# Patient Record
Sex: MALE | Race: WHITE | NOT HISPANIC OR LATINO | Employment: OTHER | URBAN - METROPOLITAN AREA
[De-identification: names, ages, dates, MRNs, and addresses within clinical notes are randomized per-mention and may not be internally consistent; named-entity substitution may affect disease eponyms.]

---

## 2017-02-13 ENCOUNTER — GENERIC CONVERSION - ENCOUNTER (OUTPATIENT)
Dept: OTHER | Facility: OTHER | Age: 62
End: 2017-02-13

## 2017-04-25 ENCOUNTER — TRANSCRIBE ORDERS (OUTPATIENT)
Dept: ADMINISTRATIVE | Facility: HOSPITAL | Age: 62
End: 2017-04-25

## 2017-04-25 ENCOUNTER — APPOINTMENT (OUTPATIENT)
Dept: LAB | Facility: MEDICAL CENTER | Age: 62
End: 2017-04-25
Payer: MEDICARE

## 2017-04-25 DIAGNOSIS — I10 ESSENTIAL (PRIMARY) HYPERTENSION: ICD-10-CM

## 2017-04-25 LAB
ALBUMIN SERPL BCP-MCNC: 3.6 G/DL (ref 3.5–5)
ALP SERPL-CCNC: 95 U/L (ref 46–116)
ALT SERPL W P-5'-P-CCNC: 21 U/L (ref 12–78)
ANION GAP SERPL CALCULATED.3IONS-SCNC: 6 MMOL/L (ref 4–13)
AST SERPL W P-5'-P-CCNC: 14 U/L (ref 5–45)
BASOPHILS # BLD AUTO: 0.04 THOUSANDS/ΜL (ref 0–0.1)
BASOPHILS NFR BLD AUTO: 1 % (ref 0–1)
BILIRUB SERPL-MCNC: 0.69 MG/DL (ref 0.2–1)
BILIRUB UR QL STRIP: NEGATIVE
BUN SERPL-MCNC: 14 MG/DL (ref 5–25)
CALCIUM SERPL-MCNC: 8.8 MG/DL (ref 8.3–10.1)
CHLORIDE SERPL-SCNC: 106 MMOL/L (ref 100–108)
CHOLEST SERPL-MCNC: 136 MG/DL (ref 50–200)
CLARITY UR: NORMAL
CO2 SERPL-SCNC: 29 MMOL/L (ref 21–32)
COLOR UR: YELLOW
CREAT SERPL-MCNC: 0.74 MG/DL (ref 0.6–1.3)
EOSINOPHIL # BLD AUTO: 0.15 THOUSAND/ΜL (ref 0–0.61)
EOSINOPHIL NFR BLD AUTO: 3 % (ref 0–6)
ERYTHROCYTE [DISTWIDTH] IN BLOOD BY AUTOMATED COUNT: 13.4 % (ref 11.6–15.1)
GFR SERPL CREATININE-BSD FRML MDRD: >60 ML/MIN/1.73SQ M
GLUCOSE P FAST SERPL-MCNC: 105 MG/DL (ref 65–99)
GLUCOSE UR STRIP-MCNC: NEGATIVE MG/DL
HCT VFR BLD AUTO: 46.8 % (ref 36.5–49.3)
HDLC SERPL-MCNC: 33 MG/DL (ref 40–60)
HGB BLD-MCNC: 15.6 G/DL (ref 12–17)
HGB UR QL STRIP.AUTO: NEGATIVE
KETONES UR STRIP-MCNC: NEGATIVE MG/DL
LDLC SERPL CALC-MCNC: 89 MG/DL (ref 0–100)
LEUKOCYTE ESTERASE UR QL STRIP: NEGATIVE
LYMPHOCYTES # BLD AUTO: 1.15 THOUSANDS/ΜL (ref 0.6–4.47)
LYMPHOCYTES NFR BLD AUTO: 22 % (ref 14–44)
MCH RBC QN AUTO: 32.6 PG (ref 26.8–34.3)
MCHC RBC AUTO-ENTMCNC: 33.3 G/DL (ref 31.4–37.4)
MCV RBC AUTO: 98 FL (ref 82–98)
MONOCYTES # BLD AUTO: 0.43 THOUSAND/ΜL (ref 0.17–1.22)
MONOCYTES NFR BLD AUTO: 8 % (ref 4–12)
NEUTROPHILS # BLD AUTO: 3.35 THOUSANDS/ΜL (ref 1.85–7.62)
NEUTS SEG NFR BLD AUTO: 66 % (ref 43–75)
NITRITE UR QL STRIP: NEGATIVE
NRBC BLD AUTO-RTO: 0 /100 WBCS
PH UR STRIP.AUTO: 7 [PH] (ref 4.5–8)
PLATELET # BLD AUTO: 220 THOUSANDS/UL (ref 149–390)
PMV BLD AUTO: 10 FL (ref 8.9–12.7)
POTASSIUM SERPL-SCNC: 4 MMOL/L (ref 3.5–5.3)
PROT SERPL-MCNC: 7 G/DL (ref 6.4–8.2)
PROT UR STRIP-MCNC: NEGATIVE MG/DL
RBC # BLD AUTO: 4.78 MILLION/UL (ref 3.88–5.62)
SODIUM SERPL-SCNC: 141 MMOL/L (ref 136–145)
SP GR UR STRIP.AUTO: 1 (ref 1–1.03)
TRIGL SERPL-MCNC: 68 MG/DL
TSH SERPL DL<=0.05 MIU/L-ACNC: 1.05 UIU/ML (ref 0.36–3.74)
UROBILINOGEN UR QL STRIP.AUTO: 0.2 E.U./DL
WBC # BLD AUTO: 5.14 THOUSAND/UL (ref 4.31–10.16)

## 2017-04-25 PROCEDURE — 85025 COMPLETE CBC W/AUTO DIFF WBC: CPT

## 2017-04-25 PROCEDURE — 36415 COLL VENOUS BLD VENIPUNCTURE: CPT

## 2017-04-25 PROCEDURE — 80061 LIPID PANEL: CPT

## 2017-04-25 PROCEDURE — 80053 COMPREHEN METABOLIC PANEL: CPT

## 2017-04-25 PROCEDURE — 84443 ASSAY THYROID STIM HORMONE: CPT

## 2017-04-25 PROCEDURE — 81003 URINALYSIS AUTO W/O SCOPE: CPT

## 2017-05-02 ENCOUNTER — ALLSCRIPTS OFFICE VISIT (OUTPATIENT)
Dept: OTHER | Facility: OTHER | Age: 62
End: 2017-05-02

## 2017-06-07 ENCOUNTER — ALLSCRIPTS OFFICE VISIT (OUTPATIENT)
Dept: OTHER | Facility: OTHER | Age: 62
End: 2017-06-07

## 2017-06-21 ENCOUNTER — TRANSCRIBE ORDERS (OUTPATIENT)
Dept: ADMINISTRATIVE | Facility: HOSPITAL | Age: 62
End: 2017-06-21

## 2017-06-21 ENCOUNTER — ALLSCRIPTS OFFICE VISIT (OUTPATIENT)
Dept: OTHER | Facility: OTHER | Age: 62
End: 2017-06-21

## 2017-06-21 DIAGNOSIS — M51.36 OTHER INTERVERTEBRAL DISC DEGENERATION, LUMBAR REGION: ICD-10-CM

## 2017-06-21 DIAGNOSIS — M51.26 OTHER INTERVERTEBRAL DISC DISPLACEMENT, LUMBAR REGION: ICD-10-CM

## 2017-06-21 DIAGNOSIS — M54.16 RADICULOPATHY OF LUMBAR REGION: ICD-10-CM

## 2017-06-21 DIAGNOSIS — M54.50 LOW BACK PAIN: ICD-10-CM

## 2017-06-21 DIAGNOSIS — M48.061 SPINAL STENOSIS OF LUMBAR REGION: ICD-10-CM

## 2017-06-21 DIAGNOSIS — M47.816 SPONDYLOSIS OF LUMBAR REGION WITHOUT MYELOPATHY OR RADICULOPATHY: ICD-10-CM

## 2017-06-21 DIAGNOSIS — M99.83 OTHER BIOMECHANICAL LESIONS OF LUMBAR REGION: ICD-10-CM

## 2017-06-21 DIAGNOSIS — M54.16 LUMBAR RADICULOPATHY: Primary | ICD-10-CM

## 2017-06-21 DIAGNOSIS — M51.26 DISPLACEMENT OF LUMBAR INTERVERTEBRAL DISC WITHOUT MYELOPATHY: ICD-10-CM

## 2017-06-29 ENCOUNTER — HOSPITAL ENCOUNTER (OUTPATIENT)
Dept: RADIOLOGY | Facility: HOSPITAL | Age: 62
Discharge: HOME | End: 2017-06-29
Payer: MEDICARE

## 2017-06-29 ENCOUNTER — HOSPITAL ENCOUNTER (OUTPATIENT)
Dept: MRI IMAGING | Facility: HOSPITAL | Age: 62
Discharge: HOME | End: 2017-06-29
Payer: MEDICARE

## 2017-06-29 DIAGNOSIS — M54.16 RADICULOPATHY OF LUMBAR REGION: ICD-10-CM

## 2017-06-29 DIAGNOSIS — M47.816 SPONDYLOSIS OF LUMBAR REGION WITHOUT MYELOPATHY OR RADICULOPATHY: ICD-10-CM

## 2017-06-29 DIAGNOSIS — M51.36 OTHER INTERVERTEBRAL DISC DEGENERATION, LUMBAR REGION: ICD-10-CM

## 2017-06-29 DIAGNOSIS — M99.83 OTHER BIOMECHANICAL LESIONS OF LUMBAR REGION: ICD-10-CM

## 2017-06-29 DIAGNOSIS — M51.26 OTHER INTERVERTEBRAL DISC DISPLACEMENT, LUMBAR REGION: ICD-10-CM

## 2017-06-29 DIAGNOSIS — M54.50 LOW BACK PAIN: ICD-10-CM

## 2017-06-29 DIAGNOSIS — M48.061 SPINAL STENOSIS OF LUMBAR REGION: ICD-10-CM

## 2017-06-29 PROCEDURE — 72148 MRI LUMBAR SPINE W/O DYE: CPT

## 2017-06-29 PROCEDURE — 72114 X-RAY EXAM L-S SPINE BENDING: CPT

## 2017-07-10 ENCOUNTER — ALLSCRIPTS OFFICE VISIT (OUTPATIENT)
Dept: OTHER | Facility: OTHER | Age: 62
End: 2017-07-10

## 2017-07-12 ENCOUNTER — APPOINTMENT (OUTPATIENT)
Dept: PHYSICAL THERAPY | Facility: MEDICAL CENTER | Age: 62
End: 2017-07-12
Payer: MEDICARE

## 2017-07-12 ENCOUNTER — GENERIC CONVERSION - ENCOUNTER (OUTPATIENT)
Dept: OTHER | Facility: OTHER | Age: 62
End: 2017-07-12

## 2017-07-12 PROCEDURE — 97162 PT EVAL MOD COMPLEX 30 MIN: CPT

## 2017-07-12 PROCEDURE — G8994 SUB PT/OT GOAL STATUS: HCPCS

## 2017-07-12 PROCEDURE — G8993 SUB PT/OT CURRENT STATUS: HCPCS

## 2017-07-17 ENCOUNTER — APPOINTMENT (OUTPATIENT)
Dept: PHYSICAL THERAPY | Facility: MEDICAL CENTER | Age: 62
End: 2017-07-17
Payer: MEDICARE

## 2017-07-17 PROCEDURE — 97110 THERAPEUTIC EXERCISES: CPT

## 2017-07-17 PROCEDURE — 97012 MECHANICAL TRACTION THERAPY: CPT

## 2017-07-19 ENCOUNTER — APPOINTMENT (OUTPATIENT)
Dept: PHYSICAL THERAPY | Facility: MEDICAL CENTER | Age: 62
End: 2017-07-19
Payer: MEDICARE

## 2017-07-19 PROCEDURE — 97012 MECHANICAL TRACTION THERAPY: CPT

## 2017-07-19 PROCEDURE — 97140 MANUAL THERAPY 1/> REGIONS: CPT

## 2017-07-19 PROCEDURE — 97110 THERAPEUTIC EXERCISES: CPT

## 2017-07-24 ENCOUNTER — APPOINTMENT (OUTPATIENT)
Dept: PHYSICAL THERAPY | Facility: MEDICAL CENTER | Age: 62
End: 2017-07-24
Payer: MEDICARE

## 2017-07-24 PROCEDURE — 97012 MECHANICAL TRACTION THERAPY: CPT

## 2017-07-24 PROCEDURE — 97110 THERAPEUTIC EXERCISES: CPT

## 2017-07-26 ENCOUNTER — APPOINTMENT (OUTPATIENT)
Dept: PHYSICAL THERAPY | Facility: MEDICAL CENTER | Age: 62
End: 2017-07-26
Payer: MEDICARE

## 2017-07-26 PROCEDURE — 97110 THERAPEUTIC EXERCISES: CPT

## 2017-07-26 PROCEDURE — 97012 MECHANICAL TRACTION THERAPY: CPT

## 2017-07-31 ENCOUNTER — APPOINTMENT (OUTPATIENT)
Dept: PHYSICAL THERAPY | Facility: MEDICAL CENTER | Age: 62
End: 2017-07-31
Payer: MEDICARE

## 2017-07-31 PROCEDURE — 97012 MECHANICAL TRACTION THERAPY: CPT

## 2017-07-31 PROCEDURE — 97110 THERAPEUTIC EXERCISES: CPT

## 2017-08-02 ENCOUNTER — APPOINTMENT (OUTPATIENT)
Dept: PHYSICAL THERAPY | Facility: MEDICAL CENTER | Age: 62
End: 2017-08-02
Payer: MEDICARE

## 2017-08-07 ENCOUNTER — APPOINTMENT (OUTPATIENT)
Dept: PHYSICAL THERAPY | Facility: MEDICAL CENTER | Age: 62
End: 2017-08-07
Payer: MEDICARE

## 2017-08-07 PROCEDURE — 97012 MECHANICAL TRACTION THERAPY: CPT

## 2017-08-07 PROCEDURE — 97110 THERAPEUTIC EXERCISES: CPT

## 2017-08-14 ENCOUNTER — APPOINTMENT (OUTPATIENT)
Dept: PHYSICAL THERAPY | Facility: MEDICAL CENTER | Age: 62
End: 2017-08-14
Payer: MEDICARE

## 2017-08-14 PROCEDURE — 97110 THERAPEUTIC EXERCISES: CPT

## 2017-08-14 PROCEDURE — 97012 MECHANICAL TRACTION THERAPY: CPT

## 2017-08-21 ENCOUNTER — APPOINTMENT (OUTPATIENT)
Dept: PHYSICAL THERAPY | Facility: MEDICAL CENTER | Age: 62
End: 2017-08-21
Payer: MEDICARE

## 2017-08-21 PROCEDURE — 97012 MECHANICAL TRACTION THERAPY: CPT

## 2017-08-21 PROCEDURE — 97110 THERAPEUTIC EXERCISES: CPT

## 2017-08-28 ENCOUNTER — APPOINTMENT (OUTPATIENT)
Dept: PHYSICAL THERAPY | Facility: MEDICAL CENTER | Age: 62
End: 2017-08-28
Payer: MEDICARE

## 2017-08-28 PROCEDURE — 97012 MECHANICAL TRACTION THERAPY: CPT

## 2017-08-28 PROCEDURE — 97110 THERAPEUTIC EXERCISES: CPT

## 2017-08-29 ENCOUNTER — ALLSCRIPTS OFFICE VISIT (OUTPATIENT)
Dept: OTHER | Facility: OTHER | Age: 62
End: 2017-08-29

## 2017-09-11 ENCOUNTER — ALLSCRIPTS OFFICE VISIT (OUTPATIENT)
Dept: RADIOLOGY | Facility: MEDICAL CENTER | Age: 62
End: 2017-09-11
Payer: MEDICARE

## 2017-09-20 ENCOUNTER — GENERIC CONVERSION - ENCOUNTER (OUTPATIENT)
Dept: OTHER | Facility: OTHER | Age: 62
End: 2017-09-20

## 2017-10-04 ENCOUNTER — ALLSCRIPTS OFFICE VISIT (OUTPATIENT)
Dept: OTHER | Facility: OTHER | Age: 62
End: 2017-10-04

## 2017-10-09 ENCOUNTER — GENERIC CONVERSION - ENCOUNTER (OUTPATIENT)
Dept: OTHER | Facility: OTHER | Age: 62
End: 2017-10-09

## 2017-10-16 ENCOUNTER — ALLSCRIPTS OFFICE VISIT (OUTPATIENT)
Dept: RADIOLOGY | Facility: MEDICAL CENTER | Age: 62
End: 2017-10-16
Payer: MEDICARE

## 2017-10-24 NOTE — PROGRESS NOTES
Assessment  1  Lumbar foraminal stenosis (724 02) (M99 83)   2  Left lumbar radiculopathy (724 4) (M54 16)    Plan  Left lumbar radiculopathy    · Gabapentin 300 MG Oral Capsule; 1 TAB QHS X 3D, THEN 1 TAB BID X 3D, THEN  1 TAB TID   Rx By: Aidee Andino; Dispense: 30 Days ; #:180 Capsule; Refill: 1;For: Left lumbar radiculopathy; ANDRE = N; [facility]   · Nerve Block Transforaminal Epidural Steroid Injection Lumbar; Status:Active; Requested  for:64Bit0609;    Perform:[facility]; Order Comments:(L) L3 & (L) L4 TFESI; JOK:[id number];YUZLFVM; For:Left lumbar radiculopathy; Ordered By:Brandan Leija;   · Follow-up Visit in 4 Weeks Evaluation and Treatment  Follow-up  Status: Hold For -  Scheduling  Requested for: 55SFB9251   Ordered; For: Left lumbar radiculopathy; Ordered By: Aidee Andino Performed:  Due: 55VCP2682    Discussion/Summary    Plan:initiate gabapentin 300 mg daily at bedtime and titrate up to 3 times a day dosing  schedule the patient for left L3 and left L4 transforaminal epidural steroid injection regarding the patient's left L3 and left L4 dermatome pattern of pain  This may need to be repeated  patient has participated in multiple weeks of physical therapy with only mild or no resolution of symptoms  risks and benefits including bleeding, infection, tissue reaction, allergic reaction were discussed  Verbal and written consent were obtained  follow-up in 4-6 weeks to evaluate his response and consider further options at that time  Chief Complaint  1  Back Pain  back and left leg pain      History of Present Illness  Mr Farnaz Barboza returns in follow-up at the request of Dr Richard Wills for consideration of transforaminal epidural steroid injections for his back and radiating left leg pain  patient is interested in pursuing this and had some relief with previous injections  He states his pain is worsening and rates this as a 7/10   He describes constant pain worse in the morning and evening described as shooting and sharp pain  He states this is localized to the back with radiation down the lateral aspect of his left leg towards the knee in an L3 and L4 distribution  have personally reviewed and/or updated the patient's past medical history, past surgical history, family history, social history, current medications, allergies, and vital signs today  Russell Ashford presents with complaints of gradual onset of constant episodes of moderate left lower back pain, described as sharp, radiating to the left buttock and left thigh  On a scale of 1 to 10, the patient rates the pain as 7  Symptoms are worsening  Review of Systems    Constitutional: no fever,-- no recent weight gain-- and-- no recent weight loss  Eyes: no double vision-- and-- no blurry vision  Cardiovascular: no chest pain,-- no palpitations-- and-- no lower extremity edema  Respiratory: no complaints of shortness of breath-- and-- no wheezing  Musculoskeletal: difficulty walking, but-- no muscle weakness,-- no joint stiffness,-- no joint swelling,-- no limb swelling,-- no pain in extremity-- and-- no decreased range of motion  Neurological: no dizziness,-- no difficulty swallowing,-- no memory loss,-- no loss of consciousness-- and-- no seizures  Gastrointestinal: no nausea,-- no vomiting,-- no constipation-- and-- no diarrhea  Genitourinary: no difficulty initiating urine stream,-- no genital pain-- and-- no frequent urination  Integumentary: no complaints of skin rash  Psychiatric: no depression  Endocrine: no excessive thirst,-- no adrenal disease,-- no hypothyroidism-- and-- no hyperthyroidism  Hematologic/Lymphatic: no tendency for easy bruising-- and-- no tendency for easy bleeding  Active Problems  1  Back pain (724 5) (M54 9)   2  Essential hypertension (401 9) (I10)   3  Gastroesophageal reflux disease (530 81) (K21 9)   4  Hyperglycemia (790 29) (R73 9)   5   Hyperlipidemia (272 4) (E78 5)   6  Initial Medicare annual wellness visit (V70 0) (Z00 00)   7  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)   8  Left lumbar radiculopathy (724 4) (M54 16)   9  Lumbago (724 2) (M54 5)   10  Lumbar foraminal stenosis (724 02) (M99 83)   11  Lumbar spinal stenosis (724 02) (M48 06)   12  Lumbar spondylosis (721 3) (M47 816)   13  Other intervertebral disc degeneration, lumbar region (722 52) (M51 36)   14  Polyarticular arthritis (716 50) (M13 0)   15  Protrusion of lumbar intervertebral disc (722 10) (M51 26)   16  Psoriasis (696 1) (L40 9)   17  Rupture of proximal biceps tendon, left, initial encounter (840 8) (C45 069B)    Past Medical History  1  History of A Fall (E888 9)   2  History of Abnormal EKG (794 31) (R94 31)   3  History of Arthritis of right knee (716 96) (M17 11)   4  History of Colon cancer screening (V76 51) (Z12 11)   5  History of Currently Wearing Eyeglasses   6  History of Dizziness and giddiness (780 4) (R42)   7  History of Dry Skin (782 9)   8  History of Gait disturbance (781 2) (R26 9)   9  History of Hiccups (786 8) (R06 6)   10  History of low back pain (V13 59) (Z87 39)   11  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)   12  History of Need for pneumococcal vaccination (V03 82) (Z23)   13  History of Need for revaccination (V05 9) (Z23)   14  History of Need for shingles vaccine (V04 89) (Z23)   15  History of Preoperative cardiovascular examination (V72 81) (Z01 810)   16  History of Status post total right knee replacement (V43 65) (Z96 651)   17  History of Tremor (781 0) (R25 1)    Surgical History  1  History of Complete Colonoscopy   2  History of Excision Of Baker's Cyst   3  History of Knee Surgery   4  History of Laminectomy Cervical   5  History of Tonsillectomy With Adenoidectomy   6  History of Total Knee Arthroplasty    Family History  Father    1   Family history of Seasonal allergies  Family History 2  Family history of Lung Cancer (V16 1)    Social History   · Denied: History of Alcohol use   · Denied: History of Drug use   · Former smoker (V15 82) (Z87 891)   · Home DME Brace   · Home DME Wheelchair   · Less than high school   · Three children   · Uses Safety Equipment - Seatbelts   ·  (V61 07) (Z63 4)    Current Meds   1  Nabumetone 750 MG Oral Tablet; TAKE 1 TABLET TWICE DAILY; Therapy: 89AFC3410 to (JTPCMQMD:[id number])  Requested for: 64EQL3660; Last   Rx:07Jun2017 Ordered   2  Omeprazole 40 MG Oral Capsule Delayed Release; TAKE 1 CAPSULE Every morning; Therapy: 59IRB2923 to (Evaluate:20Oct2017); Last Rx:25Oct2016 Ordered   3  Simvastatin 20 MG Oral Tablet; TAKE 1 TABLET DAILY; Therapy: 80LRY6558 to (Evaluate:27Apr2018); Last JK:03ULN4506 Ordered    Allergies  1  No Known Drug Allergies    Vitals  Vital Signs    Recorded: 70Uko3182 01:47PM   Heart Rate 56   Respiration 14   Systolic 969   Diastolic 54   Height 5 ft 9 in   Weight 204 lb    BMI Calculated 30 13   BSA Calculated 2 08   Pain Scale 7     Physical Exam    Constitutional   General appearance: Well developed, well nourished, alert, in no distress, non-toxic and no overt pain behavior  Eyes   Sclera: anicteric   HEENT   Hearing grossly intact  Pulmonary   Respiratory effort: Even and unlabored  Psychiatric   Mood and affect: Mood and affect appropriate  Neurologic   Cranial nerves: Cranial nerves II-XII grossly intact  Musculoskeletal   Gait and station: Abnormal  -- antalgic on left  Results/Data  Results Free Text Form [facility]:   Results     Radiology:  LUMBAR SPINE    INDICATION: Low back pain radiating to LEFT leg, chronic  COMPARISON: None    VIEWS: AP, bilateral oblique, coned down and lateral projections in neutral, flexion and extension    IMAGES: 7    FINDINGS:    Mild S-shaped scoliotic curvature noted, stable compared to prior      There is no subluxation and alignment is stable in flexion, extension, and neutral positioning  There is no radiographic evidence of acute fracture or destructive osseous lesion  Diffuse multilevel degenerative disc disease throughout the lumbar spine with disc space narrowing, endplate sclerosis and osteophyte formation  This is seen to the greatest degree at L1-2 and L4-5  Vacuum disc vomiting on also noted at L1-2  Hypertrophic facet joint arthropathy through the mid and lower lumbar spine  Visualized soft tissues appear unremarkable  IMPRESSION:    Diffuse multilevel degenerative disc disease as above  No evidence for subluxation or significant alteration in alignment on flexion and extension views  Workstation performed: [id number]    Signed by:      MRI:  MRI LUMBAR SPINE WITHOUT CONTRAST    INDICATION: 75-year-old male, chronic low back pain many years  COMPARISON: 3/26/2014 outside MRI    TECHNIQUE: Sagittal T1, sagittal T2, sagittal inversion recovery, axial T1 and axial T2, coronal T2     IMAGE QUALITY: Diagnostic    FINDINGS:    ALIGNMENT:  No compression fracture  Persistent grade 1 degenerative retrolisthesis L1-2 and L2-3  Persistent mild scoliosis  MARROW SIGNAL: Multilevel degenerative marrow  No significant marrow pathology  DISTAL CORD AND CONUS: Normal size and signal within the distal cord and conus  The conus ends at the T12-L1 level  PARASPINAL SOFT TISSUES: Paraspinal soft tissues are unremarkable  SACRUM: Normal signal within the sacrum  No evidence of insufficiency or stress fracture      LOWER THORACIC DISC SPACES:  Mild degenerative disc and facet disease T12-L1    LUMBAR DISC SPACES:    L1-L2: Progressive severe degenerative disc disease, moderate degenerative facet hypertrophy, persistent grade 1 retrolisthesis, persistent moderate bilateral foraminal stenosis    L2-L3: Progressive moderate to severe degenerative disc disease, moderate degenerative facet hypertrophy, persistent grade 1 retrolisthesis, moderate bilateral foraminal stenosis  Residual bulging annulus  New small right foraminal disc protrusion  with possible right L2 nerve root encroachment  L3-L4: Moderate degenerative disc and facet disease, small left foraminal disc protrusion, moderate to severe left foraminal stenosis, moderate right foraminal stenosis, mild interval progression, possible bilateral L3 nerve root encroachment  Zulma Sinks L4-L5: Progressive moderate to severe degenerative disc and facet disease, progressive moderate to severe bilateral foraminal stenosis, possible bilateral L4 nerve root encroachment  L5-S1: Progressive moderate degenerative disc and facet disease, persistent moderate to severe bilateral foraminal stenosis, possible bilateral L5 nerve root encroachment      IMPRESSION:  Progressive multilevel degenerative spondylosis  Mild scoliosis    Persistent grade 1 retrolisthesis, moderate bilateral foraminal stenosis L1-2, stable    Persistent grade 1 retrolisthesis, moderate bilateral foraminal stenosis, new small right foraminal disc protrusion L2-3    Persistent small left foraminal disc protrusion, moderate to severe left foraminal stenosis, moderate right foraminal stenosis L3-4    Progressive moderate to severe bilateral foraminal stenosis L4-5    Persistent moderate to severe bilateral foraminal stenosis L5-S1  Future Appointments    Date/Time Provider Specialty Site   11/07/2017 10:00 AM Maria De Jesus Costello DO Family Medicine [id number]   10/09/2017 10:00 AM EFRAIN Nelson   [place] [address] [facility]     Signatures   Electronically signed by : Kelly Driscoll DO; Aug 29 2017  2:09PM EST                       (Author)

## 2017-10-25 ENCOUNTER — GENERIC CONVERSION - ENCOUNTER (OUTPATIENT)
Dept: OTHER | Facility: OTHER | Age: 62
End: 2017-10-25

## 2017-10-27 NOTE — PROGRESS NOTES
Assessment  1  Chronic left-sided low back pain (724 2,338 29) (M54 5,G89 29)   2  Left lumbar radiculopathy (724 4) (M54 16)   3  Lumbar foraminal stenosis (724 02) (M99 83)   4  Lumbar spondylosis (721 3) (M47 816)   5  Other intervertebral disc degeneration, lumbar region (722 52) (M51 36)   6  Pain syndrome, chronic (338 4) (G89 4)    Plan  Back pain    · Procedure Flowsheet; Status:Complete;   Done: 32NXZ4444 10:19AM   Performed: In Office; BRF:[id number]; Ordered; For:Back pain; Ordered By:Gordo Prado;  Left lumbar radiculopathy    · Gabapentin 400 MG Oral Capsule; TAKE 1 CAPSULE 3 TIMES DAILY   Rx By: Gill Todd; Dispense: 30 Days ; #:90 Capsule; Refill: 1;For: Left lumbar radiculopathy; ANDRE = N; Verified Transmission to [place] [facility]; Last Updated By: System, [employer]; 10/4/2017 10:31:41 AM  Left lumbar radiculopathy, Lumbar spinal stenosis, Protrusion of lumbar intervertebral  disc    · Nerve Block Transforaminal Epidural Steroid Injection Lumbar; Status:Active; Requested  UMN:[id number];    Perform:[facility]; Order Comments:Left L3 & L4 TFESI with Dr Jonh Cruz; [id number];TGRXIZH; For:Left lumbar radiculopathy, Lumbar spinal stenosis, Protrusion of lumbar intervertebral disc; Ordered By:Gordo Prado;  Pain syndrome, chronic    · Follow-up visit in 6 weeks Evaluation and Treatment  Follow-up  Status: Hold For -  Scheduling  Requested for: 93IWK8924   Ordered; For: Pain syndrome, chronic; Ordered By: Gill Todd Performed:  Due: 47BAQ9553  PMH: History of low back pain    · Nabumetone 750 MG Oral Tablet   Rx By: Suzzette Castleman; Dispense: 90 Days ; #:180 Tablet; Refill: 3;For: PMH: History of low back pain; ANDRE = N; Print Rx; Last Updated By: Tenisha Ordoñez; 10/4/2017 10:16:00 AM    Discussion/Summary    While the patient was in the office today, I did have a thorough conversation with the patient regarding his medication regimen and treatment plan   I explained to the patient at this point since I still feel there is a significant inflammatory component and he did note moderate and stable relief from the previous injection, that it would be beneficial to proceed with the repeat left L3 and L4 transforaminal epidural steroid injection with Dr Sydney Saldaña  The patient was agreeable and verbalized an understanding  Complete risks and benefits including bleeding, infection, tissue reaction, nerve injury and allergic reaction were discussed  The approach was demonstrated using models and literature was provided  Verbal and written consent was obtained  regards to the gabapentin, I explained to the patient that I feel that there is definitely significant neuropathic component and since he is on a subtherapeutic dose, with improvement, without side effects, that it would be in his best interest to increase the gabapentin to 1200 mg a day for the next 6 weeks and see how he does  I advised the patient that if they experience any side effects or issues with the changes in their medication regiment, they should give our office a call to discuss  I also advised the patient not to drive or operate machinery until they see how the changes in the medication regimen affects them  The patient was agreeable and verbalized an understanding  patient is schedule a follow-up office visit in 6 weeks and at that point time we will Re group with regards to his medication regimen and treatment plan  The patient was agreeable and verbalized an understanding  The patient has the current Goals: To proceed with a repeat epidural steroid injection and increase his gabapentin and hopefully note even more significant and stable relief, without side effects  The patent has the current Barriers: Chronic pain syndrome  Patient is able to Self-Care  The treatment plan was reviewed with the patient/guardian   The patient/guardian understands and agrees with the treatment plan   The patient was counseled regarding instructions for management,-- prognosis,-- patient and family education,-- impressions,-- risks and benefits of treatment options-- and-- importance of compliance with treatment  total time of encounter was 25 minutes  Chief Complaint  1  Pain  Left sided low back and leg pain, improved/stable  History of Present Illness  The patient presents today for a follow up office visit  The patient is currently being treated for his left-sided low back and left lower extremity radicular symptoms, which the patient reports has improved since his last office visit  The patient is status post a left L3 and L4 transforaminal epidural steroid injection on September 11, 2017 with Dr Javier Lindquist and reports that he is experiencing 50-60% relief of his pain symptoms as a result of the injection(s)  The patient reports that there is definitely improvement between the injection and the gabapentin, however, he still has significant pain if he tries to do any kind of significant increased or prolonged activity  He states that as long as he is inactive or resting his pain is minimal and very tolerable  The patient reports that at this point since it does not appear that there are any significant surgical options, he feels that he may be needs to just tried a deal with the pain at his current level and presents today to discuss his medication and treatment plan options  Fidelina Alicea presents with complaints of intermittent episodes of moderate r>l, right buttock and right hip pain, radiating to the right buttock  On a scale of 1 to 10, the patient rates the pain as 4  Symptoms are improving  Review of Systems    Constitutional: no fever,-- no recent weight gain-- and-- no recent weight loss  Eyes: no double vision-- and-- no blurry vision  Cardiovascular: no chest pain,-- no palpitations-- and-- no lower extremity edema  Respiratory: no complaints of shortness of breath-- and-- no wheezing  Musculoskeletal: difficulty walking, but-- no muscle weakness,-- no joint stiffness,-- no joint swelling,-- no limb swelling,-- no pain in extremity-- and-- no decreased range of motion  Neurological: no dizziness,-- no difficulty swallowing,-- no memory loss,-- no loss of consciousness-- and-- no seizures  Gastrointestinal: no nausea,-- no vomiting,-- no constipation-- and-- no diarrhea  Genitourinary: no difficulty initiating urine stream,-- no genital pain-- and-- no frequent urination  Integumentary: no complaints of skin rash  Psychiatric: no depression  Endocrine: no excessive thirst,-- no adrenal disease,-- no hypothyroidism-- and-- no hyperthyroidism  Hematologic/Lymphatic: no tendency for easy bruising-- and-- no tendency for easy bleeding  Active Problems  1  Back pain (724 5) (M54 9)   2  Chronic left-sided low back pain (724 2,338 29) (M54 5,G89 29)   3  Essential hypertension (401 9) (I10)   4  Gastroesophageal reflux disease (530 81) (K21 9)   5  Hyperglycemia (790 29) (R73 9)   6  Hyperlipidemia (272 4) (E78 5)   7  Initial Medicare annual wellness visit (V70 0) (Z00 00)   8  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)   9  Left lumbar radiculopathy (724 4) (M54 16)   10  Lumbar foraminal stenosis (724 02) (M99 83)   11  Lumbar spinal stenosis (724 02) (M48 061)   12  Lumbar spondylosis (721 3) (M47 816)   13  Other intervertebral disc degeneration, lumbar region (722 52) (M51 36)   14  Polyarticular arthritis (716 50) (M13 0)   15  Protrusion of lumbar intervertebral disc (722 10) (M51 26)   16  Psoriasis (696 1) (L40 9)   17  Rupture of proximal biceps tendon, left, initial encounter (840 8) (C03 359H)    Past Medical History  1  History of A Fall (E888 9)   2  History of Abnormal EKG (794 31) (R94 31)   3  History of Arthritis of right knee (716 96) (M17 11)   4  History of Colon cancer screening (V76 51) (Z12 11)   5   History of Currently Wearing Eyeglasses   6  History of Dizziness and giddiness (780 4) (R42)   7  History of Dry Skin (782 9)   8  History of Gait disturbance (781 2) (R26 9)   9  History of Hiccups (786 8) (R06 6)   10  History of low back pain (V13 59) (Z87 39)   11  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)   12  History of Need for pneumococcal vaccination (V03 82) (Z23)   13  History of Need for revaccination (V05 9) (Z23)   14  History of Need for shingles vaccine (V04 89) (Z23)   15  History of Preoperative cardiovascular examination (V72 81) (Z01 810)   16  History of Status post total right knee replacement (V43 65) (Z96 651)   17  History of Tremor (781 0) (R25 1)    The active problems and past medical history were reviewed and updated today  Surgical History  1  History of Complete Colonoscopy   2  History of Excision Of Baker's Cyst   3  History of Knee Surgery   4  History of Laminectomy Cervical   5  History of Tonsillectomy With Adenoidectomy   6  History of Total Knee Arthroplasty    The surgical history was reviewed and updated today  Family History  Father    1  Family history of Seasonal allergies  Family History    2  Family history of Lung Cancer (V16 1)    The family history was reviewed and updated today  Social History   · Denied: History of Alcohol use   · Denied: History of Drug use   · Former smoker (V15 82) (Z87 891)   · Home DME Brace   · Home DME Wheelchair   · Less than high school   · Three children   · Uses Safety Equipment - Seatbelts   ·  (V61 07) (Z63 4)  The social history was reviewed and updated today  The social history was reviewed and is unchanged  Current Meds   1  Gabapentin 300 MG Oral Capsule; 1 TAB QHS X 3D, THEN 1 TAB BID X 3D, THEN 1 TAB   TID; Therapy: 43Pfz9286 to [phone]  Requested for: 29Oow3851; Last   Rx:46Yuo7486; Status: [facility] [facility] - Awaiting Verification Ordered   2   Nabumetone 750 MG Oral Tablet; TAKE 1 TABLET TWICE DAILY; Therapy: 33OJO1095 to (ZQJEGOZM:14ZVH4959)  Requested for: 47YZW6380; Last   Rx:07Jun2017 Ordered   3  Omeprazole 40 MG Oral Capsule Delayed Release; TAKE 1 CAPSULE Every morning; Therapy: 18IRM8321 to (Evaluate:20Oct2017); Last Rx:25Oct2016 Ordered   4  Simvastatin 20 MG Oral Tablet; TAKE 1 TABLET DAILY; Therapy: 96JMT1952 to (Evaluate:27Apr2018); Last FE:30WDY0078 Ordered    The medication list was reviewed and updated today  Allergies  1  No Known Drug Allergies    Vitals  Vital Signs    Recorded: 04WQL8631 10:15AM   Temperature 98 2 F   Heart Rate 60   Systolic 523   Diastolic 60   Height 5 ft 9 in   Weight 207 lb    BMI Calculated 30 57   BSA Calculated 2 09   Pain Scale 4     Physical Exam    Constitutional   General appearance: Well developed, well nourished, alert, in no distress, non-toxic and no overt pain behavior  Eyes   Sclera: anicteric   HEENT   Hearing grossly intact  Pulmonary   Respiratory effort: Even and unlabored  Cardiovascular   Examination of extremities: No edema or pitting edema present  Abdomen   Abdomen: Soft, non-tender, non-distended  Skin   Skin and subcutaneous tissue: Normal without rashes or lesions, well hydrated  Psychiatric   Mood and affect: Mood and affect appropriate  Neurologic Motor Tone:    Cranial nerves: Cranial nerves II-XII grossly intact  -- Slightly antalgic, but steady gait without the use of any assistive devices     Musculoskeletal       Results/Data  Procedure Flowsheet 61YRU6330 10:19AM Kelin Mendoza     Test Name Result Flag Reference   Oswestry Score 24         Future Appointments    Date/Time Provider Specialty Site   11/07/2017 10:00 AM Tomasa Baptiste DO Family Medicine [facility] PRACTICE   10/23/2017 03:15 PM Rashmi Springer DO Pain Management [facility] OUTPATIENT   12/06/2017 10:45 AM MEY Coronado Pain Management [facility] SPINE   10/09/2017 10:00 AM Jac Ferris [facility] [place]   Electronically signed by : MEY Deras; Oct  4 2017 12:23PM EST                       (Author)    Electronically signed by : Daniel Sharma DO; Oct  4 2017  4:40PM EST                       (Author)

## 2017-10-31 ENCOUNTER — GENERIC CONVERSION - ENCOUNTER (OUTPATIENT)
Dept: OTHER | Facility: OTHER | Age: 62
End: 2017-10-31

## 2017-11-02 DIAGNOSIS — I10 ESSENTIAL (PRIMARY) HYPERTENSION: ICD-10-CM

## 2017-11-02 DIAGNOSIS — E78.5 HYPERLIPIDEMIA: ICD-10-CM

## 2017-11-02 DIAGNOSIS — R73.9 HYPERGLYCEMIA: ICD-10-CM

## 2017-11-05 ENCOUNTER — APPOINTMENT (OUTPATIENT)
Dept: LAB | Facility: MEDICAL CENTER | Age: 62
End: 2017-11-05
Payer: MEDICARE

## 2017-11-05 ENCOUNTER — TRANSCRIBE ORDERS (OUTPATIENT)
Dept: ADMINISTRATIVE | Facility: HOSPITAL | Age: 62
End: 2017-11-05

## 2017-11-05 ENCOUNTER — GENERIC CONVERSION - ENCOUNTER (OUTPATIENT)
Dept: OTHER | Facility: OTHER | Age: 62
End: 2017-11-05

## 2017-11-05 DIAGNOSIS — I10 ESSENTIAL (PRIMARY) HYPERTENSION: ICD-10-CM

## 2017-11-05 DIAGNOSIS — E78.5 HYPERLIPIDEMIA: ICD-10-CM

## 2017-11-05 DIAGNOSIS — R73.9 HYPERGLYCEMIA: ICD-10-CM

## 2017-11-05 LAB
ALBUMIN SERPL BCP-MCNC: 3.6 G/DL (ref 3.5–5)
ALP SERPL-CCNC: 103 U/L (ref 46–116)
ALT SERPL W P-5'-P-CCNC: 19 U/L (ref 12–78)
ANION GAP SERPL CALCULATED.3IONS-SCNC: 4 MMOL/L (ref 4–13)
AST SERPL W P-5'-P-CCNC: 11 U/L (ref 5–45)
BASOPHILS # BLD AUTO: 0.05 THOUSANDS/ΜL (ref 0–0.1)
BASOPHILS NFR BLD AUTO: 1 % (ref 0–1)
BILIRUB SERPL-MCNC: 0.41 MG/DL (ref 0.2–1)
BILIRUB UR QL STRIP: NEGATIVE
BUN SERPL-MCNC: 21 MG/DL (ref 5–25)
CALCIUM SERPL-MCNC: 9 MG/DL (ref 8.3–10.1)
CHLORIDE SERPL-SCNC: 105 MMOL/L (ref 100–108)
CHOLEST SERPL-MCNC: 135 MG/DL (ref 50–200)
CLARITY UR: CLEAR
CO2 SERPL-SCNC: 29 MMOL/L (ref 21–32)
COLOR UR: YELLOW
CREAT SERPL-MCNC: 0.81 MG/DL (ref 0.6–1.3)
EOSINOPHIL # BLD AUTO: 0.09 THOUSAND/ΜL (ref 0–0.61)
EOSINOPHIL NFR BLD AUTO: 2 % (ref 0–6)
ERYTHROCYTE [DISTWIDTH] IN BLOOD BY AUTOMATED COUNT: 13.1 % (ref 11.6–15.1)
EST. AVERAGE GLUCOSE BLD GHB EST-MCNC: 114 MG/DL
GFR SERPL CREATININE-BSD FRML MDRD: 94 ML/MIN/1.73SQ M
GLUCOSE P FAST SERPL-MCNC: 122 MG/DL (ref 65–99)
GLUCOSE UR STRIP-MCNC: NEGATIVE MG/DL
HBA1C MFR BLD: 5.6 % (ref 4.2–6.3)
HCT VFR BLD AUTO: 48.8 % (ref 36.5–49.3)
HDLC SERPL-MCNC: 33 MG/DL (ref 40–60)
HGB BLD-MCNC: 16.8 G/DL (ref 12–17)
HGB UR QL STRIP.AUTO: NEGATIVE
KETONES UR STRIP-MCNC: NEGATIVE MG/DL
LDLC SERPL CALC-MCNC: 88 MG/DL (ref 0–100)
LEUKOCYTE ESTERASE UR QL STRIP: NEGATIVE
LYMPHOCYTES # BLD AUTO: 1.23 THOUSANDS/ΜL (ref 0.6–4.47)
LYMPHOCYTES NFR BLD AUTO: 23 % (ref 14–44)
MCH RBC QN AUTO: 33.7 PG (ref 26.8–34.3)
MCHC RBC AUTO-ENTMCNC: 34.4 G/DL (ref 31.4–37.4)
MCV RBC AUTO: 98 FL (ref 82–98)
MONOCYTES # BLD AUTO: 0.41 THOUSAND/ΜL (ref 0.17–1.22)
MONOCYTES NFR BLD AUTO: 8 % (ref 4–12)
NEUTROPHILS # BLD AUTO: 3.52 THOUSANDS/ΜL (ref 1.85–7.62)
NEUTS SEG NFR BLD AUTO: 66 % (ref 43–75)
NITRITE UR QL STRIP: NEGATIVE
NRBC BLD AUTO-RTO: 0 /100 WBCS
PH UR STRIP.AUTO: 6.5 [PH] (ref 4.5–8)
PLATELET # BLD AUTO: 204 THOUSANDS/UL (ref 149–390)
PMV BLD AUTO: 9.1 FL (ref 8.9–12.7)
POTASSIUM SERPL-SCNC: 4.2 MMOL/L (ref 3.5–5.3)
PROT SERPL-MCNC: 7 G/DL (ref 6.4–8.2)
PROT UR STRIP-MCNC: NEGATIVE MG/DL
RBC # BLD AUTO: 4.99 MILLION/UL (ref 3.88–5.62)
SODIUM SERPL-SCNC: 138 MMOL/L (ref 136–145)
SP GR UR STRIP.AUTO: 1.01 (ref 1–1.03)
TRIGL SERPL-MCNC: 72 MG/DL
TSH SERPL DL<=0.05 MIU/L-ACNC: 1.22 UIU/ML (ref 0.36–3.74)
UROBILINOGEN UR QL STRIP.AUTO: 0.2 E.U./DL
WBC # BLD AUTO: 5.31 THOUSAND/UL (ref 4.31–10.16)

## 2017-11-05 PROCEDURE — 83036 HEMOGLOBIN GLYCOSYLATED A1C: CPT

## 2017-11-05 PROCEDURE — 81003 URINALYSIS AUTO W/O SCOPE: CPT

## 2017-11-05 PROCEDURE — 85025 COMPLETE CBC W/AUTO DIFF WBC: CPT

## 2017-11-05 PROCEDURE — 80061 LIPID PANEL: CPT

## 2017-11-05 PROCEDURE — 84443 ASSAY THYROID STIM HORMONE: CPT

## 2017-11-05 PROCEDURE — 36415 COLL VENOUS BLD VENIPUNCTURE: CPT

## 2017-11-05 PROCEDURE — 80053 COMPREHEN METABOLIC PANEL: CPT

## 2017-11-07 ENCOUNTER — ALLSCRIPTS OFFICE VISIT (OUTPATIENT)
Dept: OTHER | Facility: OTHER | Age: 62
End: 2017-11-07

## 2017-11-08 NOTE — PROGRESS NOTES
Assessment  1  Essential hypertension (401 9) (I10)   2  Hyperlipidemia (272 4) (E78 5)   3  Hyperglycemia (790 29) (R73 9)   4  Gastroesophageal reflux disease (530 81) (K21 9)   5  Lumbar spinal stenosis (724 02) (M48 061)   6  Lumbar foraminal stenosis (724 02) (M99 83)   7  Need for influenza vaccination (V04 81) (Z23)   8  Chronic left-sided low back pain (724 2,338 29) (M54 5,G89 29)    Plan  Hyperglycemia    · (1) HEMOGLOBIN A1C; Status:Active; Requested for:07May2018;    · (1) URINALYSIS (will reflex a microscopy if leukocytes, occult blood, protein or nitrites are  not within normal limits); Status:Active; Requested for:07May2018;   Hyperlipidemia    · (1) CBC/PLT/DIFF; Status:Active; Requested for:07May2018;    · (1) COMPREHENSIVE METABOLIC PANEL; Status:Active; Requested for:07May2018;    · (1) LIPID PANEL, FASTING; Status:Active; Requested for:07May2018;    · (1) TSH; Status:Active; Requested for:07May2018;   Need for influenza vaccination    · Fluzone Quadrivalent 0 5 ML Intramuscular Suspension Prefilled Syringe;  INJECT 0 5  ML Intramuscular; To Be Done: 75TUV4321    Continue current medications  Follow up with Pain Management and Neurosurgery as scheduled  Call the office if there is any problems  Repeat lab work in 6 months  Chief Complaint  follow up lipid,reflux  no refills needed  review blood work,      History of Present Illness  Patient is a 77-year-old male presenting to the office for follow-up on his blood pressure, cholesterol, sugar, and general health  He continues to see pain management and Neurosurgery for his chronic back pain  He had a recent injection, and feels he is doing well  He states that he is happy with his current level of pain control  His reflux is well controlled with diet  He only takes his acid medication once a month as needed  Review of Systems    Constitutional: No fever or chills, feels well, no tiredness, no recent weight gain or weight loss     Eyes: No complaints of eye pain, no red eyes, no discharge from eyes, no itchy eyes  ENT: no complaints of earache, no hearing loss, no nosebleeds, no nasal discharge, no sore throat, no hoarseness  Cardiovascular: No complaints of slow heart rate, no fast heart rate, no chest pain, no palpitations, no leg claudication, no lower extremity  Respiratory: No complaints of shortness of breath, no wheezing, no cough, no SOB on exertion, no orthopnea or PND  Gastrointestinal: No complaints of abdominal pain, no constipation, no nausea or vomiting, no diarrhea or bloody stools  Genitourinary: No complaints of dysuria, no incontinence, no hesitancy, no nocturia, no genital lesion, no testicular pain  Musculoskeletal: as noted in HPI  Integumentary: No complaints of skin rash or skin lesions, no itching, no skin wound, no dry skin  Neurological: No compliants of headache, no confusion, no convulsions, no numbness or tingling, no dizziness or fainting, no limb weakness, no difficulty walking  Psychiatric: Is not suicidal, no sleep disturbances, no anxiety or depression, no change in personality, no emotional problems  Endocrine: No complaints of proptosis, no hot flashes, no muscle weakness, no erectile dysfunction, no deepening of the voice, no feelings of weakness  Hematologic/Lymphatic: No complaints of swollen glands, no swollen glands in the neck, does not bleed easily, no easy bruising  Active Problems  1  Back pain (724 5) (M54 9)   2  Chronic left-sided low back pain (724 2,338 29) (M54 5,G89 29)   3  Essential hypertension (401 9) (I10)   4  Gastroesophageal reflux disease (530 81) (K21 9)   5  Hyperglycemia (790 29) (R73 9)   6  Hyperlipidemia (272 4) (E78 5)   7  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)   8  Left lumbar radiculopathy (724 4) (M54 16)   9  Lumbar foraminal stenosis (724 02) (M99 83)   10  Lumbar spinal stenosis (724 02) (M48 061)   11   Lumbar spondylosis (721 3) (M47 816)   12  Other intervertebral disc degeneration, lumbar region (722 52) (M51 36)   13  Pain syndrome, chronic (338 4) (G89 4)   14  Polyarticular arthritis (716 50) (M13 0)   15  Protrusion of lumbar intervertebral disc (722 10) (M51 26)   16  Psoriasis (696 1) (L40 9)   17  Rupture of proximal biceps tendon, left, initial encounter (840 8) (X08 170Z)    Past Medical History  1  History of A Fall (E888 9)   2  History of Abnormal EKG (794 31) (R94 31)   3  History of Arthritis of right knee (716 96) (M17 11)   4  History of Colon cancer screening (V76 51) (Z12 11)   5  History of Currently Wearing Eyeglasses   6  History of Dizziness and giddiness (780 4) (R42)   7  History of Dry Skin (782 9)   8  History of Gait disturbance (781 2) (R26 9)   9  History of Hiccups (786 8) (R06 6)   10  History of low back pain (V13 59) (Z87 39)   11  History of Initial Medicare annual wellness visit (V70 0) (Z00 00)   12  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)   13  History of Need for pneumococcal vaccination (V03 82) (Z23)   14  History of Need for revaccination (V05 9) (Z23)   15  History of Need for shingles vaccine (V04 89) (Z23)   16  History of Preoperative cardiovascular examination (V72 81) (Z01 810)   17  History of Status post total right knee replacement (V43 65) (Z96 651)   18  History of Tremor (781 0) (R25 1)    The active problems and past medical history were reviewed and updated today  Surgical History  1  History of Complete Colonoscopy   2  History of Excision Of Baker's Cyst   3  History of Knee Surgery   4  History of Laminectomy Cervical   5  History of Tonsillectomy With Adenoidectomy   6  History of Total Knee Arthroplasty    Family History  Father    1  Family history of Seasonal allergies  Family History    2   Family history of Lung Cancer (V16 1)    Social History   · Denied: History of Alcohol use   · Denied: History of Drug use   · Former smoker (V15 82) (D56 238)   · Home DME Brace   · Home DME Wheelchair   · Less than high school   · Three children   · Uses Safety Equipment - Seatbelts   ·  (V61 07) (Z63 4)  The social history was reviewed and updated today  The social history was reviewed and is unchanged  Current Meds   1  Gabapentin 400 MG Oral Capsule; TAKE 1 CAPSULE 3 TIMES DAILY; Therapy: 28Ckl1633 to (Evaluate:43Ujz0587)  Requested for: 78POV2912; Last   Rx:04Oct2017 Ordered   2  Omeprazole 40 MG Oral Capsule Delayed Release; TAKE 1 CAPSULE Every morning; Therapy: 15SWD1994 to (Evaluate:41Cjo4519); Last Rx:82Pdb7559 Ordered   3  Simvastatin 20 MG Oral Tablet; TAKE 1 TABLET DAILY; Therapy: 73NQQ2571 to (Evaluate:79Sxv6625); Last UN:99BTG3077 Ordered    The medication list was reviewed and updated today  Allergies  1  No Known Drug Allergies    Vitals  Vital Signs    Recorded: 53SEY4283 54:28AI   Systolic 485 mm Hg   Diastolic 72 mm Hg   Height 5 ft 9 in   Weight 205 lb    BMI Calculated 30 27 kg/m2   BSA Calculated 2 09 m2     Physical Exam    Constitutional   General appearance: No acute distress, well appearing and well nourished  Eyes   Conjunctiva and lids: No swelling, erythema, or discharge  Pupils and irises: Equal, round and reactive to light  Ears, Nose, Mouth, and Throat   External inspection of ears and nose: Normal     Otoscopic examination: Tympanic membrance translucent with normal light reflex  Canals patent without erythema  Nasal mucosa, septum, and turbinates: Normal without edema or erythema  Oropharynx: Normal with no erythema, edema, exudate or lesions  Pulmonary   Respiratory effort: No increased work of breathing or signs of respiratory distress  Auscultation of lungs: Clear to auscultation, equal breath sounds bilaterally, no wheezes, no rales, no rhonci  Cardiovascular   Palpation of heart: Normal PMI, no thrills      Auscultation of heart: Normal rate and rhythm, normal S1 and S2, without murmurs  Examination of extremities for edema and/or varicosities: Normal     Carotid pulses: Normal     Abdomen   Abdomen: Non-tender, no masses  Liver and spleen: No hepatomegaly or splenomegaly  Lymphatic   Palpation of lymph nodes in neck: No lymphadenopathy  Musculoskeletal   Gait and station: Normal     Digits and nails: Normal without clubbing or cyanosis  -- Mild tenderness the lumbar spine left greater than right  Neurologic   Cranial nerves: Cranial nerves 2-12 intact  Reflexes: 2+ and symmetric  Sensation: No sensory loss  Psychiatric   Orientation to person, place and time: Normal     Mood and affect: Normal          Results/Data  (1) COMPREHENSIVE METABOLIC PANEL 39QKX0911 24:17[facility] Order Number: [id number]     Test Name Result Flag Reference   SODIUM 138 mmol/L  136-145   POTASSIUM 4 2 mmol/L  3 5-5 3   CHLORIDE 105 mmol/L  100-108   CARBON DIOXIDE 29 mmol/L  21-32   ANION GAP (CALC) 4 mmol/L  4-13   BLOOD UREA NITROGEN 21 mg/dL  5-25   CREATININE 0 81 mg/dL  0 60-1 30   Standardized to IDMS reference method   CALCIUM 9 0 mg/dL  8 3-10 1   BILI, TOTAL 0 41 mg/dL  0 20-1 00   ALK PHOSPHATAS 103 U/L     ALT (SGPT) 19 U/L  12-78   Specimen collection should occur prior to Sulfasalazine and/or Sulfapyridine administration due to the potential for falsely depressed results  AST(SGOT) 11 U/L  5-45   Specimen collection should occur prior to Sulfasalazine administration due to the potential for falsely depressed results  ALBUMIN 3 6 g/dL  3 5-5 0   TOTAL PROTEIN 7 0 g/dL  6 4-8 2   eGFR 94 ml/min/1 73sq m     National Kidney Disease Education Program recommendations are as follows:  GFR calculation is accurate only with a steady state creatinine  Chronic Kidney disease less than 60 ml/min/1 73 sq  meters  Kidney failure less than 15 ml/min/1 73 sq  meters     GLUCOSE FASTING 122 mg/dL H 65-99   Specimen collection should occur prior to Sulfasalazine administration due to the potential for falsely depressed results  Specimen collection should occur prior to Sulfapyridine administration due to the potential for falsely elevated results  (1) URINALYSIS (will reflex a microscopy if leukocytes, occult blood, protein or nitrites are not within normal limits) 54VHU1977 08:19AM [facility] Order Number: [id number]     Test Name Result Flag Reference   COLOR Yellow     CLARITY Clear     SPECIFIC GRAVITY UA 1 007  1 003-1 030   PH UA 6 5  4 5-8 0   LEUKOCYTE ESTERASE UA Negative  Negative   NITRITE UA Negative  Negative   PROTEIN UA Negative mg/dl  Negative   GLUCOSE UA Negative mg/dl  Negative   KETONES UA Negative mg/dl  Negative   UROBILINOGEN UA 0 2 E U /dl  0 2, 1 0 E U /dl   BILIRUBIN UA Negative  Negative   BLOOD UA Negative  Negative     (1) CBC/PLT/DIFF 29FOW6682 08:19AM [facility]    Order Number: [id number]     Test Name Result Flag Reference   WBC COUNT 5 31 Thousand/uL  4 31-10 16   RBC COUNT 4 99 Million/uL  3 88-5 62   HEMOGLOBIN 16 8 g/dL  12 0-17 0   HEMATOCRIT 48 8 %  36 5-49 3   MCV 98 fL  82-98   MCH 33 7 pg  26 8-34 3   MCHC 34 4 g/dL  31 4-37 4   RDW 13 1 %  11 6-15 1   MPV 9 1 fL  8 9-12 7   PLATELET COUNT 686 Thousands/uL  149-390   nRBC AUTOMATED 0 /100 WBCs     NEUTROPHILS RELATIVE PERCENT 66 %  43-75   LYMPHOCYTES RELATIVE PERCENT 23 %  14-44   MONOCYTES RELATIVE PERCENT 8 %  4-12   EOSINOPHILS RELATIVE PERCENT 2 %  0-6   BASOPHILS RELATIVE PERCENT 1 %  0-1   NEUTROPHILS ABSOLUTE COUNT 3 52 Thousands/? ??L  1 85-7 62   LYMPHOCYTES ABSOLUTE COUNT 1 23 Thousands/? ??L  0 60-4 47   MONOCYTES ABSOLUTE COUNT 0 41 Thousand/? ??L  0 17-1 22   EOSINOPHILS ABSOLUTE COUNT 0 09 Thousand/? ??L  0 00-0 61   BASOPHILS ABSOLUTE COUNT 0 05 Thousands/? ??L  0 00-0 10     (1) TSH [id number] 08:19AM [facility] Order Number: [id number]     Test Name Result Flag Reference   TSH 1 220 uIU/mL  0 358-3 740   Patients undergoing fluorescein dye angiography may retain small amounts of fluorescein in the body for 48-72 hours post procedure  Samples containing fluorescein can produce falsely depressed TSH values  If the patient had this procedure,a specimen should be resubmitted post fluorescein clearance  (1) LIPID PANEL, FASTING [id number] 08:19AM [facility] Order Number: [id number]     Test Name Result Flag Reference   CHOLESTEROL 135 mg/dL     HDL,DIRECT 33 mg/dL L 40-60   Specimen collection should occur prior to Metamizole administration due to the potential for falsley depressed results  LDL CHOLESTEROL CALCULATED 88 mg/dL  0-100   Triglyceride:        Normal <150 mg/dl   Borderline High 150-199 mg/dl   High 200-499 mg/dl   Very High >499 mg/dl      Cholesterol:       Desirable <200 mg/dl    Borderline High 200-239 mg/dl    High >239 mg/dl      HDL Cholesterol:       High>59 mg/dL    Low <41 mg/dL      This screening LDL is a calculated result  It does not have the accuracy of the Direct Measured LDL in the monitoring of patients with hyperlipidemia and/or statin therapy  Direct Measure LDL (GSI113) must be ordered separately in these patients  TRIGLYCERIDES 72 mg/dL  <=150   Specimen collection should occur prior to N-Acetylcysteine or Metamizole administration due to the potential for falsely depressed results  (1) HEMOGLOBIN A1C [id number] 08:19AM [facility] Order Number: [id number]     Test Name Result Flag Reference   HEMOGLOBIN A1C 5 6 %  4 2-6 3   EST  AVG   GLUCOSE 114 mg/dl       Future Appointments    Date/Time Provider Specialty Site   11/27/2017 10:30 AM [facility]     Signatures   Electronically signed by : Margo Cobos DO; Nov 7 2017 10:12AM EST                       (Author)

## 2017-11-27 ENCOUNTER — ALLSCRIPTS OFFICE VISIT (OUTPATIENT)
Dept: OTHER | Facility: OTHER | Age: 62
End: 2017-11-27

## 2017-11-28 ENCOUNTER — GENERIC CONVERSION - ENCOUNTER (OUTPATIENT)
Dept: OTHER | Facility: OTHER | Age: 62
End: 2017-11-28

## 2017-11-28 NOTE — PROGRESS NOTES
Assessment    1  Lumbar foraminal stenosis (724 02) (M99 83)   2  Left lumbar radiculopathy (724 4) (M54 16)    Plan  Chronic left-sided low back pain, Left lumbar radiculopathy    · Follow-up visit in 6 months Evaluation and Treatment  Follow-up with AO for med refills Status: Hold For - Scheduling  Requested for: 99JSQ5509   Ordered; For: Chronic left-sided low back pain, Left lumbar radiculopathy; Ordered By: Ginny Kerr Performed:  Due: 35HZV4361  Left lumbar radiculopathy    · Gabapentin 400 MG Oral Capsule; TAKE 1 CAPSULE 3 TIMES DAILY   Rx By: Ginny Kerr; Dispense: 30 Days ; #:90 Capsule; Refill: 5;Left lumbar radiculopathy; ANDRE = N; Verified Transmission to [facility]27; Last Updated By: System, SureScripts; 11/27/2017 10:36:37 AM    Discussion/Summary    The patient presents today for a follow-up office visit  The patient is currently being treated for his lumbar foraminal stenosis, and left lumbar radiculopathy  The patient is status post a left L3 and L4 transforaminal epidural steroid injection on October 23, 2017 with Dr Mindy Soria, and reports that he is experiencing complete relief of his pain symptoms as a result of the injection  Patient continues to take gabapentin 400 mg 3 times a day with no side effects or issues  discussed with the patient that since there has been significant improvement in the pain symptoms that we will hold off on any repeat injections at this point in time  However, I reviewed with the patient that if his symptoms should return, worsen, and/or experience new pain symptoms, he should call our office to schedule an office visit to discuss repeating the injection  can continue with gabapentin, I did refill this for him today  PDMP was reviewed today and was appropriateschedule 6 month follow-up with the patient for medication refills, at that time we can discuss if another injection is necessary  Patient is able to Self-Care     The treatment plan was reviewed with the patient/guardian  The patient/guardian understands and agrees with the treatment plan      Chief Complaint    1  Back Pain  Left low back and leg pain; improved      History of Present Illness  The patient presents today for a follow-up office visit  The patient is currently being treated for his lumbar foraminal stenosis, and left lumbar radiculopathy  The patient is status post a left L3 and L4 transforaminal epidural steroid injection on October 23, 2017 with Dr Kiley Jackson, and reports that he is experiencing complete relief of his pain symptoms as a result of the injection  Patient continues to take gabapentin 400 mg 3 times a day with no side effects or issues  the patient rates his pain 0/10, he does get occasional pain in the left low back   have personally reviewed and/or updated the patient's past medical history, past surgical history, family history, social history, current medications, allergies, and vital signs today  Marla Barbour presents with complaints of occasional episodes of left lower back pain  The patient reports no pain  Symptoms are improving  Review of Systems   Constitutional: no fever,-- no recent weight gain-- and-- no recent weight loss  Eyes: no double vision-- and-- no blurry vision  Cardiovascular: no chest pain,-- no palpitations-- and-- no lower extremity edema  Respiratory: no complaints of shortness of breath-- and-- no wheezing  Musculoskeletal: no difficulty walking,-- no muscle weakness,-- no joint stiffness,-- no joint swelling,-- no limb swelling,-- no pain in extremity-- and-- no decreased range of motion  Neurological: no dizziness,-- no difficulty swallowing,-- no memory loss,-- no loss of consciousness-- and-- no seizures  Gastrointestinal: no nausea,-- no vomiting,-- no constipation-- and-- no diarrhea  Genitourinary: no difficulty initiating urine stream,-- no genital pain-- and-- no frequent urination    Integumentary: no complaints of skin rash  Psychiatric: no depression  Endocrine: no excessive thirst,-- no adrenal disease,-- no hypothyroidism-- and-- no hyperthyroidism  Hematologic/Lymphatic: no tendency for easy bruising-- and-- no tendency for easy bleeding  Active Problems  1  Back pain (724 5) (M54 9)   2  Chronic left-sided low back pain (724 2,338 29) (M54 5,G89 29)   3  Essential hypertension (401 9) (I10)   4  Gastroesophageal reflux disease (530 81) (K21 9)   5  Hyperglycemia (790 29) (R73 9)   6  Hyperlipidemia (272 4) (E78 5)   7  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)   8  Left lumbar radiculopathy (724 4) (M54 16)   9  Lumbar foraminal stenosis (724 02) (M99 83)   10  Lumbar spinal stenosis (724 02) (M48 061)   11  Lumbar spondylosis (721 3) (M47 816)   12  Need for influenza vaccination (V04 81) (Z23)   13  Other intervertebral disc degeneration, lumbar region (722 52) (M51 36)   14  Pain syndrome, chronic (338 4) (G89 4)   15  Polyarticular arthritis (716 50) (M13 0)   16  Protrusion of lumbar intervertebral disc (722 10) (M51 26)   17  Psoriasis (696 1) (L40 9)   18  Rupture of proximal biceps tendon, left, initial encounter (840 8) (F28 364U)    Past Medical History  1  History of A Fall (E888 9)   2  History of Abnormal EKG (794 31) (R94 31)   3  History of Arthritis of right knee (716 96) (M17 11)   4  History of Colon cancer screening (V76 51) (Z12 11)   5  History of Currently Wearing Eyeglasses   6  History of Dizziness and giddiness (780 4) (R42)   7  History of Dry Skin (782 9)   8  History of Gait disturbance (781 2) (R26 9)   9  History of Hiccups (786 8) (R06 6)   10  History of low back pain (V13 59) (Z87 39)   11  History of Initial Medicare annual wellness visit (V70 0) (Z00 00)   12  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)   13  History of Need for pneumococcal vaccination (V03 82) (Z23)   14   History of Need for revaccination (V05 9) (Z23)   15  History of Need for shingles vaccine (V04 89) (Z23)   16  History of Preoperative cardiovascular examination (V72 81) (Z01 810)   17  History of Status post total right knee replacement (V43 65) (Z96 651)   18  History of Tremor (781 0) (R25 1)    Surgical History  1  History of Complete Colonoscopy   2  History of Excision Of Baker's Cyst   3  History of Knee Surgery   4  History of Laminectomy Cervical   5  History of Tonsillectomy With Adenoidectomy   6  History of Total Knee Arthroplasty    Family History  Father    1  Family history of Seasonal allergies  Family History    2  Family history of Lung Cancer (V16 1)    Social History     · Denied: History of Alcohol use   · Denied: History of Drug use   · Former smoker (V15 82) (Z87 891)   · Home DME Brace   · Home DME Wheelchair   · Less than high school   · Three children   · Uses Safety Equipment - Seatbelts   ·  (V61 07) (Z63 4)    Current Meds   1  Gabapentin 400 MG Oral Capsule; TAKE 1 CAPSULE 3 TIMES DAILY; Therapy: 53Acv9590 to (Evaluate:22Bah5352)  Requested for: 59UZQ2898; Last Rx:34Tfx5797 Ordered   2  Omeprazole 40 MG Oral Capsule Delayed Release; TAKE 1 CAPSULE Every morning; Therapy: 68XZG5610 to (Evaluate:93Bpt0858); Last Rx:96Aza7742 Ordered   3  Simvastatin 20 MG Oral Tablet; TAKE 1 TABLET DAILY; Therapy: 59SWC3601 to (Evaluate:62Zry3862); Last FE:36MIB3418 Ordered    Allergies  1  No Known Drug Allergies    Vitals  Vital Signs    Recorded: 05KRJ3634 10:22AM   Temperature 37 4 F   Systolic 146   Diastolic 60   Weight 109 lb    BMI Calculated 30 27   BSA Calculated 2 09   Pain Scale 0       Physical Exam   Constitutional  General appearance: Well developed, well nourished, alert, in no distress, non-toxic and no overt pain behavior  Eyes  Sclera: anicteric  HEENT  Hearing grossly intact  Pulmonary  Respiratory effort: Even and unlabored  Psychiatric  Mood and affect: Mood and affect appropriate     Neurologic  Cranial nerves: Cranial nerves II-XII grossly intact  Musculoskeletal  Gait and station: Normal    Lumbar/Sacral Spine examination demonstrates Lumbosacral Spine:  Tenderness: None  Flexion was restricted-- and-- was painless  Extension was restricted-- and-- was painless  Left lateral flexion was restricted-- and-- was painless  Right lateral flexion was restricted-- and-- was painless  Rotation to the left was restricted-- and-- was painless  Rotation to the right was restricted-- and-- was painless  Foot and ankle strength was normal bilaterally  Knee strength was normal bilaterally  Hip strength was normal bilaterally  Special Tests: negative Slump test on right-- and-- negative Slump test on left  Results/Data  Procedure Flowsheet 28MLS6614 10:25AM      Test Name Result Flag Reference   Oswestry Score 12       * MRI LUMBAR SPINE WO CONTRAST 85KCJ5376 06:52AM Hanna Aid Order Number: [id number]  Performing Comments: june 29 2017 7:15am [facility] Patient Instructions: To schedule this appointment, please contact Central Scheduling at [phone]  Test Name Result Flag Reference   MRI LUMBAR SPINE WO CONTRAST (Report)       MRI LUMBAR SPINE WITHOUT CONTRAST   INDICATION: 26-year-old male, chronic low back pain many years  COMPARISON: 3/26/2014 outside MRI   TECHNIQUE: Sagittal T1, sagittal T2, sagittal inversion recovery, axial T1 and axial T2, coronal T2     IMAGE QUALITY: Diagnostic   FINDINGS:   ALIGNMENT:   No compression fracture  Persistent grade 1 degenerative retrolisthesis L1-2 and L2-3  Persistent mild scoliosis  MARROW SIGNAL:  Multilevel degenerative marrow  No significant marrow pathology  DISTAL CORD AND CONUS: Normal size and signal within the distal cord and conus  The conus ends at the T12-L1 level  PARASPINAL SOFT TISSUES: Paraspinal soft tissues are unremarkable  SACRUM: Normal signal within the sacrum   No evidence of insufficiency or stress fracture  LOWER THORACIC DISC SPACES:   Mild degenerative disc and facet disease T12-L1   LUMBAR DISC SPACES:      L1-L2: Progressive severe degenerative disc disease, moderate degenerative facet hypertrophy, persistent grade 1 retrolisthesis, persistent moderate bilateral foraminal stenosis   L2-L3: Progressive moderate to severe degenerative disc disease, moderate degenerative facet hypertrophy, persistent grade 1 retrolisthesis, moderate bilateral foraminal stenosis  Residual bulging annulus  New small right foraminal disc protrusion   with possible right L2 nerve root encroachment  L3-L4: Moderate degenerative disc and facet disease, small left foraminal disc protrusion, moderate to severe left foraminal stenosis, moderate right foraminal stenosis, mild interval progression, possible bilateral L3 nerve root encroachment  Tongan Reichmann L4-L5: Progressive moderate to severe degenerative disc and facet disease, progressive moderate to severe bilateral foraminal stenosis, possible bilateral L4 nerve root encroachment  L5-S1: Progressive moderate degenerative disc and facet disease, persistent moderate to severe bilateral foraminal stenosis, possible bilateral L5 nerve root encroachment    IMPRESSION:  Progressive multilevel degenerative spondylosis   Mild scoliosis   Persistent grade 1 retrolisthesis, moderate bilateral foraminal stenosis L1-2, stable   Persistent grade 1 retrolisthesis, moderate bilateral foraminal stenosis, new small right foraminal disc protrusion L2-3   Persistent small left foraminal disc protrusion, moderate to severe left foraminal stenosis, moderate right foraminal stenosis L3-4   Progressive moderate to severe bilateral foraminal stenosis L4-5   Persistent moderate to severe bilateral foraminal stenosis L5-S1    ##sigslh##sigslh    Workstation performed: [id number]   Signed by:  Reed Conklin MD  6/29/17     Future Appointments    Date/Time Provider Specialty Site   05/08/2018 10:00 AM Dusty Duverney, DO Family Medicine [address]       Signatures   Electronically signed by : Chris Hoang; Nov 27 2017 10:39AM EST                       (Author)    Electronically signed by : Blima Meckel, DO; Nov 27 2017  4:11PM EST

## 2018-01-10 NOTE — RESULT NOTES
Message   Recorded as Task   Date: 07/20/2016 03:50 PM, Created By: Meryle Das   Task Name: Financial Authorization   Assigned To: Meryle Das   Regarding Patient: Laure Squires, Status: Active   Comment:    Nishant Goyal - 20 Jul 2016 3:50 PM     TASK CREATED  PT'S CHART COMPLETED FOR PROC ON 07/25/2016; PAT; H/P AND NO AUTH REQ'D AND SCANNED          Signatures   Electronically signed by : Kiya Lin, ; Jul 20 2016  3:50PM EST                       (Author)

## 2018-01-11 NOTE — RESULT NOTES
Message   Recorded as Task   Date: 10/24/2017 08:43 AM, Created By: Wild Gordillo   Task Name: Follow Up   Assigned To: [id number] [address] end procedure,Team   Regarding Patient: Almita Leon, Status: Active   CommentClenton Crekeiths - 24 Oct 2017 8:43 AM     TASK CREATED  Pt  is S/P LT L3 &L4 TFESI on 10/16 by Dr Javier Lindquist  F/U is on 12/06 w/DG  Wild Gordillo - 24 Oct 2017 9:33 AM     TASK EDITED  1st attempt to call, no answer  LMOM to CB with % of relief  Audelia Gastelum - 24 Oct 2017 1:13 PM     TASK EDITED  pt returning call and says he is doing good  80- 90 relief   cb# [phone]   Wild Gordillo - 24 Oct 2017 3:44 PM     TASK EDITED   Moo Garcia - 24 Oct 2017 3:48 PM     TASK REPLIED TO: Previously Assigned To Moo Garcia                      aware        Signatures   Electronically signed by : Olga Connelly, ; Oct 25 2017  8:35AM EST                       (Author)

## 2018-01-11 NOTE — RESULT NOTES
Message   Recorded as Task   Date: 07/01/2016 08:59 AM, Created By: Kathy Veronica   Task Name: Follow Up   Assigned To: [facility] surgery sched,Team   Regarding Patient: Pool Johnson, Status: In Progress   Comment:    Kathy Veronica - 01 Jul 2016 8:59 AM     TASK CREATED  please contact the patient and let him know that we did receive records from his rheumatologist   However, we did not receive a report from his MRI  Please ask him to obtain the MRI report and a disc with images so that we may review this  Tari Cortes - 01 Jul 2016 9:12 AM     TASK REPLIED TO: Previously Assigned To [id number] [place] [address] clinical,Team  Attempted to reach pt  LMOM for pt  to c/b  Tari Cortes - 01 Jul 2016 9:12 AM     TASK IN PROGRESS   Loretta Marie - 01 Jul 2016 10:07 AM     TASK REPLIED TO: Previously Assigned To [facility] surgery sched,Team  Patient called  --message relayed to patient that Dr Marleny Genao is requesting his MRI & disc to review  Patient stated he will ride up to the facility today and           Signatures   Electronically signed by : Ugo Cortes, ; Sep 14 2016  3:38PM EST                       (Author)

## 2018-01-12 VITALS
SYSTOLIC BLOOD PRESSURE: 157 MMHG | BODY MASS INDEX: 30.57 KG/M2 | TEMPERATURE: 98 F | DIASTOLIC BLOOD PRESSURE: 86 MMHG | HEART RATE: 68 BPM | WEIGHT: 206.38 LBS | HEIGHT: 69 IN | RESPIRATION RATE: 16 BRPM

## 2018-01-12 VITALS — SYSTOLIC BLOOD PRESSURE: 138 MMHG | TEMPERATURE: 98.5 F | WEIGHT: 205 LBS | DIASTOLIC BLOOD PRESSURE: 60 MMHG

## 2018-01-12 NOTE — RESULT NOTES
Message   Recorded as Task   Date: 07/27/2016 11:35 AM, Created By: Brittnee Eduardo   Task Name: Follow Up   Assigned To: [facility] surgery sched,Team   Regarding Patient: Juliet Franks, Status: Active   Comment:    Myanader Briscoenurys - 27 Jul 2016 11:35 AM     TASK CREATED  please schedule MBB#2   Latoya Palacios - 27 Jul 2016 12:04 PM     TASK REASSIGNED: Previously Assigned To [address] [place] [address] clinical,Team   Nishant Goyal - 27 Jul 2016 4:23 PM     TASK EDITED  Called pt sched MBB#2 on 08 22 2016 @ 9:45am for 10:00am; verbal inst and aware with meds and  needed and understood          Signatures   Electronically signed by : Sebastián Edwards, ; Jul 27 2016  4:24PM EST                       (Author)

## 2018-01-12 NOTE — RESULT NOTES
Message   Recorded as Task   Date: 09/20/2016 12:47 PM, Created By: Donell Baptiste   Task Name: Follow Up   Assigned To: [id number] [address] clinical,Team   Regarding Patient: Den Del Cid, Status: In Progress   Comment:    Pretty Schaefer - 20 Sep 2016 12:47 PM     TASK CREATED  S/P LEFT L3-4,L4-5,L5-S1 RFA 9-19-16    F/U ov 10-27-16    Spoke with pt who states he is well  Denies any pain  No s/s of infection/fever  Confirmed f/u appt with DR Rafy Markham with pt and advised to cb with any questions     Brandan Leija - 20 Sep 2016 12:49 PM     TASK REPLIED TO: Previously Assigned To [address] [address] clinical,Team    aware agree thanks   Tari Cortes - 20 Sep 2016 12:56 PM     TASK IN PROGRESS        Signatures   Electronically signed by : Robert Arredondo RN; Sep 20 2016 12:57PM EST                       (Author)

## 2018-01-12 NOTE — RESULT NOTES
Discussion/Summary   Lab work is oka, but he is way overdue for checkup  Please schedule  Verified Results  (1) COMPREHENSIVE METABOLIC PANEL [id number] 79:69FM [facility]ie [facility] Order Number: [id number]     Test Name Result Flag Reference   SODIUM 138 mmol/L  136-145   POTASSIUM 4 2 mmol/L  3 5-5 3   CHLORIDE 105 mmol/L  100-108   CARBON DIOXIDE 29 mmol/L  21-32   ANION GAP (CALC) 4 mmol/L  4-13   BLOOD UREA NITROGEN 21 mg/dL  5-25   CREATININE 0 81 mg/dL  0 60-1 30   Standardized to IDMS reference method   CALCIUM 9 0 mg/dL  8 3-10 1   BILI, TOTAL 0 41 mg/dL  0 20-1 00   ALK PHOSPHATAS 103 U/L     ALT (SGPT) 19 U/L  12-78   Specimen collection should occur prior to Sulfasalazine and/or Sulfapyridine administration due to the potential for falsely depressed results  AST(SGOT) 11 U/L  5-45   Specimen collection should occur prior to Sulfasalazine administration due to the potential for falsely depressed results  ALBUMIN 3 6 g/dL  3 5-5 0   TOTAL PROTEIN 7 0 g/dL  6 4-8 2   eGFR 94 ml/min/1 73sq m     National Kidney Disease Education Program recommendations are as follows:  GFR calculation is accurate only with a steady state creatinine  Chronic Kidney disease less than 60 ml/min/1 73 sq  meters  Kidney failure less than 15 ml/min/1 73 sq  meters  GLUCOSE FASTING 122 mg/dL H 65-99   Specimen collection should occur prior to Sulfasalazine administration due to the potential for falsely depressed results  Specimen collection should occur prior to Sulfapyridine administration due to the potential for falsely elevated results       (1) URINALYSIS (will reflex a microscopy if leukocytes, occult blood, protein or nitrites are not within normal limits) [id number] 08:19AM [employer] [facility] Order Number: [id number]     Test Name Result Flag Reference   COLOR Yellow     CLARITY Clear     SPECIFIC GRAVITY UA 1 007  1 003-1 030   PH UA 6 5  4 5-8 0   LEUKOCYTE ESTERASE UA Negative Negative   NITRITE UA Negative  Negative   PROTEIN UA Negative mg/dl  Negative   GLUCOSE UA Negative mg/dl  Negative   KETONES UA Negative mg/dl  Negative   UROBILINOGEN UA 0 2 E U /dl  0 2, 1 0 E U /dl   BILIRUBIN UA Negative  Negative   BLOOD UA Negative  Negative     (1) CBC/PLT/DIFF [id number] 08:19AM [facility] Order Number: [id number]     Test Name Result Flag Reference   WBC COUNT 5 31 Thousand/uL  4 31-10 16   RBC COUNT 4 99 Million/uL  3 88-5 62   HEMOGLOBIN 16 8 g/dL  12 0-17 0   HEMATOCRIT 48 8 %  36 5-49 3   MCV 98 fL  82-98   MCH 33 7 pg  26 8-34 3   MCHC 34 4 g/dL  31 4-37 4   RDW 13 1 %  11 6-15 1   MPV 9 1 fL  8 9-12 7   PLATELET COUNT 212 Thousands/uL  149-390   nRBC AUTOMATED 0 /100 WBCs     NEUTROPHILS RELATIVE PERCENT 66 %  43-75   LYMPHOCYTES RELATIVE PERCENT 23 %  14-44   MONOCYTES RELATIVE PERCENT 8 %  4-12   EOSINOPHILS RELATIVE PERCENT 2 %  0-6   BASOPHILS RELATIVE PERCENT 1 %  0-1   NEUTROPHILS ABSOLUTE COUNT 3 52 Thousands/? ??L  1 85-7 62   LYMPHOCYTES ABSOLUTE COUNT 1 23 Thousands/? ??L  0 60-4 47   MONOCYTES ABSOLUTE COUNT 0 41 Thousand/? ??L  0 17-1 22   EOSINOPHILS ABSOLUTE COUNT 0 09 Thousand/? ??L  0 00-0 61   BASOPHILS ABSOLUTE COUNT 0 05 Thousands/? ??L  0 00-0 10     (1) TSH [id number] 08:19AM [facility] Order Number: [id number]     Test Name Result Flag Reference   TSH 1 220 uIU/mL  0 358-3 740   Patients undergoing fluorescein dye angiography may retain small amounts of fluorescein in the body for 48-72 hours post procedure  Samples containing fluorescein can produce falsely depressed TSH values  If the patient had this procedure,a specimen should be resubmitted post fluorescein clearance       (1) LIPID PANEL, FASTING [id number] 08:19AM Eli Schaffer Order Number: [id number]     Test Name Result Flag Reference   CHOLESTEROL 135 mg/dL     HDL,DIRECT 33 mg/dL L 40-60   Specimen collection should occur prior to Metamizole administration due to the potential for falsley depressed results  LDL CHOLESTEROL CALCULATED 88 mg/dL  0-100   Triglyceride:        Normal <150 mg/dl   Borderline High 150-199 mg/dl   High 200-499 mg/dl   Very High >499 mg/dl      Cholesterol:       Desirable <200 mg/dl    Borderline High 200-239 mg/dl    High >239 mg/dl      HDL Cholesterol:       High>59 mg/dL    Low <41 mg/dL      This screening LDL is a calculated result  It does not have the accuracy of the Direct Measured LDL in the monitoring of patients with hyperlipidemia and/or statin therapy  Direct Measure LDL (OBS543) must be ordered separately in these patients  TRIGLYCERIDES 72 mg/dL  <=150   Specimen collection should occur prior to N-Acetylcysteine or Metamizole administration due to the potential for falsely depressed results  (1) HEMOGLOBIN A1C [id number] 08:19AM [facility] [place] Order Number: [id number]     Test Name Result Flag Reference   HEMOGLOBIN A1C 5 6 %  4 2-6 3   EST  AVG   GLUCOSE 114 mg/dl

## 2018-01-13 VITALS
HEIGHT: 69 IN | WEIGHT: 208.5 LBS | SYSTOLIC BLOOD PRESSURE: 120 MMHG | BODY MASS INDEX: 30.88 KG/M2 | DIASTOLIC BLOOD PRESSURE: 72 MMHG

## 2018-01-13 VITALS
DIASTOLIC BLOOD PRESSURE: 60 MMHG | BODY MASS INDEX: 30.66 KG/M2 | WEIGHT: 207 LBS | SYSTOLIC BLOOD PRESSURE: 120 MMHG | HEART RATE: 60 BPM | HEIGHT: 69 IN | TEMPERATURE: 98.2 F

## 2018-01-13 VITALS
DIASTOLIC BLOOD PRESSURE: 72 MMHG | WEIGHT: 205 LBS | BODY MASS INDEX: 30.36 KG/M2 | SYSTOLIC BLOOD PRESSURE: 118 MMHG | HEIGHT: 69 IN

## 2018-01-13 NOTE — PROGRESS NOTES
Assessment    1  Acute sinusitis (461 9) (J01 90)   2  Hyperlipidemia (272 4) (E78 5)    Plan  Acute sinusitis    · Cefuroxime Axetil 500 MG Oral Tablet; Take 1 tablet twice daily   · PredniSONE 10 MG Oral Tablet; 5 for 1 day, 4 for 1 day, 3 for 1 day, 2 for  1 day , 1 for 1 day   · Promethazine-DM 6 25-15 MG/5ML Oral Syrup; TAKE 5 - 10 ML BY MOUTH EVERY  6 HOURS AS NEEDED  Hyperlipidemia    · Crestor 10 MG Oral Tablet   · Simvastatin 20 MG Oral Tablet; TAKE 1 TABLET DAILY     Call the office if there is no improvement  Keep scheduled appointment in April, blood work prior to that  Switch Crestor to simvastatin  Chief Complaint    1  Cold Symptoms  COUGH,CONGESTION,SORE THROAT X 3 WKS   HAS TRIED MUCINEX      History of Present Illness  HPI: Patient is a 47-year-old male complaining of coughing, congestion, not feeling well for 3 weeks  He also recently received notification from his insurance company that it would cost him over $400 for his 3 month supply of his current cholesterol medication  They gave him some alternatives and he would like to switch  Review of Systems    Constitutional: no fever or chills, feels well, no tiredness, no recent weight loss or weight gain  ENT: as noted in HPI  Cardiovascular: no complaints of slow or fast heart rate, no chest pain, no palpitations, no leg claudication or lower extremity edema  Respiratory: as noted in HPI  Gastrointestinal: no complaints of abdominal pain, no constipation, no nausea or vomiting, no diarrhea or bloody stools  Genitourinary: no complaints of dysuria or incontinence, no hesitancy, no nocturia, no genital lesion, no inadequacy of penile erection  Musculoskeletal: no complaints of arthralgia, no myalgia, no joint swelling or stiffness, no limb pain or swelling  Integumentary: no complaints of skin rash or lesion, no itching or dry skin, no skin wounds     Neurological: no complaints of headache, no confusion, no numbness or tingling, no dizziness or fainting  Active Problems    1  Essential hypertension (401 9) (I10)   2  Gastroesophageal reflux disease (530 81) (K21 9)   3  Hyperlipidemia (272 4) (E78 5)   4  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)   5  Lumbar pain (724 2) (M54 5)    Past Medical History    1  History of A Fall (E888 9)   2  History of Abnormal EKG (794 31) (R94 31)   3  History of Arthritis of right knee (716 96) (M19 90)   4  History of Colon cancer screening (V76 51) (Z12 11)   5  History of Currently Wearing Eyeglasses   6  History of Dizziness and giddiness (780 4) (R42)   7  History of Dry Skin (782 9)   8  History of Gait disturbance (781 2) (R26 9)   9  History of Hiccups (786 8) (R06 6)   10  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)   11  History of Need for pneumococcal vaccination (V03 82) (Z23)   12  History of Need for shingles vaccine (V04 89) (Z23)   13  History of Preoperative cardiovascular examination (V72 81) (Z01 810)   14  History of Status post total right knee replacement (V43 65) (Z96 651)   15  History of Tremor (781 0) (R25 1)    Family History    1  Family history of Lung Cancer (V16 1)    Social History    · Denied: History of Alcohol use   · Denied: History of Drug use   · Former smoker (V15 82) (Z87 891)   · Home DME Brace   · NECK BRACE   · Home DME Wheelchair   · MANUAL WHEELCHAIR USED TO AMBULATE   · Uses Safety Equipment - Seatbelts  The social history was reviewed and updated today  The social history was reviewed and is unchanged  Surgical History    1  History of Complete Colonoscopy   2  History of Excision Of Baker's Cyst   3  History of Knee Surgery   4  History of Laminectomy Cervical   5  History of Tonsillectomy With Adenoidectomy   6  History of Total Knee Arthroplasty    Current Meds   1  Advil 200 MG Oral Tablet Recorded   2   Baclofen 10 MG Oral Tablet; TAKE 1 TABLET 3 times daily PRN back pain;   Therapy: 70NZA4476 to (Evaluate:62Vel6055) Recorded   3  Crestor 10 MG Oral Tablet; Take 1 tablet daily; Therapy: 19YTW6150 to (Last Rx:22Nov2015)  Requested for: 033 [id number] 47 39 Ordered   4  Nabumetone 500 MG Oral Tablet; Take 1 tablet twice daily; Therapy: 19MKE9287 to (Evaluate:84Vju8230)  Requested for: 72BNF5737; Last   Rx:27Kaq2837 Ordered   5  Omeprazole 40 MG Oral Capsule Delayed Release; TAKE 1 CAPSULE Every morning; Therapy: 64SXU6084 to (Evaluate:28Jan2017); Last Rx:46Qav7121 Ordered    The medication list was reviewed and updated today  Allergies    1  No Known Drug Allergies    Vitals   Recorded: 90GLZ6689 10:12AM   Temperature 89 6 F   Systolic 996   Diastolic 80   Weight 311 lb    BMI Calculated 30 42   BSA Calculated 2 09     Physical Exam    Constitutional   General appearance: Abnormal   acutely ill  Eyes   Conjunctiva and lids: No swelling, erythema, or discharge  Pupils and irises: Equal, round and reactive to light  Ears, Nose, Mouth, and Throat   External inspection of ears and nose: Normal     Otoscopic examination: Tympanic membrance translucent with normal light reflex  Canals patent without erythema  Nasal mucosa, septum, and turbinates: Abnormal   There was a mucoid discharge and a purulent discharge from both nares  The bilateral nasal mucosa was crusted and edematous  Oropharynx: Normal with no erythema, edema, exudate or lesions  Pulmonary   Respiratory effort: No increased work of breathing or signs of respiratory distress  Auscultation of lungs: Clear to auscultation, equal breath sounds bilaterally, no wheezes, no rales, no rhonci  Cardiovascular   Palpation of heart: Normal PMI, no thrills  Auscultation of heart: Normal rate and rhythm, normal S1 and S2, without murmurs  Examination of extremities for edema and/or varicosities: Normal     Carotid pulses: Normal     Abdomen   Abdomen: Non-tender, no masses      Liver and spleen: No hepatomegaly or splenomegaly  Lymphatic   Palpation of lymph nodes in neck: No lymphadenopathy  Musculoskeletal   Gait and station: Normal     Digits and nails: Normal without clubbing or cyanosis  Inspection/palpation of joints, bones, and muscles: Normal     Skin   Skin and subcutaneous tissue: Normal without rashes or lesions  Neurologic   Cranial nerves: Cranial nerves 2-12 intact  Reflexes: 2+ and symmetric  Sensation: No sensory loss      Psychiatric   Orientation to person, place and time: Normal     Mood and affect: Normal          Future Appointments    Date/Time Provider Specialty Site   04/26/2016 10:00 AM Collette Antonio, DO Family Medicine [facility]     Signatures   Electronically signed by : Ayde Levi DO; Feb 11 2016 10:27AM EST                       (Author)

## 2018-01-13 NOTE — CONSULTS
Assessment   1  Other intervertebral disc degeneration, lumbar region (722 52) (M51 36)  2  Lumbar spinal stenosis (724 02) (M48 06)  3  Lumbar foraminal stenosis (724 02) (M99 83)  4  Protrusion of lumbar intervertebral disc (722 10) (M51 26)  5  Lumbago (724 2) (M54 5)  6  Left lumbar radiculopathy (724 4) (M54 16)  7  Lumbar spondylosis (721 3) (M47 816)    Plan   Left lumbar radiculopathy, Lumbago, Lumbar foraminal stenosis, Lumbar spinal  stenosis, Lumbar spondylosis, Other intervertebral disc degeneration, lumbar region,  Protrusion of lumbar intervertebral disc    · XR SPINE LUMBAR COMPLETE W BENDING MINIMUM 6 VIEWS; Status:Active; Requested AMW:[id number];   Perform:[facility]; Order Comments:Upright AP, Lateral, oblique, and lateral   flexion / extension views; Due:2018; Ordered; For:Left lumbar radiculopathy,   Lumbago, Lumbar foraminal stenosis, Lumbar spinal stenosis, Lumbar spondylosis,   Other intervertebral disc degeneration, lumbar region, Protrusion of lumbar intervertebral   disc; Ordered By:Ely Aragon;   · Follow Up After Tests Complete Evaluation and Treatment  Follow-up  sovl Dr Logan Villareal  after lumbar MRI and lumbar xray  Status: Hold For - Scheduling  Requested for:  2017  Ordered; For: Left lumbar radiculopathy, Lumbago, Lumbar foraminal stenosis, Lumbar   spinal stenosis, Lumbar spondylosis, Other intervertebral disc degeneration, lumbar   region, Protrusion of lumbar intervertebral disc;  Ordered By: Andrei Alford  Performed:   Due: 68WGG8344    * MRI LUMBAR SPINE WO CONTRAST; Status:Need Information - Financial Authorization; Requested SGB:[id number];   Perform:[facility]; Order Comments:2017 7:15am st raphael jerry; [id number]; Last Updated By:Nanette Valles; 2017 1:23:03 PM;Ordered;     For:Left lumbar radiculopathy, Lumbago, Lumbar foraminal stenosis, Lumbar spinal stenosis, Lumbar spondylosis, Other intervertebral disc degeneration, lumbar region, Protrusion of lumbar intervertebral disc; Ordered By:Jennifer Aragon;      Discussion/Summary    This is a 57-year-old male with low back pain and left leg pain suspicious for neurogenic claudication  An old lumbar spine MRI from 2014 demonstrated lumbar disc degeneration, protrusion of lumbar disc, lumbar spinal stenosis and foraminal stenosis  To evaluate his symptoms further updated lumbar spine imaging imaging is advised  He is to have a lumbar spine MRI without contrast to evaluate for progressive degeneration , disc herniation, stenosis, etc  Advise to have lumbar flexion extension x-rays to evaluate dynamic stability  He is to follow up with Dr Larry Nino after completion of requested imaging studies  Patient advised to contact our office or present to emergency room if he experiences new or progressive back or leg pain, sensory or motor changes, bladder or bowel dysfunction, or other neurological change  Patient expressed understanding and agreement  The patient has the current Goals: Undergo updated lumbar spine imaging  Improve low back pain and left leg pain  The patent has the current Barriers: Chronic low back pain and left leg pain  Patient is able to Self-Care  Chief Complaint  Evaluation for low back and leg pain      History of Present Illness  This s is a 57-year-old male known to Dr Larry Nino  He presents for evaluation of low back pain and leg pain  Patient denies having any recent lumbar spine imaging  Patient is status post a posterior cervical decompression fusion in May 2013 by Dr Larry Nino for cervical cord compression and traumatic central cord syndrome in setting of cervical spinal stenosis  Patient reports he has done well in regards to his cervical spine surgery  He does report baseline wide gait since his neck injury in May 2013  Patient reports history of low back pain for approximately 15 years and left leg pain for approximately 5 years  He attributed his pain to his previous occupations as  although denies any specific inciting event  Patient reports left-sided low back pain near the left buttock which is constant and described as sharp in character  He rates this pain as a 5/10 pain scale  His pain is worse with activity such as doing the lawn (push mower) and cleaning  He reports he will take breaks with such activities  It is also aggravated with lumbar flexion to the left  His back pain improves with lying down or using ice or heat  He reports intermittent left leg pain  As noted above the left leg pain has been for approximately 5 years  However he reports worsening in the leg pain over the last 9 months  The pain extends from his left hip down the lateral left thigh stopping below the knee of the proximal lateral left calf typically  Occasionally the pain extends down the left leg further to his ankle area with bad weather  Sitting he rates his leg pain as 0/10 pain scale  Earlier today walking he rated his leg pain as 5/10 pain scale  His pain is aggravated with the more activity that he performs and the more he walks  He reports it takes a while but his leg pain eventually resolves with sitting  He reports intermittent numbness down the left thigh anteriorly and medial/laterally with walking  This also eventually resolves with sitting  He denies weakness of his lower extremities  He denies pain or numbness of his right lower extremity  Patient denies any recent physical therapy in the last 6 months  He does report he completed PT in 2015 following his right knee replacement  He has tried Advil in the past without relief  He reports taking Naumetone twice a day which initially helped his pain but reports dose eventually had to be increased  He has seen Dr John Bejarano of pain management  He underwent left L3-L4, L4-L5, and L5-S1 medial branch blocks in July and August 2016   He underwent an left RFA at L3-L4, L4-L5, and L5-S1 in September 2016  Patient reports these procedures provided some relief of his back and leg pain for about one month post procedure  He denies bladder or bowel dysfunction  An old L-spine MRI from 3/26/2014 demonstrated diffuse degenerative disc disease, disc bulging, and spinal stenosis and foraminal stenosis at multiple levels  Review of Systems    Constitutional: no fever and no chills  Eyes: no eyesight problems  ENT: no hearing loss  Cardiovascular: no chest pain and no palpitations  Respiratory: no shortness of breath, no cough and no wheezing  Gastrointestinal: no nausea  Genitourinary: no incontinence  Musculoskeletal: limb pain (left LE pain into the lateral calf) and joint stiffness (lower back stiffness), but no joint swelling, no myalgias and no limb swelling    The patient presents with complaints of gradual onset of mild lower back arthralgias  Neurological: numbness (bilateral hand numbness) and difficulty walking, but no headache, no tingling, no confusion, no dizziness, no limb weakness, no convulsions and no fainting  Psychiatric: no anxiety and no depression  Endocrine: no muscle weakness  Hematologic/Lymphatic: no tendency for easy bleeding and no tendency for easy bruising  ROS reviewed  Active Problems   1  Back pain (724 5) (M54 9)  2  Essential hypertension (401 9) (I10)  3  Gastroesophageal reflux disease (530 81) (K21 9)  4  Hyperglycemia (790 29) (R73 9)  5  Hyperlipidemia (272 4) (E78 5)  6  Initial Medicare annual wellness visit (V70 0) (Z00 00)  7  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)  8  Lumbar foraminal stenosis (724 02) (M99 83)  9  Lumbar spinal stenosis (724 02) (M48 06)  10  Lumbar spondylosis (721 3) (M47 816)  11  Other intervertebral disc degeneration, lumbar region (722 52) (M51 36)  12  Polyarticular arthritis (716 50) (M13 0)  13  Psoriasis (696 1) (L40 9)  14   Rupture of proximal biceps tendon, left, initial encounter (840 8) (R67 166U)    Past Medical History   1  History of A Fall (E888 9)  2  History of Abnormal EKG (794 31) (R94 31)  3  History of Arthritis of right knee (716 96) (M17 11)  4  History of Colon cancer screening (V76 51) (Z12 11)  5  History of Currently Wearing Eyeglasses  6  History of Dizziness and giddiness (780 4) (R42)  7  History of Dry Skin (782 9)  8  History of Gait disturbance (781 2) (R26 9)  9  History of Hiccups (786 8) (R06 6)  10  History of low back pain (V13 59) (Z87 39)  11  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)  12  History of Need for pneumococcal vaccination (V03 82) (Z23)  13  History of Need for revaccination (V05 9) (Z23)  14  History of Need for shingles vaccine (V04 89) (Z23)  15  History of Preoperative cardiovascular examination (V72 81) (Z01 810)  16  History of Status post total right knee replacement (V43 65) (Z96 651)  17  History of Tremor (781 0) (R25 1)    The active problems and past medical history were reviewed and updated today  Surgical History   1  History of Complete Colonoscopy  2  History of Excision Of Baker's Cyst  3  History of Knee Surgery  4  History of Laminectomy Cervical  5  History of Tonsillectomy With Adenoidectomy  6  History of Total Knee Arthroplasty    The surgical history was reviewed and updated today  Family History  Father   1  Family history of Seasonal allergies  Family History   2  Family history of Lung Cancer (V16 1)    The family history was reviewed and updated today  Social History    · Denied: History of Alcohol use   · Denied: History of Drug use   · Former smoker (V15 82) (Z87 891)   · Home DME Brace   · Home DME Wheelchair   · Less than high school   · Three children   · Uses Safety Equipment - Seatbelts   ·  (V61 07) (Z63 4)  The social history was reviewed and updated today  Current Meds  1   Nabumetone 750 MG Oral Tablet; TAKE 1 TABLET TWICE DAILY; Therapy: 51NXL3421 to (FFHYNOLW:99DNO1734)  Requested for: 89HDI5674; Last   Rx:07Jun2017 Ordered  2  Omeprazole 40 MG Oral Capsule Delayed Release; TAKE 1 CAPSULE Every morning; Therapy: 49IWZ8428 to (Evaluate:20Oct2017); Last Rx:25Oct2016 Ordered  3  Simvastatin 20 MG Oral Tablet; TAKE 1 TABLET DAILY; Therapy: 27NOJ5350 to (Evaluate:27Apr2018); Last GC:69UOZ2480 Ordered    The medication list was reviewed and updated today  Allergies   1  No Known Drug Allergies    Vitals  Vital Signs    Recorded: 21Jun2017 09:59AM   Temperature 98 F   Heart Rate 68   Respiration 16   Systolic 826   Diastolic 86   Height 5 ft 9 in   Weight 206 lb 6 oz   BMI Calculated 30 48   BSA Calculated 2 09   Pain Scale 5     Physical Exam   (SLR negative bilaterally  KIA left "pulling" of low back on left  KIA right negative)   Musculo: Spine   Spine:    Lumbar/Sacral Spine examination demonstrates no visible abnormailities and normal lordosis Lumbosacral Spine: Tenderness: level diffuse left paraspinal, but not the lumbar spine and not the right paraspinal  Flexion was not restricted  Extension was restricted and was painful  Skin Mature scar posterior cervical region and anterior right knee  Motor System - Upper Extremities: Normal to inspection and palpation  Strength: Deltoids 5/5 bilaterally  Biceps 5/5 bilaterally  Triceps 5/5 bilaterally  Extensor carpi radials is 5/5 bilaterally  Extensor digitorum 5/5 bilaterally  Intrinsic 5/5 bilaterally   5/5 bilaterally  Motor System - Lower Extremities:   Muscle strength: Abnormal   (Hip flexion/abduction/adduction 5/5 bilaterally  Knee flexion/extension 5/5 bilaterally  Ankle Dorsiflexion / Plantarflexion 5/5 bilaterally   Great toe DF left 4/5 and right 5/5)  Reflexes:   Reflexes: Abnormal   (Brisk biceps / triceps / brachioradialis / patellar/ and achilles reflexes bilaterally) Deep tendon reflexes: 2 beats ankle clonus on the right and 2 beats ankle clonus on the left  Superficial/Primitive Reflexes: Babinski reflex absent on the right and Babinski reflex absent on the left  Sensory: Sensation grossly intact to light touch  Sensation grossly intact to light touch  (Pinprick intact of upper extremities, torso, and lower extremities  )  Sensory exam: intact vibration sensation and normal proprioception  Gait and Station:   Gait and station: Abnormal   (Wide based (chronic since neck injury years ago per patient)  Independent)  Results/Data    Results Review   An old L-spine MRI from 3/26/2014 demonstrated diffuse degenerative disc disease, disc bulging, and spinal stenosis and foraminal stenosis at multiple levels  Future Appointments    Date/Time Provider Specialty Site   11/07/2017 10:00 AM Benton Frazier DO Family Medicine [facility]   07/10/2017 10:15 AM EFRAIN Haskins   [address] [place] [address]     Signatures   Electronically signed by : Jorge Grissom, [facility]; Jun 22 2017  6:13AM EST                       (Author)    Electronically signed by : EFRAIN Pedraza ; Jun 22 2017  7:48AM EST                       (Author)    Electronically signed by : EFRAIN Pedraza ; Jun 22 2017  7:48AM EST                       (Author)

## 2018-01-13 NOTE — MISCELLANEOUS
Message   Recorded as Task   Date: 11/28/2017 01:54 PM, Created By: Travon Bella   Task Name: Miscellaneous   Assigned To: [id number] clinical,Team   Regarding Patient: Kel Walker, Status: Active   CommentRadford Noam - 28 Nov 2017 1:54 PM     TASK CREATED  Patient left a message with the answering service that he saw Jacinta White in the office on Monday & his prescription was not yet called into [facility]  Please call the patient @ [phone]  Thank you  Taina Hwang - 28 Nov 2017 2:14 PM     TASK EDITED  S/w Hubert Gerber from [address] who ststed that they did receive the electronic e script that was sent by [facility] on 11/27 but for some reason it was "on hold "  Per Hubert Gerber he will fill this now  RN s/w pt and relayed same message, pt aware that he may  later today  Pt appreciative of help  Active Problems    1  Back pain (724 5) (M54 9)   2  Chronic left-sided low back pain (724 2,338 29) (M54 5,G89 29)   3  Essential hypertension (401 9) (I10)   4  Gastroesophageal reflux disease (530 81) (K21 9)   5  Hyperglycemia (790 29) (R73 9)   6  Hyperlipidemia (272 4) (E78 5)   7  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)   8  Left lumbar radiculopathy (724 4) (M54 16)   9  Lumbar foraminal stenosis (724 02) (M99 83)   10  Lumbar spinal stenosis (724 02) (M48 061)   11  Lumbar spondylosis (721 3) (M47 816)   12  Need for influenza vaccination (V04 81) (Z23)   13  Other intervertebral disc degeneration, lumbar region (722 52) (M51 36)   14  Pain syndrome, chronic (338 4) (G89 4)   15  Polyarticular arthritis (716 50) (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 0)   16  Protrusion of lumbar intervertebral disc (722 10) (M51 26)   17  Psoriasis (696 1) (L40 9)   18  Rupture of proximal biceps tendon, left, initial encounter (840 8) (F35 732Q)    Current Meds   1  Gabapentin 400 MG Oral Capsule; TAKE 1 CAPSULE 3 TIMES DAILY;    Therapy: 22Bae7494 to (Zeeshan Blancas)  Requested for: 20RQF5136; Last Rx:55Nhg7647 Ordered   2  Omeprazole 40 MG Oral Capsule Delayed Release; TAKE 1 CAPSULE Every morning; Therapy: 47APL3193 to (Evaluate:20Oct2017); Last Rx:25Oct2016 Ordered   3  Simvastatin 20 MG Oral Tablet; TAKE 1 TABLET DAILY; Therapy: 84HNR8268 to (Evaluate:27Apr2018); Last OQ:39WFR2005 Ordered    Allergies    1   No Known Drug Allergies    Signatures   Electronically signed by : Obdulio Dozier RN; Nov 28 2017  2:14PM EST                       (Author)

## 2018-01-14 VITALS
DIASTOLIC BLOOD PRESSURE: 54 MMHG | HEART RATE: 56 BPM | RESPIRATION RATE: 14 BRPM | SYSTOLIC BLOOD PRESSURE: 124 MMHG | BODY MASS INDEX: 30.21 KG/M2 | WEIGHT: 204 LBS | HEIGHT: 69 IN

## 2018-01-14 VITALS
BODY MASS INDEX: 31.25 KG/M2 | DIASTOLIC BLOOD PRESSURE: 74 MMHG | TEMPERATURE: 98.1 F | SYSTOLIC BLOOD PRESSURE: 142 MMHG | HEIGHT: 69 IN | WEIGHT: 211 LBS

## 2018-01-15 NOTE — PROGRESS NOTES
Assessment    1  Lumbar foraminal stenosis (724 02) (M99 83)   2  Lumbar spinal stenosis (724 02) (M48 06)    Plan     · *1 - [facility] Co-Management  please assess for lumbar ROM, core  strengthening, hip girdle/LE strengthening  Status: Active  Requested for: 56RMA0556   Ordered; For: Lumbar spinal stenosis; Ordered By: Narda Martinez Performed:  Due: 38MQV5256  Care Summary provided  : Yes    Follow-up visit in 3 months Evaluation and Treatment  Follow-up  Status: Hold For - Scheduling  Requested for: 36FWJ3482  Ordered; For: Lumbar spinal stenosis; Ordered By: Narda Martinez  Performed:   Due: 17SVK2473  1 - Cony Vaughan DO, Sarah Whatley (Anesthesia) Co-Management  please assess for left L3/4 and L4/5 transforaminal block  Status: Active  Requested for: 91PVV0411  Ordered; For: Lumbar spinal stenosis; Ordered By: Narda Martinez  Performed:   Due: 37IBJ2434     Discussion/Summary    Mr  Gino Ferrara has marked lumbar disc degeneration with stenosis and radiculopathy  today we reviewed the conservative medical options including PT, BLADE and medicaiton  I have provided him with a script for PT, a referral to Dr Cony Vaughan, and will see him in fu in 3 months  The patient has the current Goals: Improve function  The patent has the current Barriers: None  Patient is able to Self-Care  Self Referrals: No      Chief Complaint    1  Back Pain  Previous PCDF for cord compression  Chronic LBP with left leg radiation worse with activity  No recent PT or BLADE  denies weakness or numbness  History of Present Illness    Saima Metcalf presents with complaints of gradual onset of constant episodes of moderate left lower back pain, radiating to the left buttock and left thigh  On a scale of 1 to 10, the patient rates the pain as 6  Episodes started about 15 years ago  Symptoms are unchanged     Associated symptoms include stiffness, but no spasm, no fever, no night sweats, no abdominal pain, no general malaise, no weight loss, no arm numbness, no arm weakness, no leg weakness, no urinary incontinence, no fecal incontinence, no urinary retention and no rash localized to the area of pain    leg numbness (intermittent left thigh numbness)  Review of Systems    Constitutional: no fever and no chills  Eyes: no eyesight problems  ENT: no hearing loss  Cardiovascular: no chest pain and no palpitations  Respiratory: no shortness of breath, no cough and no wheezing  Gastrointestinal: no nausea  Genitourinary: no incontinence  Musculoskeletal: arthralgias, limb pain, myalgias and joint stiffness, but no joint swelling and no limb swelling  Active Problems    1  Back pain (724 5) (M54 9)   2  Essential hypertension (401 9) (I10)   3  Gastroesophageal reflux disease (530 81) (K21 9)   4  Hyperglycemia (790 29) (R73 9)   5  Hyperlipidemia (272 4) (E78 5)   6  Initial Medicare annual wellness visit (V70 0) (Z00 00)   7  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)   8  Left lumbar radiculopathy (724 4) (M54 16)   9  Lumbago (724 2) (M54 5)   10  Lumbar foraminal stenosis (724 02) (M99 83)   11  Lumbar spinal stenosis (724 02) (M48 06)   12  Lumbar spondylosis (721 3) (M47 816)   13  Other intervertebral disc degeneration, lumbar region (722 52) (M51 36)   14  Polyarticular arthritis (716 50) (M13 0)   15  Protrusion of lumbar intervertebral disc (722 10) (M51 26)   16  Psoriasis (696 1) (L40 9)   17  Rupture of proximal biceps tendon, left, initial encounter (840 8) (D04 829U)    Past Medical History    1  History of A Fall (E888 9)   2  History of Abnormal EKG (794 31) (R94 31)   3  History of Arthritis of right knee (716 96) (M17 11)   4  History of Colon cancer screening (V76 51) (Z12 11)   5  History of Currently Wearing Eyeglasses   6  History of Dizziness and giddiness (780 4) (R42)   7  History of Dry Skin (782 9)   8  History of Gait disturbance (781 2) (R26 9)   9   History of Hiccups (786 8) (R06 6)   10  History of low back pain (V13 59) (Z87 39)   11  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)   12  History of Need for pneumococcal vaccination (V03 82) (Z23)   13  History of Need for revaccination (V05 9) (Z23)   14  History of Need for shingles vaccine (V04 89) (Z23)   15  History of Preoperative cardiovascular examination (V72 81) (Z01 810)   16  History of Status post total right knee replacement (V43 65) (Z96 651)   17  History of Tremor (781 0) (R25 1)    Surgical History    1  History of Complete Colonoscopy   2  History of Excision Of Baker's Cyst   3  History of Knee Surgery   4  History of Laminectomy Cervical   5  History of Tonsillectomy With Adenoidectomy   6  History of Total Knee Arthroplasty    Family History  Father    1  Family history of Seasonal allergies  Family History    2  Family history of Lung Cancer (V16 1)    Social History    · Denied: History of Alcohol use   · Denied: History of Drug use   · Former smoker (V15 82) (Z87 891)   · Home DME Brace   · Home DME Wheelchair   · Less than high school   · Three children   · Uses Safety Equipment - Seatbelts   ·  (V61 07) (Z63 4)    Current Meds   1  Nabumetone 750 MG Oral Tablet; TAKE 1 TABLET TWICE DAILY; Therapy: 80CAZ4594 to (TDJUATNB:81MKJ7512)  Requested for: 90FTN3430; Last   Rx:07Jun2017 Ordered   2  Omeprazole 40 MG Oral Capsule Delayed Release; TAKE 1 CAPSULE Every morning; Therapy: 18HZH2138 to (Evaluate:20Oct2017); Last Rx:25Oct2016 Ordered   3  Simvastatin 20 MG Oral Tablet; TAKE 1 TABLET DAILY; Therapy: 11QQH2855 to (Evaluate:92Lkk7448); Last ZC:85JCL4107 Ordered    Allergies    1   No Known Drug Allergies    Vitals  Vital Signs    Recorded: 70AEP0963 10:38AM   Temperature 98 1 F   Heart Rate 57   Respiration 16   Systolic 304   Diastolic 65   Height 5 ft 9 in   Weight 206 lb    BMI Calculated 30 42   BSA Calculated 2 09   Pain Scale 6     Physical Exam   (negative SLR, full strength bilateral LE, intact sensation and DTR, severe paravertebral spasm, antalgic gait)   Constitutional Patient appears healthy and well developed  No signs of acute distress present  Eyes Vision is grossly normal  Discs flat with sharp margins  Respiratory Auscultation of lungs: Clear to auscultation bilaterally  Cardiovascular Auscultation of heart: Rate is regular  Rhythm is regular  No heart murmur appreciated  Carotid pulses: 2+ and equal bilaterally, without bruits  Peripheral vascular exam: Pedal Pulses: 2+ and equal bilaterally  Radial pulses: 2+ and equal bilaterally  Extremities: No edema of the lower limbs bilaterally  Abdomen The abdomen is flat  No abdominal scars  Abdomen is soft, nontender, and nondistended  Anus, perineum, and rectum: Exam deferred  Neurologic - Mental Status: Alert and Oriented x3  Mood and affect: Affect is normal   Memory is grossly intact  Attention is WNL  Speech is articulated and fluent  Knowledge and vocabulary consistent with education  Cranial Nerve Exam:  2nd cranial nerve: Visual field was full to confrontation  3rd, 4th, and 6th cranial nerves: Normal with no deficit  5th cranial nerve: Sensation was intact in all three divisions to light touch and temperature  Motor function was intact  7th cranial nerve: Face symmetrical at grimace and at rest  8th cranial nerve: Grossly intact to finger rub bilaterally  9th and 10th cranial nerves: Uvula is midline  11th cranial nerve: Shoulder shrug equal bilaterally  12th cranial nerve: Tongue mideline, no atrophy present  Motor System General Motor Strength: No pronator drift and no parietal drift  Motor System - Upper Extremities: Normal to inspection and palpation  Strength: Deltoids 5/5 bilaterally  Biceps 5/5 bilaterally  Triceps 5/5 bilaterally  Extensor carpi radials is 5/5 bilaterally  Extensor digitorum 5/5 bilaterally  Intrinsic 5/5 bilaterally   5/5 bilaterally    Muscle tone: Normal bilaterally  Muscle Bulk: Normal bilaterally  Motor System - Lower Extremities: Normal to inspection and palpation  Strength: Iliopsoas 5/5 bilaterally  Quadriceps 5/5 bilaterally  Hamstrings 5/5 bilaterally  Gastrocnemius 5/5 bilaterally  Muscle tone: Normal bilaterally  Reflexes: Biceps reflexes are 2+ bilaterally  Triceps reflexes are 2+ bilaterally  Achilles reflexes are 2+ bilaterally  Babinski's reflex is 2+ down going bilaterally  Ankle clonus is absent bilaterally  Coordination: Finger to nose was normal    Sensory: Sensation grossly intact to light touch  Sensation grossly intact to light touch  Gait and Station: [place] with a normal gait  Results/Data  Diagnostic Studies Reviewed Neuro[facility]:   MRI Review severe lumbar degeneration with bone-on-bone changes, lateral recess and foraminal stenosis worse at left L3/4 > L4/5  Future Appointments    Date/Time Provider Specialty Site   11/07/2017 10:00 AM Mya Bernabe DO Family Medicine [facility]   08/29/2017 01:30 PM Annie Adamson DO Pain Management [facility]   10/09/2017 10:00 AM EFRAIN Caro   Neurosurgery [facility]     Signatures   Electronically signed by : Dory Leventhal, M D ; Jul 10 2017  1:47PM EST                       (Author)

## 2018-01-16 NOTE — RESULT NOTES
Message   Recorded as Task   Date: 08/23/2016 01:04 PM, Created By: Alesia June   Task Name: Follow Up   Assigned To: [facility] surgery sched,Team   Regarding Patient: Ruddy Pabon, Status: Active   Comment:    Alesia June - 23 Aug 2016 1:04 PM     TASK CREATED  please schedule RFA and follow up with me 6 weeks later   Tari Cortes - 23 Aug 2016 2:58 PM     TASK REASSIGNED: Previously Assigned To [address] [place] [address]   Electronically signed by : Alonso Franco, ; Aug 23 2016  3:10PM EST                       (Author)

## 2018-01-16 NOTE — RESULT NOTES
Message   Recorded as Task   Date: 08/23/2016 03:09 PM, Created By: Yamil Mcgraw   Task Name: Document Appointment   Assigned To: Yamil Mcgraw   Regarding Patient: Ruddy Pabon, Status: Active   Comment:    Yamil Mcgraw - 23 Aug 2016 3:09 PM     TASK CREATED  Called pt sched RFA on 09 19 2016  @9:45am for 10:00am; verbal inst given and aware of meds and  needed  Pt understood   and has my direct line for contact          Signatures   Electronically signed by : Alonso Franco, ; Aug 23 2016  3:10PM EST                       (Author)

## 2018-01-22 VITALS
TEMPERATURE: 98.1 F | DIASTOLIC BLOOD PRESSURE: 65 MMHG | RESPIRATION RATE: 16 BRPM | HEIGHT: 69 IN | BODY MASS INDEX: 30.51 KG/M2 | HEART RATE: 57 BPM | SYSTOLIC BLOOD PRESSURE: 133 MMHG | WEIGHT: 206 LBS

## 2018-01-22 VITALS
HEIGHT: 69 IN | RESPIRATION RATE: 16 BRPM | HEART RATE: 58 BPM | DIASTOLIC BLOOD PRESSURE: 59 MMHG | TEMPERATURE: 98 F | SYSTOLIC BLOOD PRESSURE: 110 MMHG | WEIGHT: 207 LBS | BODY MASS INDEX: 30.66 KG/M2

## 2018-02-05 DIAGNOSIS — E78.2 MIXED HYPERLIPIDEMIA: Primary | ICD-10-CM

## 2018-02-05 PROBLEM — G89.29 CHRONIC LEFT-SIDED LOW BACK PAIN: Status: ACTIVE | Noted: 2017-06-21

## 2018-02-05 PROBLEM — G89.4 PAIN SYNDROME, CHRONIC: Status: ACTIVE | Noted: 2017-10-04

## 2018-02-05 PROBLEM — M51.26 PROTRUSION OF LUMBAR INTERVERTEBRAL DISC: Status: ACTIVE | Noted: 2017-06-21

## 2018-02-05 PROBLEM — R73.9 HYPERGLYCEMIA: Status: ACTIVE | Noted: 2017-05-02

## 2018-02-05 PROBLEM — M54.50 CHRONIC LEFT-SIDED LOW BACK PAIN: Status: ACTIVE | Noted: 2017-06-21

## 2018-02-05 RX ORDER — SIMVASTATIN 20 MG
TABLET ORAL
Qty: 90 TABLET | Refills: 3 | Status: SHIPPED | OUTPATIENT
Start: 2018-02-05 | End: 2018-11-05 | Stop reason: SDUPTHER

## 2018-03-07 NOTE — PROGRESS NOTES
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)    Immunizations  Influenza --- Lynsey Carmona: [id number]-0202Ycmxbad Sharma: 18-Oct-2014; Yolanda Ghotra: 22-Oct-2015; Series4:  25-Oct-2016   PCV --- Series1: 25-Oct-2016   PPSV --- Lynsey Carmona: 12-Aug-2015   Zoster --- Series1: 12-Aug-2015     Current Meds   1  Advil 200 MG Oral Tablet   2  Nabumetone 500 MG Oral Tablet; Take 1 tablet twice daily   3  Omeprazole 40 MG Oral Capsule Delayed Release; TAKE 1 CAPSULE Every morning   4  Simvastatin 20 MG Oral Tablet; TAKE 1 TABLET DAILY    Allergies    1  No Known Drug Allergies    Plan    1   Tdap (Adacel)    Future Appointments    Date/Time Provider Specialty Site   11/07/2017 10:00 AM Jonathan Morrow DO Family Medicine [facility]     Signatures   Electronically signed by : Destinee Khan DO; May  2 2017 12:13PM EST                       (Author)

## 2018-03-07 NOTE — PROCEDURES
Procedure    Indication: Leg pain  Preoperative diagnosis: Lumbar radiculitis  Postoperative diagnosis: Lumbar radiculitis    Procedure: Fluoroscopically-guided left L3 and left L4 transforaminal epidural steroid injection under fluoroscopy      After discussing the risks, benefits, and alternatives to the procedure, the patient expressed understanding and wished to proceed  The patient was brought to the fluoroscopy suite and placed in the prone position  A procedural pause was conducted to verify: correct patient identity, procedure to be performed and as applicable, correct side and site, correct patient position, and availability of implants, special equipment and special requirements  After identifying the left L3 pedicle fluoroscopically with an oblique view, the skin was sterilely prepped and draped in the usual fashion using Chloraprep skin prep  The skin and subcutaneous tissues were anesthetized with 0 5% lidocaine  A 3 5 in 22 gauge spinal needle was then advanced under fluoroscopic guidance to the neural foramen  Appropriate foraminal depth was determined with a lateral fluoroscopic view, and AP visualization confirmed needle positioning at approximately the 6 o'clock position relative to the pedicle  After negative aspiration, Omnipaque 300 contrast was injected using live fluoroscopy confirming appropriate transforaminal spread without evidence of intravascular or intrathecal uptake  Next, a 1 5 ml solution consisting of 7 5 mg of dexamethasone in sterile saline was injected slowly and incrementally into the epidural space  Following the injection the needle was withdrawn slightly and flushed with lidocaine as it was fully extracted  The procedure was then repeated in the exact same way at the left L4 pedicle with a 3 5 inch 22 gauge spinal needle  The patient tolerated the procedure well and there were no apparent complications  The patient did not develop any new neurologic deficits   After appropriate observation, the patient was dismissed from the clinic in good condition under their own power                   Signatures   Electronically signed by : Lillian Jasmine DO; Oct 16 2017 10:30AM EST                       (Author)

## 2018-04-23 ENCOUNTER — OFFICE VISIT (OUTPATIENT)
Dept: PAIN MEDICINE | Facility: MEDICAL CENTER | Age: 63
End: 2018-04-23
Payer: MEDICARE

## 2018-04-23 VITALS — WEIGHT: 214 LBS | BODY MASS INDEX: 31.6 KG/M2 | DIASTOLIC BLOOD PRESSURE: 60 MMHG | SYSTOLIC BLOOD PRESSURE: 130 MMHG

## 2018-04-23 DIAGNOSIS — M54.16 LUMBAR RADICULOPATHY: Primary | ICD-10-CM

## 2018-04-23 DIAGNOSIS — M48.061 LUMBAR FORAMINAL STENOSIS: ICD-10-CM

## 2018-04-23 PROCEDURE — 99213 OFFICE O/P EST LOW 20 MIN: CPT | Performed by: NURSE PRACTITIONER

## 2018-04-23 RX ORDER — GABAPENTIN 400 MG/1
1 CAPSULE ORAL 3 TIMES DAILY
COMMUNITY
Start: 2017-08-29 | End: 2018-04-23 | Stop reason: SDUPTHER

## 2018-04-23 RX ORDER — SIMVASTATIN 20 MG
1 TABLET ORAL DAILY
COMMUNITY
Start: 2016-02-11 | End: 2018-11-05 | Stop reason: SDUPTHER

## 2018-04-23 RX ORDER — OMEPRAZOLE 40 MG/1
1 CAPSULE, DELAYED RELEASE ORAL
COMMUNITY
Start: 2015-05-12 | End: 2019-12-11 | Stop reason: ALTCHOICE

## 2018-04-23 RX ORDER — GABAPENTIN 400 MG/1
400 CAPSULE ORAL 3 TIMES DAILY
Qty: 90 CAPSULE | Refills: 2 | Status: SHIPPED | OUTPATIENT
Start: 2018-04-23 | End: 2018-07-16 | Stop reason: SDUPTHER

## 2018-04-23 NOTE — PROGRESS NOTES
Pt is c/o lower left sided back pain  Assessment:  1  Lumbar radiculopathy - Left    2  Lumbar foraminal stenosis        Plan: At this time, the patient can continue to take gabapentin as prescribed this was refilled for him today  We will see the patient back in 3 months for medication refills unless he needs to be seen sooner  [facility] Prescription Drug Monitoring Program report was reviewed and was appropriate       History of Present Illness: The patient is a 70 y o  male who presents for a follow up office visit in regards to Back Pain  The patients current symptoms include left low back pain rated 0/10, he does get occasional pain especially when he is doing yd work that is most bothersome in the morning  Current pain medications includes:  3 times daily Gabapentin 400 mg  The patient reports that this regimen is providing pain relief  The patient is reporting no side effects from this pain medication regimen  I have personally reviewed and/or updated the patient's past medical history, past surgical history, family history, social history, current medications, allergies, and vital signs today  Review of Systems  Review of Systems   Respiratory: Negative for shortness of breath  Cardiovascular: Negative for chest pain  Gastrointestinal: Negative for constipation, diarrhea, nausea and vomiting  Musculoskeletal: Negative for arthralgias, gait problem, joint swelling and myalgias  Difficulty walking   Skin: Negative for rash  Neurological: Negative for dizziness, seizures and weakness  All other systems reviewed and are negative          Past Medical History:   Diagnosis Date    Abnormal EKG     last assessed: 04/22/2015    Arthritis of right knee     last assessed: 04/22/2015    Gait disturbance     last assessed: 04/09/2014    Injury of C5-C7 spinal cord ([facility] [address] )     last assessed: 10/20/2014; with central cord syndrome     Status post total knee replacement, right     last assessed: 05/12/2015    Tremor     last assessed: 04/09/2014    Wears glasses        Past Surgical History:   Procedure Laterality Date    CERVICAL LAMINECTOMY      COLONOSCOPY  07/2015    multiple polyps, repeat in 3 years    KNEE SURGERY Right 2002    20 years ago-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    POPLITEAL SYNOVIAL CYST EXCISION      last assessed: 04/22/2015    TONSILLECTOMY AND ADENOIDECTOMY      last assessed: 04/22/2015    TOTAL KNEE ARTHROPLASTY      last assessed: 05/12/2015       Family History   Problem Relation Age of Onset    Allergies Father      seasonal    Lung cancer Family        Social History     Occupational History    Not on file  Social History Main Topics    Smoking status: Former Smoker    Smokeless tobacco: Not on file    Alcohol use No    Drug use: No    Sexual activity: Not on file         Current Outpatient Prescriptions:     gabapentin (NEURONTIN) 400 mg capsule, Take 1 capsule (400 mg total) by mouth 3 (three) times a day, Disp: 90 capsule, Rfl: 2    omeprazole (PriLOSEC) 40 MG capsule, Take 1 capsule by mouth, Disp: , Rfl:     simvastatin (ZOCOR) 20 mg tablet, TAKE 1 TABLET EVERY DAY, Disp: 90 tablet, Rfl: 3    simvastatin (ZOCOR) 20 mg tablet, Take 1 tablet by mouth daily, Disp: , Rfl:     No Known Allergies    Physical Exam:    /60   Wt 97 1 kg (214 lb)   BMI 31 60 kg/m²     Constitutional:normal, well developed, well nourished, alert, in no distress and non-toxic and no overt pain behavior    Eyes:anicteric  HEENT:grossly intact  Neck:supple, symmetric, trachea midline and no masses   Pulmonary:even and unlabored  Cardiovascular:No edema or pitting edema present  Skin:Normal without rashes or lesions and well hydrated  Psychiatric:Mood and affect appropriate  Neurologic:Cranial Nerves II-XII grossly intact  Musculoskeletal:normal   Lumbar Spine Exam    Appearance:  Normal lordosis  Palpation/Tenderness:  left lumbar paraspinal tenderness  Range of Motion:  Full range of motion with no pain or limitations in flexion, extension, lateral flexion and rotation  Motor Strength:  Left hip flexion:  5/5  Left hip extension:  5/5  Right hip flexion:  5/5  Right hip extension:  5/5  Left knee flexion:  5/5  Left knee extension:  5/5  Right knee flexion:  5/5  Right knee extension:  5/5  Left foot dorsiflexion:  5/5  Left foot plantar flexion:  5/5  Right foot dorsiflexion:  5/5  Right foot plantar flexion:  5/5        Imaging  No orders to display   MRI lumbar spine wo contrast   Status: Final result   PACS Images     Show images for MRI lumbar spine wo contrast   Order Report      Order Details   Study Result     MRI LUMBAR SPINE WITHOUT CONTRAST     INDICATION:  78-year-old male, chronic low back pain many years  COMPARISON:  3/26/2014 outside MRI     TECHNIQUE:  Sagittal T1, sagittal T2, sagittal inversion recovery, axial T1 and axial T2, coronal T2        IMAGE QUALITY:  Diagnostic     FINDINGS:     ALIGNMENT:     No compression fracture  Persistent grade 1 degenerative retrolisthesis L1-2 and L2-3  Persistent mild scoliosis      MARROW SIGNAL:   Multilevel degenerative marrow  No significant marrow pathology      DISTAL CORD AND CONUS:  Normal size and signal within the distal cord and conus  The conus ends at the T12-L1 level      PARASPINAL SOFT TISSUES:  Paraspinal soft tissues are unremarkable      SACRUM:  Normal signal within the sacrum   No evidence of insufficiency or stress fracture      LOWER THORACIC DISC SPACES:    Mild degenerative disc and facet disease T12-L1     LUMBAR DISC SPACES:          L1-L2:  Progressive severe degenerative disc disease, moderate degenerative facet hypertrophy, persistent grade 1 retrolisthesis, persistent moderate bilateral foraminal stenosis     L2-L3:  Progressive moderate to severe degenerative disc disease, moderate degenerative facet hypertrophy, persistent grade 1 retrolisthesis, moderate bilateral foraminal stenosis  Residual bulging annulus  New small right foraminal disc protrusion   with possible right L2 nerve root encroachment      L3-L4:  Moderate degenerative disc and facet disease, small left foraminal disc protrusion, moderate to severe left foraminal stenosis, moderate right foraminal stenosis, mild interval progression, possible bilateral L3 nerve root encroachment        L4-L5:  Progressive moderate to severe degenerative disc and facet disease, progressive moderate to severe bilateral foraminal stenosis, possible bilateral L4 nerve root encroachment      L5-S1:  Progressive moderate degenerative disc and facet disease, persistent moderate to severe bilateral foraminal stenosis, possible bilateral L5 nerve root encroachment     IMPRESSION:  Progressive multilevel degenerative spondylosis  Mild scoliosis     Persistent grade 1 retrolisthesis, moderate bilateral foraminal stenosis L1-2, stable     Persistent grade 1 retrolisthesis, moderate bilateral foraminal stenosis, new small right foraminal disc protrusion L2-3     Persistent small left foraminal disc protrusion, moderate to severe left foraminal stenosis, moderate right foraminal stenosis L3-4     Progressive moderate to severe bilateral foraminal stenosis L4-5     Persistent moderate to severe bilateral foraminal stenosis L5-S1        ##sigslh##sigslh               No orders of the defined types were placed in this encounter

## 2018-05-07 ENCOUNTER — APPOINTMENT (OUTPATIENT)
Dept: LAB | Facility: MEDICAL CENTER | Age: 63
End: 2018-05-07
Payer: MEDICARE

## 2018-05-07 DIAGNOSIS — R73.9 HYPERGLYCEMIA: ICD-10-CM

## 2018-05-07 DIAGNOSIS — E78.5 HYPERLIPIDEMIA: ICD-10-CM

## 2018-05-07 LAB
ALBUMIN SERPL BCP-MCNC: 3.8 G/DL (ref 3.5–5)
ALP SERPL-CCNC: 77 U/L (ref 46–116)
ALT SERPL W P-5'-P-CCNC: 23 U/L (ref 12–78)
ANION GAP SERPL CALCULATED.3IONS-SCNC: 6 MMOL/L (ref 4–13)
AST SERPL W P-5'-P-CCNC: 18 U/L (ref 5–45)
BASOPHILS # BLD AUTO: 0.05 THOUSANDS/ΜL (ref 0–0.1)
BASOPHILS NFR BLD AUTO: 1 % (ref 0–1)
BILIRUB SERPL-MCNC: 0.44 MG/DL (ref 0.2–1)
BILIRUB UR QL STRIP: NEGATIVE
BUN SERPL-MCNC: 17 MG/DL (ref 5–25)
CALCIUM SERPL-MCNC: 8.8 MG/DL (ref 8.3–10.1)
CHLORIDE SERPL-SCNC: 109 MMOL/L (ref 100–108)
CHOLEST SERPL-MCNC: 118 MG/DL (ref 50–200)
CLARITY UR: CLEAR
CO2 SERPL-SCNC: 26 MMOL/L (ref 21–32)
COLOR UR: YELLOW
CREAT SERPL-MCNC: 0.71 MG/DL (ref 0.6–1.3)
EOSINOPHIL # BLD AUTO: 0.11 THOUSAND/ΜL (ref 0–0.61)
EOSINOPHIL NFR BLD AUTO: 2 % (ref 0–6)
ERYTHROCYTE [DISTWIDTH] IN BLOOD BY AUTOMATED COUNT: 13.3 % (ref 11.6–15.1)
EST. AVERAGE GLUCOSE BLD GHB EST-MCNC: 114 MG/DL
GFR SERPL CREATININE-BSD FRML MDRD: 98 ML/MIN/1.73SQ M
GLUCOSE P FAST SERPL-MCNC: 99 MG/DL (ref 65–99)
GLUCOSE UR STRIP-MCNC: NEGATIVE MG/DL
HBA1C MFR BLD: 5.6 % (ref 4.2–6.3)
HCT VFR BLD AUTO: 47.5 % (ref 36.5–49.3)
HDLC SERPL-MCNC: 37 MG/DL (ref 40–60)
HGB BLD-MCNC: 15.6 G/DL (ref 12–17)
HGB UR QL STRIP.AUTO: NEGATIVE
KETONES UR STRIP-MCNC: NEGATIVE MG/DL
LDLC SERPL CALC-MCNC: 71 MG/DL (ref 0–100)
LEUKOCYTE ESTERASE UR QL STRIP: NEGATIVE
LYMPHOCYTES # BLD AUTO: 1.42 THOUSANDS/ΜL (ref 0.6–4.47)
LYMPHOCYTES NFR BLD AUTO: 30 % (ref 14–44)
MCH RBC QN AUTO: 32.9 PG (ref 26.8–34.3)
MCHC RBC AUTO-ENTMCNC: 32.8 G/DL (ref 31.4–37.4)
MCV RBC AUTO: 100 FL (ref 82–98)
MONOCYTES # BLD AUTO: 0.37 THOUSAND/ΜL (ref 0.17–1.22)
MONOCYTES NFR BLD AUTO: 8 % (ref 4–12)
NEUTROPHILS # BLD AUTO: 2.81 THOUSANDS/ΜL (ref 1.85–7.62)
NEUTS SEG NFR BLD AUTO: 59 % (ref 43–75)
NITRITE UR QL STRIP: NEGATIVE
NONHDLC SERPL-MCNC: 81 MG/DL
NRBC BLD AUTO-RTO: 0 /100 WBCS
PH UR STRIP.AUTO: 6.5 [PH] (ref 4.5–8)
PLATELET # BLD AUTO: 208 THOUSANDS/UL (ref 149–390)
PMV BLD AUTO: 9.5 FL (ref 8.9–12.7)
POTASSIUM SERPL-SCNC: 4.4 MMOL/L (ref 3.5–5.3)
PROT SERPL-MCNC: 6.8 G/DL (ref 6.4–8.2)
PROT UR STRIP-MCNC: NEGATIVE MG/DL
RBC # BLD AUTO: 4.74 MILLION/UL (ref 3.88–5.62)
SODIUM SERPL-SCNC: 141 MMOL/L (ref 136–145)
SP GR UR STRIP.AUTO: 1.01 (ref 1–1.03)
TRIGL SERPL-MCNC: 49 MG/DL
TSH SERPL DL<=0.05 MIU/L-ACNC: 0.97 UIU/ML (ref 0.36–3.74)
UROBILINOGEN UR QL STRIP.AUTO: 0.2 E.U./DL
WBC # BLD AUTO: 4.77 THOUSAND/UL (ref 4.31–10.16)

## 2018-05-07 PROCEDURE — 85025 COMPLETE CBC W/AUTO DIFF WBC: CPT

## 2018-05-07 PROCEDURE — 80061 LIPID PANEL: CPT

## 2018-05-07 PROCEDURE — 80053 COMPREHEN METABOLIC PANEL: CPT

## 2018-05-07 PROCEDURE — 81003 URINALYSIS AUTO W/O SCOPE: CPT

## 2018-05-07 PROCEDURE — 36415 COLL VENOUS BLD VENIPUNCTURE: CPT

## 2018-05-07 PROCEDURE — 83036 HEMOGLOBIN GLYCOSYLATED A1C: CPT

## 2018-05-07 PROCEDURE — 84443 ASSAY THYROID STIM HORMONE: CPT

## 2018-05-08 ENCOUNTER — OFFICE VISIT (OUTPATIENT)
Dept: FAMILY MEDICINE CLINIC | Facility: CLINIC | Age: 63
End: 2018-05-08
Payer: MEDICARE

## 2018-05-08 VITALS
SYSTOLIC BLOOD PRESSURE: 128 MMHG | DIASTOLIC BLOOD PRESSURE: 70 MMHG | BODY MASS INDEX: 30.96 KG/M2 | HEIGHT: 69 IN | WEIGHT: 209 LBS

## 2018-05-08 DIAGNOSIS — R73.9 HYPERGLYCEMIA: ICD-10-CM

## 2018-05-08 DIAGNOSIS — Z00.00 MEDICARE ANNUAL WELLNESS VISIT, SUBSEQUENT: ICD-10-CM

## 2018-05-08 DIAGNOSIS — M17.12 PRIMARY OSTEOARTHRITIS OF LEFT KNEE: ICD-10-CM

## 2018-05-08 DIAGNOSIS — I10 ESSENTIAL HYPERTENSION: Primary | ICD-10-CM

## 2018-05-08 DIAGNOSIS — M54.16 LUMBAR RADICULOPATHY: ICD-10-CM

## 2018-05-08 DIAGNOSIS — M13.0 POLYARTICULAR ARTHRITIS: ICD-10-CM

## 2018-05-08 DIAGNOSIS — E78.2 MIXED HYPERLIPIDEMIA: ICD-10-CM

## 2018-05-08 DIAGNOSIS — K21.9 GASTROESOPHAGEAL REFLUX DISEASE WITHOUT ESOPHAGITIS: ICD-10-CM

## 2018-05-08 DIAGNOSIS — Z12.5 SCREENING FOR PROSTATE CANCER: ICD-10-CM

## 2018-05-08 DIAGNOSIS — L40.9 PSORIASIS: ICD-10-CM

## 2018-05-08 PROBLEM — M17.9 OSTEOARTHRITIS OF KNEE: Status: ACTIVE | Noted: 2018-05-08

## 2018-05-08 PROBLEM — M17.10 OSTEOARTHRITIS OF KNEE: Status: ACTIVE | Noted: 2018-05-08

## 2018-05-08 PROCEDURE — 99214 OFFICE O/P EST MOD 30 MIN: CPT | Performed by: FAMILY MEDICINE

## 2018-05-08 PROCEDURE — G0439 PPPS, SUBSEQ VISIT: HCPCS | Performed by: FAMILY MEDICINE

## 2018-05-08 RX ORDER — SIMVASTATIN 20 MG
TABLET ORAL
COMMUNITY
End: 2018-05-08 | Stop reason: SDUPTHER

## 2018-05-08 RX ORDER — PREDNISONE 10 MG/1
TABLET ORAL
Qty: 30 TABLET | Refills: 0 | Status: SHIPPED | OUTPATIENT
Start: 2018-05-08 | End: 2018-11-08

## 2018-05-08 RX ORDER — CALCIPOTRIENE 50 UG/G
OINTMENT TOPICAL
COMMUNITY
End: 2019-11-07

## 2018-05-08 RX ORDER — CALCITRIOL 3 UG/G
OINTMENT TOPICAL
COMMUNITY
End: 2019-11-07

## 2018-05-08 RX ORDER — TRIAMCINOLONE ACETONIDE 1 MG/G
CREAM TOPICAL
COMMUNITY
End: 2019-11-07

## 2018-05-08 RX ORDER — TACROLIMUS 1 MG/G
OINTMENT TOPICAL
COMMUNITY
End: 2019-11-07

## 2018-05-08 NOTE — PATIENT INSTRUCTIONS
Obesity   AMBULATORY CARE:   Obesity  is when your body mass index (BMI) is greater than 30  Your healthcare provider will use your height and weight to measure your BMI  The risks of obesity include  many health problems, such as injuries or physical disability  You may need tests to check for the following:  · Diabetes     · High blood pressure or high cholesterol     · Heart disease     · Gallbladder or liver disease     · Cancer of the colon, breast, prostate, liver, or kidney     · Sleep apnea     · Arthritis or gout  Seek care immediately if:   · You have a severe headache, confusion, or difficulty speaking  · You have weakness on one side of your body  · You have chest pain, sweating, or shortness of breath  Contact your healthcare provider if:   · You have symptoms of gallbladder or liver disease, such as pain in your upper abdomen  · You have knee or hip pain and discomfort while walking  · You have symptoms of diabetes, such as intense hunger and thirst, and frequent urination  · You have symptoms of sleep apnea, such as snoring or daytime sleepiness  · You have questions or concerns about your condition or care  Treatment for obesity  focuses on helping you lose weight to improve your health  Even a small decrease in BMI can reduce the risk for many health problems  Your healthcare provider will help you set a weight-loss goal   · Lifestyle changes  are the first step in treating obesity  These include making healthy food choices and getting regular physical activity  Your healthcare provider may suggest a weight-loss program that involves coaching, education, and therapy  · Medicine  may help you lose weight when it is used with a healthy diet and physical activity  · Surgery  can help you lose weight if you are very obese and have other health problems  There are several types of weight-loss surgery  Ask your healthcare provider for more information    Be successful losing weight:   · Set small, realistic goals  An example of a small goal is to walk for 20 minutes 5 days a week  Anther goal is to lose 5% of your body weight  · Tell friends, family members, and coworkers about your goals  and ask for their support  Ask a friend to lose weight with you, or join a weight-loss support group  · Identify foods or triggers that may cause you to overeat , and find ways to avoid them  Remove tempting high-calorie foods from your home and workplace  Place a bowl of fresh fruit on your kitchen counter  If stress causes you to eat, then find other ways to cope with stress  · Keep a diary to track what you eat and drink  Also write down how many minutes of physical activity you do each day  Weigh yourself once a week and record it in your diary  Eating changes: You will need to eat 500 to 1,000 fewer calories each day than you currently eat to lose 1 to 2 pounds a week  The following changes will help you cut calories:  · Eat smaller portions  Use small plates, no larger than 9 inches in diameter  Fill your plate half full of fruits and vegetables  Measure your food using measuring cups until you know what a serving size looks like  · Eat 3 meals and 1 or 2 snacks each day  Plan your meals in advance  [employer] and eat at home most of the time  Eat slowly  · Eat fruits and vegetables at every meal   They are low in calories and high in fiber, which makes you feel full  Do not add butter, margarine, or cream sauce to vegetables  Use herbs to season steamed vegetables  · Eat less fat and fewer fried foods  Eat more baked or grilled chicken and fish  These protein sources are lower in calories and fat than red meat  Limit fast food  Dress your salads with olive oil and vinegar instead of bottled dressing  · Limit the amount of sugar you eat  Do not drink sugary beverages  Limit alcohol  Activity changes:  Physical activity is good for your body in many ways   It helps you burn calories and build strong muscles  It decreases stress and depression, and improves your mood  It can also help you sleep better  Talk to your healthcare provider before you begin an exercise program   · Exercise for at least 30 minutes 5 days a week  Start slowly  Set aside time each day for physical activity that you enjoy and that is convenient for you  It is best to do both weight training and an activity that increases your heart rate, such as walking, bicycling, or swimming  · Find ways to be more active  Do yard work and housecleaning  Walk up the stairs instead of using elevators  Spend your leisure time going to events that require walking, such as outdoor festivals or fairs  This extra physical activity can help you lose weight and keep it off  Follow up with your healthcare provider as directed: You may need to meet with a dietitian  Write down your questions so you remember to ask them during your visits  © 2017 [address] [employer] Information is for End User's use only and may not be sold, redistributed or otherwise used for commercial purposes  All illustrations and images included in CareNotes® are the copyrighted property of [employer]  or Kg Bernstein  The above information is an  only  It is not intended as medical advice for individual conditions or treatments  Talk to your doctor, nurse or pharmacist before following any medical regimen to see if it is safe and effective for you  Urinary Incontinence   WHAT YOU NEED TO KNOW:   What is urinary incontinence? Urinary incontinence (UI) is when you lose control of your bladder  What causes UI? UI occurs because your bladder cannot store or empty urine properly  The following are the most common types of UI:  · Stress incontinence  is when you leak urine due to increased bladder pressure  This may happen when you cough, sneeze, or exercise       · Urge incontinence  is when you feel the need to urinate right away and leak urine accidentally  · Mixed incontinence  is when you have both stress and urge UI  What are the signs and symptoms of UI?   · You feel like your bladder does not empty completely when you urinate  · You urinate often and need to urinate immediately  · You leak urine when you sleep, or you wake up with the urge to urinate  · You leak urine when you cough, sneeze, exercise, or laugh  How is UI diagnosed? Your healthcare provider will ask how often you leak urine and whether you have stress or urge symptoms  Tell him which medicines you take, how often you urinate, and how much liquid you drink each day  You may need any of the following tests:  · Urine tests  may show infection or kidney function  · A pelvic exam  may be done to check for blockages  A pelvic exam will also show if your bladder, uterus, or other organs have moved out of place  · An x-ray, ultrasound, or CT  may show problems with parts of your urinary system  You may be given contrast liquid to help your organs show up better in the pictures  Tell the healthcare provider if you have ever had an allergic reaction to contrast liquid  Do not enter the MRI room with anything metal  Metal can cause serious injury  Tell the healthcare provider if you have any metal in or on your body  · A bladder scan  will show how much urine is left in your bladder after you urinate  You will be asked to urinate and then healthcare providers will use a small ultrasound machine to check the urine left in your bladder  · Cystometry  is used to check the function of your urinary system  Your healthcare provider checks the pressure in your bladder while filling it with fluid  Your bladder pressure may also be tested when your bladder is full and while you urinate  How is UI treated? · Medicines  can help strengthen your bladder control      · Electrical stimulation  is used to send a small amount of electrical energy to your pelvic floor muscles  This helps control your bladder function  Electrodes may be placed outside your body or in your rectum  For women, the electrodes may be placed in the vagina  · A bulking agent  may be injected into the wall of your urethra to make it thicker  This helps keep your urethra closed and decreases urine leakage  · Devices  such as a clamp, pessary, or tampon may help stop urine leaks  Ask your healthcare provider for more information about these and other devices  · Surgery  may be needed if other treatments do not work  Several types of surgery can help improve your bladder control  Ask your healthcare provider for more information about the surgery you may need  How can I manage my symptoms? · Do pelvic muscle exercises often  Your pelvic muscles help you stop urinating  Squeeze these muscles tight for 5 seconds, then relax for 5 seconds  Gradually work up to squeezing for 10 seconds  Do 3 sets of 15 repetitions a day, or as directed  This will help strengthen your pelvic muscles and improve bladder control  · A catheter  may be used to help empty your bladder  A catheter is a tiny, plastic tube that is put into your bladder to drain your urine  Your healthcare provider may tell you to use a catheter to prevent your bladder from getting too full and leaking urine  · Keep a UI record  Write down how often you leak urine and how much you leak  Make a note of what you were doing when you leaked urine  · Train your bladder  Go to the bathroom at set times, such as every 2 hours, even if you do not feel the urge to go  You can also try to hold your urine when you feel the urge to go  For example, hold your urine for 5 minutes when you feel the urge to go  As that becomes easier, hold your urine for 10 minutes  · Drink liquids as directed  Ask your healthcare provider how much liquid to drink each day and which liquids are best for you   You may need to limit the amount of liquid you drink to help control your urine leakage  Limit or do not have drinks that contain caffeine or alcohol  Do not drink any liquid right before you go to bed  · Prevent constipation  Eat a variety of high-fiber foods  Good examples are high-fiber cereals, beans, vegetables, and whole-grain breads  Prune juice may help make your bowel movement softer  Walking is the best way to trigger your intestines to have a bowel movement  · Exercise regularly and maintain a healthy weight  Ask your healthcare provider how much you should weigh and about the best exercise plan for you  Weight loss and exercise will decrease pressure on your bladder and help you control your leakage  Ask him to help you create a weight loss plan if you are overweight  When should I seek immediate care? · You have severe pain  · You are confused or cannot think clearly  When should I contact my healthcare provider? · You have a fever  · You see blood in your urine  · You have pain when you urinate  · You have new or worse pain, even after treatment  · Your mouth feels dry or you have vision changes  · Your urine is cloudy or smells bad  · You have questions or concerns about your condition or care  CARE AGREEMENT:   You have the right to help plan your care  Learn about your health condition and how it may be treated  Discuss treatment options with your caregivers to decide what care you want to receive  You always have the right to refuse treatment  The above information is an  only  It is not intended as medical advice for individual conditions or treatments  Talk to your doctor, nurse or pharmacist before following any medical regimen to see if it is safe and effective for you  © 2017 [address] [employer] Information is for End User's use only and may not be sold, redistributed or otherwise used for commercial purposes   All illustrations and images included in CareNotes® are the copyrighted property of [employer] M , Inc  or Kg Bernstein  Cigarette Smoking and Your Health   AMBULATORY CARE:   Risks to your health if you smoke:  Nicotine and other chemicals found in tobacco damage every cell in your body  Even if you are a light smoker, you have an increased risk for cancer, heart disease, and lung disease  If you are pregnant or have diabetes, smoking increases your risk for complications  Benefits to your health if you stop smoking:   · You decrease respiratory symptoms such as coughing, wheezing, and shortness of breath  · You reduce your risk for cancers of the lung, mouth, throat, kidney, bladder, pancreas, stomach, and cervix  If you already have cancer, you increase the benefits of chemotherapy  You also reduce your risk for cancer returning or a second cancer from developing  · You reduce your risk for heart disease, blood clots, heart attack, and stroke  · You reduce your risk for lung infections, and diseases such as pneumonia, asthma, chronic bronchitis, and emphysema  · Your circulation improves  More oxygen can be delivered to your body  If you have diabetes, you lower your risk for complications, such as kidney, artery, and eye diseases  You also lower your risk for nerve damage  Nerve damage can lead to amputations, poor vision, and blindness  · You improve your body's ability to heal and to fight infections  Benefits to the health of others if you stop smoking:  Tobacco is harmful to nonsmokers who breathe in your secondhand smoke  The following are ways the health of others around you may improve when you stop smoking:  · You lower the risks for lung cancer and heart disease in nonsmoking adults  · If you are pregnant, you lower the risk for miscarriage, early delivery, low birth weight, and stillbirth  You also lower your baby's risk for SIDS, obesity, developmental delay, and neurobehavioral problems, such as ADHD  · If you have children, you lower their risk for ear infections, colds, pneumonia, bronchitis, and asthma  For more information and support to stop smoking:   · Smokefree  gov  Phone: 7- 949 - 437-9734  Web Address: www smokefrMed[employer]  Follow up with your healthcare provider as directed:  Write down your questions so you remember to ask them during your visits  © 2017 [address] [employer] Information is for End User's use only and may not be sold, redistributed or otherwise used for commercial purposes  All illustrations and images included in CareNotes® are the copyrighted property of A D A M , Inc  or [address]  The above information is an  only  It is not intended as medical advice for individual conditions or treatments  Talk to your doctor, nurse or pharmacist before following any medical regimen to see if it is safe and effective for you  Fall Prevention   WHAT YOU NEED TO KNOW:   What is fall prevention? Fall prevention includes ways to make your home and other areas safer  It also includes ways you can move more carefully to prevent a fall  What increases my risk for falls? · Lack of vitamin D    · Not getting enough sleep each night    · Trouble walking or keeping your balance, or foot problems    · Health conditions that cause changes in your blood pressure, vision, or muscle strength and coordination    · Medicines that make you dizzy, weak, or sleepy    · Problems seeing clearly    · Shoes that have high heels or are not supportive    · Tripping hazards, such as items left on the floor, no handrails on the stairs, or broken steps  How can I help protect myself from falls? · Stand or sit up slowly  This may help you keep your balance and prevent falls  If you need to get up during the night, sit up first  Be sure you are fully awake before you stand  Turn on the light before you start walking  Go slowly in case you are still sleepy   Make sure you will not trip over any pets sleeping in the bedroom  · Use assistive devices as directed  Your healthcare provider may suggest that you use a cane or walker to help you keep your balance  You may need to have grab bars put in your bathroom near the toilet or in the shower  · Wear shoes that fit well and have soles that   Wear shoes both inside and outside  Use slippers with good   Do not wear shoes with high heels  · Wear a personal alarm  This is a device that allows you to call 911 if you fall and need help  Ask your healthcare provider for more information  · Stay active  Exercise can help strengthen your muscles and improve your balance  Your healthcare provider may recommend water aerobics or walking  He or she may also recommend physical therapy to improve your coordination  Never start an exercise program without talking to your healthcare provider first      · Manage medical conditions  Keep all appointments with your healthcare providers  Visit your eye doctor as directed  How can I make my home safer? · Add items to prevent falls in the bathroom  Put nonslip strips on your bath or shower floor to prevent you from slipping  Use a bath mat if you do not have carpet in the bathroom  This will prevent you from falling when you step out of the bath or shower  Use a shower seat so you do not need to stand while you shower  Sit on the toilet or a chair in your bathroom to dry yourself and put on clothing  This will prevent you from losing your balance from drying or dressing yourself while you are standing  · Keep paths clear  Remove books, shoes, and other objects from walkways and stairs  Place cords for telephones and lamps out of the way so that you do not need to walk over them  Tape them down if you cannot move them  Remove small rugs  If you cannot remove a rug, secure it with double-sided tape  This will prevent you from tripping  · Install bright lights in your home  Use night lights to help light paths to the bathroom or kitchen  Always turn on the light before you start walking  · Keep items you use often on shelves within reach  Do not use a step stool to help you reach an item  · Paint or place reflective tape on the edges of your stairs  This will help you see the stairs better  Call 911 or have someone else call if:   · You have fallen and are unconscious  · You have fallen and cannot move part of your body  Contact your healthcare provider if:   · You have fallen and have pain or a headache  · You have questions or concerns about your condition or care  CARE AGREEMENT:   You have the right to help plan your care  Learn about your health condition and how it may be treated  Discuss treatment options with your caregivers to decide what care you want to receive  You always have the right to refuse treatment  The above information is an  only  It is not intended as medical advice for individual conditions or treatments  Talk to your doctor, nurse or pharmacist before following any medical regimen to see if it is safe and effective for you  © 2017 [address] [facility] Information is for End User's use only and may not be sold, redistributed or otherwise used for commercial purposes  All illustrations and images included in CareNotes® are the copyrighted property of [employer]  or Kg Bernstein  Advance Directives   WHAT YOU NEED TO KNOW:   What are advance directives? Advance directives are legal documents that state your wishes and plans for medical care  These plans are made ahead of time in case you lose your ability to make decisions for yourself  Advance directives can apply to any medical decision, such as the treatments you want, and if you want to donate organs  What are the types of advance directives? There are many types of advance directives, and each state has rules about how to use them   You may choose a combination of any of the following:  · Living will: This is a written record of the treatment you want  You can also choose which treatments you do not want, which to limit, and which to stop at a certain time  This includes surgery, medicine, IV fluid, and tube feedings  · Durable power of  for healthcare [facility]): This is a written record that states who you want to make healthcare choices for you when you are unable to make them for yourself  This person, called a proxy, is usually a family member or a friend  You may choose more than 1 proxy  · Do not resuscitate (DNR) order:  A DNR order is used in case your heart stops beating or you stop breathing  It is a request not to have certain forms of treatment, such as CPR  A DNR order may be included in other types of advance directives  · Medical directive: This covers the care that you want if you are in a coma, near death, or unable to make decisions for yourself  You can list the treatments you want for each condition  Treatment may include pain medicine, surgery, blood transfusions, dialysis, IV or tube feedings, and a ventilator (breathing machine)  · Values history: This document has questions about your views, beliefs, and how you feel and think about life  This information can help others choose the care that you would choose  Why are advance directives important? An advance directive helps you control your care  Although spoken wishes may be used, it is better to have your wishes written down  Spoken wishes can be misunderstood, or not followed  Treatments may be given even if you do not want them  An advance directive may make it easier for your family to make difficult choices about your care  How do I decide what to put in my advance directives? · Make informed decisions:  Make sure you fully understand treatments or care you may receive   Think about the benefits and problems your decisions could cause for you or your family  Talk to healthcare providers if you have concerns or questions before you write down your wishes  You may also want to talk with your Roman Catholic or , or a   Check your state laws to make sure that what you put in your advance directive is legal      · Sign all forms:  Sign and date your advance directive when you have finished  You may also need 2 witnesses to sign the forms  Witnesses cannot be your doctor or his staff, your spouse, heirs or beneficiaries, people you owe money to, or your chosen proxy  Talk to your family, proxy, and healthcare providers about your advance directive  Give each person a copy, and keep one for yourself in a place you can get to easily  Do not keep it hidden or locked away  · Review and revise your plans: You can revise your advance directive at any time, as long as you are able to make decisions  Review your plan every year, and when there are changes in your life, or your health  When you make changes, let your family, proxy, and healthcare providers know  Give each a new copy  Where can I find more information? · [employer]  [address] , [address]  Phone: 1- [phone] - [phone]  Phone: 8- 406 - [phone]  Web Address: http://www  aafp org  · [address] [facility])  [address] [facility] [place] [address]  Phone: [phone]  Phone: [phone]  Web Address: Sergey sandoval  [facility] AGREEMENT:   You have the right to help plan your care  To help with this plan, you must learn about your health condition and treatment options  You must also learn about advance directives and how they are used  Work with your healthcare providers to decide what care will be used to treat you  You always have the right to refuse treatment  The above information is an  only   It is not intended as medical advice for individual conditions or treatments  Talk to your doctor, nurse or pharmacist before following any medical regimen to see if it is safe and effective for you  © 2017 [address] [employer] Pitts Information is for End User's use only and may not be sold, redistributed or otherwise used for commercial purposes  All illustrations and images included in CareNotes® are the copyrighted property of [employer]  or Kg Bernstein  Obesity   AMBULATORY CARE:   Obesity  is when your body mass index (BMI) is greater than 30  Your healthcare provider will use your height and weight to measure your BMI  The risks of obesity include  many health problems, such as injuries or physical disability  You may need tests to check for the following:  · Diabetes     · High blood pressure or high cholesterol     · Heart disease     · Gallbladder or liver disease     · Cancer of the colon, breast, prostate, liver, or kidney     · Sleep apnea     · Arthritis or gout  Seek care immediately if:   · You have a severe headache, confusion, or difficulty speaking  · You have weakness on one side of your body  · You have chest pain, sweating, or shortness of breath  Contact your healthcare provider if:   · You have symptoms of gallbladder or liver disease, such as pain in your upper abdomen  · You have knee or hip pain and discomfort while walking  · You have symptoms of diabetes, such as intense hunger and thirst, and frequent urination  · You have symptoms of sleep apnea, such as snoring or daytime sleepiness  · You have questions or concerns about your condition or care  Treatment for obesity  focuses on helping you lose weight to improve your health  Even a small decrease in BMI can reduce the risk for many health problems  Your healthcare provider will help you set a weight-loss goal   · Lifestyle changes  are the first step in treating obesity  These include making healthy food choices and getting regular physical activity   Your healthcare provider may suggest a weight-loss program that involves coaching, education, and therapy  · Medicine  may help you lose weight when it is used with a healthy diet and physical activity  · Surgery  can help you lose weight if you are very obese and have other health problems  There are several types of weight-loss surgery  Ask your healthcare provider for more information  Be successful losing weight:   · Set small, realistic goals  An example of a small goal is to walk for 20 minutes 5 days a week  Anther goal is to lose 5% of your body weight  · Tell friends, family members, and coworkers about your goals  and ask for their support  Ask a friend to lose weight with you, or join a weight-loss support group  · Identify foods or triggers that may cause you to overeat , and find ways to avoid them  Remove tempting high-calorie foods from your home and workplace  Place a bowl of fresh fruit on your kitchen counter  If stress causes you to eat, then find other ways to cope with stress  · Keep a diary to track what you eat and drink  Also write down how many minutes of physical activity you do each day  Weigh yourself once a week and record it in your diary  Eating changes: You will need to eat 500 to 1,000 fewer calories each day than you currently eat to lose 1 to 2 pounds a week  The following changes will help you cut calories:  · Eat smaller portions  Use small plates, no larger than 9 inches in diameter  Fill your plate half full of fruits and vegetables  Measure your food using measuring cups until you know what a serving size looks like  · Eat 3 meals and 1 or 2 snacks each day  Plan your meals in advance  [employer] and eat at home most of the time  Eat slowly  · Eat fruits and vegetables at every meal   They are low in calories and high in fiber, which makes you feel full  Do not add butter, margarine, or cream sauce to vegetables  Use herbs to season steamed vegetables  · Eat less fat and fewer fried foods  Eat more baked or grilled chicken and fish  These protein sources are lower in calories and fat than red meat  Limit fast food  Dress your salads with olive oil and vinegar instead of bottled dressing  · Limit the amount of sugar you eat  Do not drink sugary beverages  Limit alcohol  Activity changes:  Physical activity is good for your body in many ways  It helps you burn calories and build strong muscles  It decreases stress and depression, and improves your mood  It can also help you sleep better  Talk to your healthcare provider before you begin an exercise program   · Exercise for at least 30 minutes 5 days a week  Start slowly  Set aside time each day for physical activity that you enjoy and that is convenient for you  It is best to do both weight training and an activity that increases your heart rate, such as walking, bicycling, or swimming  · Find ways to be more active  Do yard work and housecleaning  Walk up the stairs instead of using elevators  Spend your leisure time going to events that require walking, such as outdoor festivals or fairs  This extra physical activity can help you lose weight and keep it off  Follow up with your healthcare provider as directed: You may need to meet with a dietitian  Write down your questions so you remember to ask them during your visits  © 2017 [address] Information is for End User's use only and may not be sold, redistributed or otherwise used for commercial purposes  All illustrations and images included in CareNotes® are the copyrighted property of [employer]  or Kg [employer]  The above information is an  only  It is not intended as medical advice for individual conditions or treatments  Talk to your doctor, nurse or pharmacist before following any medical regimen to see if it is safe and effective for you    Urinary Incontinence   WHAT YOU NEED TO KNOW:   What is urinary incontinence? Urinary incontinence (UI) is when you lose control of your bladder  What causes UI? UI occurs because your bladder cannot store or empty urine properly  The following are the most common types of UI:  · Stress incontinence  is when you leak urine due to increased bladder pressure  This may happen when you cough, sneeze, or exercise  · Urge incontinence  is when you feel the need to urinate right away and leak urine accidentally  · Mixed incontinence  is when you have both stress and urge UI  What are the signs and symptoms of UI?   · You feel like your bladder does not empty completely when you urinate  · You urinate often and need to urinate immediately  · You leak urine when you sleep, or you wake up with the urge to urinate  · You leak urine when you cough, sneeze, exercise, or laugh  How is UI diagnosed? Your healthcare provider will ask how often you leak urine and whether you have stress or urge symptoms  Tell him which medicines you take, how often you urinate, and how much liquid you drink each day  You may need any of the following tests:  · Urine tests  may show infection or kidney function  · A pelvic exam  may be done to check for blockages  A pelvic exam will also show if your bladder, uterus, or other organs have moved out of place  · An x-ray, ultrasound, or CT  may show problems with parts of your urinary system  You may be given contrast liquid to help your organs show up better in the pictures  Tell the healthcare provider if you have ever had an allergic reaction to contrast liquid  Do not enter the MRI room with anything metal  Metal can cause serious injury  Tell the healthcare provider if you have any metal in or on your body  · A bladder scan  will show how much urine is left in your bladder after you urinate   You will be asked to urinate and then healthcare providers will use a small ultrasound machine to check the urine left in your bladder  · Cystometry  is used to check the function of your urinary system  Your healthcare provider checks the pressure in your bladder while filling it with fluid  Your bladder pressure may also be tested when your bladder is full and while you urinate  How is UI treated? · Medicines  can help strengthen your bladder control  · Electrical stimulation  is used to send a small amount of electrical energy to your pelvic floor muscles  This helps control your bladder function  Electrodes may be placed outside your body or in your rectum  For women, the electrodes may be placed in the vagina  · A bulking agent  may be injected into the wall of your urethra to make it thicker  This helps keep your urethra closed and decreases urine leakage  · Devices  such as a clamp, pessary, or tampon may help stop urine leaks  Ask your healthcare provider for more information about these and other devices  · Surgery  may be needed if other treatments do not work  Several types of surgery can help improve your bladder control  Ask your healthcare provider for more information about the surgery you may need  How can I manage my symptoms? · Do pelvic muscle exercises often  Your pelvic muscles help you stop urinating  Squeeze these muscles tight for 5 seconds, then relax for 5 seconds  Gradually work up to squeezing for 10 seconds  Do 3 sets of 15 repetitions a day, or as directed  This will help strengthen your pelvic muscles and improve bladder control  · A catheter  may be used to help empty your bladder  A catheter is a tiny, plastic tube that is put into your bladder to drain your urine  Your healthcare provider may tell you to use a catheter to prevent your bladder from getting too full and leaking urine  · Keep a UI record  Write down how often you leak urine and how much you leak  Make a note of what you were doing when you leaked urine  · Train your bladder    Go to the bathroom at set times, such as every 2 hours, even if you do not feel the urge to go  You can also try to hold your urine when you feel the urge to go  For example, hold your urine for 5 minutes when you feel the urge to go  As that becomes easier, hold your urine for 10 minutes  · Drink liquids as directed  Ask your healthcare provider how much liquid to drink each day and which liquids are best for you  You may need to limit the amount of liquid you drink to help control your urine leakage  Limit or do not have drinks that contain caffeine or alcohol  Do not drink any liquid right before you go to bed  · Prevent constipation  Eat a variety of high-fiber foods  Good examples are high-fiber cereals, beans, vegetables, and whole-grain breads  Prune juice may help make your bowel movement softer  Walking is the best way to trigger your intestines to have a bowel movement  · Exercise regularly and maintain a healthy weight  Ask your healthcare provider how much you should weigh and about the best exercise plan for you  Weight loss and exercise will decrease pressure on your bladder and help you control your leakage  Ask him to help you create a weight loss plan if you are overweight  When should I seek immediate care? · You have severe pain  · You are confused or cannot think clearly  When should I contact my healthcare provider? · You have a fever  · You see blood in your urine  · You have pain when you urinate  · You have new or worse pain, even after treatment  · Your mouth feels dry or you have vision changes  · Your urine is cloudy or smells bad  · You have questions or concerns about your condition or care  CARE AGREEMENT:   You have the right to help plan your care  Learn about your health condition and how it may be treated  Discuss treatment options with your caregivers to decide what care you want to receive  You always have the right to refuse treatment   The above information is an  only  It is not intended as medical advice for individual conditions or treatments  Talk to your doctor, nurse or pharmacist before following any medical regimen to see if it is safe and effective for you  © 2017 [address] Information is for End User's use only and may not be sold, redistributed or otherwise used for commercial purposes  All illustrations and images included in CareNotes® are the copyrighted property of [employer]  or Kg [employer]  Cigarette Smoking and Your Health   AMBULATORY CARE:   Risks to your health if you smoke:  Nicotine and other chemicals found in tobacco damage every cell in your body  Even if you are a light smoker, you have an increased risk for cancer, heart disease, and lung disease  If you are pregnant or have diabetes, smoking increases your risk for complications  Benefits to your health if you stop smoking:   · You decrease respiratory symptoms such as coughing, wheezing, and shortness of breath  · You reduce your risk for cancers of the lung, mouth, throat, kidney, bladder, pancreas, stomach, and cervix  If you already have cancer, you increase the benefits of chemotherapy  You also reduce your risk for cancer returning or a second cancer from developing  · You reduce your risk for heart disease, blood clots, heart attack, and stroke  · You reduce your risk for lung infections, and diseases such as pneumonia, asthma, chronic bronchitis, and emphysema  · Your circulation improves  More oxygen can be delivered to your body  If you have diabetes, you lower your risk for complications, such as kidney, artery, and eye diseases  You also lower your risk for nerve damage  Nerve damage can lead to amputations, poor vision, and blindness  · You improve your body's ability to heal and to fight infections    Benefits to the health of others if you stop smoking:  Tobacco is harmful to nonsmokers who breathe in your secondhand smoke  The following are ways the health of others around you may improve when you stop smoking:  · You lower the risks for lung cancer and heart disease in nonsmoking adults  · If you are pregnant, you lower the risk for miscarriage, early delivery, low birth weight, and stillbirth  You also lower your baby's risk for SIDS, obesity, developmental delay, and neurobehavioral problems, such as ADHD  · If you have children, you lower their risk for ear infections, colds, pneumonia, bronchitis, and asthma  For more information and support to stop smoking:   · [employer]  Phone: 2- 012 - 923-7640  Web Address: www [employer]  Follow up with your healthcare provider as directed:  Write down your questions so you remember to ask them during your visits  © 2017 [address] Information is for End User's use only and may not be sold, redistributed or otherwise used for commercial purposes  All illustrations and images included in CareNotes® are the copyrighted property of [employer]  or Kg Bernstein  The above information is an  only  It is not intended as medical advice for individual conditions or treatments  Talk to your doctor, nurse or pharmacist before following any medical regimen to see if it is safe and effective for you  Fall Prevention   WHAT YOU NEED TO KNOW:   What is fall prevention? Fall prevention includes ways to make your home and other areas safer  It also includes ways you can move more carefully to prevent a fall  What increases my risk for falls?    · Lack of vitamin D    · Not getting enough sleep each night    · Trouble walking or keeping your balance, or foot problems    · Health conditions that cause changes in your blood pressure, vision, or muscle strength and coordination    · Medicines that make you dizzy, weak, or sleepy    · Problems seeing clearly    · Shoes that have high heels or are not supportive    · Tripping hazards, such as items left on the floor, no handrails on the stairs, or broken steps  How can I help protect myself from falls? · Stand or sit up slowly  This may help you keep your balance and prevent falls  If you need to get up during the night, sit up first  Be sure you are fully awake before you stand  Turn on the light before you start walking  Go slowly in case you are still sleepy  Make sure you will not trip over any pets sleeping in the bedroom  · Use assistive devices as directed  Your healthcare provider may suggest that you use a cane or walker to help you keep your balance  You may need to have grab bars put in your bathroom near the toilet or in the shower  · Wear shoes that fit well and have soles that   Wear shoes both inside and outside  Use slippers with good   Do not wear shoes with high heels  · Wear a personal alarm  This is a device that allows you to call 911 if you fall and need help  Ask your healthcare provider for more information  · Stay active  Exercise can help strengthen your muscles and improve your balance  Your healthcare provider may recommend water aerobics or walking  He or she may also recommend physical therapy to improve your coordination  Never start an exercise program without talking to your healthcare provider first      · Manage medical conditions  Keep all appointments with your healthcare providers  Visit your eye doctor as directed  How can I make my home safer? · Add items to prevent falls in the bathroom  Put nonslip strips on your bath or shower floor to prevent you from slipping  Use a bath mat if you do not have carpet in the bathroom  This will prevent you from falling when you step out of the bath or shower  Use a shower seat so you do not need to stand while you shower  Sit on the toilet or a chair in your bathroom to dry yourself and put on clothing   This will prevent you from losing your balance from drying or dressing yourself while you are standing  · Keep paths clear  Remove books, shoes, and other objects from walkways and stairs  Place cords for telephones and lamps out of the way so that you do not need to walk over them  Tape them down if you cannot move them  Remove small rugs  If you cannot remove a rug, secure it with double-sided tape  This will prevent you from tripping  · Install bright lights in your home  Use night lights to help light paths to the bathroom or kitchen  Always turn on the light before you start walking  · Keep items you use often on shelves within reach  Do not use a step stool to help you reach an item  · Paint or place reflective tape on the edges of your stairs  This will help you see the stairs better  Call 911 or have someone else call if:   · You have fallen and are unconscious  · You have fallen and cannot move part of your body  Contact your healthcare provider if:   · You have fallen and have pain or a headache  · You have questions or concerns about your condition or care  CARE AGREEMENT:   You have the right to help plan your care  Learn about your health condition and how it may be treated  Discuss treatment options with your caregivers to decide what care you want to receive  You always have the right to refuse treatment  The above information is an  only  It is not intended as medical advice for individual conditions or treatments  Talk to your doctor, nurse or pharmacist before following any medical regimen to see if it is safe and effective for you  © 2017 [address] Information is for End User's use only and may not be sold, redistributed or otherwise used for commercial purposes  All illustrations and images included in CareNotes® are the copyrighted property of [employer]  or Kg Bernstein  Advance Directives   WHAT YOU NEED TO KNOW:   What are advance directives?   Advance directives are legal documents that state your wishes and plans for medical care  These plans are made ahead of time in case you lose your ability to make decisions for yourself  Advance directives can apply to any medical decision, such as the treatments you want, and if you want to donate organs  What are the types of advance directives? There are many types of advance directives, and each state has rules about how to use them  You may choose a combination of any of the following:  · Living will: This is a written record of the treatment you want  You can also choose which treatments you do not want, which to limit, and which to stop at a certain time  This includes surgery, medicine, IV fluid, and tube feedings  · Durable power of  for healthcare [facility]): This is a written record that states who you want to make healthcare choices for you when you are unable to make them for yourself  This person, called a proxy, is usually a family member or a friend  You may choose more than 1 proxy  · Do not resuscitate (DNR) order:  A DNR order is used in case your heart stops beating or you stop breathing  It is a request not to have certain forms of treatment, such as CPR  A DNR order may be included in other types of advance directives  · Medical directive: This covers the care that you want if you are in a coma, near death, or unable to make decisions for yourself  You can list the treatments you want for each condition  Treatment may include pain medicine, surgery, blood transfusions, dialysis, IV or tube feedings, and a ventilator (breathing machine)  · Values history: This document has questions about your views, beliefs, and how you feel and think about life  This information can help others choose the care that you would choose  Why are advance directives important? An advance directive helps you control your care  Although spoken wishes may be used, it is better to have your wishes written down   Spoken wishes can be misunderstood, or not followed  Treatments may be given even if you do not want them  An advance directive may make it easier for your family to make difficult choices about your care  How do I decide what to put in my advance directives? · Make informed decisions:  Make sure you fully understand treatments or care you may receive  Think about the benefits and problems your decisions could cause for you or your family  Talk to healthcare providers if you have concerns or questions before you write down your wishes  You may also want to talk with your [employer] or , or a   Check your state laws to make sure that what you put in your advance directive is legal      · Sign all forms:  Sign and date your advance directive when you have finished  You may also need 2 witnesses to sign the forms  Witnesses cannot be your doctor or his staff, your spouse, heirs or beneficiaries, people you owe money to, or your chosen proxy  Talk to your family, proxy, and healthcare providers about your advance directive  Give each person a copy, and keep one for yourself in a place you can get to easily  Do not keep it hidden or locked away  · Review and revise your plans: You can revise your advance directive at any time, as long as you are able to make decisions  Review your plan every year, and when there are changes in your life, or your health  When you make changes, let your family, proxy, and healthcare providers know  Give each a new copy  Where can I find more information? · [employer]  [address] , [address]  Phone: 5- 670 - [phone]  Phone: 1- 910 - [phone]  Web Address: http://www  aafp org  · [address] [facility])  [address] [facility] [address], [address]  Phone: [phone] [phone] - [phone]  Phone: [phone]  Web Address: Sergey SOUZA AGREEMENT:   You have the right to help plan your care  To help with this plan, you must learn about your health condition and treatment options  You must also learn about advance directives and how they are used  Work with your healthcare providers to decide what care will be used to treat you  You always have the right to refuse treatment  The above information is an  only  It is not intended as medical advice for individual conditions or treatments  Talk to your doctor, nurse or pharmacist before following any medical regimen to see if it is safe and effective for you  © 2017 [address] Information is for End User's use only and may not be sold, redistributed or otherwise used for commercial purposes  All illustrations and images included in CareNotes® are the copyrighted property of [employer]  or Kg Bernstein

## 2018-05-08 NOTE — PROGRESS NOTES
HPI:  Deysi Duncan is a 70 y o  male here for his Subsequent Wellness Visit      Patient Active Problem List   Diagnosis    Chronic left-sided low back pain    Essential hypertension    Gastroesophageal reflux disease    Hyperglycemia    Mixed hyperlipidemia    Polyarticular arthritis    Pain syndrome, chronic    Psoriasis    Protrusion of lumbar intervertebral disc    Lumbar radiculopathy - Left    Lumbar foraminal stenosis    Osteoarthritis of knee     Past Medical History:   Diagnosis Date    Abnormal EKG     last assessed: 04/22/2015    Arthritis of right knee     last assessed: 04/22/2015    Gait disturbance     last assessed: 04/09/2014    Injury of C5-C7 spinal cord ([address] )     last assessed: 10/20/2014; with central cord syndrome     Status post total knee replacement, right     last assessed: 05/12/2015    Tremor     last assessed: 04/09/2014    Wears glasses      Past Surgical History:   Procedure Laterality Date    CERVICAL LAMINECTOMY      COLONOSCOPY  07/2015    multiple polyps, repeat in 3 years    KNEE SURGERY Right 2002    20 years ago-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    POPLITEAL SYNOVIAL CYST EXCISION      last assessed: 04/22/2015    TONSILLECTOMY AND ADENOIDECTOMY      last assessed: 04/22/2015    TOTAL KNEE ARTHROPLASTY      last assessed: 05/12/2015     Family History   Problem Relation Age of Onset    Allergies Father      seasonal    Lung cancer Family      History   Smoking Status    Former Smoker   Smokeless Tobacco    Never Used     History   Alcohol Use No      History   Drug Use No     /70   Ht 5' 9" (1 753 m)   Wt 94 8 kg (209 lb)   BMI 30 86 kg/m²       Current Outpatient Prescriptions   Medication Sig Dispense Refill    calcipotriene (DOVONOX) 0 005 % ointment calcipotriene 0 005 % topical ointment      Calcitriol 3 MCG/GM cream calcitriol 3 mcg/gram topical ointment      gabapentin (NEURONTIN) 400 mg capsule Take 1 capsule (400 mg total) by mouth 3 (three) times a day 90 capsule 2    simvastatin (ZOCOR) 20 mg tablet TAKE 1 TABLET EVERY DAY 90 tablet 3    tacrolimus (PROTOPIC) 0 1 % ointment Protopic 0 1 % topical ointment      triamcinolone (KENALOG) 0 1 % cream triamcinolone acetonide 0 1 % topical cream      omeprazole (PriLOSEC) 40 MG capsule Take 1 capsule by mouth      simvastatin (ZOCOR) 20 mg tablet Take 1 tablet by mouth daily       No current facility-administered medications for this visit        No Known Allergies  Immunization History   Administered Date(s) Administered    Influenza 10/07/2013    Influenza Quadrivalent Preservative Free 3 years and older IM 10/22/2015, 11/07/2017    Influenza Quadrivalent, 6-35 Months IM 10/25/2016    Influenza TIV (IM) 10/18/2014    Pneumococcal Conjugate 13-Valent 10/25/2016    Pneumococcal Polysaccharide PPV23 08/12/2015    Tdap 10/22/2015, 05/02/2017    Zoster 08/12/2015       Patient Care Team:  Katerin Rasmussen DO as PCP - General  Cena Berkeley, DO Johnney Graces, PA-C Malissa Som, CRNP Daryl Severiano Guys, MD Eliot Bigness, DO      Medicare Screening Tests and Risk Assessments:  AWV Clinical     ISAR:   Previous hospitalizations?:  Yes       Once in a Lifetime Medicare Screening:   EKG performed?:  Yes    AAA screening performed? (if performed, please add date to Health Maintenance):  No       Medicare Screening Tests and Risk Assessment:   AAA Risk Assessment    None Indicated:  Yes    Osteoporosis Risk Assessment    None indicated:  Yes    HIV Risk Assessment    None indicated:  Yes        Drug and Alcohol Use:   Tobacco use    Cigarettes:  former smoker    Tobacco use duration    Tobacco Cessation Readiness    Alcohol use    Alcohol use:  never    Alcohol Treatment Readiness   Illicit Drug Use    Drug use:  never        Diet & Exercise:   Diet   How many servings a day of the following:   Fruits and Vegetables:  3-4 Meat:  1-2   Whole Grains:  1 Simple Carbs:  1 Dairy:  1 Soda:  1   Coffee:  3 Tea:  0   Exercise    Do you currently exercise?:  yes    Frequency:  daily    Minutes per day:  15    Type of exercise:  walking       Cognitive Impairment Screening:   Anxiety screenings preformed: Yes Anxiety screen score:  0   Depression screening preformed:  Yes     PHQ-9 Depression scale score:  0   Cognitive Impairment Screening    Do you have difficulty learning or retaining new information?:  No Do you have difficulty handling new tasks?:  No   Do you have difficulty with reasoning?:  No Do you have difficulty with spatial ability and orientation?:  No   Do you have difficulty with language?:  No Do you have difficulty with behavior?:  No       Functional Ability/Level of Safety:   Hearing    Hearing difficulties:  No Bilateral:  normal   Left:  normal Right:  normal   Hearing Impairment Assessment    Current Activities    Status:  unlimited ADL's, unlimited driving, unlimited social activities   Help needed with the folllowing:    Using the phone:  No Transportation:  No   Shopping:  No Preparing Meals:  No   Doing Housework:  No Doing Laundry:  No   Managing Medications:  No Managing Money:  No   ADL    Fall Risk   Have you fallen in the last 12 months?:  No    Injury History       Home Safety:   Home Safety Risk Factors   Unfamilar with surroundings:  No Uneven floors:  No   Stairs or handrail saftey risk:  Yes Loose rugs:  No   Household clutter:  No Poor household lighting:  No   No grab bars in bathroom:  Yes        Advanced Directives:   Advanced Directives    Living Will:  No Durable POA for healthcare:  No   Advanced directive:  No    Patient's End of Life Decisions    Reviewed with patient:  Yes Provider agrees with end of life decisions:  Yes   End of life decisions comments:  I discussed with the patient his current health  At this point, he would like heroic measures to resuscitate himself if he were to be found without a pulse    However, if he is found to be terminal or non recoverable he would like to be made comfortable and have life support withdrawn  Urinary Incontinence:   Do you have urinary incontinence?:  No        Glaucoma:            Provider Screening     Preventative Screening/Counseling:   Cardiovascular Screening/Counseling:   (Labs Q5 years, EKG optional one-time)   General:  Screening Current           Diabetes Screening/Counseling:   (2 tests/year if Pre-Diabetes or 1 test/year if no Diabetes)   General:  Screening Current           Colorectal Cancer Screening/Counseling:   (FOBT Q1 yr; Flex Sig Q4 yrs or Q10 yrs after Screening Colonoscopy; Screening Colonoscpy Q2 yrs High Risk or Q10 yrs Low Risk; Barium Enema Q2 yrs High Risk or Q4 yrs Low Risk)   General:  Screening Current           Prostate Cancer Screening/Counseling:   (Annual)    General:  Risks and Benefits Discussed Due for: Labs:  PSA         Breast Cancer Screening/Counseling:   (Baseline Age 28 - 43; Annual Age 36+)         Cervical Cancer Screening/Counseling:   (Annual for High Risk or Childbearing Age with Abnormal Pap in Last 3 yrs; Every 2 all others)         Osteoporosis Screening/Counseling:   (Every 2 Yrs if at risk or more if medically necessary)   General:  Screening Not Indicated           AAA Screening/Counseling:   (Once per Lifetime with risk factors)    General:  Screening Not Indicated           Glaucoma Screening/Counseling:   (Annual)   General:  Screening Current          HIV Screening/Counseling:   (Voluntary; Once annually for high risk OR 3 times for Pregnancy at diagnosis of IUP; 3rd trimester; and at Labor   General:  Screening Not Indicated           Hepatitis C Screening:             Immunizations:        Other Preventative Couseling (Non-Medicare Wellness Visit Required):       Referrals (Non-Medicare Wellness Visit Required):       Medical Equipment/Suppliers:           No exam data present  Reviewed Updated [facility] Prior Wellness Visits:   Last Medicare wellness visit information was reviewed, patient interviewed , no change since last AWVyes  Last Medicare wellness visit information was reviewed, patient interviewed and updates made to the record today yes    Assessment and Plan:  1  Medicare annual wellness visit, subsequent     2   Screening for prostate cancer  PSA, Total Screen       Health Maintenance Due   Topic Date Due    HIV SCREENING  1955    Hepatitis C Screening  1955    Depression Screening PHQ-9  02/01/1965    Fall Risk  02/01/2018    ABDOMINAL AORTIC ANEURYSM (AAA) SCREEN  02/01/2018

## 2018-05-08 NOTE — PROGRESS NOTES
Assessment/Plan:         Diagnoses and all orders for this visit:    Essential hypertension  -     Comprehensive metabolic panel; Future  -     CBC and differential; Future  -     Urinalysis with reflex to microscopic; Future  -     PSA, Total Screen; Future    Medicare annual wellness visit, subsequent    Screening for prostate cancer  -     Cancel: PSA, Total Screen  -     PSA, Total Screen; Future    Gastroesophageal reflux disease without esophagitis    Polyarticular arthritis    Lumbar radiculopathy - Left    Psoriasis    Primary osteoarthritis of left knee  -     predniSONE 10 mg tablet; 5 x 2 days, 4 x 2 days, 3 x 2 days,2 x 2 days, 1 x 2 days  Hyperglycemia  -     HEMOGLOBIN A1C W/ EAG ESTIMATION; Future  -     Urinalysis with reflex to microscopic; Future    Mixed hyperlipidemia  -     Lipid panel; Future  -     TSH, 3rd generation; Future    Other orders  -     calcipotriene (DOVONOX) 0 005 % ointment; calcipotriene 0 005 % topical ointment  -     Calcitriol 3 MCG/GM cream; calcitriol 3 mcg/gram topical ointment  -     tacrolimus (PROTOPIC) 0 1 % ointment; Protopic 0 1 % topical ointment  -     triamcinolone (KENALOG) 0 1 % cream; triamcinolone acetonide 0 1 % topical cream  -     Discontinue: simvastatin (ZOCOR) 20 mg tablet; simvastatin 20 mg tablet          Subjective:   Chief Complaint   Patient presents with    Medicare Wellness Visit          Patient ID: Adela Giraldo is a 70 y o  male  Patient is a 55-year-old male presenting to the office for follow-up on his hypertension, hyperlipidemia, reflux, arthritis, and general health  Last colonoscopy was in 2015, he will be due in 2020  He sees the eye doctor on a regular basis, 1 year ago was his last visit  He just had his teeth completely renovated, he now has complete upper and lower dentures          The following portions of the patient's history were reviewed and updated as appropriate: allergies, current medications, past family history, past medical history, past social history, past surgical history and problem list     Review of Systems   Constitutional: Negative for appetite change, chills, diaphoresis, fatigue, fever and unexpected weight change  HENT: Negative for congestion, ear pain, hearing loss, nosebleeds, postnasal drip, rhinorrhea, sinus pressure, sore throat, tinnitus and trouble swallowing  Eyes: Negative for photophobia, pain, discharge, redness, itching and visual disturbance  Respiratory: Negative for cough, chest tightness, shortness of breath and wheezing  Cardiovascular: Negative for chest pain, palpitations and leg swelling  Denies orthopnea , dyspnea on exertion   Gastrointestinal: Negative for abdominal distention, abdominal pain, blood in stool, constipation, diarrhea, nausea and vomiting  Endocrine: Negative  Genitourinary: Negative for difficulty urinating, dysuria, flank pain, frequency, hematuria and urgency  Denies nocturia , erectile dysfunction   Musculoskeletal: Negative for arthralgias, back pain, gait problem, joint swelling and myalgias  Continues to see pain management for his back  Recently did a lot of [employer] work in his lower back is bothering a little bit more, also his left knee has swollen and he has to having difficulty bending it  Skin: Negative for pallor, rash and wound  He sees Dermatology for his plaque psoriasis and is on numerous creams  He is looking forward to getting outside this spring and summer and getting some sunlight on the plaques  Usually gets much better during the summer months  Allergic/Immunologic: Negative for environmental allergies, food allergies and immunocompromised state  Neurological: Negative for dizziness, tremors, seizures, syncope, speech difficulty, weakness, numbness and headaches  Hematological: Negative for adenopathy  Does not bruise/bleed easily     Psychiatric/Behavioral: Negative for behavioral problems, confusion, sleep disturbance and suicidal ideas  The patient is not nervous/anxious  Objective:      /70   Ht 5' 9" (1 753 m)   Wt 94 8 kg (209 lb)   BMI 30 86 kg/m²          Physical Exam   Constitutional: He is oriented to person, place, and time  He appears well-developed and well-nourished  HENT:   Head: Normocephalic and atraumatic  Nose: Nose normal    Mouth/Throat: Oropharynx is clear and moist    Total upper and lower dentures  Eyes: Conjunctivae and EOM are normal  Pupils are equal, round, and reactive to light  Neck: Normal range of motion  Neck supple  No JVD present  No tracheal deviation present  No thyromegaly present  Cardiovascular: Normal rate, regular rhythm and intact distal pulses  No murmur heard  Pulmonary/Chest: Effort normal and breath sounds normal  He has no wheezes  He has no rales  Abdominal: Soft  Bowel sounds are normal  He exhibits no mass  There is no tenderness  There is no rebound and no guarding  Musculoskeletal: He exhibits no edema or deformity  Left knee: He exhibits decreased range of motion, swelling and effusion  He exhibits no ecchymosis, no deformity, no erythema, no LCL laxity, no bony tenderness and no MCL laxity  Tenderness found  Lateral joint line tenderness noted  No medial joint line, no MCL, no LCL and no patellar tendon tenderness noted  Lumbar back: He exhibits decreased range of motion and tenderness  Lymphadenopathy:     He has no cervical adenopathy  Neurological: He is alert and oriented to person, place, and time  He has normal reflexes  No cranial nerve deficit  He exhibits normal muscle tone  Coordination normal    Skin: Skin is warm and dry  No lesion and no rash noted  Nails show no clubbing  Psychiatric: He has a normal mood and affect   Judgment normal        Appointment on 05/07/2018   Component Date Value Ref Range Status    Cholesterol 05/07/2018 118  50 - 200 mg/dL Final      Cholesterol:       Desirable         <200 mg/dl       Borderline         200-239 mg/dl       High              >239           Triglycerides 05/07/2018 49  <=150 mg/dL Final    Specimen collection should occur prior to N-Acetylcysteine or Metamizole administration due to the potential for falsely depressed results   HDL, Direct 05/07/2018 37* 40 - 60 mg/dL Final      HDL Cholesterol:       High    >59 mg/dL       Low     <41 mg/dL    LDL Calculated 05/07/2018 71  0 - 100 mg/dL Final      This screening LDL is a calculated result  It does not have the accuracy of the Direct Measured LDL in the monitoring of patients with hyperlipidemia and/or statin therapy  Direct Measure LDL (FZD995) must be ordered separately in these patients   Non-HDL-Chol (CHOL-HDL) 05/07/2018 81  mg/dl Final    Sodium 05/07/2018 141  136 - 145 mmol/L Final    Potassium 05/07/2018 4 4  3 5 - 5 3 mmol/L Final    Chloride 05/07/2018 109* 100 - 108 mmol/L Final    CO2 05/07/2018 26  21 - 32 mmol/L Final    Anion Gap 05/07/2018 6  4 - 13 mmol/L Final    BUN 05/07/2018 17  5 - 25 mg/dL Final    Creatinine 05/07/2018 0 71  0 60 - 1 30 mg/dL Final    Standardized to IDMS reference method    Glucose, Fasting 05/07/2018 99  65 - 99 mg/dL Final      Specimen collection should occur prior to Sulfasalazine administration due to the potential for falsely depressed results  Specimen collection should occur prior to Sulfapyridine administration due to the potential for falsely elevated results   Calcium 05/07/2018 8 8  8 3 - 10 1 mg/dL Final    AST 05/07/2018 18  5 - 45 U/L Final      Specimen collection should occur prior to Sulfasalazine administration due to the potential for falsely depressed results   ALT 05/07/2018 23  12 - 78 U/L Final      Specimen collection should occur prior to Sulfasalazine and/or Sulfapyridine administration due to the potential for falsely depressed results       Alkaline Phosphatase 05/07/2018 77  46 - 116 U/L Final    Total Protein 05/07/2018 6 8  6 4 - 8 2 g/dL Final    Albumin 05/07/2018 3 8  3 5 - 5 0 g/dL Final    Total Bilirubin 05/07/2018 0 44  0 20 - 1 00 mg/dL Final    eGFR 05/07/2018 98  ml/min/1 73sq m Final    WBC 05/07/2018 4 77  4 31 - 10 16 Thousand/uL Final    RBC 05/07/2018 4 74  3 88 - 5 62 Million/uL Final    Hemoglobin 05/07/2018 15 6  12 0 - 17 0 g/dL Final    Hematocrit 05/07/2018 47 5  36 5 - 49 3 % Final    MCV 05/07/2018 100* 82 - 98 fL Final    MCH 05/07/2018 32 9  26 8 - 34 3 pg Final    MCHC 05/07/2018 32 8  31 4 - 37 4 g/dL Final    RDW 05/07/2018 13 3  11 6 - 15 1 % Final    MPV 05/07/2018 9 5  8 9 - 12 7 fL Final    Platelets 12/40/3691 208  149 - 390 Thousands/uL Final    nRBC 05/07/2018 0  /100 WBCs Final    Neutrophils Relative 05/07/2018 59  43 - 75 % Final    Lymphocytes Relative 05/07/2018 30  14 - 44 % Final    Monocytes Relative 05/07/2018 8  4 - 12 % Final    Eosinophils Relative 05/07/2018 2  0 - 6 % Final    Basophils Relative 05/07/2018 1  0 - 1 % Final    Neutrophils Absolute 05/07/2018 2 81  1 85 - 7 62 Thousands/µL Final    Lymphocytes Absolute 05/07/2018 1 42  0 60 - 4 47 Thousands/µL Final    Monocytes Absolute 05/07/2018 0 37  0 17 - 1 22 Thousand/µL Final    Eosinophils Absolute 05/07/2018 0 11  0 00 - 0 61 Thousand/µL Final    Basophils Absolute 05/07/2018 0 05  0 00 - 0 10 Thousands/µL Final    TSH 3RD GENERATON 05/07/2018 0 969  0 358 - 3 740 uIU/mL Final    Color, UA 05/07/2018 Yellow   Final    Clarity, UA 05/07/2018 Clear   Final    Specific Gravity, UA 05/07/2018 1 006  1 003 - 1 030 Final    pH, UA 05/07/2018 6 5  4 5 - 8 0 Final    Leukocytes, UA 05/07/2018 Negative  Negative Final    Nitrite, UA 05/07/2018 Negative  Negative Final    Protein, UA 05/07/2018 Negative  Negative mg/dl Final    Glucose, UA 05/07/2018 Negative  Negative mg/dl Final    Ketones, UA 05/07/2018 Negative  Negative mg/dl Final    Urobilinogen, UA 05/07/2018 0 2  0 2, 1 0 E U /dl E U /dl Final    Bilirubin, UA 05/07/2018 Negative  Negative Final    Blood, UA 05/07/2018 Negative  Negative Final    Hemoglobin A1C 05/07/2018 5 6  4 2 - 6 3 % Final    EAG 05/07/2018 114  mg/dl Final   ]

## 2018-07-16 ENCOUNTER — OFFICE VISIT (OUTPATIENT)
Dept: PAIN MEDICINE | Facility: MEDICAL CENTER | Age: 63
End: 2018-07-16
Payer: MEDICARE

## 2018-07-16 VITALS
HEIGHT: 69 IN | HEART RATE: 70 BPM | WEIGHT: 204.6 LBS | BODY MASS INDEX: 30.3 KG/M2 | SYSTOLIC BLOOD PRESSURE: 124 MMHG | DIASTOLIC BLOOD PRESSURE: 60 MMHG

## 2018-07-16 DIAGNOSIS — M79.18 MYOFASCIAL PAIN SYNDROME: ICD-10-CM

## 2018-07-16 DIAGNOSIS — M54.16 LUMBAR RADICULOPATHY: Primary | ICD-10-CM

## 2018-07-16 DIAGNOSIS — M48.061 LUMBAR FORAMINAL STENOSIS: ICD-10-CM

## 2018-07-16 PROCEDURE — 99213 OFFICE O/P EST LOW 20 MIN: CPT | Performed by: NURSE PRACTITIONER

## 2018-07-16 RX ORDER — METHOCARBAMOL 500 MG/1
500 TABLET, FILM COATED ORAL
Qty: 30 TABLET | Refills: 2 | Status: SHIPPED | OUTPATIENT
Start: 2018-07-16 | End: 2018-10-08 | Stop reason: ALTCHOICE

## 2018-07-16 RX ORDER — GABAPENTIN 400 MG/1
400 CAPSULE ORAL 3 TIMES DAILY
Qty: 90 CAPSULE | Refills: 2 | Status: SHIPPED | OUTPATIENT
Start: 2018-07-16 | End: 2018-10-08 | Stop reason: SDUPTHER

## 2018-07-16 NOTE — PROGRESS NOTES
Assessment:  1  Lumbar radiculopathy - Left    2  Lumbar foraminal stenosis    3  Myofascial pain syndrome        Plan: At this time we will continue the gabapentin as prescribed this was refilled today  I will initiate methocarbamol 500 mg 1 tablet at bedtime since he states that he is waking up pretty stiff and that is when his pain is the most bothersome  I would also like him to try taking 2 extra Strength Tylenol in the morning when  He wakes up  Follow-up visit in our office in 12 weeks  History of Present Illness: The patient is a 70 y o  male who presents for a follow-up visit related to his left low back pain  Today he rates his pain 3/10, this is intermittent in nature most bothersome in the morning  He describes his pain as pressure-like  Patient continues to take gabapentin 400 mg 3 times a day with moderate relief and no side effects or issues  I have personally reviewed and/or updated the patient's past medical history, past surgical history, family history, social history, current medications, allergies, and vital signs today  Review of Systems:    Review of Systems   Respiratory: Negative for shortness of breath  Cardiovascular: Negative for chest pain  Gastrointestinal: Negative for constipation, diarrhea, nausea and vomiting  Musculoskeletal: Negative for arthralgias, gait problem, joint swelling and myalgias  Skin: Negative for rash  Neurological: Negative for dizziness, seizures and weakness  All other systems reviewed and are negative          Past Medical History:   Diagnosis Date    Abnormal EKG     last assessed: 04/22/2015    Arthritis of right knee     last assessed: 04/22/2015    Gait disturbance     last assessed: 04/09/2014    Injury of C5-C7 spinal cord ([facility] [address] )     last assessed: 10/20/2014; with central cord syndrome     Status post total knee replacement, right     last assessed: 05/12/2015    Tremor     last assessed: 04/09/2014    Wears glasses        Past Surgical History:   Procedure Laterality Date    CERVICAL LAMINECTOMY      COLONOSCOPY  07/2015    multiple polyps, repeat in 3 years    KNEE SURGERY Right 2002    20 years ago-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    POPLITEAL SYNOVIAL CYST EXCISION      last assessed: 04/22/2015    TONSILLECTOMY AND ADENOIDECTOMY      last assessed: 04/22/2015    TOTAL KNEE ARTHROPLASTY      last assessed: 05/12/2015       Family History   Problem Relation Age of Onset    Allergies Father         seasonal    Lung cancer Family        Social History     Occupational History    Not on file  Social History Main Topics    Smoking status: Former Smoker    Smokeless tobacco: Never Used    Alcohol use No    Drug use: No    Sexual activity: Not on file         Current Outpatient Prescriptions:     calcipotriene (DOVONOX) 0 005 % ointment, calcipotriene 0 005 % topical ointment, Disp: , Rfl:     Calcitriol 3 MCG/GM cream, calcitriol 3 mcg/gram topical ointment, Disp: , Rfl:     gabapentin (NEURONTIN) 400 mg capsule, Take 1 capsule (400 mg total) by mouth 3 (three) times a day, Disp: 90 capsule, Rfl: 2    methocarbamol (ROBAXIN) 500 mg tablet, Take 1 tablet (500 mg total) by mouth daily at bedtime as needed for muscle spasms, Disp: 30 tablet, Rfl: 2    omeprazole (PriLOSEC) 40 MG capsule, Take 1 capsule by mouth, Disp: , Rfl:     predniSONE 10 mg tablet, 5 x 2 days, 4 x 2 days, 3 x 2 days,2 x 2 days, 1 x 2 days  , Disp: 30 tablet, Rfl: 0    simvastatin (ZOCOR) 20 mg tablet, TAKE 1 TABLET EVERY DAY, Disp: 90 tablet, Rfl: 3    simvastatin (ZOCOR) 20 mg tablet, Take 1 tablet by mouth daily, Disp: , Rfl:     tacrolimus (PROTOPIC) 0 1 % ointment, Protopic 0 1 % topical ointment, Disp: , Rfl:     triamcinolone (KENALOG) 0 1 % cream, triamcinolone acetonide 0 1 % topical cream, Disp: , Rfl:     No Known Allergies    Physical Exam:    /60   Pulse 70   Ht 5' 9" (1 753 m)   Wt 92 8 kg (204 lb 9 6 oz) BMI 30 21 kg/m²     Constitutional: normal, well developed, well nourished, alert, in no distress and non-toxic and no overt pain behavior  Eyes: anicteric  HEENT: grossly intact  Neck: supple, symmetric, trachea midline and no masses   Pulmonary:even and unlabored  Cardiovascular:No edema or pitting edema present  Skin:Normal without rashes or lesions and well hydrated  Psychiatric:Mood and affect appropriate  Neurologic:Cranial Nerves II-XII grossly intact  Musculoskeletal:antalgic    Imaging  No orders to display       No orders of the defined types were placed in this encounter

## 2018-10-08 ENCOUNTER — OFFICE VISIT (OUTPATIENT)
Dept: PAIN MEDICINE | Facility: MEDICAL CENTER | Age: 63
End: 2018-10-08
Payer: MEDICARE

## 2018-10-08 VITALS
BODY MASS INDEX: 30.36 KG/M2 | SYSTOLIC BLOOD PRESSURE: 102 MMHG | DIASTOLIC BLOOD PRESSURE: 60 MMHG | HEIGHT: 69 IN | HEART RATE: 64 BPM | WEIGHT: 205 LBS

## 2018-10-08 DIAGNOSIS — M48.061 LUMBAR FORAMINAL STENOSIS: ICD-10-CM

## 2018-10-08 DIAGNOSIS — M54.16 LUMBAR RADICULOPATHY: Primary | ICD-10-CM

## 2018-10-08 DIAGNOSIS — G89.4 PAIN SYNDROME, CHRONIC: ICD-10-CM

## 2018-10-08 PROCEDURE — 99213 OFFICE O/P EST LOW 20 MIN: CPT | Performed by: NURSE PRACTITIONER

## 2018-10-08 RX ORDER — GABAPENTIN 400 MG/1
400 CAPSULE ORAL 3 TIMES DAILY
Qty: 90 CAPSULE | Refills: 2 | Status: SHIPPED | OUTPATIENT
Start: 2018-10-08 | End: 2018-12-31 | Stop reason: SDUPTHER

## 2018-10-08 NOTE — PROGRESS NOTES
Assessment:  1  Lumbar radiculopathy - Left    2  Lumbar foraminal stenosis    3  Pain syndrome, chronic        Plan:  Continue with gabapentin as prescribed this was refilled today  Patient does not feel that the methocarbamol was helping much though it is okay for him to discontinue the medication  Continue with extra strength Tylenol morning  Patient did have pain in his left leg the last 2 weeks however he states that yesterday and today that has resolved  If his pain symptoms should return I would like him to call the office to schedule repeat left L3 and L4 transforaminal epidural steroid injection as the last 1 did provide him complete relief, and his pain symptoms are the same  His last injection was October 23, 2017  Follow-up in 12 weeks for medication refills        [place] Prescription Drug Monitoring Program report was reviewed and was appropriate         My impressions and treatment recommendations were discussed in detail with the patient who verbalized understanding and had no further questions  Discharge instructions were provided  I personally saw and examined the patient and I agree with the above discussed plan of care  No orders of the defined types were placed in this encounter  New Medications Ordered This Visit   Medications    gabapentin (NEURONTIN) 400 mg capsule     Sig: Take 1 capsule (400 mg total) by mouth 3 (three) times a day     Dispense:  90 capsule     Refill:  2       History of Present Illness:    Izzy Gomes is a 70 y o  male who presents for follow-up related to his left leg pain this is rated 5/10 today his pain is intermittent in nature most bothersome in the morning in the evening  He describes his pain as sharp, and shooting  The patient tells me that for the past 2 weeks he did have a lot of pain and swelling in his left leg however yesterday and today his pain symptoms have resolved      Patient continues to take gabapentin 400 mg 3 times a day   He was using methocarbamol 1 tablet at bedtime however he states it was not helping  He does use extra-strength Tylenol in the morning which is helpful  He denies any side effects or issues  I have personally reviewed and/or updated the patient's past medical history, past surgical history, family history, social history, current medications, allergies, and vital signs today  Review of Systems:    Review of Systems   Constitutional: Negative for fever and unexpected weight change  HENT: Negative for trouble swallowing  Eyes: Negative for visual disturbance  Respiratory: Negative for shortness of breath and wheezing  Cardiovascular: Negative for chest pain and palpitations  Gastrointestinal: Negative for constipation, diarrhea, nausea and vomiting  Endocrine: Negative for cold intolerance, heat intolerance and polydipsia  Genitourinary: Negative for difficulty urinating and frequency  Musculoskeletal: Negative for arthralgias, gait problem, joint swelling and myalgias  Skin: Negative for rash  Neurological: Negative for dizziness, seizures, syncope, weakness and headaches  Hematological: Does not bruise/bleed easily  Psychiatric/Behavioral: Negative for dysphoric mood  All other systems reviewed and are negative        Patient Active Problem List   Diagnosis    Chronic left-sided low back pain    Essential hypertension    Gastroesophageal reflux disease    Hyperglycemia    Mixed hyperlipidemia    Polyarticular arthritis    Pain syndrome, chronic    Psoriasis    Protrusion of lumbar intervertebral disc    Lumbar radiculopathy - Left    Lumbar foraminal stenosis    Osteoarthritis of knee       Past Medical History:   Diagnosis Date    Abnormal EKG     last assessed: 04/22/2015    Arthritis of right knee     last assessed: 04/22/2015    Gait disturbance     last assessed: 04/09/2014    Injury of C5-C7 spinal cord ([facility] [address] )     last assessed: 10/20/2014; with central cord syndrome     Status post total knee replacement, right     last assessed: 05/12/2015    Tremor     last assessed: 04/09/2014    Wears glasses        Past Surgical History:   Procedure Laterality Date    CERVICAL LAMINECTOMY      COLONOSCOPY  07/2015    multiple polyps, repeat in 3 years    KNEE SURGERY Right 2002    20 years ago-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    POPLITEAL SYNOVIAL CYST EXCISION      last assessed: 04/22/2015    TONSILLECTOMY AND ADENOIDECTOMY      last assessed: 04/22/2015    TOTAL KNEE ARTHROPLASTY      last assessed: 05/12/2015       Family History   Problem Relation Age of Onset    Allergies Father         seasonal    Lung cancer Family        Social History     Occupational History    Not on file  Social History Main Topics    Smoking status: Former Smoker    Smokeless tobacco: Never Used    Alcohol use No    Drug use: No    Sexual activity: Not on file       Current Outpatient Prescriptions on File Prior to Visit   Medication Sig    calcipotriene (DOVONOX) 0 005 % ointment calcipotriene 0 005 % topical ointment    Calcitriol 3 MCG/GM cream calcitriol 3 mcg/gram topical ointment    omeprazole (PriLOSEC) 40 MG capsule Take 1 capsule by mouth    predniSONE 10 mg tablet 5 x 2 days, 4 x 2 days, 3 x 2 days,2 x 2 days, 1 x 2 days   simvastatin (ZOCOR) 20 mg tablet TAKE 1 TABLET EVERY DAY    simvastatin (ZOCOR) 20 mg tablet Take 1 tablet by mouth daily    tacrolimus (PROTOPIC) 0 1 % ointment Protopic 0 1 % topical ointment    triamcinolone (KENALOG) 0 1 % cream triamcinolone acetonide 0 1 % topical cream    [DISCONTINUED] gabapentin (NEURONTIN) 400 mg capsule Take 1 capsule (400 mg total) by mouth 3 (three) times a day    [DISCONTINUED] methocarbamol (ROBAXIN) 500 mg tablet Take 1 tablet (500 mg total) by mouth daily at bedtime as needed for muscle spasms     No current facility-administered medications on file prior to visit          No Known Allergies    Physical Exam:    /60   Pulse 64   Ht 5' 9" (1 753 m)   Wt 93 kg (205 lb)   BMI 30 27 kg/m²     Constitutional: normal, well developed, well nourished, alert, in no distress and non-toxic and no overt pain behavior    Eyes: anicteric  HEENT: grossly intact  Neck: supple, symmetric, trachea midline and no masses   Pulmonary:even and unlabored  Cardiovascular:No edema or pitting edema present  Skin:Normal without rashes or lesions and well hydrated  Psychiatric:Mood and affect appropriate  Neurologic:Cranial Nerves II-XII grossly intact  Musculoskeletal:normal    Imaging

## 2018-11-05 DIAGNOSIS — E78.2 MIXED HYPERLIPIDEMIA: Primary | ICD-10-CM

## 2018-11-05 RX ORDER — SIMVASTATIN 20 MG
TABLET ORAL
Qty: 90 TABLET | Refills: 3 | Status: SHIPPED | OUTPATIENT
Start: 2018-11-05 | End: 2019-05-09 | Stop reason: SDUPTHER

## 2018-11-07 ENCOUNTER — APPOINTMENT (OUTPATIENT)
Dept: LAB | Facility: MEDICAL CENTER | Age: 63
End: 2018-11-07
Payer: MEDICARE

## 2018-11-07 DIAGNOSIS — Z12.5 SCREENING FOR PROSTATE CANCER: ICD-10-CM

## 2018-11-07 DIAGNOSIS — R73.9 HYPERGLYCEMIA: ICD-10-CM

## 2018-11-07 DIAGNOSIS — I10 ESSENTIAL HYPERTENSION: ICD-10-CM

## 2018-11-07 DIAGNOSIS — E78.2 MIXED HYPERLIPIDEMIA: ICD-10-CM

## 2018-11-07 LAB
ALBUMIN SERPL BCP-MCNC: 4.1 G/DL (ref 3.5–5)
ALP SERPL-CCNC: 98 U/L (ref 46–116)
ALT SERPL W P-5'-P-CCNC: 18 U/L (ref 12–78)
ANION GAP SERPL CALCULATED.3IONS-SCNC: 3 MMOL/L (ref 4–13)
AST SERPL W P-5'-P-CCNC: 9 U/L (ref 5–45)
BASOPHILS # BLD AUTO: 0.06 THOUSANDS/ΜL (ref 0–0.1)
BASOPHILS NFR BLD AUTO: 1 % (ref 0–1)
BILIRUB SERPL-MCNC: 0.48 MG/DL (ref 0.2–1)
BILIRUB UR QL STRIP: NEGATIVE
BUN SERPL-MCNC: 15 MG/DL (ref 5–25)
CALCIUM SERPL-MCNC: 8.8 MG/DL (ref 8.3–10.1)
CHLORIDE SERPL-SCNC: 105 MMOL/L (ref 100–108)
CHOLEST SERPL-MCNC: 132 MG/DL (ref 50–200)
CLARITY UR: CLEAR
CO2 SERPL-SCNC: 29 MMOL/L (ref 21–32)
COLOR UR: YELLOW
CREAT SERPL-MCNC: 0.8 MG/DL (ref 0.6–1.3)
EOSINOPHIL # BLD AUTO: 0.13 THOUSAND/ΜL (ref 0–0.61)
EOSINOPHIL NFR BLD AUTO: 2 % (ref 0–6)
ERYTHROCYTE [DISTWIDTH] IN BLOOD BY AUTOMATED COUNT: 13.2 % (ref 11.6–15.1)
EST. AVERAGE GLUCOSE BLD GHB EST-MCNC: 117 MG/DL
GFR SERPL CREATININE-BSD FRML MDRD: 94 ML/MIN/1.73SQ M
GLUCOSE P FAST SERPL-MCNC: 88 MG/DL (ref 65–99)
GLUCOSE UR STRIP-MCNC: NEGATIVE MG/DL
HBA1C MFR BLD: 5.7 % (ref 4.2–6.3)
HCT VFR BLD AUTO: 51 % (ref 36.5–49.3)
HDLC SERPL-MCNC: 35 MG/DL (ref 40–60)
HGB BLD-MCNC: 16.8 G/DL (ref 12–17)
HGB UR QL STRIP.AUTO: NEGATIVE
IMM GRANULOCYTES # BLD AUTO: 0.01 THOUSAND/UL (ref 0–0.2)
IMM GRANULOCYTES NFR BLD AUTO: 0 % (ref 0–2)
KETONES UR STRIP-MCNC: NEGATIVE MG/DL
LDLC SERPL CALC-MCNC: 83 MG/DL (ref 0–100)
LEUKOCYTE ESTERASE UR QL STRIP: NEGATIVE
LYMPHOCYTES # BLD AUTO: 1.37 THOUSANDS/ΜL (ref 0.6–4.47)
LYMPHOCYTES NFR BLD AUTO: 25 % (ref 14–44)
MCH RBC QN AUTO: 33.2 PG (ref 26.8–34.3)
MCHC RBC AUTO-ENTMCNC: 32.9 G/DL (ref 31.4–37.4)
MCV RBC AUTO: 101 FL (ref 82–98)
MONOCYTES # BLD AUTO: 0.36 THOUSAND/ΜL (ref 0.17–1.22)
MONOCYTES NFR BLD AUTO: 7 % (ref 4–12)
NEUTROPHILS # BLD AUTO: 3.58 THOUSANDS/ΜL (ref 1.85–7.62)
NEUTS SEG NFR BLD AUTO: 65 % (ref 43–75)
NITRITE UR QL STRIP: NEGATIVE
NONHDLC SERPL-MCNC: 97 MG/DL
NRBC BLD AUTO-RTO: 0 /100 WBCS
PH UR STRIP.AUTO: 6.5 [PH] (ref 4.5–8)
PLATELET # BLD AUTO: 202 THOUSANDS/UL (ref 149–390)
PMV BLD AUTO: 9.4 FL (ref 8.9–12.7)
POTASSIUM SERPL-SCNC: 4.6 MMOL/L (ref 3.5–5.3)
PROT SERPL-MCNC: 6.7 G/DL (ref 6.4–8.2)
PROT UR STRIP-MCNC: NEGATIVE MG/DL
PSA SERPL-MCNC: 0.7 NG/ML (ref 0–4)
RBC # BLD AUTO: 5.06 MILLION/UL (ref 3.88–5.62)
SODIUM SERPL-SCNC: 137 MMOL/L (ref 136–145)
SP GR UR STRIP.AUTO: 1 (ref 1–1.03)
TRIGL SERPL-MCNC: 72 MG/DL
TSH SERPL DL<=0.05 MIU/L-ACNC: 1.53 UIU/ML (ref 0.36–3.74)
UROBILINOGEN UR QL STRIP.AUTO: 0.2 E.U./DL
WBC # BLD AUTO: 5.51 THOUSAND/UL (ref 4.31–10.16)

## 2018-11-07 PROCEDURE — 80061 LIPID PANEL: CPT

## 2018-11-07 PROCEDURE — 85025 COMPLETE CBC W/AUTO DIFF WBC: CPT

## 2018-11-07 PROCEDURE — G0103 PSA SCREENING: HCPCS

## 2018-11-07 PROCEDURE — 36415 COLL VENOUS BLD VENIPUNCTURE: CPT

## 2018-11-07 PROCEDURE — 81003 URINALYSIS AUTO W/O SCOPE: CPT

## 2018-11-07 PROCEDURE — 80053 COMPREHEN METABOLIC PANEL: CPT

## 2018-11-07 PROCEDURE — 83036 HEMOGLOBIN GLYCOSYLATED A1C: CPT

## 2018-11-07 PROCEDURE — 84443 ASSAY THYROID STIM HORMONE: CPT

## 2018-11-08 ENCOUNTER — OFFICE VISIT (OUTPATIENT)
Dept: FAMILY MEDICINE CLINIC | Facility: CLINIC | Age: 63
End: 2018-11-08
Payer: MEDICARE

## 2018-11-08 VITALS
WEIGHT: 208 LBS | DIASTOLIC BLOOD PRESSURE: 70 MMHG | HEIGHT: 69 IN | SYSTOLIC BLOOD PRESSURE: 110 MMHG | BODY MASS INDEX: 30.81 KG/M2

## 2018-11-08 DIAGNOSIS — I10 ESSENTIAL HYPERTENSION: Primary | ICD-10-CM

## 2018-11-08 DIAGNOSIS — Z23 NEED FOR VACCINATION: ICD-10-CM

## 2018-11-08 DIAGNOSIS — M13.0 POLYARTICULAR ARTHRITIS: ICD-10-CM

## 2018-11-08 DIAGNOSIS — J30.1 SEASONAL ALLERGIC RHINITIS DUE TO POLLEN: ICD-10-CM

## 2018-11-08 DIAGNOSIS — K21.9 GASTROESOPHAGEAL REFLUX DISEASE WITHOUT ESOPHAGITIS: ICD-10-CM

## 2018-11-08 DIAGNOSIS — E78.2 MIXED HYPERLIPIDEMIA: ICD-10-CM

## 2018-11-08 DIAGNOSIS — R73.9 HYPERGLYCEMIA: ICD-10-CM

## 2018-11-08 PROCEDURE — 90662 IIV NO PRSV INCREASED AG IM: CPT | Performed by: FAMILY MEDICINE

## 2018-11-08 PROCEDURE — 99214 OFFICE O/P EST MOD 30 MIN: CPT | Performed by: FAMILY MEDICINE

## 2018-11-08 PROCEDURE — G0008 ADMIN INFLUENZA VIRUS VAC: HCPCS | Performed by: FAMILY MEDICINE

## 2018-11-08 RX ORDER — LORATADINE 10 MG/1
10 TABLET ORAL DAILY
Qty: 90 TABLET | Refills: 1 | Status: SHIPPED | OUTPATIENT
Start: 2018-11-08 | End: 2019-03-25 | Stop reason: ALTCHOICE

## 2018-11-08 NOTE — PROGRESS NOTES
Assessment/Plan:    Essential hypertension  Stable on current medications  Continue same  Recheck in the office in 6 months    Mixed hyperlipidemia  Favorable lipid profile  Continue simvastatin  Recheck lab in 6 months    Hyperglycemia  HB A1c is 5 7  Recheck in 1 year    Gastroesophageal reflux disease  Currently taking omeprazole as needed  Only once or twice a month  Diagnoses and all orders for this visit:    Essential hypertension  -     CBC and differential; Future  -     Comprehensive metabolic panel; Future  -     Lipid panel; Future  -     Urinalysis with reflex to microscopic; Future    Mixed hyperlipidemia  -     CBC and differential; Future  -     Comprehensive metabolic panel; Future  -     Lipid panel; Future  -     TSH, 3rd generation; Future    Hyperglycemia  -     Comprehensive metabolic panel; Future  -     Urinalysis with reflex to microscopic; Future    Gastroesophageal reflux disease without esophagitis    Polyarticular arthritis    Seasonal allergic rhinitis due to pollen  -     loratadine (CLARITIN) 10 mg tablet; Take 1 tablet (10 mg total) by mouth daily    Need for vaccination  -     influenza vaccine, [id number], high-dose, PF 0 5 mL, for patients 65 yr+ (FLUZONE HIGH-DOSE)          Subjective:   Chief Complaint   Patient presents with    Hypertension    Hyperlipidemia     no refills needed  Patient ID: Steven Austin is a 70 y o  male  Patient is a 13-year-old male presenting to the office for follow-up on his hypertension, hyperlipidemia, reflux, arthritis, and to get a flu shot  He had recent lab work which he would like to review  Overall he his only complaint is some mild difficulty with allergies this time a year          The following portions of the patient's history were reviewed and updated as appropriate: allergies, current medications, past family history, past medical history, past social history, past surgical history and problem list     Review of Systems   Constitutional: Negative for appetite change, chills, diaphoresis, fatigue, fever and unexpected weight change  HENT: Positive for postnasal drip and rhinorrhea  Negative for congestion, ear pain, hearing loss, nosebleeds, sinus pressure, sore throat, tinnitus and trouble swallowing  Eyes: Negative for photophobia, pain, discharge, redness, itching and visual disturbance  Respiratory: Negative for cough, chest tightness, shortness of breath and wheezing  Cardiovascular: Negative for chest pain, palpitations and leg swelling  Denies orthopnea , dyspnea on exertion   Gastrointestinal: Negative for abdominal distention, abdominal pain, blood in stool, constipation, diarrhea, nausea and vomiting  Endocrine: Negative  Genitourinary: Negative for difficulty urinating, dysuria, flank pain, frequency, hematuria and urgency  Denies nocturia , erectile dysfunction   Musculoskeletal: Negative for arthralgias, back pain, gait problem, joint swelling and myalgias  Skin: Negative for pallor, rash and wound  Denies skin lesions   Allergic/Immunologic: Negative for environmental allergies, food allergies and immunocompromised state  Neurological: Negative for dizziness, tremors, seizures, syncope, speech difficulty, weakness, numbness and headaches  Hematological: Negative for adenopathy  Does not bruise/bleed easily  Psychiatric/Behavioral: Negative for behavioral problems, confusion, sleep disturbance and suicidal ideas  The patient is not nervous/anxious  Objective:      /70   Ht 5' 9" (1 753 m)   Wt 94 3 kg (208 lb)   BMI 30 72 kg/m²          Physical Exam   Constitutional: He is oriented to person, place, and time  He appears well-developed and well-nourished  HENT:   Head: Normocephalic and atraumatic  Nose: Nose normal    Mouth/Throat: Oropharynx is clear and moist    Eyes: Pupils are equal, round, and reactive to light   Conjunctivae and EOM are normal  Right eye exhibits discharge  Neck: Normal range of motion  Neck supple  No JVD present  No tracheal deviation present  No thyromegaly present  Cardiovascular: Normal rate, regular rhythm and intact distal pulses  No murmur heard  Pulmonary/Chest: Effort normal and breath sounds normal  He has no wheezes  He has no rales  Abdominal: Soft  Bowel sounds are normal  He exhibits no mass  There is no tenderness  There is no rebound and no guarding  Musculoskeletal: He exhibits no edema, tenderness or deformity  Lymphadenopathy:     He has no cervical adenopathy  Neurological: He is alert and oriented to person, place, and time  He has normal reflexes  No cranial nerve deficit  He exhibits normal muscle tone  Coordination normal    Skin: Skin is warm and dry  No lesion and no rash noted  Nails show no clubbing  Psychiatric: He has a normal mood and affect  Judgment normal    Nursing note and vitals reviewed

## 2018-11-19 ENCOUNTER — TELEPHONE (OUTPATIENT)
Dept: PAIN MEDICINE | Facility: MEDICAL CENTER | Age: 63
End: 2018-11-19

## 2018-11-19 NOTE — TELEPHONE ENCOUNTER
Per AO's 10/8/2018 ov note:      If his pain symptoms should return I would like him to call the office to schedule repeat left L3 and L4 transforaminal epidural steroid injection as the last 1 did provide him complete relief, and his pain symptoms are the same  His last injection was October 23, 2017  Forwarding to [address] surg coordinator - to be scheduled

## 2018-11-19 NOTE — TELEPHONE ENCOUNTER
Dr Tanya Juarez    S/w patient and he is asking to be scheduled for an injection  I spoke with syeda who stated he needed an appt to get that set up  Per patient he was in on 10/8/18 and was told to just call to get this sched if he cannot stand the pain  Patient would like a call to get this scheduled

## 2018-12-06 NOTE — TELEPHONE ENCOUNTER
Called patient and apologized for being told that he needed an OV first   Scheduled:     LT L3 & L4 TFESI ON 12/12  Has scheduled f/u with Mel Early on 12/31    Reviewed instructions: , NPO 1 hour prior, loose-fitting/comfortable pants, if ill/fever/infx/abx to call and reschedule  Patient stated verbal understanding

## 2018-12-12 ENCOUNTER — HOSPITAL ENCOUNTER (OUTPATIENT)
Dept: RADIOLOGY | Facility: MEDICAL CENTER | Age: 63
Discharge: HOME | End: 2018-12-12
Payer: MEDICARE

## 2018-12-12 VITALS
DIASTOLIC BLOOD PRESSURE: 86 MMHG | OXYGEN SATURATION: 98 % | SYSTOLIC BLOOD PRESSURE: 167 MMHG | TEMPERATURE: 98 F | RESPIRATION RATE: 20 BRPM | HEART RATE: 68 BPM

## 2018-12-12 DIAGNOSIS — M54.16 LUMBAR RADICULOPATHY: ICD-10-CM

## 2018-12-12 PROCEDURE — 64483 NJX AA&/STRD TFRM EPI L/S 1: CPT | Performed by: PHYSICAL MEDICINE & REHABILITATION

## 2018-12-12 PROCEDURE — 64484 NJX AA&/STRD TFRM EPI L/S EA: CPT | Performed by: PHYSICAL MEDICINE & REHABILITATION

## 2018-12-12 RX ORDER — CLOBETASOL PROPIONATE 0.5 MG/G
0.05 CREAM TOPICAL
COMMUNITY
End: 2019-11-07

## 2018-12-12 RX ORDER — PAPAVERINE HCL 150 MG
20 CAPSULE, EXTENDED RELEASE ORAL ONCE
Status: COMPLETED | OUTPATIENT
Start: 2018-12-12 | End: 2018-12-12

## 2018-12-12 RX ORDER — LIDOCAINE HYDROCHLORIDE 10 MG/ML
5 INJECTION, SOLUTION EPIDURAL; INFILTRATION; INTRACAUDAL; PERINEURAL ONCE
Status: COMPLETED | OUTPATIENT
Start: 2018-12-12 | End: 2018-12-12

## 2018-12-12 RX ADMIN — DEXAMETHASONE SODIUM PHOSPHATE 15 MG: 10 INJECTION, SOLUTION INTRAMUSCULAR; INTRAVENOUS at 14:18

## 2018-12-12 RX ADMIN — LIDOCAINE HYDROCHLORIDE 2.5 ML: 10 INJECTION, SOLUTION EPIDURAL; INFILTRATION; INTRACAUDAL; PERINEURAL at 14:14

## 2018-12-12 RX ADMIN — IOHEXOL 2 ML: 300 INJECTION, SOLUTION INTRAVENOUS at 14:18

## 2018-12-12 NOTE — H&P
History of Present Illness:  The patient is a 70 y o  male who presents with complaints of   Back and left leg pain    Patient Active Problem List   Diagnosis    Chronic left-sided low back pain    Essential hypertension    Gastroesophageal reflux disease    Hyperglycemia    Mixed hyperlipidemia    Polyarticular arthritis    Pain syndrome, chronic    Psoriasis    Protrusion of lumbar intervertebral disc    Lumbar radiculopathy - Left    Lumbar foraminal stenosis    Osteoarthritis of knee       Past Medical History:   Diagnosis Date    Abnormal EKG     last assessed: 04/22/2015    Arthritis of right knee     last assessed: 04/22/2015    Gait disturbance     last assessed: 04/09/2014    Injury of C5-C7 spinal cord ([address] )     last assessed: 10/20/2014; with central cord syndrome     Status post total knee replacement, right     last assessed: 05/12/2015    Tremor     last assessed: 04/09/2014    Wears glasses        Past Surgical History:   Procedure Laterality Date    CERVICAL LAMINECTOMY      COLONOSCOPY  07/2015    multiple polyps, repeat in 3 years    KNEE SURGERY Right 2002    20 years ago-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    POPLITEAL SYNOVIAL CYST EXCISION      last assessed: 04/22/2015    TONSILLECTOMY AND ADENOIDECTOMY      last assessed: 04/22/2015    TOTAL KNEE ARTHROPLASTY      last assessed: 05/12/2015         Current Outpatient Prescriptions:     calcipotriene (DOVONOX) 0 005 % ointment, calcipotriene 0 005 % topical ointment, Disp: , Rfl:     Calcitriol 3 MCG/GM cream, calcitriol 3 mcg/gram topical ointment, Disp: , Rfl:     clobetasol (TEMOVATE) 0 05 % cream, 4 61 application, Disp: , Rfl:     gabapentin (NEURONTIN) 400 mg capsule, Take 1 capsule (400 mg total) by mouth 3 (three) times a day, Disp: 90 capsule, Rfl: 2    loratadine (CLARITIN) 10 mg tablet, Take 1 tablet (10 mg total) by mouth daily, Disp: 90 tablet, Rfl: 1    omeprazole (PriLOSEC) 40 MG capsule, Take 1 capsule by mouth, Disp: , Rfl:     simvastatin (ZOCOR) 20 mg tablet, TAKE 1 TABLET EVERY DAY, Disp: 90 tablet, Rfl: 3    tacrolimus (PROTOPIC) 0 1 % ointment, Protopic 0 1 % topical ointment, Disp: , Rfl:     triamcinolone (KENALOG) 0 1 % cream, triamcinolone acetonide 0 1 % topical cream, Disp: , Rfl:     Current Facility-Administered Medications:     dexamethasone (PF) (DECADRON) injection 20 mg, 20 mg, Epidural, Once, [place], DO    iohexol (OMNIPAQUE) 300 mg/mL injection 50 mL, 50 mL, Epidural, Once, [place], DO    lidocaine (PF) (XYLOCAINE-MPF) 1 % injection 5 mL, 5 mL, Infiltration, Once, [place], DO    No Known Allergies    Physical Exam:   Vitals:    12/12/18 1401   BP: 156/82   Pulse: 77   Resp: 20   Temp: 98 °F (36 7 °C)   SpO2: 98%     General: Awake, Alert, Oriented x 3, Mood and affect appropriate  Respiratory: Respirations even and unlabored  Cardiovascular: Peripheral pulses intact; no edema  Musculoskeletal Exam:  Back and left leg pain    ASA Score:  2  Patient/Chart Verification  Patient ID Verified: Verbal  ID Band Applied: No  Consents Confirmed: Procedural, To be obtained in the Pre-Procedure area  H&P( within 30 days) Verified: To be obtained in the Pre-Procedure area  Interval H&P(within 24 hr) Complete (required for Outpatients and Surgery Admit only): To be obtained in the Pre-Procedure area  Allergies Reviewed: Yes  Anticoag/NSAID held?: NA  Currently on antibiotics?: No    Assessment:   1   Lumbar radiculopathy        Plan:  repeat left L3-4 and L4-5 transforaminal epidural steroid injection

## 2018-12-12 NOTE — DISCHARGE INSTRUCTIONS
Epidural Steroid Injection   WHAT YOU NEED TO KNOW:   An epidural steroid injection (BLADE) is a procedure to inject steroid medicine into the epidural space  The epidural space is between your spinal cord and vertebrae  Steroids reduce inflammation and fluid buildup in your spine that may be causing pain  You may be given pain medicine along with the steroids  ACTIVITY  · Do not drive or operate machinery today  · No strenuous activity today - bending, lifting, etc   · You may resume normal activites starting tomorrow - start slowly and as tolerated  · You may shower today, but no tub baths or hot tubs  · You may have numbness for several hours from the local anesthetic  Please use caution and common sense, especially with weight-bearing activities  CARE OF THE INJECTION SITE  · If you have soreness or pain, apply ice to the area today (20 minutes on/20 minutes off)  · Starting tomorrow, you may use warm, moist heat or ice if needed  · You may have an increase or change in your discomfort for 36-48 hours after your treatment  · Apply ice and continue with any pain medication you have been prescribed  · Notify [facility] if you have any of the following: redness, drainage, swelling, headache, stiff neck or fever above 100°F     SPECIAL INSTRUCTIONS  · Our office will contact you in approximately 7 days for a progress report  MEDICATIONS  · Continue to take all routine medications  · Our office may have instructed you to hold some medications  If you have a problem specifically related to your procedure, please call our office at [phone]  Problems not related to your procedure should be directed to your primary care physician

## 2018-12-19 ENCOUNTER — TELEPHONE (OUTPATIENT)
Dept: PAIN MEDICINE | Facility: CLINIC | Age: 63
End: 2018-12-19

## 2018-12-19 ENCOUNTER — TELEPHONE (OUTPATIENT)
Dept: OBGYN CLINIC | Facility: HOSPITAL | Age: 63
End: 2018-12-19

## 2018-12-19 NOTE — TELEPHONE ENCOUNTER
Patient called back in and said his level of pain is at 2% and his pain is 75% improved        Callback#[phone]

## 2018-12-31 ENCOUNTER — OFFICE VISIT (OUTPATIENT)
Dept: PAIN MEDICINE | Facility: MEDICAL CENTER | Age: 63
End: 2018-12-31
Payer: MEDICARE

## 2018-12-31 VITALS
BODY MASS INDEX: 31.43 KG/M2 | RESPIRATION RATE: 14 BRPM | HEART RATE: 60 BPM | HEIGHT: 69 IN | WEIGHT: 212.2 LBS | SYSTOLIC BLOOD PRESSURE: 114 MMHG | DIASTOLIC BLOOD PRESSURE: 66 MMHG

## 2018-12-31 DIAGNOSIS — G89.29 CHRONIC LEFT-SIDED LOW BACK PAIN WITH LEFT-SIDED SCIATICA: ICD-10-CM

## 2018-12-31 DIAGNOSIS — M54.42 CHRONIC LEFT-SIDED LOW BACK PAIN WITH LEFT-SIDED SCIATICA: ICD-10-CM

## 2018-12-31 DIAGNOSIS — M54.16 LUMBAR RADICULOPATHY: Primary | ICD-10-CM

## 2018-12-31 DIAGNOSIS — G89.4 PAIN SYNDROME, CHRONIC: ICD-10-CM

## 2018-12-31 PROCEDURE — 99213 OFFICE O/P EST LOW 20 MIN: CPT | Performed by: NURSE PRACTITIONER

## 2018-12-31 RX ORDER — GABAPENTIN 400 MG/1
400 CAPSULE ORAL 3 TIMES DAILY
Qty: 90 CAPSULE | Refills: 2 | Status: SHIPPED | OUTPATIENT
Start: 2018-12-31 | End: 2019-03-25 | Stop reason: SDUPTHER

## 2018-12-31 NOTE — PROGRESS NOTES
Assessment:  1  Lumbar radiculopathy - Left    2  Pain syndrome, chronic    3  Chronic left-sided low back pain with left-sided sciatica        Plan:  I discussed with the patient that since there has been  significant improvement in the pain symptoms that we will hold off on any repeat injections at this point in time  However, I reviewed with the patient that if his symptoms should return, worsen, and/or experience new pain symptoms, he should call our office to schedule an office visit to discuss repeating the injection  Continue with gabapentin the patient was given a refill while he was in the office today  Follow-up in 12 weeks to refill gabapentin  [place] Prescription Drug Monitoring Program report was reviewed and was appropriate         My impressions and treatment recommendations were discussed in detail with the patient who verbalized understanding and had no further questions  Discharge instructions were provided  I personally saw and examined the patient and I agree with the above discussed plan of care  No orders of the defined types were placed in this encounter  New Medications Ordered This Visit   Medications    gabapentin (NEURONTIN) 400 mg capsule     Sig: Take 1 capsule (400 mg total) by mouth 3 (three) times a day     Dispense:  90 capsule     Refill:  2       History of Present Illness:    Risa Ortiz is a 70 y o  male who presents for follow-up related to his left low back and leg pain  The patient is status post a left L3-4 and L4-5 transforaminal epidural steroid injection on December 12, 2018 with Dr John Bejarano  The patient reports significant relief as results of this procedure  Today the patient rates his pain 2/10 he does feel like he is doing better since having his injection  His pain is intermittent in nature most bothersome in the morning      Patient continues to take gabapentin 400 mg 3 times a day this does help his pain symptoms without any side effects or issues  I have personally reviewed and/or updated the patient's past medical history, past surgical history, family history, social history, current medications, allergies, and vital signs today  Review of Systems:    Review of Systems   Respiratory: Negative for shortness of breath  Cardiovascular: Negative for chest pain  Gastrointestinal: Negative for constipation, diarrhea, nausea and vomiting  Musculoskeletal: Negative for arthralgias, gait problem, joint swelling and myalgias  Skin: Negative for rash  Neurological: Negative for dizziness, seizures and weakness  All other systems reviewed and are negative        Patient Active Problem List   Diagnosis    Chronic left-sided low back pain    Essential hypertension    Gastroesophageal reflux disease    Hyperglycemia    Mixed hyperlipidemia    Polyarticular arthritis    Pain syndrome, chronic    Psoriasis    Protrusion of lumbar intervertebral disc    Lumbar radiculopathy - Left    Lumbar foraminal stenosis    Osteoarthritis of knee       Past Medical History:   Diagnosis Date    Abnormal EKG     last assessed: 04/22/2015    Arthritis of right knee     last assessed: 04/22/2015    Gait disturbance     last assessed: 04/09/2014    Injury of C5-C7 spinal cord ([facility] [place] [address] )     last assessed: 10/20/2014; with central cord syndrome     Status post total knee replacement, right     last assessed: 05/12/2015    Tremor     last assessed: 04/09/2014    Wears glasses        Past Surgical History:   Procedure Laterality Date    CERVICAL LAMINECTOMY      COLONOSCOPY  07/2015    multiple polyps, repeat in 3 years    KNEE SURGERY Right 2002    20 years ago-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    POPLITEAL SYNOVIAL CYST EXCISION      last assessed: 04/22/2015    TONSILLECTOMY AND ADENOIDECTOMY      last assessed: 04/22/2015    TOTAL KNEE ARTHROPLASTY      last assessed: 05/12/2015       Family History   Problem Relation Age of Onset    Allergies Father         seasonal    Lung cancer Family        Social History     Occupational History    Not on file  Social History Main Topics    Smoking status: Former Smoker    Smokeless tobacco: Never Used    Alcohol use No    Drug use: No    Sexual activity: Not on file       Current Outpatient Prescriptions on File Prior to Visit   Medication Sig    calcipotriene (DOVONOX) 0 005 % ointment calcipotriene 0 005 % topical ointment    Calcitriol 3 MCG/GM cream calcitriol 3 mcg/gram topical ointment    clobetasol (TEMOVATE) 0 05 % cream 8 81 application    loratadine (CLARITIN) 10 mg tablet Take 1 tablet (10 mg total) by mouth daily    omeprazole (PriLOSEC) 40 MG capsule Take 1 capsule by mouth    simvastatin (ZOCOR) 20 mg tablet TAKE 1 TABLET EVERY DAY    tacrolimus (PROTOPIC) 0 1 % ointment Protopic 0 1 % topical ointment    triamcinolone (KENALOG) 0 1 % cream triamcinolone acetonide 0 1 % topical cream    [DISCONTINUED] gabapentin (NEURONTIN) 400 mg capsule Take 1 capsule (400 mg total) by mouth 3 (three) times a day     No current facility-administered medications on file prior to visit  No Known Allergies    Physical Exam:    /66   Pulse 60   Resp 14   Ht 5' 9" (1 753 m)   Wt 96 3 kg (212 lb 3 2 oz)   BMI 31 34 kg/m²     Constitutional: normal, well developed, well nourished, alert, in no distress and non-toxic and no overt pain behavior    Eyes: anicteric  HEENT: grossly intact  Neck: supple, symmetric, trachea midline and no masses   Pulmonary:even and unlabored  Cardiovascular:No edema or pitting edema present  Skin:Normal without rashes or lesions and well hydrated  Psychiatric:Mood and affect appropriate  Neurologic:Cranial Nerves II-XII grossly intact  Musculoskeletal:normal    Imaging

## 2019-01-25 ENCOUNTER — TRANSCRIBE ORDERS (OUTPATIENT)
Dept: ADMINISTRATIVE | Facility: HOSPITAL | Age: 64
End: 2019-01-25

## 2019-01-25 ENCOUNTER — APPOINTMENT (OUTPATIENT)
Dept: LAB | Facility: MEDICAL CENTER | Age: 64
End: 2019-01-25
Payer: MEDICARE

## 2019-01-25 DIAGNOSIS — Z01.89 DIAGNOSTIC SKIN AND SENSITIZATION TESTS: ICD-10-CM

## 2019-01-25 DIAGNOSIS — Z79.899 NEED FOR PROPHYLACTIC CHEMOTHERAPY: ICD-10-CM

## 2019-01-25 DIAGNOSIS — Z79.899 NEED FOR PROPHYLACTIC CHEMOTHERAPY: Primary | ICD-10-CM

## 2019-01-25 LAB
ALBUMIN SERPL BCP-MCNC: 3.8 G/DL (ref 3.5–5)
ALP SERPL-CCNC: 97 U/L (ref 46–116)
ALT SERPL W P-5'-P-CCNC: 17 U/L (ref 12–78)
ANION GAP SERPL CALCULATED.3IONS-SCNC: 6 MMOL/L (ref 4–13)
AST SERPL W P-5'-P-CCNC: 10 U/L (ref 5–45)
BASOPHILS # BLD AUTO: 0.05 THOUSANDS/ΜL (ref 0–0.1)
BASOPHILS NFR BLD AUTO: 1 % (ref 0–1)
BILIRUB SERPL-MCNC: 0.61 MG/DL (ref 0.2–1)
BUN SERPL-MCNC: 13 MG/DL (ref 5–25)
CALCIUM SERPL-MCNC: 9.1 MG/DL (ref 8.3–10.1)
CHLORIDE SERPL-SCNC: 105 MMOL/L (ref 100–108)
CO2 SERPL-SCNC: 28 MMOL/L (ref 21–32)
CREAT SERPL-MCNC: 0.82 MG/DL (ref 0.6–1.3)
EOSINOPHIL # BLD AUTO: 0.05 THOUSAND/ΜL (ref 0–0.61)
EOSINOPHIL NFR BLD AUTO: 1 % (ref 0–6)
ERYTHROCYTE [DISTWIDTH] IN BLOOD BY AUTOMATED COUNT: 12.8 % (ref 11.6–15.1)
GFR SERPL CREATININE-BSD FRML MDRD: 93 ML/MIN/1.73SQ M
GLUCOSE SERPL-MCNC: 90 MG/DL (ref 65–140)
HBV CORE AB SER QL: NORMAL
HBV SURFACE AB SER-ACNC: <3.1 MIU/ML
HBV SURFACE AG SER QL: NORMAL
HCT VFR BLD AUTO: 48.4 % (ref 36.5–49.3)
HCV AB SER QL: NORMAL
HGB BLD-MCNC: 15.7 G/DL (ref 12–17)
IMM GRANULOCYTES # BLD AUTO: 0.01 THOUSAND/UL (ref 0–0.2)
IMM GRANULOCYTES NFR BLD AUTO: 0 % (ref 0–2)
LYMPHOCYTES # BLD AUTO: 1.26 THOUSANDS/ΜL (ref 0.6–4.47)
LYMPHOCYTES NFR BLD AUTO: 27 % (ref 14–44)
MCH RBC QN AUTO: 32.5 PG (ref 26.8–34.3)
MCHC RBC AUTO-ENTMCNC: 32.4 G/DL (ref 31.4–37.4)
MCV RBC AUTO: 100 FL (ref 82–98)
MONOCYTES # BLD AUTO: 0.34 THOUSAND/ΜL (ref 0.17–1.22)
MONOCYTES NFR BLD AUTO: 7 % (ref 4–12)
NEUTROPHILS # BLD AUTO: 2.91 THOUSANDS/ΜL (ref 1.85–7.62)
NEUTS SEG NFR BLD AUTO: 64 % (ref 43–75)
NRBC BLD AUTO-RTO: 0 /100 WBCS
PLATELET # BLD AUTO: 185 THOUSANDS/UL (ref 149–390)
PMV BLD AUTO: 9.4 FL (ref 8.9–12.7)
POTASSIUM SERPL-SCNC: 4 MMOL/L (ref 3.5–5.3)
PROT SERPL-MCNC: 6.8 G/DL (ref 6.4–8.2)
RBC # BLD AUTO: 4.83 MILLION/UL (ref 3.88–5.62)
SODIUM SERPL-SCNC: 139 MMOL/L (ref 136–145)
WBC # BLD AUTO: 4.62 THOUSAND/UL (ref 4.31–10.16)

## 2019-01-25 PROCEDURE — 87389 HIV-1 AG W/HIV-1&-2 AB AG IA: CPT

## 2019-01-25 PROCEDURE — 86706 HEP B SURFACE ANTIBODY: CPT

## 2019-01-25 PROCEDURE — 80053 COMPREHEN METABOLIC PANEL: CPT

## 2019-01-25 PROCEDURE — 36415 COLL VENOUS BLD VENIPUNCTURE: CPT

## 2019-01-25 PROCEDURE — 86803 HEPATITIS C AB TEST: CPT

## 2019-01-25 PROCEDURE — 85025 COMPLETE CBC W/AUTO DIFF WBC: CPT

## 2019-01-25 PROCEDURE — 87340 HEPATITIS B SURFACE AG IA: CPT

## 2019-01-25 PROCEDURE — 86480 TB TEST CELL IMMUN MEASURE: CPT

## 2019-01-25 PROCEDURE — 86704 HEP B CORE ANTIBODY TOTAL: CPT

## 2019-01-27 LAB — HIV 1+2 AB+HIV1 P24 AG SERPL QL IA: NORMAL

## 2019-01-30 LAB
GAMMA INTERFERON BACKGROUND BLD IA-ACNC: 0.03 IU/ML
M TB IFN-G BLD-IMP: NEGATIVE
M TB IFN-G CD4+ BCKGRND COR BLD-ACNC: -0.01 IU/ML
M TB IFN-G CD4+ BCKGRND COR BLD-ACNC: 0 IU/ML
MITOGEN IGNF BCKGRD COR BLD-ACNC: >10 IU/ML

## 2019-03-25 ENCOUNTER — OFFICE VISIT (OUTPATIENT)
Dept: PAIN MEDICINE | Facility: MEDICAL CENTER | Age: 64
End: 2019-03-25
Payer: MEDICARE

## 2019-03-25 VITALS
DIASTOLIC BLOOD PRESSURE: 62 MMHG | BODY MASS INDEX: 31.9 KG/M2 | HEIGHT: 69 IN | WEIGHT: 215.4 LBS | SYSTOLIC BLOOD PRESSURE: 106 MMHG | HEART RATE: 80 BPM | RESPIRATION RATE: 14 BRPM

## 2019-03-25 DIAGNOSIS — G89.4 PAIN SYNDROME, CHRONIC: ICD-10-CM

## 2019-03-25 DIAGNOSIS — M54.42 CHRONIC LEFT-SIDED LOW BACK PAIN WITH LEFT-SIDED SCIATICA: ICD-10-CM

## 2019-03-25 DIAGNOSIS — M54.16 LUMBAR RADICULOPATHY: Primary | ICD-10-CM

## 2019-03-25 DIAGNOSIS — G89.29 CHRONIC LEFT-SIDED LOW BACK PAIN WITH LEFT-SIDED SCIATICA: ICD-10-CM

## 2019-03-25 PROCEDURE — 99213 OFFICE O/P EST LOW 20 MIN: CPT | Performed by: NURSE PRACTITIONER

## 2019-03-25 RX ORDER — GABAPENTIN 400 MG/1
400 CAPSULE ORAL 3 TIMES DAILY
Qty: 90 CAPSULE | Refills: 2 | Status: SHIPPED | OUTPATIENT
Start: 2019-03-25 | End: 2019-06-19 | Stop reason: SDUPTHER

## 2019-03-25 RX ORDER — GABAPENTIN 400 MG/1
400 CAPSULE ORAL 3 TIMES DAILY
Qty: 90 CAPSULE | Refills: 2 | Status: SHIPPED | OUTPATIENT
Start: 2019-03-25 | End: 2019-03-25 | Stop reason: SDUPTHER

## 2019-03-25 NOTE — PROGRESS NOTES
Assessment  1  Lumbar radiculopathy - Left    2  Chronic left-sided low back pain with left-sided sciatica    3  Pain syndrome, chronic        Plan  Continue with gabapentin this was refilled today  At this time the patient is doing well we will hold off on repeating his left L3-4 and L4-5 transforaminal epidural steroid injection  The patient tells me that when the weather gets warmer he does a lot of yard work and that usually bothers his back  If his back pain worsens when he increases activity level he will call the office to schedule his repeat injection if he needs to prior to his next visit  Otherwise, follow-up in 3 months for medication refills      [place] Prescription Drug Monitoring Program report was reviewed and was appropriate         My impressions and treatment recommendations were discussed in detail with the patient who verbalized understanding and had no further questions  Discharge instructions were provided  I personally saw and examined the patient and I agree with the above discussed plan of care  No orders of the defined types were placed in this encounter  New Medications Ordered This Visit   Medications    gabapentin (NEURONTIN) 400 mg capsule     Sig: Take 1 capsule (400 mg total) by mouth 3 (three) times a day     Dispense:  90 capsule     Refill:  2       History of Present Illness    Aki Duque is a 75 y o  male who presents for follow-up related to his chronic left low back and leg pain  Today he rates his pain 1/10 he reports that he is doing better since his last office visit  The patient reports that he is not experiencing any radiating leg pain at this time  His pain is intermittent in nature most bothersome in the morning  The patient does anticipate that his pain symptoms will worsen in the next couple months as he does a lot of outdoor yd work when the weather gets warm    He reports that these activities normally do increase his back pain symptoms  Patient continues to take gabapentin 400 mg 3 times a day with moderate relief of his pain symptoms and no side effects or issues  I have personally reviewed and/or updated the patient's past medical history, past surgical history, family history, social history, current medications, allergies, and vital signs today  Review of Systems   Respiratory: Negative for shortness of breath  Cardiovascular: Negative for chest pain  Gastrointestinal: Negative for constipation, diarrhea, nausea and vomiting  Musculoskeletal: Negative for arthralgias, gait problem, joint swelling and myalgias  Skin: Negative for rash  Neurological: Negative for dizziness, seizures and weakness  All other systems reviewed and are negative        Patient Active Problem List   Diagnosis    Chronic left-sided low back pain    Essential hypertension    Gastroesophageal reflux disease    Hyperglycemia    Mixed hyperlipidemia    Polyarticular arthritis    Pain syndrome, chronic    Psoriasis    Protrusion of lumbar intervertebral disc    Lumbar radiculopathy - Left    Lumbar foraminal stenosis    Osteoarthritis of knee       Past Medical History:   Diagnosis Date    Abnormal EKG     last assessed: 04/22/2015    Arthritis of right knee     last assessed: 04/22/2015    Gait disturbance     last assessed: 04/09/2014    Injury of C5-C7 spinal cord ([facility] [address] )     last assessed: 10/20/2014; with central cord syndrome     Status post total knee replacement, right     last assessed: 05/12/2015    Tremor     last assessed: 04/09/2014    Wears glasses        Past Surgical History:   Procedure Laterality Date    CERVICAL LAMINECTOMY      COLONOSCOPY  07/2015    multiple polyps, repeat in 3 years    KNEE SURGERY Right 2002    20 years ago-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    POPLITEAL SYNOVIAL CYST EXCISION      last assessed: 04/22/2015    TONSILLECTOMY AND ADENOIDECTOMY      last assessed: 04/22/2015    TOTAL KNEE ARTHROPLASTY      last assessed: 05/12/2015       Family History   Problem Relation Age of Onset    Allergies Father         seasonal    Lung cancer Family        Social History     Occupational History    Not on file   Tobacco Use    Smoking status: Former Smoker    Smokeless tobacco: Never Used   Substance and Sexual Activity    Alcohol use: No    Drug use: No    Sexual activity: Not on file       Current Outpatient Medications on File Prior to Visit   Medication Sig    calcipotriene (DOVONOX) 0 005 % ointment calcipotriene 0 005 % topical ointment    Calcitriol 3 MCG/GM cream calcitriol 3 mcg/gram topical ointment    clobetasol (TEMOVATE) 0 05 % cream 2 99 application    omeprazole (PriLOSEC) 40 MG capsule Take 1 capsule by mouth    simvastatin (ZOCOR) 20 mg tablet TAKE 1 TABLET EVERY DAY    tacrolimus (PROTOPIC) 0 1 % ointment Protopic 0 1 % topical ointment    triamcinolone (KENALOG) 0 1 % cream triamcinolone acetonide 0 1 % topical cream    [DISCONTINUED] gabapentin (NEURONTIN) 400 mg capsule Take 1 capsule (400 mg total) by mouth 3 (three) times a day    [DISCONTINUED] loratadine (CLARITIN) 10 mg tablet Take 1 tablet (10 mg total) by mouth daily     No current facility-administered medications on file prior to visit  No Known Allergies    Physical Exam    /62   Pulse 80   Resp 14   Ht 5' 9" (1 753 m)   Wt 97 7 kg (215 lb 6 4 oz)   BMI 31 81 kg/m²     Constitutional: normal, well developed, well nourished, alert, in no distress and non-toxic and no overt pain behavior    Eyes: anicteric  HEENT: grossly intact  Neck: supple, symmetric, trachea midline and no masses   Pulmonary:even and unlabored  Cardiovascular:No edema or pitting edema present  Skin:Normal without rashes or lesions and well hydrated  Psychiatric:Mood and affect appropriate  Neurologic:Cranial Nerves II-XII grossly intact  Musculoskeletal:normal   Lumbar Spine Exam    Appearance:  Normal lordosis  Palpation/Tenderness:  left lumbar paraspinal tenderness      Range of Motion:  Flexion:  No limitation  with pain  Extension:  No limitation  with pain  Lateral Flexion - Left:  No limitation  without pain  Lateral Flexion - Right:  No limitation  without pain  Rotation - Left:  No limitation  without pain  Rotation - Right:  No limitation  without pain  Motor Strength:  Left hip flexion:  5/5  Left hip extension:  5/5  Right hip flexion:  5/5  Right hip extension:  5/5  Left knee flexion:  5/5  Left knee extension:  5/5  Right knee flexion:  5/5  Right knee extension:  5/5  Left foot dorsiflexion:  5/5  Left foot plantar flexion:  5/5  Right foot dorsiflexion:  5/5  Right foot plantar flexion:  5/5    Special Tests:  Negative slump test bilaterally      Imaging

## 2019-04-05 ENCOUNTER — APPOINTMENT (OUTPATIENT)
Dept: LAB | Facility: MEDICAL CENTER | Age: 64
End: 2019-04-05
Payer: MEDICARE

## 2019-04-05 DIAGNOSIS — R73.9 HYPERGLYCEMIA: ICD-10-CM

## 2019-04-05 DIAGNOSIS — E78.2 MIXED HYPERLIPIDEMIA: ICD-10-CM

## 2019-04-05 DIAGNOSIS — I10 ESSENTIAL HYPERTENSION: ICD-10-CM

## 2019-04-05 LAB
ALBUMIN SERPL BCP-MCNC: 3.9 G/DL (ref 3.5–5)
ALP SERPL-CCNC: 98 U/L (ref 46–116)
ALT SERPL W P-5'-P-CCNC: 18 U/L (ref 12–78)
ANION GAP SERPL CALCULATED.3IONS-SCNC: 5 MMOL/L (ref 4–13)
AST SERPL W P-5'-P-CCNC: 12 U/L (ref 5–45)
BASOPHILS # BLD AUTO: 0.06 THOUSANDS/ΜL (ref 0–0.1)
BASOPHILS NFR BLD AUTO: 1 % (ref 0–1)
BILIRUB SERPL-MCNC: 0.43 MG/DL (ref 0.2–1)
BILIRUB UR QL STRIP: NEGATIVE
BUN SERPL-MCNC: 16 MG/DL (ref 5–25)
CALCIUM SERPL-MCNC: 8.9 MG/DL (ref 8.3–10.1)
CHLORIDE SERPL-SCNC: 106 MMOL/L (ref 100–108)
CHOLEST SERPL-MCNC: 139 MG/DL (ref 50–200)
CLARITY UR: CLEAR
CO2 SERPL-SCNC: 27 MMOL/L (ref 21–32)
COLOR UR: YELLOW
CREAT SERPL-MCNC: 0.84 MG/DL (ref 0.6–1.3)
EOSINOPHIL # BLD AUTO: 0.1 THOUSAND/ΜL (ref 0–0.61)
EOSINOPHIL NFR BLD AUTO: 2 % (ref 0–6)
ERYTHROCYTE [DISTWIDTH] IN BLOOD BY AUTOMATED COUNT: 12.7 % (ref 11.6–15.1)
GFR SERPL CREATININE-BSD FRML MDRD: 91 ML/MIN/1.73SQ M
GLUCOSE P FAST SERPL-MCNC: 91 MG/DL (ref 65–99)
GLUCOSE UR STRIP-MCNC: NEGATIVE MG/DL
HCT VFR BLD AUTO: 49.6 % (ref 36.5–49.3)
HDLC SERPL-MCNC: 33 MG/DL (ref 40–60)
HGB BLD-MCNC: 16 G/DL (ref 12–17)
HGB UR QL STRIP.AUTO: NEGATIVE
IMM GRANULOCYTES # BLD AUTO: 0.01 THOUSAND/UL (ref 0–0.2)
IMM GRANULOCYTES NFR BLD AUTO: 0 % (ref 0–2)
KETONES UR STRIP-MCNC: NEGATIVE MG/DL
LDLC SERPL CALC-MCNC: 89 MG/DL (ref 0–100)
LEUKOCYTE ESTERASE UR QL STRIP: NEGATIVE
LYMPHOCYTES # BLD AUTO: 1.36 THOUSANDS/ΜL (ref 0.6–4.47)
LYMPHOCYTES NFR BLD AUTO: 26 % (ref 14–44)
MCH RBC QN AUTO: 32.7 PG (ref 26.8–34.3)
MCHC RBC AUTO-ENTMCNC: 32.3 G/DL (ref 31.4–37.4)
MCV RBC AUTO: 101 FL (ref 82–98)
MONOCYTES # BLD AUTO: 0.47 THOUSAND/ΜL (ref 0.17–1.22)
MONOCYTES NFR BLD AUTO: 9 % (ref 4–12)
NEUTROPHILS # BLD AUTO: 3.17 THOUSANDS/ΜL (ref 1.85–7.62)
NEUTS SEG NFR BLD AUTO: 62 % (ref 43–75)
NITRITE UR QL STRIP: NEGATIVE
NONHDLC SERPL-MCNC: 106 MG/DL
NRBC BLD AUTO-RTO: 0 /100 WBCS
PH UR STRIP.AUTO: 7 [PH]
PLATELET # BLD AUTO: 208 THOUSANDS/UL (ref 149–390)
PMV BLD AUTO: 9.3 FL (ref 8.9–12.7)
POTASSIUM SERPL-SCNC: 4.1 MMOL/L (ref 3.5–5.3)
PROT SERPL-MCNC: 7.1 G/DL (ref 6.4–8.2)
PROT UR STRIP-MCNC: NEGATIVE MG/DL
RBC # BLD AUTO: 4.9 MILLION/UL (ref 3.88–5.62)
SODIUM SERPL-SCNC: 138 MMOL/L (ref 136–145)
SP GR UR STRIP.AUTO: 1.01 (ref 1–1.03)
TRIGL SERPL-MCNC: 85 MG/DL
TSH SERPL DL<=0.05 MIU/L-ACNC: 1.3 UIU/ML (ref 0.36–3.74)
UROBILINOGEN UR QL STRIP.AUTO: 0.2 E.U./DL
WBC # BLD AUTO: 5.17 THOUSAND/UL (ref 4.31–10.16)

## 2019-04-05 PROCEDURE — 85025 COMPLETE CBC W/AUTO DIFF WBC: CPT

## 2019-04-05 PROCEDURE — 84443 ASSAY THYROID STIM HORMONE: CPT

## 2019-04-05 PROCEDURE — 81003 URINALYSIS AUTO W/O SCOPE: CPT

## 2019-04-05 PROCEDURE — 80061 LIPID PANEL: CPT

## 2019-04-05 PROCEDURE — 80053 COMPREHEN METABOLIC PANEL: CPT

## 2019-04-05 PROCEDURE — 36415 COLL VENOUS BLD VENIPUNCTURE: CPT

## 2019-04-19 ENCOUNTER — TRANSCRIBE ORDERS (OUTPATIENT)
Dept: ADMINISTRATIVE | Facility: HOSPITAL | Age: 64
End: 2019-04-19

## 2019-04-19 ENCOUNTER — APPOINTMENT (OUTPATIENT)
Dept: LAB | Facility: MEDICAL CENTER | Age: 64
End: 2019-04-19
Payer: MEDICARE

## 2019-04-19 DIAGNOSIS — L40.0 PSORIASIS VULGARIS: ICD-10-CM

## 2019-04-19 DIAGNOSIS — Z01.89 RADIOLOGICAL EXAMINATION, NOT ELSEWHERE CLASSIFIED: ICD-10-CM

## 2019-04-19 DIAGNOSIS — Z79.899 ENCOUNTER FOR LONG-TERM (CURRENT) USE OF OTHER MEDICATIONS: ICD-10-CM

## 2019-04-19 DIAGNOSIS — L40.0 PSORIASIS VULGARIS: Primary | ICD-10-CM

## 2019-04-19 LAB
ALBUMIN SERPL BCP-MCNC: 4 G/DL (ref 3.5–5)
ALP SERPL-CCNC: 110 U/L (ref 46–116)
ALT SERPL W P-5'-P-CCNC: 17 U/L (ref 12–78)
ANION GAP SERPL CALCULATED.3IONS-SCNC: 1 MMOL/L (ref 4–13)
AST SERPL W P-5'-P-CCNC: 13 U/L (ref 5–45)
BASOPHILS # BLD AUTO: 0.07 THOUSANDS/ΜL (ref 0–0.1)
BASOPHILS NFR BLD AUTO: 1 % (ref 0–1)
BILIRUB SERPL-MCNC: 0.79 MG/DL (ref 0.2–1)
BUN SERPL-MCNC: 19 MG/DL (ref 5–25)
CALCIUM SERPL-MCNC: 8.6 MG/DL (ref 8.3–10.1)
CHLORIDE SERPL-SCNC: 108 MMOL/L (ref 100–108)
CO2 SERPL-SCNC: 27 MMOL/L (ref 21–32)
CREAT SERPL-MCNC: 0.85 MG/DL (ref 0.6–1.3)
EOSINOPHIL # BLD AUTO: 0.12 THOUSAND/ΜL (ref 0–0.61)
EOSINOPHIL NFR BLD AUTO: 2 % (ref 0–6)
ERYTHROCYTE [DISTWIDTH] IN BLOOD BY AUTOMATED COUNT: 12.7 % (ref 11.6–15.1)
GFR SERPL CREATININE-BSD FRML MDRD: 91 ML/MIN/1.73SQ M
GLUCOSE P FAST SERPL-MCNC: 90 MG/DL (ref 65–99)
HCT VFR BLD AUTO: 46.9 % (ref 36.5–49.3)
HGB BLD-MCNC: 15.5 G/DL (ref 12–17)
IMM GRANULOCYTES # BLD AUTO: 0.01 THOUSAND/UL (ref 0–0.2)
IMM GRANULOCYTES NFR BLD AUTO: 0 % (ref 0–2)
LYMPHOCYTES # BLD AUTO: 1.66 THOUSANDS/ΜL (ref 0.6–4.47)
LYMPHOCYTES NFR BLD AUTO: 30 % (ref 14–44)
MCH RBC QN AUTO: 32.6 PG (ref 26.8–34.3)
MCHC RBC AUTO-ENTMCNC: 33 G/DL (ref 31.4–37.4)
MCV RBC AUTO: 99 FL (ref 82–98)
MONOCYTES # BLD AUTO: 0.43 THOUSAND/ΜL (ref 0.17–1.22)
MONOCYTES NFR BLD AUTO: 8 % (ref 4–12)
NEUTROPHILS # BLD AUTO: 3.3 THOUSANDS/ΜL (ref 1.85–7.62)
NEUTS SEG NFR BLD AUTO: 59 % (ref 43–75)
NRBC BLD AUTO-RTO: 0 /100 WBCS
PLATELET # BLD AUTO: 202 THOUSANDS/UL (ref 149–390)
PMV BLD AUTO: 9.6 FL (ref 8.9–12.7)
POTASSIUM SERPL-SCNC: 4 MMOL/L (ref 3.5–5.3)
PROT SERPL-MCNC: 6.9 G/DL (ref 6.4–8.2)
RBC # BLD AUTO: 4.76 MILLION/UL (ref 3.88–5.62)
SODIUM SERPL-SCNC: 136 MMOL/L (ref 136–145)
WBC # BLD AUTO: 5.59 THOUSAND/UL (ref 4.31–10.16)

## 2019-04-19 PROCEDURE — 85025 COMPLETE CBC W/AUTO DIFF WBC: CPT

## 2019-04-19 PROCEDURE — 36415 COLL VENOUS BLD VENIPUNCTURE: CPT

## 2019-04-19 PROCEDURE — 80053 COMPREHEN METABOLIC PANEL: CPT

## 2019-05-03 ENCOUNTER — APPOINTMENT (OUTPATIENT)
Dept: LAB | Facility: MEDICAL CENTER | Age: 64
End: 2019-05-03
Payer: MEDICARE

## 2019-05-03 DIAGNOSIS — Z79.899 ENCOUNTER FOR LONG-TERM (CURRENT) USE OF OTHER MEDICATIONS: ICD-10-CM

## 2019-05-03 DIAGNOSIS — L40.0 PSORIASIS VULGARIS: ICD-10-CM

## 2019-05-03 DIAGNOSIS — Z01.89 RADIOLOGICAL EXAMINATION, NOT ELSEWHERE CLASSIFIED: ICD-10-CM

## 2019-05-03 LAB
ALBUMIN SERPL BCP-MCNC: 3.8 G/DL (ref 3.5–5)
ALP SERPL-CCNC: 105 U/L (ref 46–116)
ALT SERPL W P-5'-P-CCNC: 18 U/L (ref 12–78)
ANION GAP SERPL CALCULATED.3IONS-SCNC: 7 MMOL/L (ref 4–13)
AST SERPL W P-5'-P-CCNC: 13 U/L (ref 5–45)
BASOPHILS # BLD AUTO: 0.05 THOUSANDS/ΜL (ref 0–0.1)
BASOPHILS NFR BLD AUTO: 1 % (ref 0–1)
BILIRUB SERPL-MCNC: 0.46 MG/DL (ref 0.2–1)
BUN SERPL-MCNC: 13 MG/DL (ref 5–25)
CALCIUM SERPL-MCNC: 8.4 MG/DL (ref 8.3–10.1)
CHLORIDE SERPL-SCNC: 111 MMOL/L (ref 100–108)
CO2 SERPL-SCNC: 26 MMOL/L (ref 21–32)
CREAT SERPL-MCNC: 0.81 MG/DL (ref 0.6–1.3)
EOSINOPHIL # BLD AUTO: 0.14 THOUSAND/ΜL (ref 0–0.61)
EOSINOPHIL NFR BLD AUTO: 3 % (ref 0–6)
ERYTHROCYTE [DISTWIDTH] IN BLOOD BY AUTOMATED COUNT: 12.9 % (ref 11.6–15.1)
GFR SERPL CREATININE-BSD FRML MDRD: 93 ML/MIN/1.73SQ M
GLUCOSE P FAST SERPL-MCNC: 96 MG/DL (ref 65–99)
HCT VFR BLD AUTO: 47 % (ref 36.5–49.3)
HGB BLD-MCNC: 15.4 G/DL (ref 12–17)
IMM GRANULOCYTES # BLD AUTO: 0.01 THOUSAND/UL (ref 0–0.2)
IMM GRANULOCYTES NFR BLD AUTO: 0 % (ref 0–2)
LYMPHOCYTES # BLD AUTO: 1.53 THOUSANDS/ΜL (ref 0.6–4.47)
LYMPHOCYTES NFR BLD AUTO: 33 % (ref 14–44)
MCH RBC QN AUTO: 32.6 PG (ref 26.8–34.3)
MCHC RBC AUTO-ENTMCNC: 32.8 G/DL (ref 31.4–37.4)
MCV RBC AUTO: 100 FL (ref 82–98)
MONOCYTES # BLD AUTO: 0.33 THOUSAND/ΜL (ref 0.17–1.22)
MONOCYTES NFR BLD AUTO: 7 % (ref 4–12)
NEUTROPHILS # BLD AUTO: 2.53 THOUSANDS/ΜL (ref 1.85–7.62)
NEUTS SEG NFR BLD AUTO: 56 % (ref 43–75)
NRBC BLD AUTO-RTO: 0 /100 WBCS
PLATELET # BLD AUTO: 205 THOUSANDS/UL (ref 149–390)
PMV BLD AUTO: 9.1 FL (ref 8.9–12.7)
POTASSIUM SERPL-SCNC: 4.2 MMOL/L (ref 3.5–5.3)
PROT SERPL-MCNC: 6.7 G/DL (ref 6.4–8.2)
RBC # BLD AUTO: 4.72 MILLION/UL (ref 3.88–5.62)
SODIUM SERPL-SCNC: 144 MMOL/L (ref 136–145)
WBC # BLD AUTO: 4.59 THOUSAND/UL (ref 4.31–10.16)

## 2019-05-03 PROCEDURE — 36415 COLL VENOUS BLD VENIPUNCTURE: CPT

## 2019-05-03 PROCEDURE — 85025 COMPLETE CBC W/AUTO DIFF WBC: CPT

## 2019-05-03 PROCEDURE — 80053 COMPREHEN METABOLIC PANEL: CPT

## 2019-05-09 ENCOUNTER — OFFICE VISIT (OUTPATIENT)
Dept: FAMILY MEDICINE CLINIC | Facility: CLINIC | Age: 64
End: 2019-05-09
Payer: MEDICARE

## 2019-05-09 VITALS
WEIGHT: 212 LBS | SYSTOLIC BLOOD PRESSURE: 110 MMHG | BODY MASS INDEX: 31.4 KG/M2 | HEIGHT: 69 IN | DIASTOLIC BLOOD PRESSURE: 72 MMHG

## 2019-05-09 DIAGNOSIS — Z00.00 MEDICARE ANNUAL WELLNESS VISIT, SUBSEQUENT: ICD-10-CM

## 2019-05-09 DIAGNOSIS — I10 ESSENTIAL HYPERTENSION: Primary | ICD-10-CM

## 2019-05-09 DIAGNOSIS — E78.2 MIXED HYPERLIPIDEMIA: ICD-10-CM

## 2019-05-09 DIAGNOSIS — G89.4 PAIN SYNDROME, CHRONIC: ICD-10-CM

## 2019-05-09 DIAGNOSIS — L40.9 PSORIASIS: ICD-10-CM

## 2019-05-09 DIAGNOSIS — Z12.5 SCREENING FOR PROSTATE CANCER: ICD-10-CM

## 2019-05-09 DIAGNOSIS — M13.0 POLYARTICULAR ARTHRITIS: ICD-10-CM

## 2019-05-09 DIAGNOSIS — R73.9 HYPERGLYCEMIA: ICD-10-CM

## 2019-05-09 PROCEDURE — 99214 OFFICE O/P EST MOD 30 MIN: CPT | Performed by: FAMILY MEDICINE

## 2019-05-09 PROCEDURE — G0439 PPPS, SUBSEQ VISIT: HCPCS | Performed by: FAMILY MEDICINE

## 2019-05-09 RX ORDER — SIMVASTATIN 20 MG
20 TABLET ORAL DAILY
Qty: 90 TABLET | Refills: 3 | Status: SHIPPED | OUTPATIENT
Start: 2019-05-09 | End: 2019-06-29 | Stop reason: SDUPTHER

## 2019-05-09 RX ORDER — METHOTREXATE 2.5 MG/1
TABLET ORAL WEEKLY
COMMUNITY
End: 2019-08-14 | Stop reason: ALTCHOICE

## 2019-05-09 RX ORDER — FOLIC ACID 1 MG/1
TABLET ORAL DAILY
COMMUNITY
End: 2019-08-14 | Stop reason: ALTCHOICE

## 2019-06-19 ENCOUNTER — OFFICE VISIT (OUTPATIENT)
Dept: PAIN MEDICINE | Facility: MEDICAL CENTER | Age: 64
End: 2019-06-19
Payer: MEDICARE

## 2019-06-19 ENCOUNTER — TRANSCRIBE ORDERS (OUTPATIENT)
Dept: ADMINISTRATIVE | Facility: HOSPITAL | Age: 64
End: 2019-06-19

## 2019-06-19 ENCOUNTER — APPOINTMENT (OUTPATIENT)
Dept: LAB | Facility: MEDICAL CENTER | Age: 64
End: 2019-06-19
Payer: MEDICARE

## 2019-06-19 VITALS
RESPIRATION RATE: 16 BRPM | HEART RATE: 60 BPM | BODY MASS INDEX: 31.58 KG/M2 | SYSTOLIC BLOOD PRESSURE: 134 MMHG | HEIGHT: 69 IN | DIASTOLIC BLOOD PRESSURE: 80 MMHG | WEIGHT: 213.2 LBS

## 2019-06-19 DIAGNOSIS — Z79.899 ENCOUNTER FOR LONG-TERM (CURRENT) USE OF OTHER MEDICATIONS: ICD-10-CM

## 2019-06-19 DIAGNOSIS — G89.4 PAIN SYNDROME, CHRONIC: ICD-10-CM

## 2019-06-19 DIAGNOSIS — M54.42 CHRONIC LEFT-SIDED LOW BACK PAIN WITH LEFT-SIDED SCIATICA: ICD-10-CM

## 2019-06-19 DIAGNOSIS — L40.0 PSORIASIS VULGARIS: Primary | ICD-10-CM

## 2019-06-19 DIAGNOSIS — L40.0 PSORIASIS VULGARIS: ICD-10-CM

## 2019-06-19 DIAGNOSIS — G89.29 CHRONIC LEFT-SIDED LOW BACK PAIN WITH LEFT-SIDED SCIATICA: ICD-10-CM

## 2019-06-19 DIAGNOSIS — M54.16 LUMBAR RADICULOPATHY: Primary | ICD-10-CM

## 2019-06-19 DIAGNOSIS — M48.061 LUMBAR FORAMINAL STENOSIS: ICD-10-CM

## 2019-06-19 LAB
ALBUMIN SERPL BCP-MCNC: 4.1 G/DL (ref 3.5–5)
ALP SERPL-CCNC: 121 U/L (ref 46–116)
ALT SERPL W P-5'-P-CCNC: 23 U/L (ref 12–78)
ANION GAP SERPL CALCULATED.3IONS-SCNC: 7 MMOL/L (ref 4–13)
AST SERPL W P-5'-P-CCNC: 11 U/L (ref 5–45)
BASOPHILS # BLD AUTO: 0.08 THOUSANDS/ΜL (ref 0–0.1)
BASOPHILS NFR BLD AUTO: 2 % (ref 0–1)
BILIRUB SERPL-MCNC: 0.65 MG/DL (ref 0.2–1)
BUN SERPL-MCNC: 11 MG/DL (ref 5–25)
CALCIUM SERPL-MCNC: 9.2 MG/DL (ref 8.3–10.1)
CHLORIDE SERPL-SCNC: 108 MMOL/L (ref 100–108)
CO2 SERPL-SCNC: 27 MMOL/L (ref 21–32)
CREAT SERPL-MCNC: 0.85 MG/DL (ref 0.6–1.3)
EOSINOPHIL # BLD AUTO: 0.11 THOUSAND/ΜL (ref 0–0.61)
EOSINOPHIL NFR BLD AUTO: 2 % (ref 0–6)
ERYTHROCYTE [DISTWIDTH] IN BLOOD BY AUTOMATED COUNT: 13.4 % (ref 11.6–15.1)
GFR SERPL CREATININE-BSD FRML MDRD: 91 ML/MIN/1.73SQ M
GLUCOSE SERPL-MCNC: 88 MG/DL (ref 65–140)
HCT VFR BLD AUTO: 49.2 % (ref 36.5–49.3)
HGB BLD-MCNC: 16.2 G/DL (ref 12–17)
IMM GRANULOCYTES # BLD AUTO: 0.01 THOUSAND/UL (ref 0–0.2)
IMM GRANULOCYTES NFR BLD AUTO: 0 % (ref 0–2)
LYMPHOCYTES # BLD AUTO: 1.59 THOUSANDS/ΜL (ref 0.6–4.47)
LYMPHOCYTES NFR BLD AUTO: 30 % (ref 14–44)
MCH RBC QN AUTO: 33.3 PG (ref 26.8–34.3)
MCHC RBC AUTO-ENTMCNC: 32.9 G/DL (ref 31.4–37.4)
MCV RBC AUTO: 101 FL (ref 82–98)
MONOCYTES # BLD AUTO: 0.32 THOUSAND/ΜL (ref 0.17–1.22)
MONOCYTES NFR BLD AUTO: 6 % (ref 4–12)
NEUTROPHILS # BLD AUTO: 3.2 THOUSANDS/ΜL (ref 1.85–7.62)
NEUTS SEG NFR BLD AUTO: 60 % (ref 43–75)
NRBC BLD AUTO-RTO: 0 /100 WBCS
PLATELET # BLD AUTO: 222 THOUSANDS/UL (ref 149–390)
PMV BLD AUTO: 9.5 FL (ref 8.9–12.7)
POTASSIUM SERPL-SCNC: 4.3 MMOL/L (ref 3.5–5.3)
PROT SERPL-MCNC: 6.9 G/DL (ref 6.4–8.2)
RBC # BLD AUTO: 4.87 MILLION/UL (ref 3.88–5.62)
SODIUM SERPL-SCNC: 142 MMOL/L (ref 136–145)
WBC # BLD AUTO: 5.31 THOUSAND/UL (ref 4.31–10.16)

## 2019-06-19 PROCEDURE — 36415 COLL VENOUS BLD VENIPUNCTURE: CPT

## 2019-06-19 PROCEDURE — 99214 OFFICE O/P EST MOD 30 MIN: CPT | Performed by: NURSE PRACTITIONER

## 2019-06-19 PROCEDURE — 80053 COMPREHEN METABOLIC PANEL: CPT

## 2019-06-19 PROCEDURE — 85025 COMPLETE CBC W/AUTO DIFF WBC: CPT

## 2019-06-19 RX ORDER — GABAPENTIN 400 MG/1
400 CAPSULE ORAL 3 TIMES DAILY
Qty: 90 CAPSULE | Refills: 2 | Status: SHIPPED | OUTPATIENT
Start: 2019-06-19 | End: 2019-09-18 | Stop reason: SDUPTHER

## 2019-06-29 DIAGNOSIS — E78.2 MIXED HYPERLIPIDEMIA: ICD-10-CM

## 2019-06-29 RX ORDER — SIMVASTATIN 20 MG
TABLET ORAL
Qty: 90 TABLET | Refills: 3 | Status: SHIPPED | OUTPATIENT
Start: 2019-06-29 | End: 2020-01-06 | Stop reason: SDUPTHER

## 2019-07-17 ENCOUNTER — TELEPHONE (OUTPATIENT)
Dept: PAIN MEDICINE | Facility: MEDICAL CENTER | Age: 64
End: 2019-07-17

## 2019-07-17 NOTE — TELEPHONE ENCOUNTER
Patient called phone room to cancel his procedure, stated he was sick  Phone room did try to contact our  who is out of office, message should be on machine for her  Patient will be cx out of todays appointment and will receive a call back from whoever is covering or when our scheduelr returns

## 2019-08-07 ENCOUNTER — TELEPHONE (OUTPATIENT)
Dept: RADIOLOGY | Facility: MEDICAL CENTER | Age: 64
End: 2019-08-07

## 2019-08-07 NOTE — TELEPHONE ENCOUNTER
RN s/w pt regarding previous  Pt aware and will contact his Humira prescriber and will call us back if it is not ok to move forward with procedure

## 2019-08-07 NOTE — TELEPHONE ENCOUNTER
Patient called stating he has left L3-4 and L4-5 tfesi scheduled on 8/14  Recently patient has started using humira pens and was told he could not have certain procedures with it  Patient wants to verify if he can still have procedure  Please advise  Thank you

## 2019-08-07 NOTE — TELEPHONE ENCOUNTER
Please have patient contact the prescriber of humira and tell them the procedure he has scheduled and see if they are ok with it

## 2019-08-14 ENCOUNTER — HOSPITAL ENCOUNTER (OUTPATIENT)
Dept: RADIOLOGY | Facility: MEDICAL CENTER | Age: 64
Discharge: HOME | End: 2019-08-14
Attending: PHYSICAL MEDICINE & REHABILITATION
Payer: MEDICARE

## 2019-08-14 VITALS
OXYGEN SATURATION: 98 % | RESPIRATION RATE: 20 BRPM | TEMPERATURE: 98.6 F | SYSTOLIC BLOOD PRESSURE: 149 MMHG | DIASTOLIC BLOOD PRESSURE: 79 MMHG | HEART RATE: 55 BPM

## 2019-08-14 DIAGNOSIS — M54.16 LUMBAR RADICULOPATHY: ICD-10-CM

## 2019-08-14 DIAGNOSIS — M48.061 LUMBAR FORAMINAL STENOSIS: ICD-10-CM

## 2019-08-14 PROCEDURE — 64484 NJX AA&/STRD TFRM EPI L/S EA: CPT | Performed by: PHYSICAL MEDICINE & REHABILITATION

## 2019-08-14 PROCEDURE — 64483 NJX AA&/STRD TFRM EPI L/S 1: CPT | Performed by: PHYSICAL MEDICINE & REHABILITATION

## 2019-08-14 RX ORDER — HYDROCORTISONE 25 MG/ML
LOTION TOPICAL
Refills: 3 | COMMUNITY
Start: 2019-07-06 | End: 2019-11-07

## 2019-08-14 RX ORDER — LIDOCAINE HYDROCHLORIDE 10 MG/ML
5 INJECTION, SOLUTION EPIDURAL; INFILTRATION; INTRACAUDAL; PERINEURAL ONCE
Status: COMPLETED | OUTPATIENT
Start: 2019-08-14 | End: 2019-08-14

## 2019-08-14 RX ORDER — PAPAVERINE HCL 150 MG
20 CAPSULE, EXTENDED RELEASE ORAL ONCE
Status: COMPLETED | OUTPATIENT
Start: 2019-08-14 | End: 2019-08-14

## 2019-08-14 RX ADMIN — IOHEXOL 2 ML: 300 INJECTION, SOLUTION INTRAVENOUS at 11:17

## 2019-08-14 RX ADMIN — DEXAMETHASONE SODIUM PHOSPHATE 15 MG: 10 INJECTION, SOLUTION INTRAMUSCULAR; INTRAVENOUS at 11:17

## 2019-08-14 RX ADMIN — LIDOCAINE HYDROCHLORIDE 2.5 ML: 10 INJECTION, SOLUTION EPIDURAL; INFILTRATION; INTRACAUDAL; PERINEURAL at 11:14

## 2019-08-14 NOTE — H&P
History of Present Illness:  The patient is a 75 y o  male who presents with complaints of back and left leg pain    Patient Active Problem List   Diagnosis    Chronic left-sided low back pain    Essential hypertension    Hyperglycemia    Mixed hyperlipidemia    Polyarticular arthritis    Pain syndrome, chronic    Psoriasis    Protrusion of lumbar intervertebral disc    Lumbar radiculopathy - Left    Lumbar foraminal stenosis    Osteoarthritis of knee    Gastroesophageal reflux disease without esophagitis       Past Medical History:   Diagnosis Date    Abnormal EKG     last assessed: 04/22/2015    Arthritis of right knee     last assessed: 04/22/2015    Gait disturbance     last assessed: 04/09/2014    Injury of C5-C7 spinal cord ([facility] [address] )     last assessed: 10/20/2014; with central cord syndrome     Status post total knee replacement, right     last assessed: 05/12/2015    Tremor     last assessed: 04/09/2014    Wears glasses        Past Surgical History:   Procedure Laterality Date    CERVICAL LAMINECTOMY      COLONOSCOPY  07/2015    multiple polyps, repeat in 3 years    KNEE SURGERY Right 2002    20 years ago-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    POPLITEAL SYNOVIAL CYST EXCISION      last assessed: 04/22/2015    TONSILLECTOMY AND ADENOIDECTOMY      last assessed: 04/22/2015    TOTAL KNEE ARTHROPLASTY      last assessed: 05/12/2015         Current Outpatient Medications:     adalimumab (HUMIRA) 40 mg/0 8 mL PSKT, Inject 40 mg under the skin every 14 (fourteen) days, Disp: , Rfl:     calcipotriene (DOVONOX) 0 005 % ointment, calcipotriene 0 005 % topical ointment, Disp: , Rfl:     Calcitriol 3 MCG/GM cream, calcitriol 3 mcg/gram topical ointment, Disp: , Rfl:     clobetasol (TEMOVATE) 0 05 % cream, 1 31 application, Disp: , Rfl:     gabapentin (NEURONTIN) 400 mg capsule, Take 1 capsule (400 mg total) by mouth 3 (three) times a day, Disp: 90 capsule, Rfl: 2    hydrocortisone 2 5 % lotion, APPLY TO AREA OF FACE EARS AND GROIN TWICE DAILY AS NEEDED FOR PSORIASIS USE SPARINGLY, Disp: , Rfl: 3    omeprazole (PriLOSEC) 40 MG capsule, Take 1 capsule by mouth, Disp: , Rfl:     simvastatin (ZOCOR) 20 mg tablet, TAKE 1 TABLET EVERY DAY, Disp: 90 tablet, Rfl: 3    tacrolimus (PROTOPIC) 0 1 % ointment, Protopic 0 1 % topical ointment, Disp: , Rfl:     triamcinolone (KENALOG) 0 1 % cream, triamcinolone acetonide 0 1 % topical cream, Disp: , Rfl:     Current Facility-Administered Medications:     dexamethasone (PF) (DECADRON) injection 20 mg, 20 mg, Epidural, Once, Gwenevere Chet, DO    iohexol (OMNIPAQUE) 300 mg/mL injection 50 mL, 50 mL, Epidural, Once, Gwenevere Chet, DO    lidocaine (PF) (XYLOCAINE-MPF) 1 % injection 5 mL, 5 mL, Infiltration, Once, Gwenevere Chet, DO    No Known Allergies    Physical Exam:   Vitals:    08/14/19 1102   BP: 145/75   Pulse: 60   Resp: 20   Temp: 98 6 °F (37 °C)   SpO2: 97%     General: Awake, Alert, Oriented x 3, Mood and affect appropriate  Respiratory: Respirations even and unlabored  Cardiovascular: Peripheral pulses intact; no edema  Musculoskeletal Exam:  Back and left leg pain    ASA Score:  2  Patient/Chart Verification  Patient ID Verified: Verbal  ID Band Applied: No  Consents Confirmed: Procedural  H&P( within 30 days) Verified: To be obtained in the Pre-Procedure area  Interval H&P(within 24 hr) Complete (required for Outpatients and Surgery Admit only): To be obtained in the Pre-Procedure area  Allergies Reviewed: Yes  Anticoag/NSAID held?: NA  Currently on antibiotics?: No(pt does injections for his psoriasis, humira)    Assessment:   1  Lumbar radiculopathy - Left    2   Lumbar foraminal stenosis        Plan: left L3-4 and L4-5 tfesi

## 2019-08-14 NOTE — DISCHARGE INSTRUCTIONS
Epidural Steroid Injection   WHAT YOU NEED TO KNOW:   An epidural steroid injection (BLADE) is a procedure to inject steroid medicine into the epidural space  The epidural space is between your spinal cord and vertebrae  Steroids reduce inflammation and fluid buildup in your spine that may be causing pain  You may be given pain medicine along with the steroids  ACTIVITY  · Do not drive or operate machinery today  · No strenuous activity today - bending, lifting, etc   · You may resume normal activites starting tomorrow - start slowly and as tolerated  · You may shower today, but no tub baths or hot tubs  · You may have numbness for several hours from the local anesthetic  Please use caution and common sense, especially with weight-bearing activities  CARE OF THE INJECTION SITE  · If you have soreness or pain, apply ice to the area today (20 minutes on/20 minutes off)  · Starting tomorrow, you may use warm, moist heat or ice if needed  · You may have an increase or change in your discomfort for 36-48 hours after your treatment  · Apply ice and continue with any pain medication you have been prescribed  · Notify [facility] if you have any of the following: redness, drainage, swelling, headache, stiff neck or fever above 100°F     SPECIAL INSTRUCTIONS  · Our office will contact you in approximately 7 days for a progress report  MEDICATIONS  · Continue to take all routine medications  · Our office may have instructed you to hold some medications  If you have a problem specifically related to your procedure, please call our office at [phone]  Problems not related to your procedure should be directed to your primary care physician

## 2019-08-21 ENCOUNTER — TELEPHONE (OUTPATIENT)
Dept: PAIN MEDICINE | Facility: CLINIC | Age: 64
End: 2019-08-21

## 2019-09-18 ENCOUNTER — OFFICE VISIT (OUTPATIENT)
Dept: PAIN MEDICINE | Facility: MEDICAL CENTER | Age: 64
End: 2019-09-18
Payer: MEDICARE

## 2019-09-18 VITALS
BODY MASS INDEX: 31.25 KG/M2 | RESPIRATION RATE: 16 BRPM | DIASTOLIC BLOOD PRESSURE: 52 MMHG | WEIGHT: 211 LBS | HEART RATE: 64 BPM | HEIGHT: 69 IN | SYSTOLIC BLOOD PRESSURE: 108 MMHG

## 2019-09-18 DIAGNOSIS — M48.061 LUMBAR FORAMINAL STENOSIS: ICD-10-CM

## 2019-09-18 DIAGNOSIS — M54.42 CHRONIC LEFT-SIDED LOW BACK PAIN WITH LEFT-SIDED SCIATICA: ICD-10-CM

## 2019-09-18 DIAGNOSIS — G89.4 PAIN SYNDROME, CHRONIC: ICD-10-CM

## 2019-09-18 DIAGNOSIS — G89.29 CHRONIC LEFT-SIDED LOW BACK PAIN WITH LEFT-SIDED SCIATICA: ICD-10-CM

## 2019-09-18 DIAGNOSIS — M54.16 LUMBAR RADICULOPATHY: Primary | ICD-10-CM

## 2019-09-18 PROCEDURE — 99213 OFFICE O/P EST LOW 20 MIN: CPT | Performed by: NURSE PRACTITIONER

## 2019-09-18 RX ORDER — GABAPENTIN 400 MG/1
400 CAPSULE ORAL 3 TIMES DAILY
Qty: 90 CAPSULE | Refills: 2 | Status: SHIPPED | OUTPATIENT
Start: 2019-09-18 | End: 2019-12-11 | Stop reason: SDUPTHER

## 2019-09-18 NOTE — PROGRESS NOTES
Assessment  1  Lumbar radiculopathy - Left    2  Lumbar foraminal stenosis    3  Chronic left-sided low back pain with left-sided sciatica    4  Pain syndrome, chronic        Plan  Continue with gabapentin this was refilled today  Patient is still experiencing significant relief from his left L3-4 and L4-5 transforaminal epidural steroid injection that he had in August   We will hold off on any further injections until his pain symptoms return  Patient will follow up in 3 months for medication refills    [place] Prescription Drug Monitoring Program report was reviewed and was appropriate         My impressions and treatment recommendations were discussed in detail with the patient who verbalized understanding and had no further questions  Discharge instructions were provided  I personally saw and examined the patient and I agree with the above discussed plan of care  No orders of the defined types were placed in this encounter  New Medications Ordered This Visit   Medications    gabapentin (NEURONTIN) 400 mg capsule     Sig: Take 1 capsule (400 mg total) by mouth 3 (three) times a day     Dispense:  90 capsule     Refill:  2       History of Present Illness    Aki Palomino is a 75 y o  male presents for follow-up related to his left low back and leg pain  Today the patient reports that he is doing better rates his pain 3/10  His pain is constant most bothersome in the morning  He describes his pain as dull, and aching  He is status post a left L3-4 and L4-5 transforaminal epidural steroid injection on August 14, 2019 with Dr Marleny Genao  He reports that he has gotten significant relief from the injection  Patient is taking gabapentin 400 mg 1 tablet 3 times per day this does adequately relieve some of his pain symptoms without any side effects or issues  Patient tells me that he recently began Humira      I have personally reviewed and/or updated the patient's past medical history, past surgical history, family history, social history, current medications, allergies, and vital signs today  Review of Systems   Respiratory: Negative for shortness of breath  Cardiovascular: Negative for chest pain  Gastrointestinal: Negative for constipation, diarrhea, nausea and vomiting  Musculoskeletal: Negative for arthralgias, gait problem, joint swelling and myalgias  Skin: Negative for rash  Neurological: Negative for dizziness, seizures and weakness  All other systems reviewed and are negative        Patient Active Problem List   Diagnosis    Chronic left-sided low back pain    Essential hypertension    Hyperglycemia    Mixed hyperlipidemia    Polyarticular arthritis    Pain syndrome, chronic    Psoriasis    Protrusion of lumbar intervertebral disc    Lumbar radiculopathy - Left    Lumbar foraminal stenosis    Osteoarthritis of knee    Gastroesophageal reflux disease without esophagitis       Past Medical History:   Diagnosis Date    Abnormal EKG     last assessed: 04/22/2015    Arthritis of right knee     last assessed: 04/22/2015    Gait disturbance     last assessed: 04/09/2014    Injury of C5-C7 spinal cord ([facility] [address] )     last assessed: 10/20/2014; with central cord syndrome     Status post total knee replacement, right     last assessed: 05/12/2015    Tremor     last assessed: 04/09/2014    Wears glasses        Past Surgical History:   Procedure Laterality Date    CERVICAL LAMINECTOMY      COLONOSCOPY  07/2015    multiple polyps, repeat in 3 years    KNEE SURGERY Right 2002    20 years ago-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    POPLITEAL SYNOVIAL CYST EXCISION      last assessed: 04/22/2015    TONSILLECTOMY AND ADENOIDECTOMY      last assessed: 04/22/2015    TOTAL KNEE ARTHROPLASTY      last assessed: 05/12/2015       Family History   Problem Relation Age of Onset    Allergies Father         seasonal    Lung cancer Family        Social History     Occupational History    Not on file   Tobacco Use    Smoking status: Former Smoker    Smokeless tobacco: Never Used   Substance and Sexual Activity    Alcohol use: No    Drug use: No    Sexual activity: Not on file       Current Outpatient Medications on File Prior to Visit   Medication Sig    adalimumab (HUMIRA) 40 mg/0 8 mL PSKT Inject 40 mg under the skin every 14 (fourteen) days    calcipotriene (DOVONOX) 0 005 % ointment calcipotriene 0 005 % topical ointment    Calcitriol 3 MCG/GM cream calcitriol 3 mcg/gram topical ointment    clobetasol (TEMOVATE) 0 05 % cream 7 87 application    hydrocortisone 2 5 % lotion APPLY TO AREA OF FACE EARS AND GROIN TWICE DAILY AS NEEDED FOR PSORIASIS USE SPARINGLY    omeprazole (PriLOSEC) 40 MG capsule Take 1 capsule by mouth    simvastatin (ZOCOR) 20 mg tablet TAKE 1 TABLET EVERY DAY    tacrolimus (PROTOPIC) 0 1 % ointment Protopic 0 1 % topical ointment    triamcinolone (KENALOG) 0 1 % cream triamcinolone acetonide 0 1 % topical cream    [DISCONTINUED] gabapentin (NEURONTIN) 400 mg capsule Take 1 capsule (400 mg total) by mouth 3 (three) times a day     No current facility-administered medications on file prior to visit  No Known Allergies    Physical Exam    /52   Pulse 64   Resp 16   Ht 5' 9" (1 753 m)   Wt 95 7 kg (211 lb)   BMI 31 16 kg/m²     Constitutional: normal, well developed, well nourished, alert, in no distress and non-toxic and no overt pain behavior    Eyes: anicteric  HEENT: grossly intact  Neck: supple, symmetric, trachea midline and no masses   Pulmonary:even and unlabored  Cardiovascular:No edema or pitting edema present  Skin:Normal without rashes or lesions and well hydrated  Psychiatric:Mood and affect appropriate  Neurologic:Cranial Nerves II-XII grossly intact  Musculoskeletal:normal    Imaging

## 2019-09-19 ENCOUNTER — OFFICE VISIT (OUTPATIENT)
Dept: FAMILY MEDICINE CLINIC | Facility: CLINIC | Age: 64
End: 2019-09-19
Payer: MEDICARE

## 2019-09-19 VITALS
HEIGHT: 69 IN | BODY MASS INDEX: 31.13 KG/M2 | DIASTOLIC BLOOD PRESSURE: 70 MMHG | WEIGHT: 210.2 LBS | SYSTOLIC BLOOD PRESSURE: 124 MMHG

## 2019-09-19 DIAGNOSIS — R22.0 RIGHT FACIAL SWELLING: Primary | ICD-10-CM

## 2019-09-19 PROCEDURE — 99213 OFFICE O/P EST LOW 20 MIN: CPT | Performed by: FAMILY MEDICINE

## 2019-09-19 RX ORDER — CEFADROXIL 500 MG/1
500 CAPSULE ORAL EVERY 12 HOURS SCHEDULED
Qty: 14 CAPSULE | Refills: 0 | Status: SHIPPED | OUTPATIENT
Start: 2019-09-19 | End: 2019-09-26

## 2019-09-19 NOTE — PROGRESS NOTES
Assessment/Plan:         Diagnoses and all orders for this visit:    Right facial swelling  Comments: Will cover him for cellulitis with antibiotics  If this happens again after his next injection, with talk to the specialist about possible side effects  Orders:  -     cefadroxil (DURICEF) 500 mg capsule; Take 1 capsule (500 mg total) by mouth every 12 (twelve) hours for 7 days          Subjective:   Chief Complaint   Patient presents with    Facial Swelling     right side x  2 days           Patient ID: Geeta Ley is a 75 y o  male  Patient is a 56-year-old male presenting to the office complaining of right-sided facial swelling for 2 days  Started 3 days ago, was bad for 2 days yesterday had difficulty opening his mouth to chew  It is somewhat improved today  No fever or chills, no obvious redness to the area  He does not have any teeth  Of note, he recently started Humira in August, and gave himself his 2nd injection 3 days ago  The following portions of the patient's history were reviewed and updated as appropriate: allergies, current medications, past family history, past medical history, past social history, past surgical history and problem list     Review of Systems   Constitutional: Negative for activity change, chills, fatigue and fever  HENT: Positive for facial swelling  Negative for congestion, dental problem, ear pain, mouth sores, postnasal drip, rhinorrhea, sinus pressure, sinus pain and sore throat  Eyes: Negative for discharge and redness  Respiratory: Negative  Cardiovascular: Negative  Gastrointestinal: Negative  Endocrine: Negative for cold intolerance and heat intolerance  Genitourinary: Negative  Musculoskeletal: Positive for myalgias  Skin: Positive for color change  Allergic/Immunologic: Positive for immunocompromised state  Neurological: Negative            Objective:      /70   Ht 5' 9" (1 753 m)   Wt 95 3 kg (210 lb 3 2 oz)   BMI 31 04 kg/m²          Physical Exam   Constitutional: He is oriented to person, place, and time  He appears well-developed and well-nourished  HENT:   Head: Normocephalic and atraumatic  Nose: Nose normal    Mouth/Throat: Oropharynx is clear and moist    adentulous   Eyes: Pupils are equal, round, and reactive to light  Conjunctivae and EOM are normal    Neck: Normal range of motion  Neck supple  No JVD present  No tracheal deviation present  No thyromegaly present  Cardiovascular: Normal rate, regular rhythm and intact distal pulses  No murmur heard  Pulmonary/Chest: Effort normal and breath sounds normal  He has no wheezes  He has no rales  Abdominal: Soft  Bowel sounds are normal  He exhibits no mass  There is no tenderness  There is no rebound and no guarding  Musculoskeletal: He exhibits no edema, tenderness or deformity  Lymphadenopathy:     He has no cervical adenopathy  Neurological: He is alert and oriented to person, place, and time  He has normal reflexes  He displays normal reflexes  No cranial nerve deficit  He exhibits normal muscle tone  Coordination normal    Skin: Skin is warm and dry  No lesion and no rash noted  No erythema ( of the neck bilaterally  Does go up the left side to the jaw somewhat  )  Nails show no clubbing  Psychiatric: He has a normal mood and affect  Judgment normal        Brief literature search failed to correlate facial swelling with Humira

## 2019-11-04 ENCOUNTER — APPOINTMENT (OUTPATIENT)
Dept: LAB | Facility: MEDICAL CENTER | Age: 64
End: 2019-11-04
Payer: MEDICARE

## 2019-11-04 DIAGNOSIS — I10 ESSENTIAL HYPERTENSION: ICD-10-CM

## 2019-11-04 DIAGNOSIS — Z12.5 SCREENING FOR PROSTATE CANCER: ICD-10-CM

## 2019-11-04 DIAGNOSIS — E78.2 MIXED HYPERLIPIDEMIA: ICD-10-CM

## 2019-11-04 DIAGNOSIS — M13.0 POLYARTICULAR ARTHRITIS: ICD-10-CM

## 2019-11-04 LAB
ALBUMIN SERPL BCP-MCNC: 3.9 G/DL (ref 3.5–5)
ALP SERPL-CCNC: 93 U/L (ref 46–116)
ALT SERPL W P-5'-P-CCNC: 18 U/L (ref 12–78)
ANION GAP SERPL CALCULATED.3IONS-SCNC: 4 MMOL/L (ref 4–13)
AST SERPL W P-5'-P-CCNC: 10 U/L (ref 5–45)
BASOPHILS # BLD AUTO: 0.05 THOUSANDS/ΜL (ref 0–0.1)
BASOPHILS NFR BLD AUTO: 1 % (ref 0–1)
BILIRUB SERPL-MCNC: 0.54 MG/DL (ref 0.2–1)
BILIRUB UR QL STRIP: NEGATIVE
BUN SERPL-MCNC: 18 MG/DL (ref 5–25)
CALCIUM SERPL-MCNC: 8.7 MG/DL (ref 8.3–10.1)
CHLORIDE SERPL-SCNC: 109 MMOL/L (ref 100–108)
CHOLEST SERPL-MCNC: 150 MG/DL (ref 50–200)
CLARITY UR: CLEAR
CO2 SERPL-SCNC: 25 MMOL/L (ref 21–32)
COLOR UR: YELLOW
CREAT SERPL-MCNC: 0.84 MG/DL (ref 0.6–1.3)
EOSINOPHIL # BLD AUTO: 0.09 THOUSAND/ΜL (ref 0–0.61)
EOSINOPHIL NFR BLD AUTO: 2 % (ref 0–6)
ERYTHROCYTE [DISTWIDTH] IN BLOOD BY AUTOMATED COUNT: 12.4 % (ref 11.6–15.1)
GFR SERPL CREATININE-BSD FRML MDRD: 91 ML/MIN/1.73SQ M
GLUCOSE P FAST SERPL-MCNC: 100 MG/DL (ref 65–99)
GLUCOSE UR STRIP-MCNC: NEGATIVE MG/DL
HCT VFR BLD AUTO: 50.3 % (ref 36.5–49.3)
HDLC SERPL-MCNC: 42 MG/DL
HGB BLD-MCNC: 16.4 G/DL (ref 12–17)
HGB UR QL STRIP.AUTO: NEGATIVE
IMM GRANULOCYTES # BLD AUTO: 0.01 THOUSAND/UL (ref 0–0.2)
IMM GRANULOCYTES NFR BLD AUTO: 0 % (ref 0–2)
KETONES UR STRIP-MCNC: NEGATIVE MG/DL
LDLC SERPL CALC-MCNC: 93 MG/DL (ref 0–100)
LEUKOCYTE ESTERASE UR QL STRIP: NEGATIVE
LYMPHOCYTES # BLD AUTO: 1.66 THOUSANDS/ΜL (ref 0.6–4.47)
LYMPHOCYTES NFR BLD AUTO: 46 % (ref 14–44)
MCH RBC QN AUTO: 32.9 PG (ref 26.8–34.3)
MCHC RBC AUTO-ENTMCNC: 32.6 G/DL (ref 31.4–37.4)
MCV RBC AUTO: 101 FL (ref 82–98)
MONOCYTES # BLD AUTO: 0.39 THOUSAND/ΜL (ref 0.17–1.22)
MONOCYTES NFR BLD AUTO: 10 % (ref 4–12)
NEUTROPHILS # BLD AUTO: 1.54 THOUSANDS/ΜL (ref 1.85–7.62)
NEUTS SEG NFR BLD AUTO: 41 % (ref 43–75)
NITRITE UR QL STRIP: NEGATIVE
NONHDLC SERPL-MCNC: 108 MG/DL
NRBC BLD AUTO-RTO: 0 /100 WBCS
PH UR STRIP.AUTO: 6.5 [PH]
PLATELET # BLD AUTO: 180 THOUSANDS/UL (ref 149–390)
PMV BLD AUTO: 10 FL (ref 8.9–12.7)
POTASSIUM SERPL-SCNC: 4.5 MMOL/L (ref 3.5–5.3)
PROT SERPL-MCNC: 6.7 G/DL (ref 6.4–8.2)
PROT UR STRIP-MCNC: NEGATIVE MG/DL
PSA SERPL-MCNC: 0.6 NG/ML (ref 0–4)
RBC # BLD AUTO: 4.99 MILLION/UL (ref 3.88–5.62)
SODIUM SERPL-SCNC: 138 MMOL/L (ref 136–145)
SP GR UR STRIP.AUTO: 1.01 (ref 1–1.03)
TRIGL SERPL-MCNC: 75 MG/DL
TSH SERPL DL<=0.05 MIU/L-ACNC: 1.14 UIU/ML (ref 0.36–3.74)
UROBILINOGEN UR QL STRIP.AUTO: 0.2 E.U./DL
WBC # BLD AUTO: 3.74 THOUSAND/UL (ref 4.31–10.16)

## 2019-11-04 PROCEDURE — 80061 LIPID PANEL: CPT

## 2019-11-04 PROCEDURE — 80053 COMPREHEN METABOLIC PANEL: CPT

## 2019-11-04 PROCEDURE — G0103 PSA SCREENING: HCPCS

## 2019-11-04 PROCEDURE — 36415 COLL VENOUS BLD VENIPUNCTURE: CPT

## 2019-11-04 PROCEDURE — 85025 COMPLETE CBC W/AUTO DIFF WBC: CPT

## 2019-11-04 PROCEDURE — 84443 ASSAY THYROID STIM HORMONE: CPT

## 2019-11-04 PROCEDURE — 81003 URINALYSIS AUTO W/O SCOPE: CPT

## 2019-11-07 ENCOUNTER — OFFICE VISIT (OUTPATIENT)
Dept: FAMILY MEDICINE CLINIC | Facility: CLINIC | Age: 64
End: 2019-11-07
Payer: MEDICARE

## 2019-11-07 VITALS
DIASTOLIC BLOOD PRESSURE: 78 MMHG | SYSTOLIC BLOOD PRESSURE: 124 MMHG | HEIGHT: 69 IN | WEIGHT: 214 LBS | BODY MASS INDEX: 31.7 KG/M2

## 2019-11-07 DIAGNOSIS — L40.9 PSORIASIS: ICD-10-CM

## 2019-11-07 DIAGNOSIS — M48.061 LUMBAR FORAMINAL STENOSIS: ICD-10-CM

## 2019-11-07 DIAGNOSIS — R73.9 HYPERGLYCEMIA: ICD-10-CM

## 2019-11-07 DIAGNOSIS — I10 ESSENTIAL HYPERTENSION: Primary | ICD-10-CM

## 2019-11-07 DIAGNOSIS — M13.0 POLYARTICULAR ARTHRITIS: ICD-10-CM

## 2019-11-07 DIAGNOSIS — K21.9 GASTROESOPHAGEAL REFLUX DISEASE WITHOUT ESOPHAGITIS: ICD-10-CM

## 2019-11-07 DIAGNOSIS — E78.2 MIXED HYPERLIPIDEMIA: ICD-10-CM

## 2019-11-07 DIAGNOSIS — Z23 NEED FOR VACCINATION: ICD-10-CM

## 2019-11-07 PROBLEM — E66.09 CLASS 1 OBESITY DUE TO EXCESS CALORIES WITHOUT SERIOUS COMORBIDITY IN ADULT: Status: ACTIVE | Noted: 2019-11-07

## 2019-11-07 PROBLEM — E66.811 CLASS 1 OBESITY DUE TO EXCESS CALORIES WITHOUT SERIOUS COMORBIDITY IN ADULT: Status: ACTIVE | Noted: 2019-11-07

## 2019-11-07 PROCEDURE — 99214 OFFICE O/P EST MOD 30 MIN: CPT | Performed by: FAMILY MEDICINE

## 2019-11-07 PROCEDURE — 90662 IIV NO PRSV INCREASED AG IM: CPT | Performed by: FAMILY MEDICINE

## 2019-11-07 PROCEDURE — G0008 ADMIN INFLUENZA VIRUS VAC: HCPCS | Performed by: FAMILY MEDICINE

## 2019-11-07 NOTE — PROGRESS NOTES
Assessment/Plan:    Essential hypertension    Diet-controlled  Monitor weight, increased exercise of possible  Hyperglycemia    Stable, check Hb A1c in 6 months  Mixed hyperlipidemia    HDL is the best it has been in years  Continue physical activity, continue simvastatin 20 mg daily  Pain syndrome, chronic    Follow-up with pain management as scheduled, continue gabapentin 400 mg twice a day  Psoriasis    Continue with Dermatology and Humira  Diagnoses and all orders for this visit:    Essential hypertension  -     Lipid panel; Future    Gastroesophageal reflux disease without esophagitis    Hyperglycemia  -     Hemoglobin A1C; Future  -     Comprehensive metabolic panel; Future    Lumbar foraminal stenosis    Polyarticular arthritis    Psoriasis    Mixed hyperlipidemia  -     Comprehensive metabolic panel; Future  -     Lipid panel; Future  -     TSH, 3rd generation; Future    Need for vaccination  -     influenza vaccine, [id number], high-dose, PF 0 5 mL (FLUZONE HIGH-DOSE)          Subjective:   Chief Complaint   Patient presents with    Hypertension    GERD    Hyperlipidemia     no refills needed  review blood work           Patient ID: Brianne Maldonado is a 75 y o  male  Patient is a 69-year-old male presenting to the office for follow-up on his diet-controlled blood pressure, hyperlipidemia, GERD, and update me on his pain issues, to get a flu shot, and review recent lab work  Overall he feels he has more good days and bad days  Still getting occasional injections and continuing on gabapentin  The following portions of the patient's history were reviewed and updated as appropriate: allergies, current medications, past family history, past medical history, past social history, past surgical history and problem list     Review of Systems   Constitutional: Negative for appetite change, chills, diaphoresis, fatigue, fever and unexpected weight change     HENT: Negative for congestion, ear pain, hearing loss, nosebleeds, postnasal drip, rhinorrhea, sinus pressure, sore throat, tinnitus and trouble swallowing  Eyes: Negative for photophobia, pain, discharge, redness, itching and visual disturbance  Respiratory: Negative for cough, chest tightness, shortness of breath and wheezing  Cardiovascular: Negative for chest pain, palpitations and leg swelling  Denies orthopnea , dyspnea on exertion   Gastrointestinal: Negative for abdominal distention, abdominal pain, blood in stool, constipation, diarrhea, nausea and vomiting  Endocrine: Negative  Genitourinary: Negative for difficulty urinating, dysuria, flank pain, frequency, hematuria and urgency  Denies nocturia , erectile dysfunction   Musculoskeletal: Positive for arthralgias and back pain  Negative for gait problem, joint swelling and myalgias  Skin: Negative for pallor, rash and wound  Denies skin lesions   Allergic/Immunologic: Negative for environmental allergies, food allergies and immunocompromised state  Neurological: Negative for dizziness, tremors, seizures, syncope, speech difficulty, weakness, numbness and headaches  Hematological: Negative for adenopathy  Does not bruise/bleed easily  Psychiatric/Behavioral: Negative for behavioral problems, confusion, sleep disturbance and suicidal ideas  The patient is not nervous/anxious  Objective:      /78   Ht 5' 9" (1 753 m)   Wt 97 1 kg (214 lb)   BMI 31 60 kg/m²          Physical Exam   Constitutional: He is oriented to person, place, and time  He appears well-developed and well-nourished  HENT:   Head: Normocephalic and atraumatic  Nose: Nose normal    Mouth/Throat: Oropharynx is clear and moist    Eyes: Pupils are equal, round, and reactive to light  Conjunctivae and EOM are normal    Neck: Normal range of motion  Neck supple  No JVD present  No tracheal deviation present  No thyromegaly present     Cardiovascular: Normal rate, regular rhythm and intact distal pulses  No murmur heard  Pulmonary/Chest: Effort normal and breath sounds normal  He has no wheezes  He has no rales  Abdominal: Soft  Bowel sounds are normal  He exhibits no mass  There is no tenderness  There is no rebound and no guarding  Musculoskeletal: He exhibits no edema or deformity  Lumbar back: He exhibits decreased range of motion and tenderness  Lymphadenopathy:     He has no cervical adenopathy  Neurological: He is alert and oriented to person, place, and time  He has normal reflexes  He displays normal reflexes  No cranial nerve deficit  He exhibits normal muscle tone  Coordination normal    Skin: Skin is warm and dry  No lesion and no rash noted  Nails show no clubbing  Psychiatric: He has a normal mood and affect  Judgment normal    Nursing note and vitals reviewed  BMI Counseling: Body mass index is 31 6 kg/m²  The BMI is above normal  Nutrition recommendations include decreasing overall calorie intake, moderation in carbohydrate intake, increasing intake of lean protein and reducing intake of saturated fat and trans fat  Exercise recommendations include exercising 3-5 times per week

## 2019-12-11 ENCOUNTER — OFFICE VISIT (OUTPATIENT)
Dept: PAIN MEDICINE | Facility: MEDICAL CENTER | Age: 64
End: 2019-12-11
Payer: MEDICARE

## 2019-12-11 VITALS
WEIGHT: 219 LBS | RESPIRATION RATE: 14 BRPM | BODY MASS INDEX: 32.44 KG/M2 | HEIGHT: 69 IN | HEART RATE: 64 BPM | DIASTOLIC BLOOD PRESSURE: 58 MMHG | SYSTOLIC BLOOD PRESSURE: 120 MMHG

## 2019-12-11 DIAGNOSIS — M54.42 CHRONIC LEFT-SIDED LOW BACK PAIN WITH LEFT-SIDED SCIATICA: Primary | ICD-10-CM

## 2019-12-11 DIAGNOSIS — G89.4 PAIN SYNDROME, CHRONIC: ICD-10-CM

## 2019-12-11 DIAGNOSIS — M48.061 LUMBAR FORAMINAL STENOSIS: ICD-10-CM

## 2019-12-11 DIAGNOSIS — G89.29 CHRONIC LEFT-SIDED LOW BACK PAIN WITH LEFT-SIDED SCIATICA: Primary | ICD-10-CM

## 2019-12-11 DIAGNOSIS — M54.16 LUMBAR RADICULOPATHY: ICD-10-CM

## 2019-12-11 PROCEDURE — 99213 OFFICE O/P EST LOW 20 MIN: CPT | Performed by: NURSE PRACTITIONER

## 2019-12-11 RX ORDER — GABAPENTIN 400 MG/1
400 CAPSULE ORAL 3 TIMES DAILY
Qty: 270 CAPSULE | Refills: 1 | Status: SHIPPED | OUTPATIENT
Start: 2019-12-11 | End: 2020-06-10 | Stop reason: SDUPTHER

## 2019-12-11 RX ORDER — GABAPENTIN 400 MG/1
400 CAPSULE ORAL 3 TIMES DAILY
Qty: 90 CAPSULE | Refills: 1 | Status: SHIPPED | OUTPATIENT
Start: 2019-12-11 | End: 2019-12-11 | Stop reason: SDUPTHER

## 2019-12-11 NOTE — PROGRESS NOTES
Assessment  1  Chronic left-sided low back pain with left-sided sciatica    2  Lumbar radiculopathy - Left    3  Pain syndrome, chronic    4  Lumbar foraminal stenosis        Plan  Continue with gabapentin this was refilled today  Patient is still doing well he does not need to repeat his left L3-4 and L4-5 transforaminal epidural steroid injection at this time  He will call the office if something changes or worsens  Follow-up in 6 months for medication refills        [place] Prescription Drug Monitoring Program report was reviewed and was appropriate         My impressions and treatment recommendations were discussed in detail with the patient who verbalized understanding and had no further questions  Discharge instructions were provided  I personally saw and examined the patient and I agree with the above discussed plan of care  No orders of the defined types were placed in this encounter  New Medications Ordered This Visit   Medications    gabapentin (NEURONTIN) 400 mg capsule     Sig: Take 1 capsule (400 mg total) by mouth 3 (three) times a day     Dispense:  270 capsule     Refill:  1       History of Present Illness    Aki Castellon is a 75 y o  male presents for follow-up related to his chronic left low back pain  Today he reports he is doing better rates his pain 3/10  He does get intermittent pain that is most bothersome in the morning  Patient continues to take gabapentin 400 mg 1 tablet 3 times a day with good relief and no side effects or issues  I have personally reviewed and/or updated the patient's past medical history, past surgical history, family history, social history, current medications, allergies, and vital signs today  Review of Systems   Respiratory: Negative for shortness of breath  Cardiovascular: Negative for chest pain  Gastrointestinal: Negative for constipation, diarrhea, nausea and vomiting     Musculoskeletal: Negative for arthralgias, gait problem, joint swelling and myalgias  Skin: Negative for rash  Neurological: Negative for dizziness, seizures and weakness  All other systems reviewed and are negative        Patient Active Problem List   Diagnosis    Chronic left-sided low back pain    Essential hypertension    Hyperglycemia    Mixed hyperlipidemia    Polyarticular arthritis    Pain syndrome, chronic    Psoriasis    Protrusion of lumbar intervertebral disc    Lumbar radiculopathy - Left    Lumbar foraminal stenosis    Osteoarthritis of knee    Gastroesophageal reflux disease without esophagitis    Class 1 obesity due to excess calories without serious comorbidity in adult       Past Medical History:   Diagnosis Date    Abnormal EKG     last assessed: 04/22/2015    Arthritis of right knee     last assessed: 04/22/2015    Gait disturbance     last assessed: 04/09/2014    Injury of C5-C7 spinal cord ([facility] [address] )     last assessed: 10/20/2014; with central cord syndrome     Status post total knee replacement, right     last assessed: 05/12/2015    Tremor     last assessed: 04/09/2014    Wears glasses        Past Surgical History:   Procedure Laterality Date    CERVICAL LAMINECTOMY      COLONOSCOPY  07/2015    multiple polyps, repeat in 3 years    KNEE SURGERY Right 2002    20 years ago-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    POPLITEAL SYNOVIAL CYST EXCISION      last assessed: 04/22/2015    TONSILLECTOMY AND ADENOIDECTOMY      last assessed: 04/22/2015    TOTAL KNEE ARTHROPLASTY      last assessed: 05/12/2015       Family History   Problem Relation Age of Onset    Allergies Father         seasonal    Lung cancer Family        Social History     Occupational History    Not on file   Tobacco Use    Smoking status: Former Smoker    Smokeless tobacco: Never Used   Substance and Sexual Activity    Alcohol use: No    Drug use: No    Sexual activity: Not on file       Current Outpatient Medications on File Prior to Visit   Medication Sig    adalimumab (HUMIRA) 40 mg/0 8 mL PSKT Inject 40 mg under the skin every 14 (fourteen) days    simvastatin (ZOCOR) 20 mg tablet TAKE 1 TABLET EVERY DAY    [DISCONTINUED] gabapentin (NEURONTIN) 400 mg capsule Take 1 capsule (400 mg total) by mouth 3 (three) times a day    [DISCONTINUED] omeprazole (PriLOSEC) 40 MG capsule Take 1 capsule by mouth     No current facility-administered medications on file prior to visit  No Known Allergies    Physical Exam    /58   Pulse 64   Resp 14   Ht 5' 9" (1 753 m)   Wt 99 3 kg (219 lb)   BMI 32 34 kg/m²     Constitutional: normal, well developed, well nourished, alert, in no distress and non-toxic and no overt pain behavior    Eyes: anicteric  HEENT: grossly intact  Neck: supple, symmetric, trachea midline and no masses   Pulmonary:even and unlabored  Cardiovascular:No edema or pitting edema present  Skin:Normal without rashes or lesions and well hydrated  Psychiatric:Mood and affect appropriate  Neurologic:Cranial Nerves II-XII grossly intact  Musculoskeletal:normal    Imaging

## 2019-12-16 ENCOUNTER — TELEPHONE (OUTPATIENT)
Dept: FAMILY MEDICINE CLINIC | Facility: CLINIC | Age: 64
End: 2019-12-16

## 2020-01-06 DIAGNOSIS — E78.2 MIXED HYPERLIPIDEMIA: ICD-10-CM

## 2020-01-06 RX ORDER — SIMVASTATIN 20 MG
20 TABLET ORAL DAILY
Qty: 90 TABLET | Refills: 3 | Status: SHIPPED | OUTPATIENT
Start: 2020-01-06 | End: 2020-11-06

## 2020-01-16 ENCOUNTER — TRANSCRIBE ORDERS (OUTPATIENT)
Dept: ADMINISTRATIVE | Facility: HOSPITAL | Age: 65
End: 2020-01-16

## 2020-01-16 ENCOUNTER — APPOINTMENT (OUTPATIENT)
Dept: LAB | Facility: MEDICAL CENTER | Age: 65
End: 2020-01-16
Payer: MEDICARE

## 2020-01-16 DIAGNOSIS — Z01.89 LABORATORY PROCEDURE: ICD-10-CM

## 2020-01-16 DIAGNOSIS — Z79.899 ENCOUNTER FOR LONG-TERM (CURRENT) USE OF OTHER MEDICATIONS: ICD-10-CM

## 2020-01-16 DIAGNOSIS — L40.0 PSORIASIS VULGARIS: Primary | ICD-10-CM

## 2020-01-16 DIAGNOSIS — L40.0 PSORIASIS VULGARIS: ICD-10-CM

## 2020-01-16 PROCEDURE — 36415 COLL VENOUS BLD VENIPUNCTURE: CPT

## 2020-01-16 PROCEDURE — 86480 TB TEST CELL IMMUN MEASURE: CPT

## 2020-01-17 LAB
GAMMA INTERFERON BACKGROUND BLD IA-ACNC: 0.02 IU/ML
M TB IFN-G BLD-IMP: NEGATIVE
M TB IFN-G CD4+ BCKGRND COR BLD-ACNC: 0 IU/ML
M TB IFN-G CD4+ BCKGRND COR BLD-ACNC: 0 IU/ML
MITOGEN IGNF BCKGRD COR BLD-ACNC: >10 IU/ML

## 2020-02-25 NOTE — RESULT NOTES
Message   Recorded as Task   Date: 09/18/2017 03:47 PM, Created By: Jordyn Rebolledo   Task Name: Follow Up   Assigned To: [id number] end procedure,Team   Regarding Patient: Martha Guzman, Status: Active   CommentCorey Clint - 18 Sep 2017 3:47 PM     TASK CREATED  Pt  is S/P LT L3 & L4 TFESI on 9/11 by Dr Jonh Cruz  F/U is on 10/17  Albert Bhat - 19 Sep 2017 3:29 PM     TASK EDITED  Pt  reports 10-15% relief post inj  just started feeling relief today, was very sore over weekend  F/U is on 10/17     Girish Terrazas - 37 Sep 2017 4:06 PM     TASK REPLIED TO: Previously Assigned To Girish Terrazas                      agree        Signatures   Electronically signed by : Ruthann Giraldo, ; Sep 20 2017  2:01PM EST                       (Author)
labs, images and plan d/w pt and daughter. all questions answered. pt ready and stable for admission. -Stephen Peters PA-C     If daughter needs to be reached overnight she provided contact info:  [place] [phone].

## 2020-05-13 ENCOUNTER — APPOINTMENT (OUTPATIENT)
Dept: LAB | Facility: MEDICAL CENTER | Age: 65
End: 2020-05-13
Payer: MEDICARE

## 2020-05-13 DIAGNOSIS — R73.9 HYPERGLYCEMIA: ICD-10-CM

## 2020-05-13 DIAGNOSIS — I10 ESSENTIAL HYPERTENSION: ICD-10-CM

## 2020-05-13 DIAGNOSIS — E78.2 MIXED HYPERLIPIDEMIA: ICD-10-CM

## 2020-05-13 LAB
ALBUMIN SERPL BCP-MCNC: 3.8 G/DL (ref 3.5–5)
ALP SERPL-CCNC: 83 U/L (ref 46–116)
ALT SERPL W P-5'-P-CCNC: 21 U/L (ref 12–78)
ANION GAP SERPL CALCULATED.3IONS-SCNC: 4 MMOL/L (ref 4–13)
AST SERPL W P-5'-P-CCNC: 10 U/L (ref 5–45)
BILIRUB SERPL-MCNC: 0.39 MG/DL (ref 0.2–1)
BUN SERPL-MCNC: 13 MG/DL (ref 5–25)
CALCIUM SERPL-MCNC: 8.6 MG/DL (ref 8.3–10.1)
CHLORIDE SERPL-SCNC: 108 MMOL/L (ref 100–108)
CHOLEST SERPL-MCNC: 138 MG/DL (ref 50–200)
CO2 SERPL-SCNC: 27 MMOL/L (ref 21–32)
CREAT SERPL-MCNC: 0.85 MG/DL (ref 0.6–1.3)
EST. AVERAGE GLUCOSE BLD GHB EST-MCNC: 111 MG/DL
GFR SERPL CREATININE-BSD FRML MDRD: 90 ML/MIN/1.73SQ M
GLUCOSE P FAST SERPL-MCNC: 110 MG/DL (ref 65–99)
HBA1C MFR BLD: 5.5 %
HDLC SERPL-MCNC: 33 MG/DL
LDLC SERPL CALC-MCNC: 88 MG/DL (ref 0–100)
NONHDLC SERPL-MCNC: 105 MG/DL
POTASSIUM SERPL-SCNC: 4.7 MMOL/L (ref 3.5–5.3)
PROT SERPL-MCNC: 6.6 G/DL (ref 6.4–8.2)
SODIUM SERPL-SCNC: 139 MMOL/L (ref 136–145)
TRIGL SERPL-MCNC: 87 MG/DL
TSH SERPL DL<=0.05 MIU/L-ACNC: 1.85 UIU/ML (ref 0.36–3.74)

## 2020-05-13 PROCEDURE — 36415 COLL VENOUS BLD VENIPUNCTURE: CPT

## 2020-05-13 PROCEDURE — 83036 HEMOGLOBIN GLYCOSYLATED A1C: CPT

## 2020-05-13 PROCEDURE — 84443 ASSAY THYROID STIM HORMONE: CPT

## 2020-05-13 PROCEDURE — 80053 COMPREHEN METABOLIC PANEL: CPT

## 2020-05-13 PROCEDURE — 80061 LIPID PANEL: CPT

## 2020-05-14 ENCOUNTER — OFFICE VISIT (OUTPATIENT)
Dept: FAMILY MEDICINE CLINIC | Facility: CLINIC | Age: 65
End: 2020-05-14
Payer: MEDICARE

## 2020-05-14 VITALS
SYSTOLIC BLOOD PRESSURE: 128 MMHG | BODY MASS INDEX: 32.44 KG/M2 | WEIGHT: 219 LBS | DIASTOLIC BLOOD PRESSURE: 70 MMHG | HEIGHT: 69 IN | TEMPERATURE: 98.4 F

## 2020-05-14 DIAGNOSIS — G89.29 CHRONIC LEFT-SIDED LOW BACK PAIN WITH LEFT-SIDED SCIATICA: ICD-10-CM

## 2020-05-14 DIAGNOSIS — Z12.5 SCREENING FOR PROSTATE CANCER: ICD-10-CM

## 2020-05-14 DIAGNOSIS — R73.9 HYPERGLYCEMIA: ICD-10-CM

## 2020-05-14 DIAGNOSIS — E78.2 MIXED HYPERLIPIDEMIA: ICD-10-CM

## 2020-05-14 DIAGNOSIS — K21.9 GASTROESOPHAGEAL REFLUX DISEASE WITHOUT ESOPHAGITIS: ICD-10-CM

## 2020-05-14 DIAGNOSIS — I10 ESSENTIAL HYPERTENSION: ICD-10-CM

## 2020-05-14 DIAGNOSIS — L40.9 PSORIASIS: ICD-10-CM

## 2020-05-14 DIAGNOSIS — M54.42 CHRONIC LEFT-SIDED LOW BACK PAIN WITH LEFT-SIDED SCIATICA: ICD-10-CM

## 2020-05-14 DIAGNOSIS — Z00.00 MEDICARE ANNUAL WELLNESS VISIT, SUBSEQUENT: Primary | ICD-10-CM

## 2020-05-14 DIAGNOSIS — E66.09 CLASS 1 OBESITY DUE TO EXCESS CALORIES WITHOUT SERIOUS COMORBIDITY WITH BODY MASS INDEX (BMI) OF 32.0 TO 32.9 IN ADULT: ICD-10-CM

## 2020-05-14 PROCEDURE — 3078F DIAST BP <80 MM HG: CPT | Performed by: FAMILY MEDICINE

## 2020-05-14 PROCEDURE — 1160F RVW MEDS BY RX/DR IN RCRD: CPT | Performed by: FAMILY MEDICINE

## 2020-05-14 PROCEDURE — 4040F PNEUMOC VAC/ADMIN/RCVD: CPT | Performed by: FAMILY MEDICINE

## 2020-05-14 PROCEDURE — G0439 PPPS, SUBSEQ VISIT: HCPCS | Performed by: FAMILY MEDICINE

## 2020-05-14 PROCEDURE — 99214 OFFICE O/P EST MOD 30 MIN: CPT | Performed by: FAMILY MEDICINE

## 2020-05-14 PROCEDURE — 3074F SYST BP LT 130 MM HG: CPT | Performed by: FAMILY MEDICINE

## 2020-05-14 PROCEDURE — 3008F BODY MASS INDEX DOCD: CPT | Performed by: FAMILY MEDICINE

## 2020-05-14 PROCEDURE — 1170F FXNL STATUS ASSESSED: CPT | Performed by: FAMILY MEDICINE

## 2020-05-14 PROCEDURE — 1125F AMNT PAIN NOTED PAIN PRSNT: CPT | Performed by: FAMILY MEDICINE

## 2020-05-14 PROCEDURE — 1123F ACP DISCUSS/DSCN MKR DOCD: CPT | Performed by: FAMILY MEDICINE

## 2020-05-14 PROCEDURE — 1036F TOBACCO NON-USER: CPT | Performed by: FAMILY MEDICINE

## 2020-05-14 RX ORDER — GABAPENTIN 400 MG/1
400 CAPSULE ORAL 3 TIMES DAILY
COMMUNITY
Start: 2020-03-31 | End: 2020-06-10 | Stop reason: SDUPTHER

## 2020-05-14 RX ORDER — OMEPRAZOLE 40 MG/1
40 CAPSULE, DELAYED RELEASE ORAL
Qty: 90 CAPSULE | Refills: 1 | Status: SHIPPED | OUTPATIENT
Start: 2020-05-14 | End: 2021-10-08 | Stop reason: ALTCHOICE

## 2020-06-09 ENCOUNTER — TELEPHONE (OUTPATIENT)
Dept: PAIN MEDICINE | Facility: MEDICAL CENTER | Age: 65
End: 2020-06-09

## 2020-06-10 ENCOUNTER — OFFICE VISIT (OUTPATIENT)
Dept: PAIN MEDICINE | Facility: MEDICAL CENTER | Age: 65
End: 2020-06-10
Payer: MEDICARE

## 2020-06-10 VITALS
TEMPERATURE: 99.1 F | HEART RATE: 61 BPM | BODY MASS INDEX: 32.44 KG/M2 | HEIGHT: 69 IN | WEIGHT: 219 LBS | SYSTOLIC BLOOD PRESSURE: 135 MMHG | DIASTOLIC BLOOD PRESSURE: 77 MMHG | RESPIRATION RATE: 16 BRPM

## 2020-06-10 DIAGNOSIS — M54.16 LUMBAR RADICULOPATHY: Primary | ICD-10-CM

## 2020-06-10 DIAGNOSIS — M48.061 LUMBAR FORAMINAL STENOSIS: ICD-10-CM

## 2020-06-10 DIAGNOSIS — G89.4 PAIN SYNDROME, CHRONIC: ICD-10-CM

## 2020-06-10 DIAGNOSIS — M54.42 CHRONIC LEFT-SIDED LOW BACK PAIN WITH LEFT-SIDED SCIATICA: ICD-10-CM

## 2020-06-10 DIAGNOSIS — M51.26 PROTRUSION OF LUMBAR INTERVERTEBRAL DISC: ICD-10-CM

## 2020-06-10 DIAGNOSIS — G89.29 CHRONIC LEFT-SIDED LOW BACK PAIN WITH LEFT-SIDED SCIATICA: ICD-10-CM

## 2020-06-10 PROCEDURE — 4040F PNEUMOC VAC/ADMIN/RCVD: CPT | Performed by: NURSE PRACTITIONER

## 2020-06-10 PROCEDURE — 99214 OFFICE O/P EST MOD 30 MIN: CPT | Performed by: NURSE PRACTITIONER

## 2020-06-10 PROCEDURE — 1036F TOBACCO NON-USER: CPT | Performed by: NURSE PRACTITIONER

## 2020-06-10 PROCEDURE — 1170F FXNL STATUS ASSESSED: CPT | Performed by: NURSE PRACTITIONER

## 2020-06-10 PROCEDURE — 1160F RVW MEDS BY RX/DR IN RCRD: CPT | Performed by: NURSE PRACTITIONER

## 2020-06-10 PROCEDURE — 3078F DIAST BP <80 MM HG: CPT | Performed by: NURSE PRACTITIONER

## 2020-06-10 PROCEDURE — 3008F BODY MASS INDEX DOCD: CPT | Performed by: NURSE PRACTITIONER

## 2020-06-10 PROCEDURE — 3075F SYST BP GE 130 - 139MM HG: CPT | Performed by: NURSE PRACTITIONER

## 2020-06-10 RX ORDER — OMEPRAZOLE 40 MG/1
CAPSULE, DELAYED RELEASE ORAL
COMMUNITY
Start: 2020-05-14 | End: 2020-06-10 | Stop reason: SDUPTHER

## 2020-06-10 RX ORDER — GABAPENTIN 400 MG/1
400 CAPSULE ORAL 3 TIMES DAILY
Qty: 90 CAPSULE | Refills: 5 | Status: SHIPPED | OUTPATIENT
Start: 2020-06-10 | End: 2021-05-12

## 2020-06-10 RX ORDER — SIMVASTATIN 20 MG
TABLET ORAL
COMMUNITY
Start: 2020-06-02 | End: 2020-06-10 | Stop reason: SDUPTHER

## 2020-06-17 ENCOUNTER — HOSPITAL ENCOUNTER (OUTPATIENT)
Dept: RADIOLOGY | Facility: MEDICAL CENTER | Age: 65
Discharge: HOME | End: 2020-06-17
Attending: PHYSICAL MEDICINE & REHABILITATION
Payer: MEDICARE

## 2020-06-17 VITALS
DIASTOLIC BLOOD PRESSURE: 79 MMHG | OXYGEN SATURATION: 96 % | TEMPERATURE: 99.1 F | HEART RATE: 58 BPM | SYSTOLIC BLOOD PRESSURE: 126 MMHG | RESPIRATION RATE: 18 BRPM

## 2020-06-17 DIAGNOSIS — M54.16 LUMBAR RADICULOPATHY: ICD-10-CM

## 2020-06-17 DIAGNOSIS — G89.29 CHRONIC LEFT-SIDED LOW BACK PAIN WITH LEFT-SIDED SCIATICA: ICD-10-CM

## 2020-06-17 DIAGNOSIS — M48.061 LUMBAR FORAMINAL STENOSIS: ICD-10-CM

## 2020-06-17 DIAGNOSIS — G89.4 PAIN SYNDROME, CHRONIC: ICD-10-CM

## 2020-06-17 DIAGNOSIS — M54.42 CHRONIC LEFT-SIDED LOW BACK PAIN WITH LEFT-SIDED SCIATICA: ICD-10-CM

## 2020-06-17 DIAGNOSIS — M51.26 PROTRUSION OF LUMBAR INTERVERTEBRAL DISC: ICD-10-CM

## 2020-06-17 PROCEDURE — 64483 NJX AA&/STRD TFRM EPI L/S 1: CPT | Performed by: PHYSICAL MEDICINE & REHABILITATION

## 2020-06-17 RX ORDER — PAPAVERINE HCL 150 MG
20 CAPSULE, EXTENDED RELEASE ORAL ONCE
Status: COMPLETED | OUTPATIENT
Start: 2020-06-17 | End: 2020-06-17

## 2020-06-17 RX ORDER — LIDOCAINE HYDROCHLORIDE 10 MG/ML
5 INJECTION, SOLUTION EPIDURAL; INFILTRATION; INTRACAUDAL; PERINEURAL ONCE
Status: COMPLETED | OUTPATIENT
Start: 2020-06-17 | End: 2020-06-17

## 2020-06-17 RX ADMIN — LIDOCAINE HYDROCHLORIDE 1.5 ML: 10 INJECTION, SOLUTION EPIDURAL; INFILTRATION; INTRACAUDAL; PERINEURAL at 10:03

## 2020-06-17 RX ADMIN — IOHEXOL 1 ML: 300 INJECTION, SOLUTION INTRAVENOUS at 10:04

## 2020-06-17 RX ADMIN — DEXAMETHASONE SODIUM PHOSPHATE 10 MG: 10 INJECTION, SOLUTION INTRAMUSCULAR; INTRAVENOUS at 10:05

## 2020-06-24 ENCOUNTER — TELEPHONE (OUTPATIENT)
Dept: PAIN MEDICINE | Facility: CLINIC | Age: 65
End: 2020-06-24

## 2020-07-08 ENCOUNTER — OFFICE VISIT (OUTPATIENT)
Dept: FAMILY MEDICINE CLINIC | Facility: CLINIC | Age: 65
End: 2020-07-08
Payer: MEDICARE

## 2020-07-08 VITALS
DIASTOLIC BLOOD PRESSURE: 82 MMHG | WEIGHT: 217 LBS | TEMPERATURE: 97.4 F | BODY MASS INDEX: 32.14 KG/M2 | SYSTOLIC BLOOD PRESSURE: 120 MMHG | HEIGHT: 69 IN

## 2020-07-08 DIAGNOSIS — G56.02 LEFT CARPAL TUNNEL SYNDROME: Primary | ICD-10-CM

## 2020-07-08 PROCEDURE — 4040F PNEUMOC VAC/ADMIN/RCVD: CPT | Performed by: FAMILY MEDICINE

## 2020-07-08 PROCEDURE — 3074F SYST BP LT 130 MM HG: CPT | Performed by: FAMILY MEDICINE

## 2020-07-08 PROCEDURE — 99213 OFFICE O/P EST LOW 20 MIN: CPT | Performed by: FAMILY MEDICINE

## 2020-07-08 PROCEDURE — 1160F RVW MEDS BY RX/DR IN RCRD: CPT | Performed by: FAMILY MEDICINE

## 2020-07-08 PROCEDURE — 3079F DIAST BP 80-89 MM HG: CPT | Performed by: FAMILY MEDICINE

## 2020-07-08 PROCEDURE — 1036F TOBACCO NON-USER: CPT | Performed by: FAMILY MEDICINE

## 2020-07-08 PROCEDURE — 3008F BODY MASS INDEX DOCD: CPT | Performed by: FAMILY MEDICINE

## 2020-07-08 NOTE — PROGRESS NOTES
Assessment/Plan:    No problem-specific Assessment & Plan notes found for this encounter  Diagnoses and all orders for this visit:    Left carpal tunnel syndrome  -     Ambulatory referral to Hand Surgery; Future          Subjective:   Chief Complaint   Patient presents with    Hand Pain     left x 2 wks  Patient ID: Hermilo Villagomez is a 77 y o  male  Patient is complaining of left hand numbness, and discomfort in the 1st 3 fingers for months  Has gotten much worse over the last 2-2 and half weeks  He is right-hand dominant  The following portions of the patient's history were reviewed and updated as appropriate: allergies, current medications, past family history, past medical history, past social history, past surgical history and problem list     Review of Systems   Constitutional: Positive for activity change  Negative for appetite change and fatigue  HENT: Negative  Eyes: Negative  Respiratory: Negative  Cardiovascular: Negative  Gastrointestinal: Negative  Endocrine: Negative  Genitourinary: Negative  Musculoskeletal: Positive for arthralgias, back pain and myalgias  Skin: Negative for color change, pallor and rash  Allergic/Immunologic: Positive for immunocompromised state  Neurological: Positive for weakness and numbness  Hematological: Negative  Psychiatric/Behavioral: Negative  Objective:      /82   Temp (!) 97 4 °F (36 3 °C)   Ht 5' 9" (1 753 m)   Wt 98 4 kg (217 lb)   BMI 32 05 kg/m²          Physical Exam   Constitutional: He is oriented to person, place, and time  He appears well-developed and well-nourished  HENT:   Head: Normocephalic and atraumatic  Mouth/Throat: Oropharynx is clear and moist    Eyes: Pupils are equal, round, and reactive to light  Conjunctivae and EOM are normal    Neck: Normal range of motion  Neck supple  No JVD present  Cardiovascular: Normal rate, regular rhythm and intact distal pulses     No murmur heard  Pulmonary/Chest: Effort normal and breath sounds normal  He has no wheezes  Abdominal: Soft  Bowel sounds are normal  He exhibits no mass  There is no tenderness  There is no rebound and no guarding  Musculoskeletal: He exhibits no edema, tenderness or deformity  Positive Tinel sign, positive Phalen sign on left  Lymphadenopathy:     He has no cervical adenopathy  Neurological: He is alert and oriented to person, place, and time  He has normal reflexes  He displays normal reflexes  A sensory deficit (  Mild decreased sensation to light touch of the first through 3rd left digits ) is present  No cranial nerve deficit  He exhibits normal muscle tone  Coordination normal    Skin: Skin is warm and dry  No lesion and no rash noted  Nails show no clubbing  Psychiatric: He has a normal mood and affect  Judgment normal    Nursing note and vitals reviewed

## 2020-07-15 ENCOUNTER — OFFICE VISIT (OUTPATIENT)
Dept: PAIN MEDICINE | Facility: MEDICAL CENTER | Age: 65
End: 2020-07-15
Payer: MEDICARE

## 2020-07-15 VITALS
SYSTOLIC BLOOD PRESSURE: 125 MMHG | RESPIRATION RATE: 14 BRPM | BODY MASS INDEX: 31.84 KG/M2 | DIASTOLIC BLOOD PRESSURE: 75 MMHG | WEIGHT: 215 LBS | HEIGHT: 69 IN | TEMPERATURE: 99.4 F | HEART RATE: 62 BPM

## 2020-07-15 DIAGNOSIS — G89.29 CHRONIC LEFT-SIDED LOW BACK PAIN WITH LEFT-SIDED SCIATICA: ICD-10-CM

## 2020-07-15 DIAGNOSIS — M54.42 CHRONIC LEFT-SIDED LOW BACK PAIN WITH LEFT-SIDED SCIATICA: ICD-10-CM

## 2020-07-15 DIAGNOSIS — M51.26 PROTRUSION OF LUMBAR INTERVERTEBRAL DISC: ICD-10-CM

## 2020-07-15 DIAGNOSIS — M48.061 LUMBAR FORAMINAL STENOSIS: ICD-10-CM

## 2020-07-15 DIAGNOSIS — G89.4 PAIN SYNDROME, CHRONIC: ICD-10-CM

## 2020-07-15 DIAGNOSIS — M54.16 LUMBAR RADICULOPATHY: Primary | ICD-10-CM

## 2020-07-15 PROCEDURE — 3078F DIAST BP <80 MM HG: CPT | Performed by: PHYSICAL MEDICINE & REHABILITATION

## 2020-07-15 PROCEDURE — 4040F PNEUMOC VAC/ADMIN/RCVD: CPT | Performed by: PHYSICAL MEDICINE & REHABILITATION

## 2020-07-15 PROCEDURE — 3008F BODY MASS INDEX DOCD: CPT | Performed by: PHYSICAL MEDICINE & REHABILITATION

## 2020-07-15 PROCEDURE — 1036F TOBACCO NON-USER: CPT | Performed by: PHYSICAL MEDICINE & REHABILITATION

## 2020-07-15 PROCEDURE — 1160F RVW MEDS BY RX/DR IN RCRD: CPT | Performed by: PHYSICAL MEDICINE & REHABILITATION

## 2020-07-15 PROCEDURE — 99213 OFFICE O/P EST LOW 20 MIN: CPT | Performed by: PHYSICAL MEDICINE & REHABILITATION

## 2020-07-15 PROCEDURE — 3074F SYST BP LT 130 MM HG: CPT | Performed by: PHYSICAL MEDICINE & REHABILITATION

## 2020-07-15 NOTE — PROGRESS NOTES
Assessment  1  Lumbar radiculopathy - Left    2  Protrusion of lumbar intervertebral disc    3  Lumbar foraminal stenosis    4  Chronic left-sided low back pain with left-sided sciatica    5  Pain syndrome, chronic        Plan  I discussed with the patient that since there has been  significant improvement in the pain symptoms that we will hold off on any repeat injections at this point in time  However, I reviewed with the patient that if his symptoms should return, worsen, and/or experience new pain symptoms, he should call our office to discuss repeating the injection  Continue with gabapentin as prescribed the patient does not require any refills at this time  Follow up as needed      My impressions and treatment recommendations were discussed in detail with the patient who verbalized understanding and had no further questions  Discharge instructions were provided  I personally saw and examined the patient and I agree with the above discussed plan of care  No orders of the defined types were placed in this encounter  No orders of the defined types were placed in this encounter  History of Present Illness    Aki Miranda is a 77 y o  male presents for follow-up related to his chronic left low back and leg pain  Today the patient reports he is doing better rates his pain 2/10  He does get intermittent pain which is bothersome in the morning  He describes this pain as pressure-like  Patient is status post a left L3-4 transforaminal epidural steroid injection with Dr Jayshree Petersen on June 17, 2020  He was also supposed to have the left L4-5 level done as well however he tells me that he elected to not have this procedure done because it was too painful he reports 80% of ongoing relief from his procedure  He tells me that about a week after the injection he did do [employer] work and it took him 4 days to recover due to pain    He tells me that since then this has resolved and he was working in his [employer] yesterday with no problem  Patient uses gabapentin 400 mg 3 times daily with moderate relief and no side effects or issues  I have personally reviewed and/or updated the patient's past medical history, past surgical history, family history, social history, current medications, allergies, and vital signs today  Review of Systems   Respiratory: Negative for shortness of breath  Cardiovascular: Negative for chest pain  Gastrointestinal: Negative for constipation, diarrhea, nausea and vomiting  Musculoskeletal: Negative for arthralgias, gait problem, joint swelling and myalgias  Skin: Negative for rash  Neurological: Negative for dizziness, seizures and weakness  All other systems reviewed and are negative        Patient Active Problem List   Diagnosis    Chronic left-sided low back pain    Essential hypertension    Hyperglycemia    Mixed hyperlipidemia    Polyarticular arthritis    Pain syndrome, chronic    Psoriasis    Protrusion of lumbar intervertebral disc    Lumbar radiculopathy - Left    Lumbar foraminal stenosis    Osteoarthritis of knee    Gastroesophageal reflux disease without esophagitis    Class 1 obesity due to excess calories without serious comorbidity in adult       Past Medical History:   Diagnosis Date    Abnormal EKG     last assessed: 04/22/2015    Arthritis of right knee     last assessed: 04/22/2015    Chronic pain disorder     Gait disturbance     last assessed: 04/09/2014    Injury of C5-C7 spinal cord ([facility] [address] )     last assessed: 10/20/2014; with central cord syndrome     Low back pain     Neck pain     Status post total knee replacement, right     last assessed: 05/12/2015    Tremor     last assessed: 04/09/2014    Wears glasses        Past Surgical History:   Procedure Laterality Date    CERVICAL LAMINECTOMY      COLONOSCOPY  07/2015    multiple polyps, repeat in 3 years    JOINT REPLACEMENT      KNEE SURGERY Right 2002    20 years ago-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    LAMINECTOMY      ORTHOPEDIC SURGERY      POPLITEAL SYNOVIAL CYST EXCISION      last assessed: 04/22/2015    SPINE SURGERY      TONSILLECTOMY AND ADENOIDECTOMY      last assessed: 04/22/2015    TOTAL KNEE ARTHROPLASTY      last assessed: 05/12/2015       Family History   Problem Relation Age of Onset    Allergies Father         seasonal    Lung cancer Family     No Known Problems Mother        Social History     Occupational History    Not on file   Tobacco Use    Smoking status: Former Smoker    Smokeless tobacco: Never Used   Substance and Sexual Activity    Alcohol use: No    Drug use: No    Sexual activity: Yes     Partners: Female       Current Outpatient Medications on File Prior to Visit   Medication Sig    adalimumab (HUMIRA) 40 mg/0 8 mL PSKT Inject 40 mg under the skin every 14 (fourteen) days    gabapentin (NEURONTIN) 400 mg capsule Take 1 capsule (400 mg total) by mouth 3 (three) times a day    omeprazole (PriLOSEC) 40 MG capsule Take 1 capsule (40 mg total) by mouth daily before breakfast    simvastatin (ZOCOR) 20 mg tablet Take 1 tablet (20 mg total) by mouth daily     No current facility-administered medications on file prior to visit  No Known Allergies    Physical Exam    /75   Pulse 62   Temp 99 4 °F (37 4 °C)   Resp 14   Ht 5' 9" (1 753 m)   Wt 97 5 kg (215 lb)   BMI 31 75 kg/m²     Constitutional: normal, well developed, well nourished, alert, in no distress and non-toxic and no overt pain behavior    Eyes: anicteric  HEENT: grossly intact  Neck: supple, symmetric, trachea midline and no masses   Pulmonary:even and unlabored  Cardiovascular:No edema or pitting edema present  Skin:Normal without rashes or lesions and well hydrated  Psychiatric:Mood and affect appropriate  Neurologic:Cranial Nerves II-XII grossly intact  Musculoskeletal:normal    Imaging

## 2020-07-23 ENCOUNTER — OFFICE VISIT (OUTPATIENT)
Dept: OBGYN CLINIC | Facility: MEDICAL CENTER | Age: 65
End: 2020-07-23
Payer: MEDICARE

## 2020-07-23 VITALS
HEIGHT: 69 IN | SYSTOLIC BLOOD PRESSURE: 143 MMHG | WEIGHT: 216 LBS | DIASTOLIC BLOOD PRESSURE: 75 MMHG | HEART RATE: 71 BPM | BODY MASS INDEX: 31.99 KG/M2 | TEMPERATURE: 99.1 F

## 2020-07-23 DIAGNOSIS — G56.02 LEFT CARPAL TUNNEL SYNDROME: Primary | ICD-10-CM

## 2020-07-23 DIAGNOSIS — G56.01 CARPAL TUNNEL SYNDROME ON RIGHT: ICD-10-CM

## 2020-07-23 PROCEDURE — 99204 OFFICE O/P NEW MOD 45 MIN: CPT | Performed by: ORTHOPAEDIC SURGERY

## 2020-07-23 NOTE — LETTER
July 23, 2020     Cathleen Trejo DO  [address]    Patient: Sebastian Srivastava   YOB: 1955   Date of Visit: 7/23/2020       Dear Dr Robert Perdomo: Thank you for referring Augusto Varghese to me for evaluation  Below are my notes for this consultation  If you have questions, please do not hesitate to call me  I look forward to following your patient along with you  Sincerely,        Francisco Javier Sheikh MD        CC: No Recipients  Francisco Javier Sheikh MD  7/23/2020  5:23 PM  Sign at close encounter  Chief Complaint     Bilateral hand numbness     History of Present Illness     Aki Prado is a right-hand-dominant 77 y o  male who presents the office today for evaluation of his bilateral hand numbness  I am seeing him in consultation at the request of Cathleen WEISS  Patient states he has had left hand numbness for approximately 2 months time  He notes no incident of injury  The numbness and tingling affects the radial 3 digits on the left side  He notes the numbness is worse at night and wakes him from sleep  Recently he has been experiencing intermittent numbness tingling to the radial 3 digits of the right side as well  This is more with positions were his wrist is fully flexed  He does have a past medical history of a cervical spine injury in 2013  He notes they had diffuse C5 through C6  After his cervical spine injury he had left-sided weakness and numbness and tingling but states the symptoms he is having is different than what he had experienced in the past   Patient also reports that he has had some left small finger pain to the PIP joint specifically  He notes the past he dislocated this finger believes he may have some underlying arthritis  He is currently on Humira  He has a history of a right knee replacement        Past Medical History:   Diagnosis Date    Abnormal EKG     last assessed: 04/22/2015    Arthritis of right knee     last assessed: 04/22/2015    Chronic pain disorder     Gait disturbance     last assessed: 04/09/2014    Injury of C5-C7 spinal cord ([address] )     last assessed: 10/20/2014; with central cord syndrome     Low back pain     Neck pain     Status post total knee replacement, right     last assessed: 05/12/2015    Tremor     last assessed: 04/09/2014    Wears glasses        Past Surgical History:   Procedure Laterality Date    CERVICAL LAMINECTOMY      COLONOSCOPY  07/2015    multiple polyps, repeat in 3 years   [address] Right 2002    20 years ago-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    LAMINECTOMY      ORTHOPEDIC SURGERY      POPLITEAL SYNOVIAL CYST EXCISION      last assessed: 04/22/2015    SPINE SURGERY      TONSILLECTOMY AND ADENOIDECTOMY      last assessed: 04/22/2015    TOTAL KNEE ARTHROPLASTY      last assessed: 05/12/2015       No Known Allergies    Current Outpatient Medications on File Prior to Visit   Medication Sig Dispense Refill    adalimumab (HUMIRA) 40 mg/0 8 mL PSKT Inject 40 mg under the skin every 14 (fourteen) days      gabapentin (NEURONTIN) 400 mg capsule Take 1 capsule (400 mg total) by mouth 3 (three) times a day 90 capsule 5    omeprazole (PriLOSEC) 40 MG capsule Take 1 capsule (40 mg total) by mouth daily before breakfast 90 capsule 1    simvastatin (ZOCOR) 20 mg tablet Take 1 tablet (20 mg total) by mouth daily 90 tablet 3     No current facility-administered medications on file prior to visit  Social History     Tobacco Use    Smoking status: Former Smoker    Smokeless tobacco: Never Used   Substance Use Topics    Alcohol use: No    Drug use: No       Family History   Problem Relation Age of Onset    Allergies Father         seasonal    Lung cancer Family     No Known Problems Mother        Review of Systems     As stated in the HPI  All other systems were reviewed and are negative        Physical Exam     /75   Pulse 71   Temp 99 1 °F (37 3 °C)   Ht 5' 9" (1 753 m)   Wt 98 kg (216 lb)   BMI 31 90 kg/m²      GENERAL: This is a well-developed, well-nourished, age-appropriate patient in no acute distress  The patient is alert and oriented x3  Pleasant and cooperative  Eyes: Anicteric sclerae  Extraocular movements appear intact  HENT: Nares are patent with no drainage  Lungs: There is equal chest rise on inspection  Breathing is non-labored with no audible wheezing  Cardiovascular: No cyanosis  No upper extremity lymphadema  Skin: Skin is warm to touch  No obvious skin lesions or rashes other than described below  Neurologic: No ataxia  Psychiatric: Mood and affect are appropriate  Left upper extremity   Skin intact  No erythema or ecchymosis noted  No swelling noted  Full range of motion of elbow, wrist, and hand in all planes  Negative Spurling's test  Mild thenar atrophy   Positive Tinel's at carpal tunnel  Positive Durkan's compression test  Negative Tinel's at cubital tunnel  Negative flexion compression test of elbow  Tender to palpate small finger, no active triggering noted  5/5 Motor to the FDI, FDP2, FDP5, EDC  4/5 APB  Sensation intact to light touch in the radial, and ulnar nerve distribution  Decreased to the median distribution in the index and long finger  Brisk capillary refill noted to all digits    Right upper extremity  Skin intact  No erythema or ecchymosis noted  No swelling noted  Negative Spurling's test  Full range of motion of elbow, wrist, hand in all planes  Positive Tinel's at carpal tunnel  Positive Durkan's compression test  Negative Tinel's at cubital tunnel  Negative flexion compression test of elbow  5/5 Motor to the APB, FDI, FDP2, FDP5, EDC  Sensation intact to light touch in the median, radial, and ulnar nerve distribution    Brisk capillary refill to all digits      Data Review     Results Reviewed     None             Imaging:  None today     Assessment and Plan      Diagnoses and all orders for this visit:    Left carpal tunnel syndrome  -     Ambulatory referral to Hand Surgery           57-year-old male with bilateral carpal tunnel syndrome left worse than right and and left small finger pain likely due to underlying arthritis  Patient and I discussed in regards to his small finger oral anti-inflammatories  Patient does not wish to proceed further with treatment for this  In regards to his carpal tunnel syndrome we discussed nonsurgical treatment options of bilateral wrist cock-up braces  Should wear these only at night  If he continues to have referred symptoms refractory to nonsurgical management surgery may be warranted  Follow-up in 6 weeks for re-evaluation    We discussed the etiology and natural course of carpal tunnel syndrome  We discussed that carpal tunnel syndrome is related to increased pressure on the nerve in the carpal canal at the level of the wrist   Increasing pressure can be a result of wrist flexion or extension or changes to the contents of the carpal tunnel  We discussed that progression of this condition from mild to severe can result in numbness and tingling as well as dysfunction of the hand and even atrophy and weakness to the thumb musculature  Treatment options for this condition range from nonoperative to operative and the mainstays are nighttime splinting for nonoperative measures and carpal tunnel release for operative measures  Trial of nonoperative measures for around 6 weeks is typically beneficial prior to any surgical intervention and can help to avoid surgery  Surgical release is performed with a mini open approach and while it can be performed with local only or local and sedation, there are risks to the procedure that include bleeding, infection, damage to surrounding neurovascular structures, weakness, pillar pain, and persistence of symptoms    I also discussed with the patient that if carpal tunnel persists in both wrists that surgical intervention can be performed simultaneously and that recovery is expedited        Follow Up: 6 weeks     To Do Next Visit: re-evaluation     PROCEDURES PERFORMED:  Procedures  No Procedures performed today     Scribe Attestation    I,:   Yoav Pruett MA am acting as a scribe while in the presence of the attending physician :        I,:   Cesia Anaya MD personally performed the services described in this documentation    as scribed in my presence :

## 2020-07-23 NOTE — PROGRESS NOTES
Chief Complaint     Bilateral hand numbness     History of Present Illness     Aki Blanca is a right-hand-dominant 77 y o  male who presents the office today for evaluation of his bilateral hand numbness  I am seeing him in consultation at the request of Cintia WEISS  Patient states he has had left hand numbness for approximately 2 months time  He notes no incident of injury  The numbness and tingling affects the radial 3 digits on the left side  He notes the numbness is worse at night and wakes him from sleep  Recently he has been experiencing intermittent numbness tingling to the radial 3 digits of the right side as well  This is more with positions were his wrist is fully flexed  He does have a past medical history of a cervical spine injury in 2013  He notes they had diffuse C5 through C6  After his cervical spine injury he had left-sided weakness and numbness and tingling but states the symptoms he is having is different than what he had experienced in the past   Patient also reports that he has had some left small finger pain to the PIP joint specifically  He notes the past he dislocated this finger believes he may have some underlying arthritis  He is currently on Humira  He has a history of a right knee replacement        Past Medical History:   Diagnosis Date    Abnormal EKG     last assessed: 04/22/2015    Arthritis of right knee     last assessed: 04/22/2015    Chronic pain disorder     Gait disturbance     last assessed: 04/09/2014    Injury of C5-C7 spinal cord ([facility] [address] )     last assessed: 10/20/2014; with central cord syndrome     Low back pain     Neck pain     Status post total knee replacement, right     last assessed: 05/12/2015    Tremor     last assessed: 04/09/2014    Wears glasses        Past Surgical History:   Procedure Laterality Date    CERVICAL LAMINECTOMY      COLONOSCOPY  07/2015    multiple polyps, repeat in 3 years   [address] [facility] Right 2002    20 years ago-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    LAMINECTOMY      ORTHOPEDIC SURGERY      POPLITEAL SYNOVIAL CYST EXCISION      last assessed: 04/22/2015    SPINE SURGERY      TONSILLECTOMY AND ADENOIDECTOMY      last assessed: 04/22/2015    TOTAL KNEE ARTHROPLASTY      last assessed: 05/12/2015       No Known Allergies    Current Outpatient Medications on File Prior to Visit   Medication Sig Dispense Refill    adalimumab (HUMIRA) 40 mg/0 8 mL PSKT Inject 40 mg under the skin every 14 (fourteen) days      gabapentin (NEURONTIN) 400 mg capsule Take 1 capsule (400 mg total) by mouth 3 (three) times a day 90 capsule 5    omeprazole (PriLOSEC) 40 MG capsule Take 1 capsule (40 mg total) by mouth daily before breakfast 90 capsule 1    simvastatin (ZOCOR) 20 mg tablet Take 1 tablet (20 mg total) by mouth daily 90 tablet 3     No current facility-administered medications on file prior to visit  Social History     Tobacco Use    Smoking status: Former Smoker    Smokeless tobacco: Never Used   Substance Use Topics    Alcohol use: No    Drug use: No       Family History   Problem Relation Age of Onset    Allergies Father         seasonal    Lung cancer Family     No Known Problems Mother        Review of Systems     As stated in the HPI  All other systems were reviewed and are negative  Physical Exam     /75   Pulse 71   Temp 99 1 °F (37 3 °C)   Ht 5' 9" (1 753 m)   Wt 98 kg (216 lb)   BMI 31 90 kg/m²     GENERAL: This is a well-developed, well-nourished, age-appropriate patient in no acute distress  The patient is alert and oriented x3  Pleasant and cooperative  Eyes: Anicteric sclerae  Extraocular movements appear intact  HENT: Nares are patent with no drainage  Lungs: There is equal chest rise on inspection  Breathing is non-labored with no audible wheezing  Cardiovascular: No cyanosis  No upper extremity lymphadema  Skin: Skin is warm to touch   No obvious skin lesions or rashes other than described below  Neurologic: No ataxia  Psychiatric: Mood and affect are appropriate  Left upper extremity   Skin intact  No erythema or ecchymosis noted  No swelling noted  Full range of motion of elbow, wrist, and hand in all planes  Negative Spurling's test  Mild thenar atrophy   Positive Tinel's at carpal tunnel  Positive Durkan's compression test  Negative Tinel's at cubital tunnel  Negative flexion compression test of elbow  Tender to palpate small finger, no active triggering noted  5/5 Motor to the FDI, FDP2, FDP5, EDC  4/5 APB  Sensation intact to light touch in the radial, and ulnar nerve distribution  Decreased to the median distribution in the index and long finger  Brisk capillary refill noted to all digits    Right upper extremity  Skin intact  No erythema or ecchymosis noted  No swelling noted  Negative Spurling's test  Full range of motion of elbow, wrist, hand in all planes  Positive Tinel's at carpal tunnel  Positive Durkan's compression test  Negative Tinel's at cubital tunnel  Negative flexion compression test of elbow  5/5 Motor to the APB, FDI, FDP2, FDP5, EDC  Sensation intact to light touch in the median, radial, and ulnar nerve distribution  Brisk capillary refill to all digits      Data Review     Results Reviewed     None             Imaging:  None today     Assessment and Plan      Diagnoses and all orders for this visit:    Left carpal tunnel syndrome  -     Ambulatory referral to Hand Surgery           69-year-old male with bilateral carpal tunnel syndrome left worse than right and and left small finger pain likely due to underlying arthritis  Patient and I discussed in regards to his small finger oral anti-inflammatories  Patient does not wish to proceed further with treatment for this  In regards to his carpal tunnel syndrome we discussed nonsurgical treatment options of bilateral wrist cock-up braces  Should wear these only at night    If he continues to have referred symptoms refractory to nonsurgical management surgery may be warranted  Follow-up in 6 weeks for re-evaluation    We discussed the etiology and natural course of carpal tunnel syndrome  We discussed that carpal tunnel syndrome is related to increased pressure on the nerve in the carpal canal at the level of the wrist   Increasing pressure can be a result of wrist flexion or extension or changes to the contents of the carpal tunnel  We discussed that progression of this condition from mild to severe can result in numbness and tingling as well as dysfunction of the hand and even atrophy and weakness to the thumb musculature  Treatment options for this condition range from nonoperative to operative and the mainstays are nighttime splinting for nonoperative measures and carpal tunnel release for operative measures  Trial of nonoperative measures for around 6 weeks is typically beneficial prior to any surgical intervention and can help to avoid surgery  Surgical release is performed with a mini open approach and while it can be performed with local only or local and sedation, there are risks to the procedure that include bleeding, infection, damage to surrounding neurovascular structures, weakness, pillar pain, and persistence of symptoms  I also discussed with the patient that if carpal tunnel persists in both wrists that surgical intervention can be performed simultaneously and that recovery is expedited        Follow Up: 6 weeks     To Do Next Visit: re-evaluation     PROCEDURES PERFORMED:  Procedures  No Procedures performed today     Scribe Attestation    I,:   Steven Tate MA am acting as a scribe while in the presence of the attending physician :        I,:   Francisco Javier Sheikh MD personally performed the services described in this documentation    as scribed in my presence :

## 2020-09-03 ENCOUNTER — OFFICE VISIT (OUTPATIENT)
Dept: OBGYN CLINIC | Facility: MEDICAL CENTER | Age: 65
End: 2020-09-03
Payer: MEDICARE

## 2020-09-03 VITALS
DIASTOLIC BLOOD PRESSURE: 74 MMHG | BODY MASS INDEX: 31.84 KG/M2 | SYSTOLIC BLOOD PRESSURE: 149 MMHG | WEIGHT: 215 LBS | HEIGHT: 69 IN | HEART RATE: 74 BPM | TEMPERATURE: 97.6 F

## 2020-09-03 DIAGNOSIS — G56.03 CARPAL TUNNEL SYNDROME, BILATERAL: Primary | ICD-10-CM

## 2020-09-03 PROCEDURE — 99214 OFFICE O/P EST MOD 30 MIN: CPT | Performed by: ORTHOPAEDIC SURGERY

## 2020-09-03 RX ORDER — LIDOCAINE HYDROCHLORIDE AND EPINEPHRINE 10; 10 MG/ML; UG/ML
20 INJECTION, SOLUTION INFILTRATION; PERINEURAL
Status: CANCELLED | OUTPATIENT
Start: 2020-10-20 | End: 2020-10-21

## 2020-09-03 RX ORDER — LIDOCAINE HYDROCHLORIDE AND EPINEPHRINE 10; 10 MG/ML; UG/ML
20 INJECTION, SOLUTION INFILTRATION; PERINEURAL
Status: CANCELLED | OUTPATIENT
Start: 2020-09-25 | End: 2020-09-26

## 2020-09-03 NOTE — H&P (VIEW-ONLY)
Chief Complaint     Bilateral hand numbness/tingling      History of Present Illness     Aki Hinton is a 77 y o  male presenting to the office for follow-up on bilateral hand numbness and tingling with left worse than right  Patient has been doing bracing therapy at night without significant relief  He continues to have pain and limitations with daily activities due to his hands  He continues to have nighttime pain which does wake him up  He has complete numbness of the left thumb, index, and middle fingers  His right side is also bothering him and numb but more sporadically  Patient has a history of cervical spine injury in 2013 which he underwent surgery for  He does have some residual burning sensation in the upper extremities secondary to his neck  No catching clicking popping locking        Past Medical History:   Diagnosis Date    Abnormal EKG     last assessed: 04/22/2015    Arthritis of right knee     last assessed: 04/22/2015    Chronic pain disorder     Gait disturbance     last assessed: 04/09/2014    Injury of C5-C7 spinal cord ([address] )     last assessed: 10/20/2014; with central cord syndrome     Low back pain     Neck pain     Status post total knee replacement, right     last assessed: 05/12/2015    Tremor     last assessed: 04/09/2014    Wears glasses        Past Surgical History:   Procedure Laterality Date    CERVICAL LAMINECTOMY      COLONOSCOPY  07/2015    multiple polyps, repeat in 3 years   [address] [facility] [address] Right 2002    20 years ago-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    LAMINECTOMY      ORTHOPEDIC SURGERY      POPLITEAL SYNOVIAL CYST EXCISION      last assessed: 04/22/2015   S[facility] Adler SPINE SURGERY      TONSILLECTOMY AND ADENOIDECTOMY      last assessed: 04/22/2015    TOTAL KNEE ARTHROPLASTY      last assessed: 05/12/2015       No Known Allergies    Current Outpatient Medications on File Prior to Visit   Medication Sig Dispense Refill    adalimumab (HUMIRA) 40 mg/0 8 mL PSKT Inject 40 mg under the skin every 14 (fourteen) days      gabapentin (NEURONTIN) 400 mg capsule Take 1 capsule (400 mg total) by mouth 3 (three) times a day 90 capsule 5    omeprazole (PriLOSEC) 40 MG capsule Take 1 capsule (40 mg total) by mouth daily before breakfast 90 capsule 1    simvastatin (ZOCOR) 20 mg tablet Take 1 tablet (20 mg total) by mouth daily 90 tablet 3     No current facility-administered medications on file prior to visit  Social History     Tobacco Use    Smoking status: Former Smoker    Smokeless tobacco: Never Used   Substance Use Topics    Alcohol use: No    Drug use: No       Family History   Problem Relation Age of Onset    Allergies Father         seasonal    Lung cancer Family     No Known Problems Mother        Review of Systems     As stated in the HPI  All other systems were reviewed and are negative  Physical Exam     /74   Pulse 74   Temp 97 6 °F (36 4 °C)   Ht 5' 9" (1 753 m)   Wt 97 5 kg (215 lb)   BMI 31 75 kg/m²     GENERAL: This is a well-developed, well-nourished, age-appropriate patient in no acute distress  The patient is alert and oriented x3  Pleasant and cooperative  Eyes: Anicteric sclerae  Extraocular movements appear intact  HENT: Nares are patent with no drainage  Lungs: There is equal chest rise on inspection  Breathing is non-labored with no audible wheezing  Cardiovascular: No cyanosis  No upper extremity lymphadema  Skin: Skin is warm to touch  No obvious skin lesions or rashes other than described below  Neurologic: No ataxia  Psychiatric: Mood and affect are appropriate  Right upper extremity:  Full range of motion at the elbow and wrist, DPC 0  No tenderness on exam  Negative Tinel's test at the cubital tunnel and  Positive Tinel's test at the carpal tunnel negative Durkan's test  5/5 Motor to the APB, FDI, FDP2, FDP5, EDC  Sensation intact to light touch in the radial and ulnar nerve distribution  Diminished sensation along the median nerve distribution  Brisk capillary refill    Left upper extremity:  Full range of motion at the elbow and wrist, DPC 1  No tenderness on exam  Negative Tinel's test at the cubital tunnel and  Positive Tinel's test at the carpal tunnel negative Durkan's test  5/5 Motor to the APB, FDI, FDP2, FDP5, EDC  Sensation intact to light touch in the radial and ulnar nerve distribution  Loss of sensation along the median nerve distribution  Brisk capillary refill    Data Review     Results Reviewed     None         Imaging:  None needed today    Assessment and Plan      Diagnoses and all orders for this visit:    Carpal tunnel syndrome, bilateral  -     Case request operating room: RELEASE CARPAL TUNNEL - Open; Standing  -     Case request operating room: RELEASE CARPAL TUNNEL - Open  -     Case request operating room: [place] [id number]; Standing  -     Case request operating room: RELEASE CARPAL TUNNEL - Open    Other orders  -     Void on call to OR; Standing  -     lidocaine-epinephrine (XYLOCAINE/EPINEPHRINE) 1 %-1:100,000 injection 20 mL  -     Vital signs (per unit routine); Standing  -     Void on call to OR; Standing  -     lidocaine-epinephrine (XYLOCAINE/EPINEPHRINE) 1 %-1:100,000 injection 20 mL         [age] year old male with bilateral carpal tunnel syndrome left worse than right  Patient has tried nonoperative modalities with bracing but has not found significant improvement  Patient would like to undergo operative intervention  Operative intervention was discussed with the patient in detail both perioperative and postoperative course with recovery  Expected healing time was also discussed  Patient is in good understanding and wishes to proceed  Risks and benefits were discussed with good understanding      Although we discussed performing both releases at the same time, patient needs to drive himself and so we will perform 1 at a time under local analgesia      Follow Up:  10 days postop    To Do Next Visit:  Postoperative care    PROCEDURES PERFORMED:  Procedures  No Procedures performed today     Scribe Attestation    I,:   Ghassan Tobar PA-C am acting as a scribe while in the presence of the attending physician :        I,:   Jennifer Bosch MD personally performed the services described in this documentation    as scribed in my presence :

## 2020-09-03 NOTE — PROGRESS NOTES
Chief Complaint     Bilateral hand numbness/tingling      History of Present Illness     Forrest Chari Sever is a 77 y o  male presenting to the office for follow-up on bilateral hand numbness and tingling with left worse than right  Patient has been doing bracing therapy at night without significant relief  He continues to have pain and limitations with daily activities due to his hands  He continues to have nighttime pain which does wake him up  He has complete numbness of the left thumb, index, and middle fingers  His right side is also bothering him and numb but more sporadically  Patient has a history of cervical spine injury in 2013 which he underwent surgery for  He does have some residual burning sensation in the upper extremities secondary to his neck  No catching clicking popping locking        Past Medical History:   Diagnosis Date    Abnormal EKG     last assessed: 04/22/2015    Arthritis of right knee     last assessed: 04/22/2015    Chronic pain disorder     Gait disturbance     last assessed: 04/09/2014    Injury of C5-C7 spinal cord ([facility] [address] )     last assessed: 10/20/2014; with central cord syndrome     Low back pain     Neck pain     Status post total knee replacement, right     last assessed: 05/12/2015    Tremor     last assessed: 04/09/2014    Wears glasses        Past Surgical History:   Procedure Laterality Date    CERVICAL LAMINECTOMY      COLONOSCOPY  07/2015    multiple polyps, repeat in 3 years   [address] Right [address]    20 years ago-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    LAMINECTOMY      ORTHOPEDIC SURGERY      POPLITEAL SYNOVIAL CYST EXCISION      last assessed: 04/22/2015   [facility]      TONSILLECTOMY AND ADENOIDECTOMY      last assessed: 04/22/2015    TOTAL KNEE ARTHROPLASTY      last assessed: 05/12/2015       No Known Allergies    Current Outpatient Medications on File Prior to Visit   Medication Sig Dispense Refill    adalimumab (HUMIRA) 40 mg/0 8 mL PSKT Inject 40 mg under the skin every 14 (fourteen) days      gabapentin (NEURONTIN) 400 mg capsule Take 1 capsule (400 mg total) by mouth 3 (three) times a day 90 capsule 5    omeprazole (PriLOSEC) 40 MG capsule Take 1 capsule (40 mg total) by mouth daily before breakfast 90 capsule 1    simvastatin (ZOCOR) 20 mg tablet Take 1 tablet (20 mg total) by mouth daily 90 tablet 3     No current facility-administered medications on file prior to visit  Social History     Tobacco Use    Smoking status: Former Smoker    Smokeless tobacco: Never Used   Substance Use Topics    Alcohol use: No    Drug use: No       Family History   Problem Relation Age of Onset    Allergies Father         seasonal    Lung cancer Family     No Known Problems Mother        Review of Systems     As stated in the HPI  All other systems were reviewed and are negative  Physical Exam     /74   Pulse 74   Temp 97 6 °F (36 4 °C)   Ht 5' 9" (1 753 m)   Wt 97 5 kg (215 lb)   BMI 31 75 kg/m²     GENERAL: This is a well-developed, well-nourished, age-appropriate patient in no acute distress  The patient is alert and oriented x3  Pleasant and cooperative  Eyes: Anicteric sclerae  Extraocular movements appear intact  HENT: Nares are patent with no drainage  Lungs: There is equal chest rise on inspection  Breathing is non-labored with no audible wheezing  Cardiovascular: No cyanosis  No upper extremity lymphadema  Skin: Skin is warm to touch  No obvious skin lesions or rashes other than described below  Neurologic: No ataxia  Psychiatric: Mood and affect are appropriate  Right upper extremity:  Full range of motion at the elbow and wrist, DPC 0  No tenderness on exam  Negative Tinel's test at the cubital tunnel and  Positive Tinel's test at the carpal tunnel negative Durkan's test  5/5 Motor to the APB, FDI, FDP2, FDP5, EDC  Sensation intact to light touch in the radial and ulnar nerve distribution  Diminished sensation along the median nerve distribution  Brisk capillary refill    Left upper extremity:  Full range of motion at the elbow and wrist, DPC 1  No tenderness on exam  Negative Tinel's test at the cubital tunnel and  Positive Tinel's test at the carpal tunnel negative Durkan's test  5/5 Motor to the APB, FDI, FDP2, FDP5, EDC  Sensation intact to light touch in the radial and ulnar nerve distribution  Loss of sensation along the median nerve distribution  Brisk capillary refill    Data Review     Results Reviewed     None         Imaging:  None needed today    Assessment and Plan      Diagnoses and all orders for this visit:    Carpal tunnel syndrome, bilateral  -     Case request operating room: RELEASE CARPAL TUNNEL - Open; Standing  -     Case request operating room: RELEASE CARPAL TUNNEL - Open  -     Case request operating room: [place] [id number]; Standing  -     Case request operating room: RELEASE CARPAL TUNNEL - Open    Other orders  -     Void on call to OR; Standing  -     lidocaine-epinephrine (XYLOCAINE/EPINEPHRINE) 1 %-1:100,000 injection 20 mL  -     Vital signs (per unit routine); Standing  -     Void on call to OR; Standing  -     lidocaine-epinephrine (XYLOCAINE/EPINEPHRINE) 1 %-1:100,000 injection 20 mL         Examine year old male with bilateral carpal tunnel syndrome left worse than right  Patient has tried nonoperative modalities with bracing but has not found significant improvement  Patient would like to undergo operative intervention  Operative intervention was discussed with the patient in detail both perioperative and postoperative course with recovery  Expected healing time was also discussed  Patient is in good understanding and wishes to proceed  Risks and benefits were discussed with good understanding      Although we discussed performing both releases at the same time, patient needs to drive himself and so we will perform 1 at a time under local analgesia      Follow Up:  10 days postop    To Do Next Visit:  Postoperative care    PROCEDURES PERFORMED:  Procedures  No Procedures performed today     Scribe Attestation    I,:   Miri Álvarez PA-C am acting as a scribe while in the presence of the attending physician :        I,:   Rachael Spangler MD personally performed the services described in this documentation    as scribed in my presence :

## 2020-09-17 ENCOUNTER — TELEPHONE (OUTPATIENT)
Dept: OBGYN CLINIC | Facility: MEDICAL CENTER | Age: 65
End: 2020-09-17

## 2020-09-24 NOTE — PRE-PROCEDURE INSTRUCTIONS
Pre-Surgery Instructions:   Medication Instructions    adalimumab (HUMIRA) 40 mg/0 8 mL PSKT Instructed patient per Anesthesia Guidelines   gabapentin (NEURONTIN) 400 mg capsule Instructed patient per Anesthesia Guidelines   omeprazole (PriLOSEC) 40 MG capsule Instructed patient per Anesthesia Guidelines   simvastatin (ZOCOR) 20 mg tablet Instructed patient per Anesthesia Guidelines  Instructed to take gabapentin am of surgery per anesthesia guidelines  Can take omeprazole am of surgery if needed

## 2020-09-25 ENCOUNTER — HOSPITAL ENCOUNTER (OUTPATIENT)
Facility: HOSPITAL | Age: 65
Setting detail: OUTPATIENT SURGERY
Discharge: HOME | End: 2020-09-25
Attending: ORTHOPAEDIC SURGERY | Admitting: ORTHOPAEDIC SURGERY
Payer: MEDICARE

## 2020-09-25 VITALS
TEMPERATURE: 98.3 F | OXYGEN SATURATION: 97 % | HEART RATE: 64 BPM | BODY MASS INDEX: 31.84 KG/M2 | SYSTOLIC BLOOD PRESSURE: 146 MMHG | HEIGHT: 69 IN | DIASTOLIC BLOOD PRESSURE: 67 MMHG | WEIGHT: 215 LBS | RESPIRATION RATE: 16 BRPM

## 2020-09-25 PROCEDURE — 64721 CARPAL TUNNEL SURGERY: CPT | Performed by: ORTHOPAEDIC SURGERY

## 2020-09-25 RX ORDER — MAGNESIUM HYDROXIDE 1200 MG/15ML
LIQUID ORAL AS NEEDED
Status: DISCONTINUED | OUTPATIENT
Start: 2020-09-25 | End: 2020-09-25 | Stop reason: HOSPADM

## 2020-09-25 RX ORDER — LIDOCAINE HYDROCHLORIDE AND EPINEPHRINE 10; 10 MG/ML; UG/ML
20 INJECTION, SOLUTION INFILTRATION; PERINEURAL
Status: COMPLETED | OUTPATIENT
Start: 2020-09-25 | End: 2020-09-25

## 2020-09-25 RX ORDER — OXYCODONE HYDROCHLORIDE 5 MG/1
5 TABLET ORAL EVERY 4 HOURS PRN
Status: DISCONTINUED | OUTPATIENT
Start: 2020-09-25 | End: 2020-09-25 | Stop reason: HOSPADM

## 2020-09-25 RX ADMIN — LIDOCAINE HYDROCHLORIDE,EPINEPHRINE BITARTRATE 12 ML: 10; .01 INJECTION, SOLUTION INFILTRATION; PERINEURAL at 07:44

## 2020-09-25 NOTE — DISCHARGE INSTRUCTIONS
[facility] - Dr Garth Pollock (Orthopedic Surgery)    Follow-up Appointments   Please call to set up/confirm your first postoperative visit with Dr Lenora Escobar in 10-14 days    Dressing and [address] A dressing has been placed on your hand/arm to keep the incisions clean  Keep your dressing clean and dry      o You may remove the dressing 5 days after surgery  Once the dressing is off, your incision can get wet while showering/bathing only  Do not soak the wound (in bath water, pool, lake, etc )  Otherwise, keep your incision covered with Band-Aids or light dressing  Keep it dry and do not apply any ointments   Wear a plastic bag over your dressing/splint whenever you take a shower or bath until you are allowed to remove it   Swelling is normal after surgery  Elevate your hand/arm so the surgical site is above your heart to decrease the swelling  Swelling is like water, it runs downhill  This is especially important for the first 72 hours after surgery  o The best way to elevate your hand/arm is with your fingers pointing towards the ceiling and your hand/arm above the level of the heart   o You can use pillows to help prop your hand/arm up when sitting or lying down   If you are experiencing pain, be sure you are elevating your hand/arm as often as possible   Apply an ice pack over your dressing/splint for 20 minutes of every hour for the first 3 days when you are awake  This can help to reduce swelling and inflammation  Be sure the ice pack is waterproof so it does not leak on the dressing/splint  A simple ice pack can be made by adding ten cubes and a small amount of water in a small zip-lock bag  Seal this small bag tightly  Place this small bag in a larger zip lock bag  Apply to the area in pain   If the dressing feels too tight in spite of elevation, loosen the outer wrap but do not remove the entire dressing     If you have exposed pins/wires, take clean gauze or a cotton tip applicator (like a Q-tip), get it wet in clean warm water mixed with non-scented hand soap (like Angeli, [place], Dial, etc ), and gently wipe around the base of the pin where it comes through the skin once a day  Do not use water from a well to clean as this has bacteria in it and can contribute to infections  ACTIVITIES:  [facility] and straighten the parts of your hand, wrist, elbow, and shoulder that are not included in your surgical dressing or splint  Do this at least 6 times a day, as this will help decrease swelling and speed up your recovery  This includes your fingers  See the instructions listed below for finger motion  THIS IS VERY IMPORTANT FOR YOUR RECOVERY! POSTOPERATIVE CARE/CONCERNS:  [facility] You may experience some temporary numbness in your fingers   You should have very little to no bleeding on your dressing   Notify the office (see contact info at bottom of page) for any of the following:  o Excessive pain not relieved by rest, elevation, and pain medications  o Feeling that the dressing is too tight in spite of adequately elevating hand/arm  o Active bleeding through the dressing  o Drainage from the wound site or pin sites  o Foul odor from the dressing/wound  o Temperature greater that 101? F or chills  o Blue or excessively cold fingertips  o Numbness of the fingertips that does not improve in spite of adequately elevating hand/arm    PAIN MEDICATION:   Pain is a normal part of the recovery after surgery  The pain medication provided to you will help to decrease the discomfort but will not completely eliminate the pain   A prescription for a narcotic pain medicine (oxycodone or hydrocodone) and anti-inflammatory (naproxen) were called in to your pharmacy  Please take the anti-inflammatory medication AND over-the-counter acetaminophen (Tylenol) regularly  The instructions will be listed on the bottles   The narcotic pain medicine should be used for pain that is not controlled by these other medications and only for the first few days after surgery  The narcotic is HIGHLY ADDICTIVE and has many side effects such as causing constipation, dizziness, confusion, decreased breathing and more  It is safe to take for a short period of time after surgery  It is almost never prescribed for longer than a few weeks and never after one month for elective surgeries   Do not take narcotic or anti-inflammatories on an empty stomach   It is illegal to drive while taking narcotic pain medication   Your pain should decrease over the first few days after surgery which will allow you to take less pain medicine, increase the time between doses of medication, or stop taking all pain medicine        OFFICE CONTACT NUMBERS   Please call [phone] with any questions about appointments or any medical concerns

## 2020-09-25 NOTE — OP NOTE
DATE OF SURGERY: 9/25/2020    SURGEON: Octavio Martinez MD    PREOPERATIVE DIAGNOSIS: Left carpal tunnel syndrome    POSTOPERATIVE DIAGNOSIS: same    PROCEDURE: Left mini open carpal tunnel release    IMPLANTS: * No implants in log *     ASSISTANTS: None    ANESTHESIA: Local    ESTIMATED BLOOD LOSS: Minimal     INTRAVENOUS FLUIDS: Per anesthesia    URINE OUTPUT: No wolfe    TOURNIQUET TIME: * No tourniquets in log *      COMPLICATIONS: None    ANTIBIOTICS: None    SPECIMENS: * No specimens in log *         INDICATIONS: Kana Brown is a 77y o  year old male who sustained the above conditions  The indications for operative intervention were carpal tunnel syndrome refractory to nonoperative modalities  The alternatives to operative intervention included continue nonoperative care  The risks of the operative procedure were discussed in detail with the patient including but not limited to the risks of anesthesia, infection, injury to blood vessel/nerve, pain, stiffness, changes in sensation, and the need for repeat surgery  The patient understood these risks and alternatives and elected to proceed with surgery  DESCRIPTION OF PROCEDURE: The patient was seen in the preoperative holding area and identification was performed as per the institution's protocol  The patient was then brought to the operating room, a briefing was held and prophylactic antibiotics were not given  Anesthesia was induced  A tourniquet was not used The site was pre-scrubbed with chlorhexidine and prepped with ChloraPrep and draped in the usual sterile fashion  Following a timeout procedure, an incision starting distally at the intersection of Johns's cardinal line at the intersection of the longitudinal line drawn on the radial border of the 4th ray was made on the left hand  This was carried about 1 in proximally  Dissection was carried down through skin and subcutaneous tissue sweeping the fat away from the palmar fascia    The palmar fascia was incised longitudinally and retractors were placed to identify the transverse carpal ligament  Gentle sweeping of any muscle present revealed the transverse carpal ligament  This was incised using a knife from the proximal portion of the incision distally until 2 mm past the distal palmar fat  Distal release was confirmed and then retractors were placed proximally  A small subcutaneous pocket was then made superficial to the remaining transverse carpal ligament  Scissors were then used to then incise the transverse carpal ligament proximally into the antebrachial fascia  Care was taken to preserve the contents of the carpal tunnel which were deep  Full release was confirmed both visually and by feel  The wounds were copiously irrigated and closed in layers including 4-0 nylon  Sterile dressing was applied    All sharps and sponge counts were correct and there were no complications  I attest that I, Libra Washington, was present and scrubbed for the entirety of the procedure  No qualified resident was available to assist with this case  The patient was awoken from anesthesia in good condition and taken to the PACU         PLAN: The patient will follow up in 10 14 days

## 2020-10-06 ENCOUNTER — OFFICE VISIT (OUTPATIENT)
Dept: OBGYN CLINIC | Facility: MEDICAL CENTER | Age: 65
End: 2020-10-06

## 2020-10-06 VITALS
SYSTOLIC BLOOD PRESSURE: 124 MMHG | TEMPERATURE: 98.1 F | WEIGHT: 215 LBS | DIASTOLIC BLOOD PRESSURE: 70 MMHG | BODY MASS INDEX: 31.84 KG/M2 | HEIGHT: 69 IN | HEART RATE: 74 BPM

## 2020-10-06 DIAGNOSIS — Z47.89 AFTERCARE FOLLOWING SURGERY OF THE MUSCULOSKELETAL SYSTEM: ICD-10-CM

## 2020-10-06 DIAGNOSIS — G56.01 CARPAL TUNNEL SYNDROME ON RIGHT: Primary | ICD-10-CM

## 2020-10-06 PROCEDURE — 99024 POSTOP FOLLOW-UP VISIT: CPT | Performed by: ORTHOPAEDIC SURGERY

## 2020-10-20 ENCOUNTER — HOSPITAL ENCOUNTER (OUTPATIENT)
Facility: HOSPITAL | Age: 65
Setting detail: OUTPATIENT SURGERY
Discharge: HOME | End: 2020-10-20
Attending: ORTHOPAEDIC SURGERY | Admitting: ORTHOPAEDIC SURGERY
Payer: MEDICARE

## 2020-10-20 VITALS
HEIGHT: 69 IN | OXYGEN SATURATION: 98 % | DIASTOLIC BLOOD PRESSURE: 69 MMHG | TEMPERATURE: 99.2 F | HEART RATE: 59 BPM | RESPIRATION RATE: 18 BRPM | BODY MASS INDEX: 31.84 KG/M2 | SYSTOLIC BLOOD PRESSURE: 131 MMHG | WEIGHT: 215 LBS

## 2020-10-20 DIAGNOSIS — E78.2 MIXED HYPERLIPIDEMIA: ICD-10-CM

## 2020-10-20 DIAGNOSIS — Z47.89 AFTERCARE FOLLOWING SURGERY OF THE MUSCULOSKELETAL SYSTEM: Primary | ICD-10-CM

## 2020-10-20 PROCEDURE — 64721 CARPAL TUNNEL SURGERY: CPT | Performed by: ORTHOPAEDIC SURGERY

## 2020-10-20 RX ORDER — OXYCODONE HYDROCHLORIDE 5 MG/1
5 TABLET ORAL EVERY 4 HOURS PRN
Status: DISCONTINUED | OUTPATIENT
Start: 2020-10-20 | End: 2020-10-20 | Stop reason: HOSPADM

## 2020-10-20 RX ORDER — MAGNESIUM HYDROXIDE 1200 MG/15ML
LIQUID ORAL AS NEEDED
Status: DISCONTINUED | OUTPATIENT
Start: 2020-10-20 | End: 2020-10-20 | Stop reason: HOSPADM

## 2020-10-20 RX ORDER — OXYCODONE HYDROCHLORIDE 5 MG/1
5 TABLET ORAL EVERY 4 HOURS PRN
Qty: 5 TABLET | Refills: 0 | Status: SHIPPED | OUTPATIENT
Start: 2020-10-20 | End: 2020-10-30

## 2020-10-20 RX ORDER — LIDOCAINE HYDROCHLORIDE AND EPINEPHRINE 10; 10 MG/ML; UG/ML
20 INJECTION, SOLUTION INFILTRATION; PERINEURAL
Status: COMPLETED | OUTPATIENT
Start: 2020-10-20 | End: 2020-10-20

## 2020-10-20 RX ADMIN — LIDOCAINE HYDROCHLORIDE AND EPINEPHRINE 13 ML: 10; 10 INJECTION, SOLUTION INFILTRATION; PERINEURAL at 06:43

## 2020-10-21 RX ORDER — SIMVASTATIN 20 MG
TABLET ORAL
Qty: 90 TABLET | Refills: 3 | OUTPATIENT
Start: 2020-10-21

## 2020-11-03 ENCOUNTER — OFFICE VISIT (OUTPATIENT)
Dept: OBGYN CLINIC | Facility: MEDICAL CENTER | Age: 65
End: 2020-11-03

## 2020-11-03 ENCOUNTER — APPOINTMENT (OUTPATIENT)
Dept: LAB | Facility: MEDICAL CENTER | Age: 65
End: 2020-11-03
Payer: MEDICARE

## 2020-11-03 VITALS
TEMPERATURE: 97.9 F | DIASTOLIC BLOOD PRESSURE: 76 MMHG | SYSTOLIC BLOOD PRESSURE: 125 MMHG | BODY MASS INDEX: 31.84 KG/M2 | HEART RATE: 73 BPM | HEIGHT: 69 IN | WEIGHT: 215 LBS

## 2020-11-03 DIAGNOSIS — R73.9 HYPERGLYCEMIA: ICD-10-CM

## 2020-11-03 DIAGNOSIS — I10 ESSENTIAL HYPERTENSION: ICD-10-CM

## 2020-11-03 DIAGNOSIS — L40.9 PSORIASIS: ICD-10-CM

## 2020-11-03 DIAGNOSIS — G56.02 LEFT CARPAL TUNNEL SYNDROME: ICD-10-CM

## 2020-11-03 DIAGNOSIS — E78.2 MIXED HYPERLIPIDEMIA: ICD-10-CM

## 2020-11-03 DIAGNOSIS — Z47.89 AFTERCARE FOLLOWING SURGERY OF THE MUSCULOSKELETAL SYSTEM: Primary | ICD-10-CM

## 2020-11-03 LAB
ALBUMIN SERPL BCP-MCNC: 4 G/DL (ref 3.5–5)
ALP SERPL-CCNC: 85 U/L (ref 46–116)
ALT SERPL W P-5'-P-CCNC: 19 U/L (ref 12–78)
ANION GAP SERPL CALCULATED.3IONS-SCNC: 2 MMOL/L (ref 4–13)
AST SERPL W P-5'-P-CCNC: 8 U/L (ref 5–45)
BASOPHILS # BLD AUTO: 0.05 THOUSANDS/ΜL (ref 0–0.1)
BASOPHILS NFR BLD AUTO: 1 % (ref 0–1)
BILIRUB SERPL-MCNC: 0.71 MG/DL (ref 0.2–1)
BILIRUB UR QL STRIP: NEGATIVE
BUN SERPL-MCNC: 13 MG/DL (ref 5–25)
CALCIUM SERPL-MCNC: 8.8 MG/DL (ref 8.3–10.1)
CHLORIDE SERPL-SCNC: 108 MMOL/L (ref 100–108)
CHOLEST SERPL-MCNC: 142 MG/DL (ref 50–200)
CLARITY UR: CLEAR
CO2 SERPL-SCNC: 28 MMOL/L (ref 21–32)
COLOR UR: YELLOW
CREAT SERPL-MCNC: 0.76 MG/DL (ref 0.6–1.3)
EOSINOPHIL # BLD AUTO: 0.09 THOUSAND/ΜL (ref 0–0.61)
EOSINOPHIL NFR BLD AUTO: 2 % (ref 0–6)
ERYTHROCYTE [DISTWIDTH] IN BLOOD BY AUTOMATED COUNT: 12.8 % (ref 11.6–15.1)
GFR SERPL CREATININE-BSD FRML MDRD: 94 ML/MIN/1.73SQ M
GLUCOSE P FAST SERPL-MCNC: 82 MG/DL (ref 65–99)
GLUCOSE UR STRIP-MCNC: NEGATIVE MG/DL
HCT VFR BLD AUTO: 49.6 % (ref 36.5–49.3)
HDLC SERPL-MCNC: 37 MG/DL
HGB BLD-MCNC: 16.1 G/DL (ref 12–17)
HGB UR QL STRIP.AUTO: NEGATIVE
IMM GRANULOCYTES # BLD AUTO: 0.01 THOUSAND/UL (ref 0–0.2)
IMM GRANULOCYTES NFR BLD AUTO: 0 % (ref 0–2)
KETONES UR STRIP-MCNC: NEGATIVE MG/DL
LDLC SERPL CALC-MCNC: 85 MG/DL (ref 0–100)
LEUKOCYTE ESTERASE UR QL STRIP: NEGATIVE
LYMPHOCYTES # BLD AUTO: 2.03 THOUSANDS/ΜL (ref 0.6–4.47)
LYMPHOCYTES NFR BLD AUTO: 37 % (ref 14–44)
MCH RBC QN AUTO: 32.7 PG (ref 26.8–34.3)
MCHC RBC AUTO-ENTMCNC: 32.5 G/DL (ref 31.4–37.4)
MCV RBC AUTO: 101 FL (ref 82–98)
MONOCYTES # BLD AUTO: 0.43 THOUSAND/ΜL (ref 0.17–1.22)
MONOCYTES NFR BLD AUTO: 8 % (ref 4–12)
NEUTROPHILS # BLD AUTO: 2.84 THOUSANDS/ΜL (ref 1.85–7.62)
NEUTS SEG NFR BLD AUTO: 52 % (ref 43–75)
NITRITE UR QL STRIP: NEGATIVE
NONHDLC SERPL-MCNC: 105 MG/DL
NRBC BLD AUTO-RTO: 0 /100 WBCS
PH UR STRIP.AUTO: 6 [PH]
PLATELET # BLD AUTO: 197 THOUSANDS/UL (ref 149–390)
PMV BLD AUTO: 9.4 FL (ref 8.9–12.7)
POTASSIUM SERPL-SCNC: 4 MMOL/L (ref 3.5–5.3)
PROT SERPL-MCNC: 7 G/DL (ref 6.4–8.2)
PROT UR STRIP-MCNC: NEGATIVE MG/DL
PSA SERPL-MCNC: 0.6 NG/ML (ref 0–4)
RBC # BLD AUTO: 4.93 MILLION/UL (ref 3.88–5.62)
SODIUM SERPL-SCNC: 138 MMOL/L (ref 136–145)
SP GR UR STRIP.AUTO: 1.01 (ref 1–1.03)
TRIGL SERPL-MCNC: 98 MG/DL
TSH SERPL DL<=0.05 MIU/L-ACNC: 1.12 UIU/ML (ref 0.36–3.74)
UROBILINOGEN UR QL STRIP.AUTO: 1 E.U./DL
WBC # BLD AUTO: 5.45 THOUSAND/UL (ref 4.31–10.16)

## 2020-11-03 PROCEDURE — 99024 POSTOP FOLLOW-UP VISIT: CPT | Performed by: ORTHOPAEDIC SURGERY

## 2020-11-03 PROCEDURE — 84443 ASSAY THYROID STIM HORMONE: CPT

## 2020-11-03 PROCEDURE — 36415 COLL VENOUS BLD VENIPUNCTURE: CPT

## 2020-11-03 PROCEDURE — 81003 URINALYSIS AUTO W/O SCOPE: CPT

## 2020-11-03 PROCEDURE — 80053 COMPREHEN METABOLIC PANEL: CPT

## 2020-11-03 PROCEDURE — G0103 PSA SCREENING: HCPCS

## 2020-11-03 PROCEDURE — 85025 COMPLETE CBC W/AUTO DIFF WBC: CPT

## 2020-11-03 PROCEDURE — 80061 LIPID PANEL: CPT

## 2020-11-05 DIAGNOSIS — E78.2 MIXED HYPERLIPIDEMIA: ICD-10-CM

## 2020-11-06 RX ORDER — SIMVASTATIN 20 MG
TABLET ORAL
Qty: 90 TABLET | Refills: 1 | Status: SHIPPED | OUTPATIENT
Start: 2020-11-06 | End: 2021-03-19

## 2020-11-09 ENCOUNTER — OFFICE VISIT (OUTPATIENT)
Dept: FAMILY MEDICINE CLINIC | Facility: CLINIC | Age: 65
End: 2020-11-09
Payer: MEDICARE

## 2020-11-09 VITALS
SYSTOLIC BLOOD PRESSURE: 118 MMHG | HEIGHT: 69 IN | WEIGHT: 218.4 LBS | BODY MASS INDEX: 32.35 KG/M2 | DIASTOLIC BLOOD PRESSURE: 78 MMHG | TEMPERATURE: 98.3 F

## 2020-11-09 DIAGNOSIS — R73.9 HYPERGLYCEMIA: ICD-10-CM

## 2020-11-09 DIAGNOSIS — I10 ESSENTIAL HYPERTENSION: ICD-10-CM

## 2020-11-09 DIAGNOSIS — K21.9 GASTROESOPHAGEAL REFLUX DISEASE WITHOUT ESOPHAGITIS: ICD-10-CM

## 2020-11-09 DIAGNOSIS — E78.2 MIXED HYPERLIPIDEMIA: Primary | ICD-10-CM

## 2020-11-09 DIAGNOSIS — E66.09 CLASS 1 OBESITY DUE TO EXCESS CALORIES WITHOUT SERIOUS COMORBIDITY WITH BODY MASS INDEX (BMI) OF 32.0 TO 32.9 IN ADULT: ICD-10-CM

## 2020-11-09 DIAGNOSIS — M65.312 TRIGGER FINGER OF LEFT THUMB: ICD-10-CM

## 2020-11-09 DIAGNOSIS — L40.9 PSORIASIS: ICD-10-CM

## 2020-11-09 DIAGNOSIS — R20.0 NUMBNESS OF LEFT HAND: ICD-10-CM

## 2020-11-09 PROCEDURE — 99214 OFFICE O/P EST MOD 30 MIN: CPT | Performed by: FAMILY MEDICINE

## 2020-11-11 ENCOUNTER — HOSPITAL ENCOUNTER (OUTPATIENT)
Dept: ULTRASOUND IMAGING | Facility: HOSPITAL | Age: 65
Discharge: HOME | End: 2020-11-11
Attending: ORTHOPAEDIC SURGERY
Payer: MEDICARE

## 2020-11-11 DIAGNOSIS — Z47.89 AFTERCARE FOLLOWING SURGERY OF THE MUSCULOSKELETAL SYSTEM: ICD-10-CM

## 2020-11-11 DIAGNOSIS — G56.02 LEFT CARPAL TUNNEL SYNDROME: ICD-10-CM

## 2020-11-11 PROCEDURE — 76882 US LMTD JT/FCL EVL NVASC XTR: CPT

## 2020-12-06 DIAGNOSIS — M48.061 LUMBAR FORAMINAL STENOSIS: ICD-10-CM

## 2020-12-06 DIAGNOSIS — M51.26 PROTRUSION OF LUMBAR INTERVERTEBRAL DISC: ICD-10-CM

## 2020-12-06 DIAGNOSIS — M54.16 LUMBAR RADICULOPATHY: ICD-10-CM

## 2020-12-06 DIAGNOSIS — G89.29 CHRONIC LEFT-SIDED LOW BACK PAIN WITH LEFT-SIDED SCIATICA: ICD-10-CM

## 2020-12-06 DIAGNOSIS — M54.42 CHRONIC LEFT-SIDED LOW BACK PAIN WITH LEFT-SIDED SCIATICA: ICD-10-CM

## 2020-12-07 RX ORDER — GABAPENTIN 400 MG/1
CAPSULE ORAL
Qty: 90 CAPSULE | Refills: 5 | OUTPATIENT
Start: 2020-12-07

## 2020-12-18 ENCOUNTER — HOSPITAL ENCOUNTER (OUTPATIENT)
Dept: NEUROLOGY | Facility: CLINIC | Age: 65
Discharge: HOME | End: 2020-12-18
Payer: MEDICARE

## 2020-12-18 DIAGNOSIS — G56.02 LEFT CARPAL TUNNEL SYNDROME: ICD-10-CM

## 2020-12-18 DIAGNOSIS — Z47.89 AFTERCARE FOLLOWING SURGERY OF THE MUSCULOSKELETAL SYSTEM: ICD-10-CM

## 2020-12-18 PROCEDURE — 95910 NRV CNDJ TEST 7-8 STUDIES: CPT | Performed by: PSYCHIATRY & NEUROLOGY

## 2020-12-18 PROCEDURE — 95886 MUSC TEST DONE W/N TEST COMP: CPT | Performed by: PSYCHIATRY & NEUROLOGY

## 2020-12-22 ENCOUNTER — OFFICE VISIT (OUTPATIENT)
Dept: OBGYN CLINIC | Facility: MEDICAL CENTER | Age: 65
End: 2020-12-22

## 2020-12-22 VITALS
HEIGHT: 69 IN | SYSTOLIC BLOOD PRESSURE: 141 MMHG | WEIGHT: 218 LBS | DIASTOLIC BLOOD PRESSURE: 71 MMHG | HEART RATE: 71 BPM | BODY MASS INDEX: 32.29 KG/M2 | TEMPERATURE: 98.6 F

## 2020-12-22 DIAGNOSIS — Z98.890 S/P BILATERAL CARPAL TUNNEL RELEASE: ICD-10-CM

## 2020-12-22 DIAGNOSIS — R20.0 NUMBNESS OF LEFT HAND: ICD-10-CM

## 2020-12-22 DIAGNOSIS — Z47.89 AFTERCARE FOLLOWING SURGERY OF THE MUSCULOSKELETAL SYSTEM: Primary | ICD-10-CM

## 2020-12-22 PROCEDURE — 99024 POSTOP FOLLOW-UP VISIT: CPT | Performed by: ORTHOPAEDIC SURGERY

## 2021-01-08 ENCOUNTER — LAB (OUTPATIENT)
Dept: LAB | Facility: MEDICAL CENTER | Age: 66
End: 2021-01-08
Payer: MEDICARE

## 2021-01-08 ENCOUNTER — TRANSCRIBE ORDERS (OUTPATIENT)
Dept: ADMINISTRATIVE | Facility: HOSPITAL | Age: 66
End: 2021-01-08

## 2021-01-08 DIAGNOSIS — L40.0 PSORIASIS VULGARIS: Primary | ICD-10-CM

## 2021-01-08 DIAGNOSIS — L40.0 PSORIASIS VULGARIS: ICD-10-CM

## 2021-01-08 LAB
ALBUMIN SERPL BCP-MCNC: 3.8 G/DL (ref 3.5–5)
ALP SERPL-CCNC: 92 U/L (ref 46–116)
ALT SERPL W P-5'-P-CCNC: 20 U/L (ref 12–78)
ANION GAP SERPL CALCULATED.3IONS-SCNC: 1 MMOL/L (ref 4–13)
AST SERPL W P-5'-P-CCNC: 14 U/L (ref 5–45)
BASOPHILS # BLD AUTO: 0.07 THOUSANDS/ΜL (ref 0–0.1)
BASOPHILS NFR BLD AUTO: 1 % (ref 0–1)
BILIRUB SERPL-MCNC: 0.56 MG/DL (ref 0.2–1)
BUN SERPL-MCNC: 16 MG/DL (ref 5–25)
CALCIUM SERPL-MCNC: 9.1 MG/DL (ref 8.3–10.1)
CHLORIDE SERPL-SCNC: 108 MMOL/L (ref 100–108)
CO2 SERPL-SCNC: 28 MMOL/L (ref 21–32)
CREAT SERPL-MCNC: 0.86 MG/DL (ref 0.6–1.3)
EOSINOPHIL # BLD AUTO: 0.06 THOUSAND/ΜL (ref 0–0.61)
EOSINOPHIL NFR BLD AUTO: 1 % (ref 0–6)
ERYTHROCYTE [DISTWIDTH] IN BLOOD BY AUTOMATED COUNT: 12.8 % (ref 11.6–15.1)
GFR SERPL CREATININE-BSD FRML MDRD: 90 ML/MIN/1.73SQ M
GLUCOSE P FAST SERPL-MCNC: 99 MG/DL (ref 65–99)
HCT VFR BLD AUTO: 48.7 % (ref 36.5–49.3)
HGB BLD-MCNC: 15.7 G/DL (ref 12–17)
IMM GRANULOCYTES # BLD AUTO: 0.02 THOUSAND/UL (ref 0–0.2)
IMM GRANULOCYTES NFR BLD AUTO: 0 % (ref 0–2)
LYMPHOCYTES # BLD AUTO: 1.68 THOUSANDS/ΜL (ref 0.6–4.47)
LYMPHOCYTES NFR BLD AUTO: 34 % (ref 14–44)
MCH RBC QN AUTO: 32.7 PG (ref 26.8–34.3)
MCHC RBC AUTO-ENTMCNC: 32.2 G/DL (ref 31.4–37.4)
MCV RBC AUTO: 102 FL (ref 82–98)
MONOCYTES # BLD AUTO: 0.38 THOUSAND/ΜL (ref 0.17–1.22)
MONOCYTES NFR BLD AUTO: 8 % (ref 4–12)
NEUTROPHILS # BLD AUTO: 2.7 THOUSANDS/ΜL (ref 1.85–7.62)
NEUTS SEG NFR BLD AUTO: 56 % (ref 43–75)
NRBC BLD AUTO-RTO: 0 /100 WBCS
PLATELET # BLD AUTO: 216 THOUSANDS/UL (ref 149–390)
PMV BLD AUTO: 9.6 FL (ref 8.9–12.7)
POTASSIUM SERPL-SCNC: 4.3 MMOL/L (ref 3.5–5.3)
PROT SERPL-MCNC: 6.7 G/DL (ref 6.4–8.2)
RBC # BLD AUTO: 4.8 MILLION/UL (ref 3.88–5.62)
SODIUM SERPL-SCNC: 137 MMOL/L (ref 136–145)
WBC # BLD AUTO: 4.91 THOUSAND/UL (ref 4.31–10.16)

## 2021-01-08 PROCEDURE — 36415 COLL VENOUS BLD VENIPUNCTURE: CPT

## 2021-01-08 PROCEDURE — 85025 COMPLETE CBC W/AUTO DIFF WBC: CPT

## 2021-01-08 PROCEDURE — 80053 COMPREHEN METABOLIC PANEL: CPT

## 2021-01-08 PROCEDURE — 86480 TB TEST CELL IMMUN MEASURE: CPT

## 2021-01-11 LAB
GAMMA INTERFERON BACKGROUND BLD IA-ACNC: 0.03 IU/ML
M TB IFN-G BLD-IMP: NEGATIVE
M TB IFN-G CD4+ BCKGRND COR BLD-ACNC: 0 IU/ML
M TB IFN-G CD4+ BCKGRND COR BLD-ACNC: 0.01 IU/ML
MITOGEN IGNF BCKGRD COR BLD-ACNC: >10 IU/ML

## 2021-02-24 ENCOUNTER — OFFICE VISIT (OUTPATIENT)
Dept: FAMILY MEDICINE CLINIC | Facility: CLINIC | Age: 66
End: 2021-02-24
Payer: MEDICARE

## 2021-02-24 ENCOUNTER — HOSPITAL ENCOUNTER (OUTPATIENT)
Dept: NON INVASIVE DIAGNOSTICS | Facility: HOSPITAL | Age: 66
Discharge: HOME | End: 2021-02-24
Payer: MEDICARE

## 2021-02-24 VITALS
HEIGHT: 69 IN | BODY MASS INDEX: 32.29 KG/M2 | TEMPERATURE: 97 F | DIASTOLIC BLOOD PRESSURE: 78 MMHG | SYSTOLIC BLOOD PRESSURE: 140 MMHG | WEIGHT: 218 LBS

## 2021-02-24 DIAGNOSIS — M79.661 RIGHT CALF PAIN: Primary | ICD-10-CM

## 2021-02-24 DIAGNOSIS — M79.661 RIGHT CALF PAIN: ICD-10-CM

## 2021-02-24 PROCEDURE — 99213 OFFICE O/P EST LOW 20 MIN: CPT | Performed by: FAMILY MEDICINE

## 2021-02-24 PROCEDURE — 93971 EXTREMITY STUDY: CPT

## 2021-02-24 RX ORDER — PREDNISONE 10 MG/1
TABLET ORAL
Qty: 18 TABLET | Refills: 0 | Status: SHIPPED | OUTPATIENT
Start: 2021-02-24 | End: 2021-05-12

## 2021-02-24 NOTE — ASSESSMENT & PLAN NOTE
Must rule out blood clot I suspect this could be related to his lumbar spine I will start oral prednisone and check duplex

## 2021-02-24 NOTE — PROGRESS NOTES
Assessment/Plan:    Right calf pain  Must rule out blood clot I suspect this could be related to his lumbar spine I will start oral prednisone and check duplex       Diagnoses and all orders for this visit:    Right calf pain  -     VAS lower limb venous duplex study, unilateral/limited; Future  -     predniSONE 10 mg tablet; 3 tablets for 3 days then 2 tablets for 3 days then 1 tablet daily for 3 days          Subjective:   Chief Complaint   Patient presents with    Pain     right calf  x 3 wks  Patient ID: Marylou Francis is a 74 y o  male  Patient was out shovelling snow first week in 2/2021 Patient the following day startd with severe pain in the right calf Patient pain is 8 out of 10 Patient notes the pain radiates all the way to his ankle Patient notes that flexing the ankle increases the pain He also notes that pressure in a certain spot will casue pain and numbess to radiate into his toes Patient has lumbar stenosis issues and gets pain management shots for the left side He has some weaknss in the right leg Patient feels his pain is increasing and he is also seeing some swelling in one spot He is taking tylenol with no help Patient is also on humira for psoriasis Patient is supposed to be seeing pain amanagment but due to this pain and the snow has not     Leg Pain   Incident onset: 3 weeks  Incident location: noticed after shovelling snow  There was no injury mechanism  Pain location: right calf  The quality of the pain is described as shooting  The pain is at a severity of 8/10  The pain is severe  The pain has been worsening since onset  Associated symptoms include numbness and tingling  Pertinent negatives include no inability to bear weight, loss of motion, loss of sensation or muscle weakness  The symptoms are aggravated by palpation and movement  He has tried acetaminophen and ice for the symptoms  The treatment provided mild relief         The following portions of the patient's history were reviewed and updated as appropriate: allergies, current medications, past family history, past medical history, past social history, past surgical history and problem list     Review of Systems   Respiratory: Negative for cough and shortness of breath  Cardiovascular: Positive for leg swelling  Negative for chest pain and palpitations  Musculoskeletal: Positive for back pain and gait problem  Neurological: Positive for tingling and numbness  Negative for weakness and headaches  Objective:      /78   Temp (!) 97 °F (36 1 °C)   Ht 5' 9" (1 753 m)   Wt 98 9 kg (218 lb)   BMI 32 19 kg/m²          Physical Exam  Vitals signs and nursing note reviewed  Constitutional:       Appearance: He is obese  HENT:      Head: Normocephalic and atraumatic  Cardiovascular:      Rate and Rhythm: Normal rate and regular rhythm  Pulses: Normal pulses  Pulmonary:      Effort: Pulmonary effort is normal       Breath sounds: Normal breath sounds  Musculoskeletal:      Comments: Right calf swelling along the lateral calf Patient has pain with palpaiton Patient has full ROM of the knee and ankle however movement at the ankle causes increase in calf pain   Neurological:      General: No focal deficit present  Mental Status: He is alert  Mental status is at baseline  He is disoriented

## 2021-02-25 PROCEDURE — 93971 EXTREMITY STUDY: CPT | Performed by: SURGERY

## 2021-03-02 ENCOUNTER — TELEPHONE (OUTPATIENT)
Dept: FAMILY MEDICINE CLINIC | Facility: CLINIC | Age: 66
End: 2021-03-02

## 2021-03-02 DIAGNOSIS — M54.16 LUMBAR RADICULOPATHY: Primary | ICD-10-CM

## 2021-03-02 DIAGNOSIS — M48.061 LUMBAR FORAMINAL STENOSIS: ICD-10-CM

## 2021-03-10 DIAGNOSIS — Z23 ENCOUNTER FOR IMMUNIZATION: ICD-10-CM

## 2021-03-16 ENCOUNTER — TELEPHONE (OUTPATIENT)
Dept: FAMILY MEDICINE CLINIC | Facility: CLINIC | Age: 66
End: 2021-03-16

## 2021-03-18 DIAGNOSIS — E78.2 MIXED HYPERLIPIDEMIA: ICD-10-CM

## 2021-03-19 RX ORDER — SIMVASTATIN 20 MG
TABLET ORAL
Qty: 90 TABLET | Refills: 3 | Status: SHIPPED | OUTPATIENT
Start: 2021-03-19 | End: 2022-02-16

## 2021-03-23 ENCOUNTER — OFFICE VISIT (OUTPATIENT)
Dept: OBGYN CLINIC | Facility: MEDICAL CENTER | Age: 66
End: 2021-03-23
Payer: MEDICARE

## 2021-03-23 VITALS — SYSTOLIC BLOOD PRESSURE: 144 MMHG | HEART RATE: 70 BPM | DIASTOLIC BLOOD PRESSURE: 77 MMHG | TEMPERATURE: 98.2 F

## 2021-03-23 DIAGNOSIS — Z98.890 S/P BILATERAL CARPAL TUNNEL RELEASE: Primary | ICD-10-CM

## 2021-03-23 PROCEDURE — 99213 OFFICE O/P EST LOW 20 MIN: CPT | Performed by: ORTHOPAEDIC SURGERY

## 2021-03-23 NOTE — PROGRESS NOTES
Chief Complaint     Post op evaluation       History of Present Illness     Aki Baca is a 74 y o   male who presents to the office today for a follow up s/p bilateral carpal tunnel releases  Patient underwent right carpal release on 10/20/2020 and left carpal tunnel release on 09/25/2020  Overall Aki is doing well  He states that his right hand numbness/tingling has resolved  He notes continued numbness to the radial 3 digits on his left hand, but states it has improved and is not bothersome at this time  Aki is currently being treated by Dr Bisi Thompson regarding his c-spine  He did undergo c-spine surgery which left his LUE weak  Past Medical History:   Diagnosis Date    Bilateral carpal tunnel syndrome     Chronic pain disorder     low back    GERD (gastroesophageal reflux disease)     Hyperlipidemia     Low back pain     Lumbar disc disease     Lumbar spinal stenosis     Neck pain     had cervical fusion    Obesity     Psoriasis     right elbow    Tremor     left leg    Wears dentures     full dentures    Wears glasses        Past Surgical History:   Procedure Laterality Date    CERVICAL LAMINECTOMY      COLONOSCOPY  07/2015    multiple polyps, repeat in 3 years    JOINT REPLACEMENT      right TKR    KNEE SURGERY Right 2002    20 years ago-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    LAMINECTOMY      ORTHOPEDIC SURGERY      POPLITEAL SYNOVIAL CYST EXCISION      last assessed: 04/22/2015    IA REVISE MEDIAN N/CARPAL TUNNEL SURG Left 9/25/2020    Procedure: RELEASE CARPAL TUNNEL;  Surgeon: Rishi Lanza MD;  Location: [facility];  Service: Orthopedics    IA REVISE MEDIAN N/CARPAL TUNNEL SURG Right 10/20/2020    Procedure: RELEASE CARPAL TUNNEL - Open;  Surgeon:  Rishi Lanza MD;  Location: [facility];  Service: Orthopedics    SPINE SURGERY      TONSILLECTOMY AND ADENOIDECTOMY      last assessed: 04/22/2015    TOTAL KNEE ARTHROPLASTY      last assessed: 05/12/2015       No Known Allergies    Current Outpatient Medications on File Prior to Visit   Medication Sig Dispense Refill    adalimumab (HUMIRA) 40 mg/0 8 mL PSKT Inject 40 mg under the skin every 14 (fourteen) days      gabapentin (NEURONTIN) 400 mg capsule Take 1 capsule (400 mg total) by mouth 3 (three) times a day (Patient not taking: Reported on 2021) 90 capsule 5    omeprazole (PriLOSEC) 40 MG capsule Take 1 capsule (40 mg total) by mouth daily before breakfast (Patient taking differently: Take 40 mg by mouth daily as needed ) 90 capsule 1    predniSONE 10 mg tablet 3 tablets for 3 days then 2 tablets for 3 days then 1 tablet daily for 3 days 18 tablet 0    simvastatin (ZOCOR) 20 mg tablet TAKE 1 TABLET BY MOUTH  DAILY 90 tablet 3     No current facility-administered medications on file prior to visit  Social History     Tobacco Use    Smoking status: Former Smoker     Quit date: 2013     Years since quittin 4    Smokeless tobacco: Never Used   Substance Use Topics    Alcohol use: No    Drug use: No       Family History   Problem Relation Age of Onset    Allergies Father         seasonal    Lung cancer Family     No Known Problems Mother        Review of Systems     As stated in the HPI  All other systems were reviewed and are negative  Physical Exam     /77   Pulse 70   Temp 98 2 °F (36 8 °C)     GENERAL: This is a well-developed, well-nourished, age-appropriate patient in no acute distress  The patient is alert and oriented x3  Pleasant and cooperative  Eyes: Anicteric sclerae  Extraocular movements appear intact  HENT: Nares are patent with no drainage  Lungs: There is equal chest rise on inspection  Breathing is non-labored with no audible wheezing  Cardiovascular: No cyanosis  No upper extremity lymphadema  Skin: Skin is warm to touch  No obvious skin lesions or rashes other than described below    Neurologic: No ataxia  Psychiatric: Mood and affect are appropriate  Bilateral upper extremities:     No erythema, ecchymosis or edema  Well healed surgical incision   + tinel's at the wrist incision on the left   - Tinel's at at the carpal tunnel on the right   5/5 APB on the right   4/5 APB on the left   4/5 FDP 2 on the left, 5/5 on the right   5/5 FDP 5 on the left and right   4+/5 FPL on the right   3+/5 FPL on the left   [place] [id number]   Sensation intact to light touch in the radial, and ulnar nerve distribution  Decreased sensation to light touch in the median nerve distribution  2 point discrimination is greater then 15 mm to the thumb, 11 mm to the long finger, 7 mm to the ring finger, 9 mm to the small finger and 15 mm to the thumb on the right  2 point discrimination is greater then 15 mm to the thumb, non recordable to the long finger, 11 mm to the ring finger and 11 mm to the small finger on the left  Data Review     Results Reviewed     None             Imaging:  No new imaging to review     Assessment and Plan      Diagnoses and all orders for this visit:    S/p bilateral carpal tunnel release         74 y o  male aprox  6 months s/p bilateral carpal tunnel releases  Overall Aki is doing well  The numbness and tinglin to his right hand has resolved and he has no provacative signs of carpal tunnel syndrome  His left hand numbness/tinling has improved  His residual symptoms are likely coming from his cervical spine  He has shown a bit of improvement with his 2PD which suggests improvement in the nerve overall  He was advised to continue close follow up with Dr Karime Muñoz  I will see him back in the office on an as needed basis         Follow Up: PRN     To Do Next Visit:    PROCEDURES PERFORMED:  Procedures  No Procedures performed today      Scribe Attestation    I,:  Miky Padilla am acting as a scribe while in the presence of the attending physician :       I,:  Benancio Lanes, MD personally performed the services described in this documentation as scribed in my presence :

## 2021-03-29 ENCOUNTER — OFFICE VISIT (OUTPATIENT)
Dept: PAIN MEDICINE | Facility: MEDICAL CENTER | Age: 66
End: 2021-03-29
Payer: MEDICARE

## 2021-03-29 VITALS
TEMPERATURE: 97.5 F | WEIGHT: 219.4 LBS | BODY MASS INDEX: 32.5 KG/M2 | SYSTOLIC BLOOD PRESSURE: 156 MMHG | HEART RATE: 79 BPM | DIASTOLIC BLOOD PRESSURE: 77 MMHG | HEIGHT: 69 IN

## 2021-03-29 DIAGNOSIS — M79.2 NEUROPATHIC PAIN: Primary | ICD-10-CM

## 2021-03-29 DIAGNOSIS — S84.11XA INJURY OF RIGHT PERONEAL NERVE, INITIAL ENCOUNTER: ICD-10-CM

## 2021-03-29 PROCEDURE — 99214 OFFICE O/P EST MOD 30 MIN: CPT | Performed by: PHYSICAL MEDICINE & REHABILITATION

## 2021-03-29 RX ORDER — PREGABALIN 75 MG/1
75 CAPSULE ORAL 2 TIMES DAILY
Qty: 60 CAPSULE | Refills: 1 | Status: SHIPPED | OUTPATIENT
Start: 2021-03-29 | End: 2021-07-26 | Stop reason: ALTCHOICE

## 2021-03-29 NOTE — PROGRESS NOTES
Assessment:  1  Neuropathic pain    2  Injury of right peroneal nerve, initial encounter        Plan:  1  Start Lyrica in place of gabapentin  2  At this time I would like to obtain an EMG of the right lower extremity  The patient's symptoms are more consistent with a peripheral nerve injury as opposed to lumbar radiculopathy at this point  If this fails to provide diagnostic benefit would consider MRI of the right calf region as well as potentially updating the MRI of his lumbar spine  History of Present Illness: The patient is a 74 y o  male who presents for consultation in regards to No chief complaint on file  The patient presents in follow-up today regarding right calf pain  He states that this developed during the snowstorm and he felt a pop sensation in his calf which then later turned into pain radiating down the leg and into his foot  He is describing significant pain rated as an 8/10 which is constant and characterized as burning, sharp, throbbing, cramping, pressure-like, shooting, numbness, pins and needle sensation  He was evaluated by his primary care physician with a venous Doppler which was negative for DVT  He has been trying some gabapentin at home which he had available from prior treatment but this is not providing benefit  Review of Systems:    Review of Systems   Constitutional: Negative for diaphoresis and fever  HENT: Negative for ear pain, hearing loss and sinus pain  Eyes: Positive for pain  Negative for visual disturbance  Respiratory: Negative for shortness of breath and wheezing  Cardiovascular: Negative for leg swelling  Gastrointestinal: Negative for abdominal pain, nausea and vomiting  Endocrine: Positive for cold intolerance and heat intolerance  Genitourinary: Negative for genital sores and urgency  Musculoskeletal: Positive for gait problem and joint swelling  Negative for myalgias  Skin: Negative for rash     Neurological: Positive for weakness  Negative for dizziness and numbness  Hematological: Does not bruise/bleed easily  Psychiatric/Behavioral: Negative for decreased concentration and suicidal ideas  The patient is not nervous/anxious  Past Medical History:   Diagnosis Date    Bilateral carpal tunnel syndrome     Chronic pain disorder     low back    GERD (gastroesophageal reflux disease)     Hyperlipidemia     Low back pain     Lumbar disc disease     Lumbar spinal stenosis     Neck pain     had cervical fusion    Obesity     Psoriasis     right elbow    Tremor     left leg    Wears dentures     full dentures    Wears glasses        Past Surgical History:   Procedure Laterality Date    CERVICAL LAMINECTOMY      COLONOSCOPY  2015    multiple polyps, repeat in 3 years    JOINT REPLACEMENT      right TKR    KNEE SURGERY Right     20 years ago-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    LAMINECTOMY      ORTHOPEDIC SURGERY      POPLITEAL SYNOVIAL CYST EXCISION      last assessed: 2015    ND REVISE MEDIAN N/CARPAL TUNNEL SURG Left 2020    Procedure: RELEASE CARPAL TUNNEL;  Surgeon: Liliana Mcuhgh MD;  Location: [facility];  Service: Orthopedics    ND REVISE MEDIAN N/CARPAL TUNNEL SURG Right 10/20/2020    Procedure: RELEASE CARPAL TUNNEL - Open;  Surgeon:  Liliana Mchugh MD;  Location[facility];  Service: Orthopedics    SPINE SURGERY      TONSILLECTOMY AND ADENOIDECTOMY      last assessed: 2015    TOTAL KNEE ARTHROPLASTY      last assessed: 2015       Family History   Problem Relation Age of Onset    Allergies Father         seasonal    Lung cancer Family     No Known Problems Mother        Social History     Occupational History    Not on file   Tobacco Use    Smoking status: Former Smoker     Quit date: 2013     Years since quittin 5    Smokeless tobacco: Never Used   Substance and Sexual Activity    Alcohol use: No    Drug use: No    Sexual activity: Yes     Partners: Female         Current Outpatient Medications:     adalimumab (HUMIRA) 40 mg/0 8 mL PSKT, Inject 40 mg under the skin every 14 (fourteen) days, Disp: , Rfl:     simvastatin (ZOCOR) 20 mg tablet, TAKE 1 TABLET BY MOUTH  DAILY, Disp: 90 tablet, Rfl: 3    gabapentin (NEURONTIN) 400 mg capsule, Take 1 capsule (400 mg total) by mouth 3 (three) times a day (Patient not taking: Reported on 2/24/2021), Disp: 90 capsule, Rfl: 5    omeprazole (PriLOSEC) 40 MG capsule, Take 1 capsule (40 mg total) by mouth daily before breakfast (Patient not taking: Reported on 3/29/2021), Disp: 90 capsule, Rfl: 1    predniSONE 10 mg tablet, 3 tablets for 3 days then 2 tablets for 3 days then 1 tablet daily for 3 days (Patient not taking: Reported on 3/29/2021), Disp: 18 tablet, Rfl: 0    pregabalin (LYRICA) 75 mg capsule, Take 1 capsule (75 mg total) by mouth 2 (two) times a day, Disp: 60 capsule, Rfl: 1    No Known Allergies    Physical Exam:    /77 (BP Location: Right arm, Patient Position: Sitting, Cuff Size: Standard)   Pulse 79   Temp 97 5 °F (36 4 °C) (Temporal)   Ht 5' 9" (1 753 m)   Wt 99 5 kg (219 lb 6 4 oz)   BMI 32 40 kg/m²     Constitutional: normal, well developed, well nourished, alert, in no distress and non-toxic and no overt pain behavior  Eyes: anicteric  HEENT: grossly intact  Neck: supple, symmetric, trachea midline and no masses   Pulmonary:even and unlabored  Cardiovascular:No edema or pitting edema present  Skin:Normal without rashes or lesions and well hydrated  Psychiatric:Mood and affect appropriate  Neurologic:Cranial Nerves II-XII grossly intact, there is some decreased sensation to light touch over the right dorsal foot and 1st and 2nd toes  Musculoskeletal:normal, except for tenderness to palpation along the right anterior tibialis and lateral gastrocnemius on the right reproducing some of his pain    He does have some pain with active dorsiflexion on the right but has full active range of motion      Imaging  No orders to display       Orders Placed This Encounter   Procedures    EMG 1 Limb

## 2021-04-26 ENCOUNTER — HOSPITAL ENCOUNTER (OUTPATIENT)
Dept: NEUROLOGY | Facility: CLINIC | Age: 66
Discharge: HOME | End: 2021-04-26
Payer: MEDICARE

## 2021-04-26 DIAGNOSIS — S84.11XA INJURY OF RIGHT PERONEAL NERVE, INITIAL ENCOUNTER: ICD-10-CM

## 2021-04-26 DIAGNOSIS — M79.2 NEUROPATHIC PAIN: ICD-10-CM

## 2021-04-26 PROCEDURE — 95886 MUSC TEST DONE W/N TEST COMP: CPT | Performed by: PSYCHIATRY & NEUROLOGY

## 2021-04-26 PROCEDURE — 95911 NRV CNDJ TEST 9-10 STUDIES: CPT | Performed by: PSYCHIATRY & NEUROLOGY

## 2021-04-27 ENCOUNTER — TELEPHONE (OUTPATIENT)
Dept: PAIN MEDICINE | Facility: MEDICAL CENTER | Age: 66
End: 2021-04-27

## 2021-04-27 DIAGNOSIS — M54.16 RIGHT LUMBAR RADICULOPATHY: Primary | ICD-10-CM

## 2021-04-27 NOTE — TELEPHONE ENCOUNTER
MEDICARE WELLNESS VISIT NOTE    This visit is being performed via phone to discuss Medicare Wellness Visit and Telephonic Visit    [facility]    Tali is in [place] and her identity has been established.   She was informed that consent to treat includes permission to submit charges to the applicable insurance on file. Tali was advised regarding the potential risk inherent in video visits, as the assessment may be limited due to what can be seen on the screen which potentially results in an incomplete assessment; as well as either of us may discontinue the video visit if it is felt that the videoconferencing connections are not adequate for his/her situation.   36 minutes were spent in this encounter.        HISTORY OF PRESENT ILLNESS:   Tali Apodaca presents for her Subsequent Annual Medicare Wellness Visit.   She has complaints or concerns which include:     Dyspepsia  The omeprazole 40 mg dose was ineffective. States she's been on the pantoprazole 40 mg dose for 6-8 weeks; has helped a little but not markedly so.     Dysthymic disorder  Patient has yet to try the hydroxyzine. Again states she was not aware she could take it.    Hot flashes  The paxil was ineffective. She stopped taking it. Gabapentin did not help either when she took it.      Spinal stenosis of lumbar region with neurogenic claudication  Patient feels she may need another epidural. Wondering if she can undergo again now.    LE swelling  Patient reporting LE swelling in foot/ankle of right foot. Doesn't notice it as much in the left leg. She thinks it's been more noticeable since a mechanical fall she suffered recently when slipping on some rainy walkway. Denies pain in the leg.      Patient Care Team:  Leslee Williamson MD as PCP - General (Internal Medicine)  Cory Cosby MD as ENT/Otolaryngolgy (ENT/Otolaryngology)  Russel Owen MD as Gastroenterologist (Gastroenterology)  Jesse KINGSTON  RN s/w pt regarding previous  Pt aware and would like to have the MRI      --please advise thank you--  Place order for MRI MD Yordan as Hematology (Hematology & Oncology)  Luisito Leung MD as Orthopedic Surgeon (Orthopedic Surgery)  Baljit Mcdonnell MD as Orthopedic Surgeon (Hand Surgery)  Fredy Wheeler MD as Orthopedic Surgeon (Orthopedic Surgery)        Patient Active Problem List   Diagnosis   • Type 2 diabetes mellitus without complication, without long-term current use of insulin (CMS/HCC)   • Essential hypertension   • Spinal stenosis of lumbar region with neurogenic claudication   • Venous stasis   • Obstructive sleep apnea   • Dysthymic disorder         Past Medical History:   Diagnosis Date   • Actinic keratosis    • Anosmia     ENT workup thus far negative   • Asthma    • BCC (basal cell carcinoma), face 2003, 2017    R cheek and mid chin   • Chronic constipation 10/29/2012   • Chronic neck pain    • Colon polyps 08/09/2017    3 yr recall, tubular adenoma, sessile serrated adenoma/ Dr. Owen   • Dysthymic disorder    • Essential hypertension    • Generalized osteoarthrosis, unspecified site     s/p bilateral knee replacements and hip replacements   • Hot flashes     treated with paxil   • Hyperlipidemia    • Liver disease     mild transaminase elevations; hep serologies negative and CT abd/pelvis negative 6/2016; likely fatty liver   • Mild chronic gastritis     seen on EGD 8/2017; patient stopped her ranitidine in 11/2019   • Neuropathy of both feet     b12 wnl   • Obesity, unspecified    • Other pulmonary embolism and infarction 10/30/2012   • Paraspinal mass 10/30/2012    schwannoma    • PONV (postoperative nausea and vomiting)    • Reflux esophagitis 10/2002    small hiatal hernia otherwise normal ugi 10/02   • Sleep apnea     [employer],resmed sq autoset cpap,medium,7.5 cm    • Spinal stenosis, lumbar region with neurogenic claudication 02/11/2013    referred to pain management 2020; underwent caudal SHANTEL with good response   • Type 2 diabetes mellitus treated without insulin (CMS/HCC)     diet controlled   • Ulnar  neuropathy     right    • Venous stasis    • Vitamin D deficiency 2006         Past Surgical History:   Procedure Laterality Date   • Caudal block  2020    Dr. Noble   •  delivery+postpartum care      x3   • Cholecystectomy     • Colonoscopy remove lesion by snare  2017    rpt 3 yrs,serrated adenoma x1,adenoma x8,Dr. Owen   • Esophagogastroduodenoscopy transoral flex w/bx single or mult  2017    mild gastritis,neg hpylori,Dr. Noriega   • Forearm/wrist surgery unlisted  2008    Rt ulnar nerve decomp & subcut trans  Dr. Mcdonnell   • Hand/finger surgery unlisted      lt hand ligament reconstruction   • Joint replacement  2012    Right hip replacement and abductor tendon repair -Dr Leung   • Prophylaxis retina detach,photocoag  3/13/07    Laser, Retinal Tear Repair OS   • Remv cataract extracap insert lens  2013    Cataract Removal Lens Implant Left   • Remv cataract extracap insert lens  13    Cataract Removal Lens Implant, right   • Tonsillectomy and adenoidectomy     • Total hip replacement Left 4/24/15    Hip replacement with abductor tendon repar -Dr Leung    • Total knee replacement  02    bilateral knee replacement         Social History     Tobacco Use   • Smoking status: Never Smoker   • Smokeless tobacco: Never Used   Substance Use Topics   • Alcohol use: No     Alcohol/week: 0.0 standard drinks     Frequency: Never   • Drug use: No     Drug use:    Drug Use:    No              Family History   Problem Relation Age of Onset   • Cancer Mother         age 79died of lung and throat cancer, smoker, marginal diabetes.    • COPD Mother    • Diabetes Mother    • Heart Father          MI at 59, first at 52,    • Arthritis Father    • Heart disease Father    • NEGATIVE FAMILY HX OF Brother         alive and well, hdtn   • NEGATIVE FAMILY HX OF Brother         alive and well , htn   • Arthritis Brother    • Hypertension Brother    •  NEGATIVE FAMILY HX OF Brother         aliven and well with htn   • Hypertension Brother    • NEGATIVE FAMILY HX OF Daughter    • Depression Daughter    • NEGATIVE FAMILY HX OF Daughter    • Depression Daughter    • NEGATIVE FAMILY HX OF Daughter         asthma, severe.    • Depression Daughter    • Asthma Daughter         overweight, had gastric bipass but still with asthma,    • Depression Daughter        Current Outpatient Medications   Medication Sig Dispense Refill   • pantoprazole (PROTONIX) 40 MG tablet Take 1 tablet by mouth daily. 90 tablet 0   • lisinopril (ZESTRIL) 20 MG tablet TAKE 1 TABLET BY MOUTH  DAILY 90 tablet 0   • atorvastatin (LIPITOR) 40 MG tablet TAKE 1 TABLET BY MOUTH  DAILY 90 tablet 0   • traZODone (DESYREL) 50 MG tablet Take 1 tablet by mouth nightly. 90 tablet 3   • venlafaxine XR (EFFEXOR XR) 75 MG 24 hr capsule Take 3 capsules in the morning and 2 capsules in the evening. 450 capsule 3   • Ascorbic Acid (VITAMIN C) 500 MG Cap  60 capsule    • blood glucose lancets Test blood sugar 1 time daily as directed. Diagnosis: e11.9. Meter: As preferred by insurance 100 each 0   • blood glucose meter Test blood sugar 1 time daily as directed. Diagnosis: e11.9. Meter: As preferred by insurance 1 kit 0   • DISPENSE CPAP mask of her choice, setting of 7.5 cm water pressure. 1 Device 1   • amoxicillin (AMOXIL) 500 MG capsule 4 caps one hour prior to dentist 12 capsule 1   • Blood Glucose Monitoring Suppl (ACCU-CHEK ROBERT) KIT Device and strips.  Please verify insurance will cover. 1 kit 0   • VITAMIN D 1000 UNIT PO CAPS 2000 units per day 30 0   • ACCU-CHEK COMFORT CURVE VI STRP test twice daily 200 11   • paroxetine (Paxil CR) 25 MG 24 hr tablet Take 1 tablet by mouth every morning. 90 tablet 3   • hydrOXYzine (ATARAX) 25 MG tablet Take 1 tablet by mouth every 6 hours as needed for Anxiety. 30 tablet 3   • furosemide (LASIX) 20 MG tablet Take 2 tablets by mouth daily.       No current  facility-administered medications for this visit.         The following items on the Medicare Health Risk Assessment were found to be positive  4.) During the past 4 weeks, what was the hardest physical activity you could do for at least 2 minutes?: Very Light     6 c.) How many servings of Fried or High Fat Foods do you have each day (1 serving = 1 Espinal, French Fries, chips, doughnut, fried chicken/fish): clarified she actually eats moreso carbohydrates than high fat foods --> advice regarding healthier food options     6 d.) How many servings of Sugar Sweetened Beverages do you have each day ( 1 serving = 1 can or 12 oz cup of sode or juice): 2 per day --> 2 diet sodas     7.) Have you had a fall two or more times in the past year?: Yes --> clarified patient fell once; she slipped on a step when it was raining     11e.) Tiredness or Fatigue: Often --> chronic fatigue, worse in the last two months         Vision and Hearing screens: not performed    Advance Directive:   The patient has the following documents:  Power of  for Health Care  Living Will (Declaration for Physicians)    Cognitive Assessment: no evidence of cognitive dysfunction by direct observation    Recent PHQ 2/9 Score    PHQ 2:  Date Adult PHQ 2 Score Adult PHQ 2 Interpretation   9/21/2020 0 No further screening needed       PHQ 9:  Date Adult PHQ 9 Score Adult PHQ 9 Interpretation   10/10/2019 8 Mild Depression       DEPRESSION ASSESSMENT/PLAN:  Depression screening is negative no further plan needed.     There is no height or weight on file to calculate BMI.    BMI ASSESSMENT/PLAN:  Patient is obese.    30 minutes of physical activity a day Also dietary counseling today.      Medicare annual wellness visit, subsequent  - medicare questionnaire reviewed with patient (see above)  - patient has already been referred for colonoscopy (advised her to reschedule given her h/o polyps)    Dyspepsia  Patient had mild chronic gastritis on EGD from  8/2017. Unresponsive to high dose pantoprazole x 6-8 weeks.  - SERVICE TO GASTROENTEROLOGY    Dysthymic disorder  - continue venlafaxine  - again discussed trial of hydroxyzine as a prn anxiolytic and risks of sedation with it; she wants me to send through [facility]    Hot flashes  Suspect it is moreso a side effect of venlafaxine at this point.  - d/c paxil  - monitor    Spinal stenosis of lumbar region with neurogenic claudication  - patient advised to call pain clinic for f/u since she had such an excellent response to the epidural and could undergo again    Leg swelling  I have documented 1+ ankle edema (likely venous stasis) on my exams in the past. Difficult to ascertain over the phone if it is worse than baseline.   - patient will need to be seen for this --> discussed urgent care option vs patient being seen by provider in our clinic asap and she opts for the latter; if swelling is truly asymmetric, may need doppler US to rule out clot  - I have reached out to the PSR  to make sure the patient is called today    Chronic fatigue  Worse in the last few months. Patient thinks it could be due to COVID pandemic effects, but will rule out thyroid disease, anemia.  - THYROID STIMULATING HORMONE REFLEX; Future  - CBC WITH DIFFERENTIAL; Future    Type 2 diabetes mellitus without complication, without long-term current use of insulin (CMS/[facility])  Diet controlled; last a1c at goal.  - continue diet management  - next A1C due now   - lipids: patient on atorva 40 with 10/2019 lipids at goal --> check lipids now  - BP: deferred today given telephone visit; on lisinopril  - last creatinine wnl 7/2020 --> next due 7/2021  - urine microalbumin: patient on ACEi     Preventative Health  Heart: lipids at goal 10/2019 --> check lipids now, bp deferred given telephone visit  Cancer screening:       Cervical - outside age screening       Breast - outside age screening       Colon - TAs seen on last colonoscopy in 8/2017 --  repeat recommended 8/2020 (discussed today)       Lung cancer - not indicated   Contraception: postmenopausal  Bones: 2003 dexa wnl --> dexa order pending; patient was 59 at time of first dexa  Hepatitis C (born 1947-65): negative 9/2000  Immunizations: UTD  Other: --     Outside providers  none    Needed Screening/Treatment:   Colorectal cancer screening   Needed follow up:  None    See orders.   See Patient Instructions section.   Return in about 1 day (around 12/30/2020) for LE swelling; if possible, please make establish care appointment with  new provider as well per pt.

## 2021-04-27 NOTE — TELEPHONE ENCOUNTER
----- Message from Mateo Guy DO sent at 4/26/2021  3:58 PM EDT -----  chronic L5 radiculopathy in the right lower extremity, without ongoing denervation  In addition, there is evidence to support an underlying generalized, mild, predominantly axonal sensory-motor neuropathy  Would recommend updating his lumbar spine MRI at this point  If he's agreeable I'll place the order

## 2021-04-27 NOTE — TELEPHONE ENCOUNTER
S/w pt and advised of JE's notation  RN provided central scheduling phone # to pt, pt will call to schedule MRI

## 2021-05-10 ENCOUNTER — APPOINTMENT (OUTPATIENT)
Dept: LAB | Facility: MEDICAL CENTER | Age: 66
End: 2021-05-10
Payer: MEDICARE

## 2021-05-10 DIAGNOSIS — R73.9 HYPERGLYCEMIA: ICD-10-CM

## 2021-05-10 DIAGNOSIS — I10 ESSENTIAL HYPERTENSION: ICD-10-CM

## 2021-05-10 DIAGNOSIS — E78.2 MIXED HYPERLIPIDEMIA: ICD-10-CM

## 2021-05-10 LAB
ALBUMIN SERPL BCP-MCNC: 3.5 G/DL (ref 3.5–5)
ALP SERPL-CCNC: 78 U/L (ref 46–116)
ALT SERPL W P-5'-P-CCNC: 16 U/L (ref 12–78)
ANION GAP SERPL CALCULATED.3IONS-SCNC: 6 MMOL/L (ref 4–13)
AST SERPL W P-5'-P-CCNC: 10 U/L (ref 5–45)
BASOPHILS # BLD AUTO: 0.03 THOUSANDS/ΜL (ref 0–0.1)
BASOPHILS NFR BLD AUTO: 1 % (ref 0–1)
BILIRUB SERPL-MCNC: 0.57 MG/DL (ref 0.2–1)
BUN SERPL-MCNC: 11 MG/DL (ref 5–25)
CALCIUM SERPL-MCNC: 8.7 MG/DL (ref 8.3–10.1)
CHLORIDE SERPL-SCNC: 107 MMOL/L (ref 100–108)
CHOLEST SERPL-MCNC: 122 MG/DL (ref 50–200)
CO2 SERPL-SCNC: 26 MMOL/L (ref 21–32)
CREAT SERPL-MCNC: 0.73 MG/DL (ref 0.6–1.3)
EOSINOPHIL # BLD AUTO: 0.05 THOUSAND/ΜL (ref 0–0.61)
EOSINOPHIL NFR BLD AUTO: 1 % (ref 0–6)
ERYTHROCYTE [DISTWIDTH] IN BLOOD BY AUTOMATED COUNT: 12.7 % (ref 11.6–15.1)
EST. AVERAGE GLUCOSE BLD GHB EST-MCNC: 108 MG/DL
GFR SERPL CREATININE-BSD FRML MDRD: 95 ML/MIN/1.73SQ M
GLUCOSE P FAST SERPL-MCNC: 96 MG/DL (ref 65–99)
HBA1C MFR BLD: 5.4 %
HCT VFR BLD AUTO: 45.4 % (ref 36.5–49.3)
HDLC SERPL-MCNC: 34 MG/DL
HGB BLD-MCNC: 14.8 G/DL (ref 12–17)
IMM GRANULOCYTES # BLD AUTO: 0.01 THOUSAND/UL (ref 0–0.2)
IMM GRANULOCYTES NFR BLD AUTO: 0 % (ref 0–2)
LDLC SERPL CALC-MCNC: 75 MG/DL (ref 0–100)
LYMPHOCYTES # BLD AUTO: 1.35 THOUSANDS/ΜL (ref 0.6–4.47)
LYMPHOCYTES NFR BLD AUTO: 33 % (ref 14–44)
MCH RBC QN AUTO: 33 PG (ref 26.8–34.3)
MCHC RBC AUTO-ENTMCNC: 32.6 G/DL (ref 31.4–37.4)
MCV RBC AUTO: 101 FL (ref 82–98)
MONOCYTES # BLD AUTO: 0.31 THOUSAND/ΜL (ref 0.17–1.22)
MONOCYTES NFR BLD AUTO: 8 % (ref 4–12)
NEUTROPHILS # BLD AUTO: 2.31 THOUSANDS/ΜL (ref 1.85–7.62)
NEUTS SEG NFR BLD AUTO: 57 % (ref 43–75)
NONHDLC SERPL-MCNC: 88 MG/DL
NRBC BLD AUTO-RTO: 0 /100 WBCS
PLATELET # BLD AUTO: 199 THOUSANDS/UL (ref 149–390)
PMV BLD AUTO: 9.7 FL (ref 8.9–12.7)
POTASSIUM SERPL-SCNC: 3.7 MMOL/L (ref 3.5–5.3)
PROT SERPL-MCNC: 6.5 G/DL (ref 6.4–8.2)
RBC # BLD AUTO: 4.49 MILLION/UL (ref 3.88–5.62)
SODIUM SERPL-SCNC: 139 MMOL/L (ref 136–145)
TRIGL SERPL-MCNC: 63 MG/DL
TSH SERPL DL<=0.05 MIU/L-ACNC: 1.16 UIU/ML (ref 0.36–3.74)
WBC # BLD AUTO: 4.06 THOUSAND/UL (ref 4.31–10.16)

## 2021-05-10 PROCEDURE — 80053 COMPREHEN METABOLIC PANEL: CPT

## 2021-05-10 PROCEDURE — 85025 COMPLETE CBC W/AUTO DIFF WBC: CPT

## 2021-05-10 PROCEDURE — 83036 HEMOGLOBIN GLYCOSYLATED A1C: CPT

## 2021-05-10 PROCEDURE — 80061 LIPID PANEL: CPT

## 2021-05-10 PROCEDURE — 36415 COLL VENOUS BLD VENIPUNCTURE: CPT

## 2021-05-10 PROCEDURE — 84443 ASSAY THYROID STIM HORMONE: CPT

## 2021-05-11 ENCOUNTER — HOSPITAL ENCOUNTER (OUTPATIENT)
Dept: MRI IMAGING | Facility: HOSPITAL | Age: 66
Discharge: HOME | End: 2021-05-11
Attending: PHYSICAL MEDICINE & REHABILITATION
Payer: MEDICARE

## 2021-05-11 DIAGNOSIS — M54.16 RIGHT LUMBAR RADICULOPATHY: ICD-10-CM

## 2021-05-11 PROCEDURE — 72148 MRI LUMBAR SPINE W/O DYE: CPT

## 2021-05-11 PROCEDURE — G1004 CDSM NDSC: HCPCS

## 2021-05-12 ENCOUNTER — OFFICE VISIT (OUTPATIENT)
Dept: FAMILY MEDICINE CLINIC | Facility: CLINIC | Age: 66
End: 2021-05-12
Payer: MEDICARE

## 2021-05-12 VITALS
TEMPERATURE: 97.7 F | DIASTOLIC BLOOD PRESSURE: 78 MMHG | BODY MASS INDEX: 32.38 KG/M2 | SYSTOLIC BLOOD PRESSURE: 132 MMHG | HEIGHT: 69 IN | WEIGHT: 218.6 LBS

## 2021-05-12 DIAGNOSIS — R73.9 HYPERGLYCEMIA: ICD-10-CM

## 2021-05-12 DIAGNOSIS — K21.9 GASTROESOPHAGEAL REFLUX DISEASE WITHOUT ESOPHAGITIS: ICD-10-CM

## 2021-05-12 DIAGNOSIS — I10 ESSENTIAL HYPERTENSION: Primary | ICD-10-CM

## 2021-05-12 DIAGNOSIS — E66.09 CLASS 1 OBESITY DUE TO EXCESS CALORIES WITHOUT SERIOUS COMORBIDITY WITH BODY MASS INDEX (BMI) OF 32.0 TO 32.9 IN ADULT: ICD-10-CM

## 2021-05-12 DIAGNOSIS — G89.4 PAIN SYNDROME, CHRONIC: ICD-10-CM

## 2021-05-12 DIAGNOSIS — M13.0 POLYARTICULAR ARTHRITIS: ICD-10-CM

## 2021-05-12 DIAGNOSIS — M54.16 RADICULOPATHY, LUMBAR REGION: ICD-10-CM

## 2021-05-12 DIAGNOSIS — E78.2 MIXED HYPERLIPIDEMIA: ICD-10-CM

## 2021-05-12 PROCEDURE — 99214 OFFICE O/P EST MOD 30 MIN: CPT | Performed by: FAMILY MEDICINE

## 2021-05-12 NOTE — PROGRESS NOTES
Assessment/Plan:    Essential hypertension   stable with dietary/ lifestyle changes  Recheck in 6 months    Hyperglycemia   HB A1c was 5 4, continue to monitor  Recheck 6 months    Mixed hyperlipidemia   Well controlled on simvastatin 20 mg daily, continue same  Recheck in 6 months    Gastroesophageal reflux disease without esophagitis   Stable on p r n  omeprazole  Has only taken 2 doses since his last office visit  Class 1 obesity due to excess calories without serious comorbidity with body mass index (BMI) of 32 0 to 32 9 in adult    Severely limited with his ability do physical activity  Trying to watch his diet  Radiculopathy, lumbar region    Abnormal EMG,  Had an MRI, report is pending  Gabapentin did not help, Lyrica was not helpful either  Diagnoses and all orders for this visit:    Essential hypertension  -     CBC and differential; Future  -     Comprehensive metabolic panel; Future  -     Lipid panel; Future  -     TSH, 3rd generation; Future    Hyperglycemia  -     Comprehensive metabolic panel; Future  -     Hemoglobin A1C; Future    Mixed hyperlipidemia  -     CBC and differential; Future  -     Comprehensive metabolic panel; Future  -     Lipid panel; Future  -     TSH, 3rd generation; Future    Polyarticular arthritis    Pain syndrome, chronic    Radiculopathy, lumbar region    Gastroesophageal reflux disease without esophagitis    Class 1 obesity due to excess calories without serious comorbidity with body mass index (BMI) of 32 0 to 32 9 in adult          Subjective:   Chief Complaint   Patient presents with    Hypertension    Hyperlipidemia    GERD     no refills needed  review blood work           Patient ID: Louis Serna is a 74 y o  male  He is here for follow-up on his blood pressure, cholesterol, GERD, and update me on his back/ radiculopathy  He had recent lab work which he would like to review    Heartburn is fairly well controlled, he has only had to take a couple doses since I last saw him  Continues to have difficulty with back pain radiating down his right leg  He had an abnormal EMG suggestive of a chronic L5 radiculopathy  He is scheduled for follow-up with pain management  He had an MRI done the other day, the report is still pending  The following portions of the patient's history were reviewed and updated as appropriate: allergies, current medications, past family history, past medical history, past social history, past surgical history and problem list     Review of Systems   Constitutional: Positive for fatigue  Negative for appetite change, chills, diaphoresis, fever and unexpected weight change  HENT: Negative for congestion, ear pain, hearing loss, nosebleeds, postnasal drip, rhinorrhea, sinus pressure, sore throat, tinnitus and trouble swallowing  Eyes: Negative for photophobia, pain, discharge, redness, itching and visual disturbance  Respiratory: Negative for cough, chest tightness, shortness of breath and wheezing  Cardiovascular: Negative for chest pain, palpitations and leg swelling  Denies orthopnea , dyspnea on exertion   Gastrointestinal: Negative for abdominal distention, abdominal pain, blood in stool, constipation, diarrhea, nausea and vomiting  Endocrine: Negative  Genitourinary: Negative for difficulty urinating, dysuria, flank pain, frequency, hematuria and urgency  Denies nocturia , erectile dysfunction   Musculoskeletal: Positive for arthralgias, back pain and gait problem  Negative for joint swelling and myalgias  Skin: Negative for pallor, rash and wound  Denies skin lesions   Allergic/Immunologic: Negative for environmental allergies, food allergies and immunocompromised state  Neurological: Positive for weakness and numbness  Negative for dizziness, tremors, seizures, syncope, speech difficulty and headaches  Hematological: Negative for adenopathy  Does not bruise/bleed easily  Psychiatric/Behavioral: Negative for behavioral problems, confusion, sleep disturbance and suicidal ideas  The patient is not nervous/anxious  Objective:      /78   Temp 97 7 °F (36 5 °C)   Ht 5' 9" (1 753 m)   Wt 99 2 kg (218 lb 9 6 oz)   BMI 32 28 kg/m²          Physical Exam  Vitals signs and nursing note reviewed  Constitutional:       Appearance: He is well-developed  He is obese  HENT:      Head: Normocephalic and atraumatic  Right Ear: Tympanic membrane and ear canal normal       Left Ear: Tympanic membrane and ear canal normal       Nose: Nose normal    Eyes:      Conjunctiva/sclera: Conjunctivae normal       Pupils: Pupils are equal, round, and reactive to light  Neck:      Musculoskeletal: Normal range of motion and neck supple  Thyroid: No thyromegaly  Vascular: No JVD  Trachea: No tracheal deviation  Cardiovascular:      Rate and Rhythm: Normal rate and regular rhythm  Heart sounds: No murmur  Pulmonary:      Effort: Pulmonary effort is normal       Breath sounds: Normal breath sounds  No wheezing or rales  Abdominal:      General: Bowel sounds are normal       Palpations: Abdomen is soft  There is no mass  Tenderness: There is no abdominal tenderness  There is no guarding or rebound  Musculoskeletal:         General: No deformity  Lumbar back: He exhibits decreased range of motion and tenderness  Comments:   Decreased sensation in the right foot  Lymphadenopathy:      Cervical: No cervical adenopathy  Skin:     General: Skin is warm and dry  Findings: No lesion or rash  Nails: There is no clubbing  Neurological:      Mental Status: He is alert and oriented to person, place, and time  Cranial Nerves: No cranial nerve deficit  Motor: No abnormal muscle tone  Coordination: Coordination normal       Deep Tendon Reflexes: Reflexes are normal and symmetric   Reflexes normal    Psychiatric: Judgment: Judgment normal

## 2021-05-12 NOTE — ASSESSMENT & PLAN NOTE
Abnormal EMG,  Had an MRI, report is pending  Gabapentin did not help, Lyrica was not helpful either

## 2021-05-17 ENCOUNTER — IMMUNIZATIONS (OUTPATIENT)
Dept: FAMILY MEDICINE CLINIC | Facility: HOSPITAL | Age: 66
End: 2021-05-17

## 2021-05-17 ENCOUNTER — TELEPHONE (OUTPATIENT)
Dept: PAIN MEDICINE | Facility: CLINIC | Age: 66
End: 2021-05-17

## 2021-05-17 DIAGNOSIS — Z23 ENCOUNTER FOR IMMUNIZATION: Primary | ICD-10-CM

## 2021-05-17 PROCEDURE — 91301 SARS-COV-2 / COVID-19 MRNA VACCINE (MODERNA) 100 MCG: CPT

## 2021-05-17 PROCEDURE — 0011A SARS-COV-2 / COVID-19 MRNA VACCINE (MODERNA) 100 MCG: CPT

## 2021-05-19 ENCOUNTER — TELEPHONE (OUTPATIENT)
Dept: PAIN MEDICINE | Facility: MEDICAL CENTER | Age: 66
End: 2021-05-19

## 2021-05-19 NOTE — TELEPHONE ENCOUNTER
Called patient and scheduled:     RIGHT L5 LESI on 6/29  4w f/u with Elliott Campuzano on 7/26    Reviewed instructions: , NPO 1 hour prior, loose-fitting/comfortable pants, if ill/fever/infx/abx to call and reschedule  No immunizations or vaccinations 2w prior/after steroid injections  Hold the following meds: denies    Patient stated verbal understanding

## 2021-05-19 NOTE — TELEPHONE ENCOUNTER
RN s/w pt regarding previous  Per pt he would like to schedule the L5 LESI and does still have the leg pain and he is having trouble walking due to the pain  Pt denied taking any prescription blood thinning medications      Pt had his first Covid Vaccine on 5/17 and is scheduled for his second vaccine on June 12th    --please call pt to schedule L5 LESI--

## 2021-05-19 NOTE — TELEPHONE ENCOUNTER
----- Message from Cj Cristina DO sent at 5/19/2021  7:44 AM EDT -----  Scoliosis, multilevel spondylolisthesis and spondylosis with eccentric disc osteophyte complexes resulting in moderate spinal stenosis and moderate to severe foraminal encroachment as described above  The overall degree of degenerative disease is not significantly changed when comparing to the June 29, 2017 study  CAN OFFER HIM RIGHT L5 LESI IF STILL HAVING LEG PAIN

## 2021-06-12 ENCOUNTER — IMMUNIZATIONS (OUTPATIENT)
Dept: FAMILY MEDICINE CLINIC | Facility: HOSPITAL | Age: 66
End: 2021-06-12

## 2021-06-12 DIAGNOSIS — Z23 ENCOUNTER FOR IMMUNIZATION: Primary | ICD-10-CM

## 2021-06-12 PROCEDURE — 91301 SARS-COV-2 / COVID-19 MRNA VACCINE (MODERNA) 100 MCG: CPT

## 2021-06-12 PROCEDURE — 0012A SARS-COV-2 / COVID-19 MRNA VACCINE (MODERNA) 100 MCG: CPT

## 2021-06-18 ENCOUNTER — HOSPITAL ENCOUNTER (OUTPATIENT)
Dept: RADIOLOGY | Facility: MEDICAL CENTER | Age: 66
Discharge: HOME | End: 2021-06-18
Attending: PHYSICAL MEDICINE & REHABILITATION
Payer: MEDICARE

## 2021-06-18 VITALS
TEMPERATURE: 97.4 F | DIASTOLIC BLOOD PRESSURE: 80 MMHG | OXYGEN SATURATION: 98 % | RESPIRATION RATE: 20 BRPM | SYSTOLIC BLOOD PRESSURE: 151 MMHG | HEART RATE: 56 BPM

## 2021-06-18 DIAGNOSIS — M48.061 SPINAL STENOSIS OF LUMBAR REGION WITH RADICULOPATHY: ICD-10-CM

## 2021-06-18 DIAGNOSIS — M54.16 SPINAL STENOSIS OF LUMBAR REGION WITH RADICULOPATHY: ICD-10-CM

## 2021-06-18 DIAGNOSIS — M43.16 SPONDYLOLISTHESIS OF LUMBAR REGION: ICD-10-CM

## 2021-06-18 PROCEDURE — 62323 NJX INTERLAMINAR LMBR/SAC: CPT | Performed by: PHYSICAL MEDICINE & REHABILITATION

## 2021-06-18 RX ORDER — METHYLPREDNISOLONE ACETATE 80 MG/ML
80 INJECTION, SUSPENSION INTRA-ARTICULAR; INTRALESIONAL; INTRAMUSCULAR; PARENTERAL; SOFT TISSUE ONCE
Status: COMPLETED | OUTPATIENT
Start: 2021-06-18 | End: 2021-06-18

## 2021-06-18 RX ADMIN — IOHEXOL 1 ML: 300 INJECTION, SOLUTION INTRAVENOUS at 09:42

## 2021-06-18 RX ADMIN — METHYLPREDNISOLONE ACETATE 80 MG: 80 INJECTION, SUSPENSION INTRA-ARTICULAR; INTRALESIONAL; INTRAMUSCULAR; PARENTERAL; SOFT TISSUE at 09:42

## 2021-06-18 NOTE — H&P
History of Present Illness: The patient is a 74 y o  male who presents with complaints of right back and leg pain    Patient Active Problem List   Diagnosis    Chronic left-sided low back pain    Essential hypertension    Hyperglycemia    Mixed hyperlipidemia    Polyarticular arthritis    Pain syndrome, chronic    Psoriasis    Protrusion of lumbar intervertebral disc    Radiculopathy, lumbar region    Lumbar foraminal stenosis    Osteoarthritis of knee    Gastroesophageal reflux disease without esophagitis    Class 1 obesity due to excess calories without serious comorbidity with body mass index (BMI) of 32 0 to 32 9 in adult    Numbness of left hand    Trigger finger of left thumb    Left carpal tunnel syndrome    Ulnar neuropathy of left upper extremity    Right calf pain    Neuropathic pain       Past Medical History:   Diagnosis Date    Bilateral carpal tunnel syndrome     Chronic pain disorder     low back    GERD (gastroesophageal reflux disease)     Hyperlipidemia     Low back pain     Lumbar disc disease     Lumbar spinal stenosis     Neck pain     had cervical fusion    Obesity     Psoriasis     right elbow    Tremor     left leg    Wears dentures     full dentures    Wears glasses        Past Surgical History:   Procedure Laterality Date    CERVICAL LAMINECTOMY      COLONOSCOPY  07/2015    multiple polyps, repeat in 3 years    JOINT REPLACEMENT      right TKR    KNEE SURGERY Right 2002    20 years ago-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    LAMINECTOMY      ORTHOPEDIC SURGERY      POPLITEAL SYNOVIAL CYST EXCISION      last assessed: 04/22/2015    NM REVISE MEDIAN N/CARPAL TUNNEL SURG Left 9/25/2020    Procedure: RELEASE CARPAL TUNNEL;  Surgeon: Julián Mchugh MD;  Location: [facility];  Service: Orthopedics    NM REVISE MEDIAN N/CARPAL TUNNEL SURG Right 10/20/2020    Procedure: RELEASE CARPAL TUNNEL - Open;  Surgeon:  Julián Mchugh MD;  Location: [facility];  Service: Orthopedics    SPINE SURGERY      TONSILLECTOMY AND ADENOIDECTOMY      last assessed: 04/22/2015    TOTAL KNEE ARTHROPLASTY      last assessed: 05/12/2015         Current Outpatient Medications:     adalimumab (HUMIRA) 40 mg/0 8 mL PSKT, Inject 40 mg under the skin every 14 (fourteen) days, Disp: , Rfl:     omeprazole (PriLOSEC) 40 MG capsule, Take 1 capsule (40 mg total) by mouth daily before breakfast, Disp: 90 capsule, Rfl: 1    pregabalin (LYRICA) 75 mg capsule, Take 1 capsule (75 mg total) by mouth 2 (two) times a day, Disp: 60 capsule, Rfl: 1    simvastatin (ZOCOR) 20 mg tablet, TAKE 1 TABLET BY MOUTH  DAILY, Disp: 90 tablet, Rfl: 3    Current Facility-Administered Medications:     iohexol (OMNIPAQUE) 300 mg/mL injection 50 mL, 50 mL, Epidural, Once, Vicki Yary, DO    methylPREDNISolone acetate (DEPO-MEDROL) injection 80 mg, 80 mg, Epidural, Once, Vicki Yary, DO    No Known Allergies    Physical Exam:   Vitals:    06/18/21 0914   BP: 161/79   Pulse: 61   Resp: 20   Temp: (!) 97 4 °F (36 3 °C)   SpO2: 98%     General: Awake, Alert, Oriented x 3, Mood and affect appropriate  Respiratory: Respirations even and unlabored  Cardiovascular: Peripheral pulses intact; no edema  Musculoskeletal Exam: right back and leg pain    ASA Score: 2    Patient/Chart Verification  Patient ID Verified: Verbal  ID Band Applied: No  Consents Confirmed: Procedural  H&P( within 30 days) Verified: To be obtained in the Pre-Procedure area  Interval H&P(within 24 hr) Complete (required for Outpatients and Surgery Admit only): To be obtained in the Pre-Procedure area  Allergies Reviewed: Yes  Anticoag/NSAID held?: NA  Currently on antibiotics?: No    Assessment:   1  Spondylolisthesis of lumbar region    2   Spinal stenosis of lumbar region with radiculopathy        Plan: RIGHT L5 LESI

## 2021-06-18 NOTE — DISCHARGE INSTRUCTIONS
Epidural Steroid Injection   WHAT YOU NEED TO KNOW:   An epidural steroid injection (BLADE) is a procedure to inject steroid medicine into the epidural space  The epidural space is between your spinal cord and vertebrae  Steroids reduce inflammation and fluid buildup in your spine that may be causing pain  You may be given pain medicine along with the steroids  ACTIVITY  · Do not drive or operate machinery today  · No strenuous activity today - bending, lifting, etc   · You may resume normal activites starting tomorrow - start slowly and as tolerated  · You may shower today, but no tub baths or hot tubs  · You may have numbness for several hours from the local anesthetic  Please use caution and common sense, especially with weight-bearing activities  CARE OF THE INJECTION SITE  · If you have soreness or pain, apply ice to the area today (20 minutes on/20 minutes off)  · Starting tomorrow, you may use warm, moist heat or ice if needed  · You may have an increase or change in your discomfort for 36-48 hours after your treatment  · Apply ice and continue with any pain medication you have been prescribed  · Notify [facility] if you have any of the following: redness, drainage, swelling, headache, stiff neck or fever above 100°F     SPECIAL INSTRUCTIONS  · Our office will contact you in approximately 7 days for a progress report  MEDICATIONS  · Continue to take all routine medications  · Our office may have instructed you to hold some medications  As no general anesthesia was used in today's procedure, you should not experience any side effects related to anesthesia  If you have a problem specifically related to your procedure, please call our office at [phone]  Problems not related to your procedure should be directed to your primary care physician

## 2021-06-25 ENCOUNTER — TELEPHONE (OUTPATIENT)
Dept: PAIN MEDICINE | Facility: CLINIC | Age: 66
End: 2021-06-25

## 2021-06-25 NOTE — TELEPHONE ENCOUNTER
patient stated no improvement .. 5/10 pain level..If patient does no activity , he has pain level 2/10... leg is still locking up         [phone]

## 2021-07-26 ENCOUNTER — OFFICE VISIT (OUTPATIENT)
Dept: PAIN MEDICINE | Facility: MEDICAL CENTER | Age: 66
End: 2021-07-26
Payer: MEDICARE

## 2021-07-26 VITALS
SYSTOLIC BLOOD PRESSURE: 112 MMHG | HEIGHT: 69 IN | HEART RATE: 70 BPM | WEIGHT: 211 LBS | TEMPERATURE: 97.7 F | DIASTOLIC BLOOD PRESSURE: 68 MMHG | BODY MASS INDEX: 31.25 KG/M2

## 2021-07-26 DIAGNOSIS — M79.661 RIGHT CALF PAIN: ICD-10-CM

## 2021-07-26 DIAGNOSIS — M79.18 MYOFASCIAL PAIN SYNDROME: ICD-10-CM

## 2021-07-26 DIAGNOSIS — G89.4 PAIN SYNDROME, CHRONIC: ICD-10-CM

## 2021-07-26 DIAGNOSIS — M54.16 SPINAL STENOSIS OF LUMBAR REGION WITH RADICULOPATHY: Primary | ICD-10-CM

## 2021-07-26 DIAGNOSIS — M79.2 NEUROPATHIC PAIN: ICD-10-CM

## 2021-07-26 DIAGNOSIS — M54.16 RADICULOPATHY, LUMBAR REGION: ICD-10-CM

## 2021-07-26 DIAGNOSIS — M51.26 PROTRUSION OF LUMBAR INTERVERTEBRAL DISC: ICD-10-CM

## 2021-07-26 DIAGNOSIS — M48.061 SPINAL STENOSIS OF LUMBAR REGION WITH RADICULOPATHY: Primary | ICD-10-CM

## 2021-07-26 PROCEDURE — 99213 OFFICE O/P EST LOW 20 MIN: CPT | Performed by: NURSE PRACTITIONER

## 2021-07-26 RX ORDER — METHOCARBAMOL 500 MG/1
500 TABLET, FILM COATED ORAL 3 TIMES DAILY
Qty: 90 TABLET | Refills: 0 | Status: SHIPPED | OUTPATIENT
Start: 2021-07-26 | End: 2021-08-23 | Stop reason: CLARIF

## 2021-07-26 RX ORDER — DULOXETIN HYDROCHLORIDE 30 MG/1
30 CAPSULE, DELAYED RELEASE ORAL DAILY
Qty: 30 CAPSULE | Refills: 0 | Status: SHIPPED | OUTPATIENT
Start: 2021-07-26 | End: 2021-08-23 | Stop reason: SDUPTHER

## 2021-07-26 NOTE — PROGRESS NOTES
Assessment  1  Spinal stenosis of lumbar region with radiculopathy    2  Radiculopathy, lumbar region    3  Protrusion of lumbar intervertebral disc    4  Pain syndrome, chronic    5  Neuropathic pain    6  Right calf pain    7  Myofascial pain syndrome        Plan   at this point we will initiate duloxetine 30 mg 1 tablet daily as the patient does not feel that he got any relief from Lyrica  He was educated on the most common side effects which are nausea and dizziness  Initiate methocarbamol 500 mg 1 tablet up to 3 times daily as he has been having a lot of cramping in his right leg  He was educated on the most common side effects which is drowsiness  Will hold off on any repeat right L5 lumbar epidural steroid injections as he feels he is doing pretty good from that standpoint  Patient will follow-up in 4 weeks to re-evaluate new medications  My impressions and treatment recommendations were discussed in detail with the patient who verbalized understanding and had no further questions  Discharge instructions were provided  I personally saw and examined the patient and I agree with the above discussed plan of care  No orders of the defined types were placed in this encounter  New Medications Ordered This Visit   Medications    DULoxetine (CYMBALTA) 30 mg delayed release capsule     Sig: Take 1 capsule (30 mg total) by mouth daily     Dispense:  30 capsule     Refill:  0    methocarbamol (ROBAXIN) 500 mg tablet     Sig: Take 1 tablet (500 mg total) by mouth 3 (three) times a day     Dispense:  90 tablet     Refill:  0       History of Present Illness    Aki Gibson is a 74 y o  male   Presents for follow-up related to his right low back and leg pain  Today he rates his pain 4/10 this is constant most bothersome in the evening  He describes the pain as shooting, and numbness        He is status post a right L5 lumbar epidural steroid injection on June 18, 2021 with Dr Ava Murguia he tells me that  he feels pretty good from the injection the most bothersome thing for him is the numbness that he has in his right foot and the cramping his been experiencing in his right calf      patient tells me that he did try the Lyrica that was prescribed to him by our office and he completed the bottle but he did not notice any improvement so he did not refill it  I have personally reviewed and/or updated the patient's past medical history, past surgical history, family history, social history, current medications, allergies, and vital signs today  Review of Systems   Respiratory: Negative for shortness of breath  Cardiovascular: Negative for chest pain  Gastrointestinal: Negative for constipation, diarrhea, nausea and vomiting  Musculoskeletal: Positive for back pain, gait problem and joint swelling  Negative for arthralgias and myalgias  Skin: Negative for rash  Neurological: Negative for dizziness, seizures and weakness  All other systems reviewed and are negative        Patient Active Problem List   Diagnosis    Chronic left-sided low back pain    Essential hypertension    Hyperglycemia    Mixed hyperlipidemia    Polyarticular arthritis    Pain syndrome, chronic    Psoriasis    Protrusion of lumbar intervertebral disc    Radiculopathy, lumbar region    Spinal stenosis of lumbar region with radiculopathy    Osteoarthritis of knee    Gastroesophageal reflux disease without esophagitis    Class 1 obesity due to excess calories without serious comorbidity with body mass index (BMI) of 32 0 to 32 9 in adult    Numbness of left hand    Trigger finger of left thumb    Left carpal tunnel syndrome    Ulnar neuropathy of left upper extremity    Right calf pain    Neuropathic pain       Past Medical History:   Diagnosis Date    Arthritis 2013    Bilateral carpal tunnel syndrome     Chronic pain disorder     low back    GERD (gastroesophageal reflux disease)     Hyperlipidemia     Low back pain     Lumbar disc disease     Lumbar spinal stenosis     Neck pain     had cervical fusion    Obesity     Psoriasis     right elbow    Tremor     left leg    Wears dentures     full dentures    Wears glasses        Past Surgical History:   Procedure Laterality Date    CERVICAL LAMINECTOMY      COLONOSCOPY  2015    multiple polyps, repeat in 3 years    JOINT REPLACEMENT      right TKR    KNEE SURGERY Right     20 years ago-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    LAMINECTOMY      ORTHOPEDIC SURGERY      POPLITEAL SYNOVIAL CYST EXCISION      last assessed: 2015    [place] REVISE MEDIAN N/CARPAL TUNNEL SURG Left 2020    Procedure: RELEASE CARPAL TUNNEL;  Surgeon: Treva Wright MD;  [facility];  Service: Orthopedics    VT REVISE MEDIAN N/CARPAL TUNNEL SURG Right 10/20/2020    Procedure: RELEASE CARPAL TUNNEL - Open;  Surgeon:  Treva Wright MD;  [facility];  Service: Orthopedics    SPINE SURGERY      TONSILLECTOMY AND ADENOIDECTOMY      last assessed: 2015    TOTAL KNEE ARTHROPLASTY      last assessed: 2015       Family History   Problem Relation Age of Onset    Allergies Father         seasonal    Lung cancer Family     No Known Problems Mother        Social History     Occupational History    Not on file   Tobacco Use    Smoking status: Former Smoker     Packs/day: 2 00     Years: 10 00     Pack years: 20 00     Types: Cigarettes     Quit date: 2013     Years since quittin 8    Smokeless tobacco: Never Used   Vaping Use    Vaping Use: Never used   Substance and Sexual Activity    Alcohol use: Not Currently     Alcohol/week: 12 0 standard drinks     Types: 12 Cans of beer per week    Drug use: Never    Sexual activity: Not Currently     Partners: Female, Male       Current Outpatient Medications on File Prior to Visit   Medication Sig    adalimumab (HUMIRA) 40 mg/0 8 mL PSKT Inject 40 mg under the skin every 14 (fourteen) days    simvastatin (ZOCOR) 20 mg tablet TAKE 1 TABLET BY MOUTH  DAILY    omeprazole (PriLOSEC) 40 MG capsule Take 1 capsule (40 mg total) by mouth daily before breakfast (Patient not taking: Reported on 7/26/2021)    [DISCONTINUED] pregabalin (LYRICA) 75 mg capsule Take 1 capsule (75 mg total) by mouth 2 (two) times a day     No current facility-administered medications on file prior to visit  No Known Allergies    Physical Exam    /68   Pulse 70   Temp 97 7 °F (36 5 °C)   Ht 5' 9" (1 753 m)   Wt 95 7 kg (211 lb)   BMI 31 16 kg/m²     Constitutional: normal, well developed, well nourished, alert, in no distress and non-toxic and no overt pain behavior    Eyes: anicteric  HEENT: grossly intact  Neck: supple, symmetric, trachea midline and no masses   Pulmonary:even and unlabored  Cardiovascular:No edema or pitting edema present  Skin:Normal without rashes or lesions and well hydrated  Psychiatric:Mood and affect appropriate  Neurologic:Cranial Nerves II-XII grossly intact  Musculoskeletal:normal    Imaging

## 2021-08-23 ENCOUNTER — OFFICE VISIT (OUTPATIENT)
Dept: PAIN MEDICINE | Facility: MEDICAL CENTER | Age: 66
End: 2021-08-23
Payer: MEDICARE

## 2021-08-23 VITALS
HEIGHT: 69 IN | WEIGHT: 210.6 LBS | BODY MASS INDEX: 31.19 KG/M2 | SYSTOLIC BLOOD PRESSURE: 125 MMHG | HEART RATE: 82 BPM | DIASTOLIC BLOOD PRESSURE: 72 MMHG

## 2021-08-23 DIAGNOSIS — G89.29 CHRONIC LEFT-SIDED LOW BACK PAIN WITH LEFT-SIDED SCIATICA: ICD-10-CM

## 2021-08-23 DIAGNOSIS — M54.16 SPINAL STENOSIS OF LUMBAR REGION WITH RADICULOPATHY: Primary | ICD-10-CM

## 2021-08-23 DIAGNOSIS — M51.26 PROTRUSION OF LUMBAR INTERVERTEBRAL DISC: ICD-10-CM

## 2021-08-23 DIAGNOSIS — M79.2 NEUROPATHIC PAIN: ICD-10-CM

## 2021-08-23 DIAGNOSIS — M79.18 MYOFASCIAL PAIN SYNDROME: ICD-10-CM

## 2021-08-23 DIAGNOSIS — M48.061 SPINAL STENOSIS OF LUMBAR REGION WITH RADICULOPATHY: Primary | ICD-10-CM

## 2021-08-23 DIAGNOSIS — M54.16 RADICULOPATHY, LUMBAR REGION: ICD-10-CM

## 2021-08-23 DIAGNOSIS — M79.661 RIGHT CALF PAIN: ICD-10-CM

## 2021-08-23 DIAGNOSIS — M54.42 CHRONIC LEFT-SIDED LOW BACK PAIN WITH LEFT-SIDED SCIATICA: ICD-10-CM

## 2021-08-23 DIAGNOSIS — G89.4 PAIN SYNDROME, CHRONIC: ICD-10-CM

## 2021-08-23 PROCEDURE — 99213 OFFICE O/P EST LOW 20 MIN: CPT | Performed by: NURSE PRACTITIONER

## 2021-08-23 RX ORDER — TIZANIDINE 4 MG/1
4 TABLET ORAL 3 TIMES DAILY
Qty: 90 TABLET | Refills: 2 | Status: SHIPPED | OUTPATIENT
Start: 2021-08-23 | End: 2021-11-22 | Stop reason: SDUPTHER

## 2021-08-23 RX ORDER — DULOXETIN HYDROCHLORIDE 30 MG/1
30 CAPSULE, DELAYED RELEASE ORAL DAILY
Qty: 30 CAPSULE | Refills: 2 | Status: SHIPPED | OUTPATIENT
Start: 2021-08-23 | End: 2021-11-22 | Stop reason: CLARIF

## 2021-08-23 NOTE — PROGRESS NOTES
Assessment:  1  Spinal stenosis of lumbar region with radiculopathy    2  Radiculopathy, lumbar region    3  Protrusion of lumbar intervertebral disc    4  Chronic left-sided low back pain with left-sided sciatica    5  Pain syndrome, chronic    6  Neuropathic pain    7  Myofascial pain syndrome    8  Right calf pain        Plan:    Continue with duloxetine this was refilled today  I will switch his methocarbamol to tizanidine 4 mg 1 tablet 3 times daily as he tells me methocarbamol was not covered by his insurance when he got it filled last time  Patient feels he is still doing okay following his right L5 lumbar epidural steroid injection on June 18th    If he needs to repeat before his next office visit he will call the office  follow-up in 12 weeks for medication refills        History of Present Illness: The patient is a 74 y o  male who presents for a follow up office visit in regards to Leg Pain and Foot Pain  The patients current symptoms include  Pain rated 3/10 this is intermittent and most bothersome the evening  He describes the pain as throbbing, pressure-like, numbness, and pins and needles  Current pain medications includes:   Duloxetine 30 mg 1 tablet  Daily, and methocarbamol 500 mg 3 times daily   The patient reports that this regimen is providing  50% pain relief  The patient is reporting no side effects from this pain medication regimen  I have personally reviewed and/or updated the patient's past medical history, past surgical history, family history, social history, current medications, allergies, and vital signs today  Review of Systems  Review of Systems   Constitutional: Negative for fatigue and unexpected weight change  HENT: Negative for dental problem, ear pain, hearing loss and sneezing  Eyes: Negative for visual disturbance  Respiratory: Negative for cough and chest tightness  Cardiovascular: Negative for leg swelling     Gastrointestinal: Negative for anal bleeding  Endocrine: Negative for heat intolerance  Genitourinary: Negative for flank pain and genital sores  Musculoskeletal: Positive for gait problem  Skin: Negative for wound  Allergic/Immunologic: Negative for immunocompromised state  Neurological: Negative for speech difficulty and light-headedness  Hematological: Negative for adenopathy  Psychiatric/Behavioral: Negative for confusion  The patient is not hyperactive  All other systems reviewed and are negative  Past Medical History:   Diagnosis Date    Arthritis 2013    Bilateral carpal tunnel syndrome     Chronic pain disorder     low back    GERD (gastroesophageal reflux disease)     Hyperlipidemia     Low back pain     Lumbar disc disease     Lumbar spinal stenosis     Neck pain     had cervical fusion    Obesity     Psoriasis     right elbow    Tremor     left leg    Wears dentures     full dentures    Wears glasses        Past Surgical History:   Procedure Laterality Date    CERVICAL LAMINECTOMY      COLONOSCOPY  07/2015    multiple polyps, repeat in 3 years    JOINT REPLACEMENT      right TKR    KNEE SURGERY Right 2002    20 years ago-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    LAMINECTOMY      ORTHOPEDIC SURGERY      POPLITEAL SYNOVIAL CYST EXCISION      last assessed: 04/22/2015    [place] REVISE MEDIAN N/CARPAL TUNNEL SURG Left 9/25/2020    Procedure: RELEASE CARPAL TUNNEL;  Surgeon: Davi Recio MD;  [facility];  Service: Orthopedics    IA REVISE MEDIAN N/CARPAL TUNNEL SURG Right 10/20/2020    Procedure: RELEASE CARPAL TUNNEL - Open;  Surgeon:  Davi Recio MD;  [facility];  Service: Orthopedics    SPINE SURGERY      TONSILLECTOMY AND ADENOIDECTOMY      last assessed: 04/22/2015    TOTAL KNEE ARTHROPLASTY      last assessed: 05/12/2015       Family History   Problem Relation Age of Onset    Allergies Father         seasonal    Lung cancer Family     No Known Problems Mother        Social History     Occupational History    Not on file   Tobacco Use    Smoking status: Former Smoker     Packs/day: 2 00     Years: 10 00     Pack years: 20 00     Types: Cigarettes     Quit date: 2013     Years since quittin 9    Smokeless tobacco: Never Used   Vaping Use    Vaping Use: Never used   Substance and Sexual Activity    Alcohol use: Not Currently     Alcohol/week: 12 0 standard drinks     Types: 12 Cans of beer per week    Drug use: Never    Sexual activity: Not Currently     Partners: Female, Male         Current Outpatient Medications:     adalimumab (HUMIRA) 40 mg/0 8 mL PSKT, Inject 40 mg under the skin every 14 (fourteen) days, Disp: , Rfl:     DULoxetine (CYMBALTA) 30 mg delayed release capsule, Take 1 capsule (30 mg total) by mouth daily, Disp: 30 capsule, Rfl: 2    simvastatin (ZOCOR) 20 mg tablet, TAKE 1 TABLET BY MOUTH  DAILY, Disp: 90 tablet, Rfl: 3    omeprazole (PriLOSEC) 40 MG capsule, Take 1 capsule (40 mg total) by mouth daily before breakfast (Patient not taking: Reported on 2021), Disp: 90 capsule, Rfl: 1    tiZANidine (ZANAFLEX) 4 mg tablet, Take 1 tablet (4 mg total) by mouth 3 (three) times a day, Disp: 90 tablet, Rfl: 2    No Known Allergies    Physical Exam:    /72   Pulse 82   Ht 5' 9" (1 753 m)   Wt 95 5 kg (210 lb 9 6 oz)   BMI 31 10 kg/m²     Constitutional:normal, well developed, well nourished, alert, in no distress and non-toxic and no overt pain behavior  Eyes:anicteric  HEENT:grossly intact  Neck:supple, symmetric, trachea midline and no masses   Pulmonary:even and unlabored  Cardiovascular:No edema or pitting edema present  Skin:Normal without rashes or lesions and well hydrated  Psychiatric:Mood and affect appropriate  Neurologic:Cranial Nerves II-XII grossly intact  Musculoskeletal:normal    Imaging  No orders to display       No orders of the defined types were placed in this encounter

## 2021-09-28 ENCOUNTER — TELEPHONE (OUTPATIENT)
Dept: PAIN MEDICINE | Facility: MEDICAL CENTER | Age: 66
End: 2021-09-28

## 2021-09-28 NOTE — TELEPHONE ENCOUNTER
S/W pt  Pt stated he has pain in b/l sides of his back  Pt had right L5 LESI on 6/18/21 and 6/17/20 left L3-4 and L4-5 TFESI  Pt requested  injections  RIGHT SIDE  Current pain level:  5/10    Percentage relief from previous injection:  80%     Duration of pain relief from previous injection:  last few days      LEFT SIDE  Current pain level:  5/10    Percentage relief from previous injection:  at least 85%    Duration of pain relief from previous injection:  last few days      Please advise

## 2021-09-28 NOTE — TELEPHONE ENCOUNTER
----- Message from [facility] sent at 9/28/2021 11:42 AM EDT -----  Regarding: FW: Non-Urgent Medical Question  Contact: [phone]    ----- Message -----  From: Wilfrid Austin: 9/28/2021  11:26 AM EDT  To: Yolande Burch  Subject: Non-Urgent Medical Question                      ROCÍO PERRINR 1955    NEED TO SET UP SHOTS IN MY BACK  WHEN THE INS WILL PAY FOR IT   [place]   [phone]

## 2021-10-08 ENCOUNTER — HOSPITAL ENCOUNTER (OUTPATIENT)
Dept: RADIOLOGY | Facility: MEDICAL CENTER | Age: 66
Discharge: HOME | End: 2021-10-08
Attending: PHYSICAL MEDICINE & REHABILITATION | Admitting: PHYSICAL MEDICINE & REHABILITATION
Payer: MEDICARE

## 2021-10-08 VITALS
OXYGEN SATURATION: 96 % | TEMPERATURE: 98.8 F | DIASTOLIC BLOOD PRESSURE: 81 MMHG | HEART RATE: 68 BPM | SYSTOLIC BLOOD PRESSURE: 136 MMHG | RESPIRATION RATE: 20 BRPM

## 2021-10-08 DIAGNOSIS — M54.16 LUMBAR RADICULOPATHY: ICD-10-CM

## 2021-10-08 PROCEDURE — 62323 NJX INTERLAMINAR LMBR/SAC: CPT | Performed by: PHYSICAL MEDICINE & REHABILITATION

## 2021-10-08 RX ORDER — METHYLPREDNISOLONE ACETATE 80 MG/ML
80 INJECTION, SUSPENSION INTRA-ARTICULAR; INTRALESIONAL; INTRAMUSCULAR; PARENTERAL; SOFT TISSUE ONCE
Status: COMPLETED | OUTPATIENT
Start: 2021-10-08 | End: 2021-10-08

## 2021-10-08 RX ADMIN — METHYLPREDNISOLONE ACETATE 80 MG: 80 INJECTION, SUSPENSION INTRA-ARTICULAR; INTRALESIONAL; INTRAMUSCULAR; PARENTERAL; SOFT TISSUE at 10:28

## 2021-10-08 RX ADMIN — IOHEXOL 2 ML: 300 INJECTION, SOLUTION INTRAVENOUS at 10:28

## 2021-10-15 ENCOUNTER — TELEPHONE (OUTPATIENT)
Dept: PAIN MEDICINE | Facility: CLINIC | Age: 66
End: 2021-10-15

## 2021-11-15 ENCOUNTER — APPOINTMENT (OUTPATIENT)
Dept: LAB | Facility: MEDICAL CENTER | Age: 66
End: 2021-11-15
Payer: MEDICARE

## 2021-11-15 DIAGNOSIS — I10 ESSENTIAL HYPERTENSION: ICD-10-CM

## 2021-11-15 DIAGNOSIS — R73.9 HYPERGLYCEMIA: ICD-10-CM

## 2021-11-15 DIAGNOSIS — E78.2 MIXED HYPERLIPIDEMIA: ICD-10-CM

## 2021-11-15 LAB
ALBUMIN SERPL BCP-MCNC: 3.7 G/DL (ref 3.5–5)
ALP SERPL-CCNC: 102 U/L (ref 46–116)
ALT SERPL W P-5'-P-CCNC: 15 U/L (ref 12–78)
ANION GAP SERPL CALCULATED.3IONS-SCNC: 5 MMOL/L (ref 4–13)
AST SERPL W P-5'-P-CCNC: 11 U/L (ref 5–45)
BASOPHILS # BLD AUTO: 0.05 THOUSANDS/ΜL (ref 0–0.1)
BASOPHILS NFR BLD AUTO: 1 % (ref 0–1)
BILIRUB SERPL-MCNC: 0.69 MG/DL (ref 0.2–1)
BUN SERPL-MCNC: 12 MG/DL (ref 5–25)
CALCIUM SERPL-MCNC: 9.2 MG/DL (ref 8.3–10.1)
CHLORIDE SERPL-SCNC: 107 MMOL/L (ref 100–108)
CHOLEST SERPL-MCNC: 136 MG/DL (ref 50–200)
CO2 SERPL-SCNC: 25 MMOL/L (ref 21–32)
CREAT SERPL-MCNC: 0.79 MG/DL (ref 0.6–1.3)
EOSINOPHIL # BLD AUTO: 0.07 THOUSAND/ΜL (ref 0–0.61)
EOSINOPHIL NFR BLD AUTO: 1 % (ref 0–6)
ERYTHROCYTE [DISTWIDTH] IN BLOOD BY AUTOMATED COUNT: 13.1 % (ref 11.6–15.1)
EST. AVERAGE GLUCOSE BLD GHB EST-MCNC: 117 MG/DL
GFR SERPL CREATININE-BSD FRML MDRD: 92 ML/MIN/1.73SQ M
GLUCOSE P FAST SERPL-MCNC: 103 MG/DL (ref 65–99)
HBA1C MFR BLD: 5.7 %
HCT VFR BLD AUTO: 48.6 % (ref 36.5–49.3)
HDLC SERPL-MCNC: 35 MG/DL
HGB BLD-MCNC: 15.8 G/DL (ref 12–17)
IMM GRANULOCYTES # BLD AUTO: 0.02 THOUSAND/UL (ref 0–0.2)
IMM GRANULOCYTES NFR BLD AUTO: 0 % (ref 0–2)
LDLC SERPL CALC-MCNC: 84 MG/DL (ref 0–100)
LYMPHOCYTES # BLD AUTO: 1.77 THOUSANDS/ΜL (ref 0.6–4.47)
LYMPHOCYTES NFR BLD AUTO: 33 % (ref 14–44)
MCH RBC QN AUTO: 33.5 PG (ref 26.8–34.3)
MCHC RBC AUTO-ENTMCNC: 32.5 G/DL (ref 31.4–37.4)
MCV RBC AUTO: 103 FL (ref 82–98)
MONOCYTES # BLD AUTO: 0.39 THOUSAND/ΜL (ref 0.17–1.22)
MONOCYTES NFR BLD AUTO: 7 % (ref 4–12)
NEUTROPHILS # BLD AUTO: 3 THOUSANDS/ΜL (ref 1.85–7.62)
NEUTS SEG NFR BLD AUTO: 58 % (ref 43–75)
NONHDLC SERPL-MCNC: 101 MG/DL
NRBC BLD AUTO-RTO: 0 /100 WBCS
PLATELET # BLD AUTO: 223 THOUSANDS/UL (ref 149–390)
PMV BLD AUTO: 9.3 FL (ref 8.9–12.7)
POTASSIUM SERPL-SCNC: 4.1 MMOL/L (ref 3.5–5.3)
PROT SERPL-MCNC: 7.1 G/DL (ref 6.4–8.2)
RBC # BLD AUTO: 4.71 MILLION/UL (ref 3.88–5.62)
SODIUM SERPL-SCNC: 137 MMOL/L (ref 136–145)
TRIGL SERPL-MCNC: 86 MG/DL
TSH SERPL DL<=0.05 MIU/L-ACNC: 1.34 UIU/ML (ref 0.36–3.74)
WBC # BLD AUTO: 5.3 THOUSAND/UL (ref 4.31–10.16)

## 2021-11-15 PROCEDURE — 36415 COLL VENOUS BLD VENIPUNCTURE: CPT

## 2021-11-15 PROCEDURE — 80053 COMPREHEN METABOLIC PANEL: CPT

## 2021-11-15 PROCEDURE — 84443 ASSAY THYROID STIM HORMONE: CPT

## 2021-11-15 PROCEDURE — 80061 LIPID PANEL: CPT

## 2021-11-15 PROCEDURE — 83036 HEMOGLOBIN GLYCOSYLATED A1C: CPT

## 2021-11-15 PROCEDURE — 85025 COMPLETE CBC W/AUTO DIFF WBC: CPT

## 2021-11-16 ENCOUNTER — OFFICE VISIT (OUTPATIENT)
Dept: FAMILY MEDICINE CLINIC | Facility: CLINIC | Age: 66
End: 2021-11-16
Payer: MEDICARE

## 2021-11-16 VITALS
SYSTOLIC BLOOD PRESSURE: 122 MMHG | HEIGHT: 69 IN | BODY MASS INDEX: 31.7 KG/M2 | WEIGHT: 214 LBS | DIASTOLIC BLOOD PRESSURE: 78 MMHG | TEMPERATURE: 97.2 F

## 2021-11-16 DIAGNOSIS — I10 ESSENTIAL HYPERTENSION: Primary | ICD-10-CM

## 2021-11-16 DIAGNOSIS — K21.9 GASTROESOPHAGEAL REFLUX DISEASE WITHOUT ESOPHAGITIS: ICD-10-CM

## 2021-11-16 DIAGNOSIS — Z00.00 MEDICARE ANNUAL WELLNESS VISIT, SUBSEQUENT: ICD-10-CM

## 2021-11-16 DIAGNOSIS — G89.4 PAIN SYNDROME, CHRONIC: ICD-10-CM

## 2021-11-16 DIAGNOSIS — M48.061 SPINAL STENOSIS OF LUMBAR REGION WITH RADICULOPATHY: ICD-10-CM

## 2021-11-16 DIAGNOSIS — E78.2 MIXED HYPERLIPIDEMIA: ICD-10-CM

## 2021-11-16 DIAGNOSIS — R73.9 HYPERGLYCEMIA: ICD-10-CM

## 2021-11-16 DIAGNOSIS — Z12.5 SCREENING FOR PROSTATE CANCER: ICD-10-CM

## 2021-11-16 DIAGNOSIS — L40.9 PSORIASIS: ICD-10-CM

## 2021-11-16 DIAGNOSIS — M54.16 SPINAL STENOSIS OF LUMBAR REGION WITH RADICULOPATHY: ICD-10-CM

## 2021-11-16 PROCEDURE — 1123F ACP DISCUSS/DSCN MKR DOCD: CPT | Performed by: FAMILY MEDICINE

## 2021-11-16 PROCEDURE — G0439 PPPS, SUBSEQ VISIT: HCPCS | Performed by: FAMILY MEDICINE

## 2021-11-16 PROCEDURE — 99214 OFFICE O/P EST MOD 30 MIN: CPT | Performed by: FAMILY MEDICINE

## 2021-11-22 ENCOUNTER — OFFICE VISIT (OUTPATIENT)
Dept: PAIN MEDICINE | Facility: MEDICAL CENTER | Age: 66
End: 2021-11-22
Payer: MEDICARE

## 2021-11-22 VITALS
WEIGHT: 213.6 LBS | DIASTOLIC BLOOD PRESSURE: 75 MMHG | BODY MASS INDEX: 31.64 KG/M2 | SYSTOLIC BLOOD PRESSURE: 127 MMHG | HEIGHT: 69 IN | HEART RATE: 66 BPM

## 2021-11-22 DIAGNOSIS — M48.061 SPINAL STENOSIS OF LUMBAR REGION WITH RADICULOPATHY: ICD-10-CM

## 2021-11-22 DIAGNOSIS — M79.2 NEUROPATHIC PAIN: ICD-10-CM

## 2021-11-22 DIAGNOSIS — G89.4 PAIN SYNDROME, CHRONIC: ICD-10-CM

## 2021-11-22 DIAGNOSIS — M54.16 LUMBAR RADICULOPATHY: Primary | ICD-10-CM

## 2021-11-22 DIAGNOSIS — G89.29 CHRONIC LEFT-SIDED LOW BACK PAIN WITH LEFT-SIDED SCIATICA: ICD-10-CM

## 2021-11-22 DIAGNOSIS — M54.16 SPINAL STENOSIS OF LUMBAR REGION WITH RADICULOPATHY: ICD-10-CM

## 2021-11-22 DIAGNOSIS — M79.18 MYOFASCIAL PAIN SYNDROME: ICD-10-CM

## 2021-11-22 DIAGNOSIS — M54.42 CHRONIC LEFT-SIDED LOW BACK PAIN WITH LEFT-SIDED SCIATICA: ICD-10-CM

## 2021-11-22 PROCEDURE — 99213 OFFICE O/P EST LOW 20 MIN: CPT | Performed by: NURSE PRACTITIONER

## 2021-11-22 RX ORDER — GABAPENTIN 300 MG/1
300 CAPSULE ORAL 2 TIMES DAILY
Qty: 60 CAPSULE | Refills: 2 | Status: SHIPPED | OUTPATIENT
Start: 2021-11-22 | End: 2022-02-23 | Stop reason: SDUPTHER

## 2021-11-22 RX ORDER — TIZANIDINE 4 MG/1
4 TABLET ORAL 3 TIMES DAILY
Qty: 90 TABLET | Refills: 2 | Status: SHIPPED | OUTPATIENT
Start: 2021-11-22 | End: 2022-02-23 | Stop reason: SDUPTHER

## 2022-02-23 ENCOUNTER — APPOINTMENT (OUTPATIENT)
Dept: LAB | Facility: MEDICAL CENTER | Age: 67
End: 2022-02-23
Payer: MEDICARE

## 2022-02-23 ENCOUNTER — OFFICE VISIT (OUTPATIENT)
Dept: PAIN MEDICINE | Facility: MEDICAL CENTER | Age: 67
End: 2022-02-23
Payer: MEDICARE

## 2022-02-23 VITALS
TEMPERATURE: 97.8 F | DIASTOLIC BLOOD PRESSURE: 62 MMHG | SYSTOLIC BLOOD PRESSURE: 118 MMHG | HEIGHT: 69 IN | HEART RATE: 81 BPM | OXYGEN SATURATION: 93 % | WEIGHT: 218 LBS | BODY MASS INDEX: 32.29 KG/M2

## 2022-02-23 DIAGNOSIS — G89.4 PAIN SYNDROME, CHRONIC: ICD-10-CM

## 2022-02-23 DIAGNOSIS — M54.16 LUMBAR RADICULOPATHY: Primary | ICD-10-CM

## 2022-02-23 DIAGNOSIS — L40.0 PSORIASIS VULGARIS: ICD-10-CM

## 2022-02-23 DIAGNOSIS — M79.18 MYOFASCIAL PAIN SYNDROME: ICD-10-CM

## 2022-02-23 DIAGNOSIS — M48.061 SPINAL STENOSIS OF LUMBAR REGION WITH RADICULOPATHY: ICD-10-CM

## 2022-02-23 DIAGNOSIS — M54.16 SPINAL STENOSIS OF LUMBAR REGION WITH RADICULOPATHY: ICD-10-CM

## 2022-02-23 DIAGNOSIS — M79.2 NEUROPATHIC PAIN: ICD-10-CM

## 2022-02-23 DIAGNOSIS — M51.26 PROTRUSION OF LUMBAR INTERVERTEBRAL DISC: ICD-10-CM

## 2022-02-23 DIAGNOSIS — Z01.89 LABORATORY PROCEDURE: ICD-10-CM

## 2022-02-23 DIAGNOSIS — Z79.899 ENCOUNTER FOR LONG-TERM (CURRENT) USE OF OTHER MEDICATIONS: ICD-10-CM

## 2022-02-23 LAB
ALBUMIN SERPL BCP-MCNC: 3.7 G/DL (ref 3.5–5)
ALP SERPL-CCNC: 94 U/L (ref 46–116)
ALT SERPL W P-5'-P-CCNC: 16 U/L (ref 12–78)
ANION GAP SERPL CALCULATED.3IONS-SCNC: 4 MMOL/L (ref 4–13)
AST SERPL W P-5'-P-CCNC: 9 U/L (ref 5–45)
BASOPHILS # BLD AUTO: 0.04 THOUSANDS/ΜL (ref 0–0.1)
BASOPHILS NFR BLD AUTO: 1 % (ref 0–1)
BILIRUB SERPL-MCNC: 0.39 MG/DL (ref 0.2–1)
BUN SERPL-MCNC: 19 MG/DL (ref 5–25)
CALCIUM SERPL-MCNC: 9.2 MG/DL (ref 8.3–10.1)
CHLORIDE SERPL-SCNC: 111 MMOL/L (ref 100–108)
CO2 SERPL-SCNC: 26 MMOL/L (ref 21–32)
CREAT SERPL-MCNC: 0.77 MG/DL (ref 0.6–1.3)
EOSINOPHIL # BLD AUTO: 0.08 THOUSAND/ΜL (ref 0–0.61)
EOSINOPHIL NFR BLD AUTO: 2 % (ref 0–6)
ERYTHROCYTE [DISTWIDTH] IN BLOOD BY AUTOMATED COUNT: 12.5 % (ref 11.6–15.1)
GFR SERPL CREATININE-BSD FRML MDRD: 92 ML/MIN/1.73SQ M
GLUCOSE P FAST SERPL-MCNC: 105 MG/DL (ref 65–99)
HCT VFR BLD AUTO: 48.1 % (ref 36.5–49.3)
HGB BLD-MCNC: 15.7 G/DL (ref 12–17)
IMM GRANULOCYTES # BLD AUTO: 0.01 THOUSAND/UL (ref 0–0.2)
IMM GRANULOCYTES NFR BLD AUTO: 0 % (ref 0–2)
LYMPHOCYTES # BLD AUTO: 1.76 THOUSANDS/ΜL (ref 0.6–4.47)
LYMPHOCYTES NFR BLD AUTO: 39 % (ref 14–44)
MCH RBC QN AUTO: 33.3 PG (ref 26.8–34.3)
MCHC RBC AUTO-ENTMCNC: 32.6 G/DL (ref 31.4–37.4)
MCV RBC AUTO: 102 FL (ref 82–98)
MONOCYTES # BLD AUTO: 0.35 THOUSAND/ΜL (ref 0.17–1.22)
MONOCYTES NFR BLD AUTO: 8 % (ref 4–12)
NEUTROPHILS # BLD AUTO: 2.27 THOUSANDS/ΜL (ref 1.85–7.62)
NEUTS SEG NFR BLD AUTO: 50 % (ref 43–75)
NRBC BLD AUTO-RTO: 0 /100 WBCS
PLATELET # BLD AUTO: 194 THOUSANDS/UL (ref 149–390)
PMV BLD AUTO: 9.6 FL (ref 8.9–12.7)
POTASSIUM SERPL-SCNC: 4 MMOL/L (ref 3.5–5.3)
PROT SERPL-MCNC: 7.2 G/DL (ref 6.4–8.2)
RBC # BLD AUTO: 4.71 MILLION/UL (ref 3.88–5.62)
SODIUM SERPL-SCNC: 141 MMOL/L (ref 136–145)
WBC # BLD AUTO: 4.51 THOUSAND/UL (ref 4.31–10.16)

## 2022-02-23 PROCEDURE — 99214 OFFICE O/P EST MOD 30 MIN: CPT | Performed by: NURSE PRACTITIONER

## 2022-02-23 PROCEDURE — 36415 COLL VENOUS BLD VENIPUNCTURE: CPT

## 2022-02-23 PROCEDURE — 86480 TB TEST CELL IMMUN MEASURE: CPT

## 2022-02-23 PROCEDURE — 85025 COMPLETE CBC W/AUTO DIFF WBC: CPT

## 2022-02-23 PROCEDURE — 80053 COMPREHEN METABOLIC PANEL: CPT

## 2022-02-23 RX ORDER — GABAPENTIN 300 MG/1
300 CAPSULE ORAL 2 TIMES DAILY
Qty: 60 CAPSULE | Refills: 2 | Status: SHIPPED | OUTPATIENT
Start: 2022-02-23 | End: 2022-04-18 | Stop reason: SDUPTHER

## 2022-02-23 RX ORDER — TIZANIDINE 4 MG/1
4 TABLET ORAL 3 TIMES DAILY
Qty: 90 TABLET | Refills: 2 | Status: SHIPPED | OUTPATIENT
Start: 2022-02-23 | End: 2022-04-18 | Stop reason: SDUPTHER

## 2022-02-23 NOTE — PROGRESS NOTES
Assessment  1  Lumbar radiculopathy    2  Spinal stenosis of lumbar region with radiculopathy    3  Neuropathic pain    4  Myofascial pain syndrome    5  Protrusion of lumbar intervertebral disc    6  Pain syndrome, chronic        Continue with gabapentin and tizanidine these were both refilled today  Schedule patient for a left L5 lumbar epidural steroid injection to decrease pain symptoms  He last had this procedure on October 8, 2021 and he had 75% relief up until December when his pain symptoms started to return  Return  in 3 months for medication and procedure follow-up      Complete risks and benefits including bleeding, infection, tissue reaction, nerve injury and allergic reaction were discussed  The approach was demonstrated using models and literature was provided  Verbal and written consent was obtained  My impressions and treatment recommendations were discussed in detail with the patient who verbalized understanding and had no further questions  Discharge instructions were provided  I personally saw and examined the patient and I agree with the above discussed plan of care  Orders Placed This Encounter   Procedures    FL spine and pain procedure     Standing Status:   Future     Standing Expiration Date:   2/23/2026     Order Specific Question:   Reason for Exam:     Answer:   left L5 LESI     Order Specific Question:   Anticoagulant hold needed? Answer:   none     New Medications Ordered This Visit   Medications    gabapentin (NEURONTIN) 300 mg capsule     Sig: Take 1 capsule (300 mg total) by mouth 2 (two) times a day     Dispense:  60 capsule     Refill:  2    tiZANidine (ZANAFLEX) 4 mg tablet     Sig: Take 1 tablet (4 mg total) by mouth 3 (three) times a day     Dispense:  90 tablet     Refill:  2       History of Present Illness    Aki Pizarro is a 69 y o  male presents for follow-up related to his low back and leg pain symptoms    Today he rates his pain 5/10 which is constant most bothersome in the morning  He describes the pain as throbbing and localizes this pain mainly to his left low back and leg  He tells me that he does have some pain on the right but it is managed  He last had an L5 lumbar epidural steroid injection on the left side with Dr Sandro Santacruz on October 8, 2021 he did get 75% relief that lasted until his pain symptoms started to return in December  He tells me at that time he was having to walk with a walker and he ended up going to a chiropractor because he was in a lot of pain  He tells me that the chiropractor did give him some relief  He continues with gabapentin 300 mg, 2 tablets daily and tizanidine 4 mg 3 times daily  He denies any side effects or issues from the medication    I have personally reviewed and/or updated the patient's past medical history, past surgical history, family history, social history, current medications, allergies, and vital signs today  Review of Systems   Respiratory: Negative for shortness of breath  Cardiovascular: Negative for chest pain  Gastrointestinal: Negative for constipation, diarrhea, nausea and vomiting  Musculoskeletal: Positive for back pain and joint swelling  Negative for arthralgias, gait problem and myalgias  Skin: Negative for rash  Neurological: Negative for dizziness, seizures and weakness  All other systems reviewed and are negative        Patient Active Problem List   Diagnosis    Chronic left-sided low back pain    Essential hypertension    Hyperglycemia    Mixed hyperlipidemia    Polyarticular arthritis    Pain syndrome, chronic    Psoriasis    Protrusion of lumbar intervertebral disc    Lumbar radiculopathy    Spinal stenosis of lumbar region with radiculopathy    Osteoarthritis of knee    Gastroesophageal reflux disease without esophagitis    Class 1 obesity due to excess calories without serious comorbidity with body mass index (BMI) of 31 0 to 31 9 in adult    Numbness of left hand    Trigger finger of left thumb    Left carpal tunnel syndrome    Ulnar neuropathy of left upper extremity    Right calf pain    Neuropathic pain       Past Medical History:   Diagnosis Date    Arthritis 2013    Bilateral carpal tunnel syndrome     Chronic pain disorder     low back    GERD (gastroesophageal reflux disease)     Hyperlipidemia     Low back pain     Lumbar disc disease     Lumbar spinal stenosis     Neck pain     had cervical fusion    Obesity     Psoriasis     right elbow    Tremor     left leg    Wears dentures     full dentures    Wears glasses        Past Surgical History:   Procedure Laterality Date    CERVICAL LAMINECTOMY      COLONOSCOPY  2015    multiple polyps, repeat in 3 years    JOINT REPLACEMENT      right TKR    KNEE SURGERY Right     20 years ago-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    LAMINECTOMY      ORTHOPEDIC SURGERY      POPLITEAL SYNOVIAL CYST EXCISION      last assessed: 2015    [place] REVISE MEDIAN N/CARPAL TUNNEL SURG Left 2020    Procedure: RELEASE CARPAL TUNNEL;  Surgeon: Cody Ruggiero MD;  [facility];  Service: Orthopedics    ND REVISE MEDIAN N/CARPAL TUNNEL SURG Right 10/20/2020    Procedure: RELEASE CARPAL TUNNEL - Open;  Surgeon:  Cody Ruggiero MD;  [facility];  Service: Orthopedics    SPINE SURGERY      TONSILLECTOMY AND ADENOIDECTOMY      last assessed: 2015    TOTAL KNEE ARTHROPLASTY      last assessed: 2015       Family History   Problem Relation Age of Onset    Allergies Father         seasonal    Lung cancer Family     No Known Problems Mother        Social History     Occupational History    Not on file   Tobacco Use    Smoking status: Former Smoker     Packs/day: 2 00     Years: 10 00     Pack years: 20 00     Types: Cigarettes     Quit date: 2013     Years since quittin 4    Smokeless tobacco: Never Used   Vaping Use    Vaping Use: Never used   Substance and Sexual Activity    Alcohol use: Not Currently     Alcohol/week: 12 0 standard drinks     Types: 12 Cans of beer per week    Drug use: Never    Sexual activity: Not Currently     Partners: Female, Male       Current Outpatient Medications on File Prior to Visit   Medication Sig    adalimumab (HUMIRA) 40 mg/0 8 mL PSKT Inject 40 mg under the skin every 14 (fourteen) days    simvastatin (ZOCOR) 20 mg tablet TAKE 1 TABLET BY MOUTH  DAILY    [DISCONTINUED] gabapentin (NEURONTIN) 300 mg capsule Take 1 capsule (300 mg total) by mouth 2 (two) times a day    [DISCONTINUED] tiZANidine (ZANAFLEX) 4 mg tablet Take 1 tablet (4 mg total) by mouth 3 (three) times a day     No current facility-administered medications on file prior to visit  No Known Allergies    Physical Exam    /62   Pulse 81   Temp 97 8 °F (36 6 °C)   Ht 5' 9" (1 753 m)   Wt 98 9 kg (218 lb)   SpO2 93%   BMI 32 19 kg/m²     Constitutional: normal, well developed, well nourished, alert, in no distress and non-toxic and no overt pain behavior    Eyes: anicteric  HEENT: grossly intact  Neck: supple, symmetric, trachea midline and no masses   Pulmonary:even and unlabored  Cardiovascular:No edema or pitting edema present  Skin:Normal without rashes or lesions and well hydrated  Psychiatric:Mood and affect appropriate  Neurologic:Cranial Nerves II-XII grossly intact  Musculoskeletal:normal     Lumbar Spine Exam    Appearance:  Normal lordosis  Palpation/Tenderness:  left lumbar paraspinal tenderness    Special Tests:  Left Straight Leg Test:  positive  Right Straight Leg Test:  negative  Left Wallace's Maneuver:  negative  Right Wallace's Maneuver:  negative       Imaging

## 2022-02-25 LAB
GAMMA INTERFERON BACKGROUND BLD IA-ACNC: 0.03 IU/ML
M TB IFN-G BLD-IMP: NEGATIVE
M TB IFN-G CD4+ BCKGRND COR BLD-ACNC: -0.01 IU/ML
M TB IFN-G CD4+ BCKGRND COR BLD-ACNC: -0.01 IU/ML
MITOGEN IGNF BCKGRD COR BLD-ACNC: >10 IU/ML

## 2022-03-22 ENCOUNTER — HOSPITAL ENCOUNTER (OUTPATIENT)
Dept: RADIOLOGY | Facility: MEDICAL CENTER | Age: 67
Discharge: HOME | End: 2022-03-22
Attending: PHYSICAL MEDICINE & REHABILITATION
Payer: MEDICARE

## 2022-03-22 VITALS
DIASTOLIC BLOOD PRESSURE: 75 MMHG | SYSTOLIC BLOOD PRESSURE: 128 MMHG | TEMPERATURE: 98.4 F | HEART RATE: 66 BPM | RESPIRATION RATE: 18 BRPM | OXYGEN SATURATION: 93 %

## 2022-03-22 DIAGNOSIS — M51.26 PROTRUSION OF LUMBAR INTERVERTEBRAL DISC: ICD-10-CM

## 2022-03-22 DIAGNOSIS — M54.16 LUMBAR RADICULOPATHY: ICD-10-CM

## 2022-03-22 DIAGNOSIS — M54.16 SPINAL STENOSIS OF LUMBAR REGION WITH RADICULOPATHY: ICD-10-CM

## 2022-03-22 DIAGNOSIS — M48.061 SPINAL STENOSIS OF LUMBAR REGION WITH RADICULOPATHY: ICD-10-CM

## 2022-03-22 PROCEDURE — 62323 NJX INTERLAMINAR LMBR/SAC: CPT | Performed by: PHYSICAL MEDICINE & REHABILITATION

## 2022-03-22 RX ORDER — METHYLPREDNISOLONE ACETATE 80 MG/ML
80 INJECTION, SUSPENSION INTRA-ARTICULAR; INTRALESIONAL; INTRAMUSCULAR; PARENTERAL; SOFT TISSUE ONCE
Status: COMPLETED | OUTPATIENT
Start: 2022-03-22 | End: 2022-03-22

## 2022-03-22 RX ADMIN — IOHEXOL 2 ML: 300 INJECTION, SOLUTION INTRAVENOUS at 09:53

## 2022-03-22 RX ADMIN — METHYLPREDNISOLONE ACETATE 80 MG: 80 INJECTION, SUSPENSION INTRA-ARTICULAR; INTRALESIONAL; INTRAMUSCULAR; PARENTERAL; SOFT TISSUE at 09:54

## 2022-03-22 NOTE — H&P
History of Present Illness: The patient is a 69 y o  male who presents with complaints of left low back and leg pain    Patient Active Problem List   Diagnosis    Chronic left-sided low back pain    Essential hypertension    Hyperglycemia    Mixed hyperlipidemia    Polyarticular arthritis    Pain syndrome, chronic    Psoriasis    Protrusion of lumbar intervertebral disc    Lumbar radiculopathy    Spinal stenosis of lumbar region with radiculopathy    Osteoarthritis of knee    Gastroesophageal reflux disease without esophagitis    Class 1 obesity due to excess calories without serious comorbidity with body mass index (BMI) of 31 0 to 31 9 in adult    Numbness of left hand    Trigger finger of left thumb    Left carpal tunnel syndrome    Ulnar neuropathy of left upper extremity    Right calf pain    Neuropathic pain       Past Medical History:   Diagnosis Date    Arthritis 2013    Bilateral carpal tunnel syndrome     Chronic pain disorder     low back    GERD (gastroesophageal reflux disease)     Hyperlipidemia     Low back pain     Lumbar disc disease     Lumbar spinal stenosis     Neck pain     had cervical fusion    Obesity     Psoriasis     right elbow    Tremor     left leg    Wears dentures     full dentures    Wears glasses        Past Surgical History:   Procedure Laterality Date    CERVICAL LAMINECTOMY      COLONOSCOPY  07/2015    multiple polyps, repeat in 3 years    JOINT REPLACEMENT      right TKR    KNEE SURGERY Right 2002    20 years ago-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    LAMINECTOMY      ORTHOPEDIC SURGERY      POPLITEAL SYNOVIAL CYST EXCISION      last assessed: 04/22/2015    LA REVISE MEDIAN N/CARPAL TUNNEL SURG Left 9/25/2020    Procedure: RELEASE CARPAL TUNNEL;  Surgeon: Teresa Bright MD;  Location: [facility];  Service: Orthopedics    LA REVISE MEDIAN N/CARPAL TUNNEL SURG Right 10/20/2020    Procedure: RELEASE CARPAL TUNNEL - Open;  Surgeon:  Teresa Bright MD;  Location: [facility];  Service: Orthopedics    [place] [id number]      last assessed: 04/22/2015    TOTAL KNEE ARTHROPLASTY      last assessed: 05/12/2015         Current Outpatient Medications:     adalimumab (HUMIRA) 40 mg/0 8 mL PSKT, Inject 40 mg under the skin every 14 (fourteen) days, Disp: , Rfl:     gabapentin (NEURONTIN) 300 mg capsule, Take 1 capsule (300 mg total) by mouth 2 (two) times a day, Disp: 60 capsule, Rfl: 2    simvastatin (ZOCOR) 20 mg tablet, TAKE 1 TABLET BY MOUTH  DAILY, Disp: 90 tablet, Rfl: 1    tiZANidine (ZANAFLEX) 4 mg tablet, Take 1 tablet (4 mg total) by mouth 3 (three) times a day, Disp: 90 tablet, Rfl: 2    No Known Allergies    Physical Exam:   Vitals:    03/22/22 0935   BP: 125/77   Pulse: 72   Resp: 18   Temp: 98 4 °F (36 9 °C)   SpO2: 95%     General: Awake, Alert, Oriented x 3, Mood and affect appropriate  Respiratory: Respirations even and unlabored  Cardiovascular: Peripheral pulses intact; no edema  Musculoskeletal Exam: left low back and leg pain    ASA Score: 2    Patient/Chart Verification  Patient ID Verified: Verbal  Consents Confirmed: Procedural,To be obtained in the Pre-Procedure area  H&P( within 30 days) Verified: To be obtained in the Pre-Procedure area  Allergies Reviewed: Yes  Anticoag/NSAID held?: NA  Currently on antibiotics?: No  Pregnancy denied?: NA    Assessment:   1  Spinal stenosis of lumbar region with radiculopathy    2  Lumbar radiculopathy    3   Protrusion of lumbar intervertebral disc        Plan: left L5 LESI

## 2022-03-22 NOTE — DISCHARGE INSTRUCTIONS
Epidural Steroid Injection   WHAT YOU NEED TO KNOW:   An epidural steroid injection (BLADE) is a procedure to inject steroid medicine into the epidural space  The epidural space is between your spinal cord and vertebrae  Steroids reduce inflammation and fluid buildup in your spine that may be causing pain  You may be given pain medicine along with the steroids  ACTIVITY  · Do not drive or operate machinery today  · No strenuous activity today - bending, lifting, etc   · You may resume normal activites starting tomorrow - start slowly and as tolerated  · You may shower today, but no tub baths or hot tubs  · You may have numbness for several hours from the local anesthetic  Please use caution and common sense, especially with weight-bearing activities  CARE OF THE INJECTION SITE  · If you have soreness or pain, apply ice to the area today (20 minutes on/20 minutes off)  · Starting tomorrow, you may use warm, moist heat or ice if needed  · You may have an increase or change in your discomfort for 36-48 hours after your treatment  · Apply ice and continue with any pain medication you have been prescribed  · Notify the [facility] if you have any of the following: redness, drainage, swelling, headache, stiff neck or fever above 100°F     SPECIAL INSTRUCTIONS  · Our office will contact you in approximately 7 days for a progress report  MEDICATIONS  · Continue to take all routine medications  · Our office may have instructed you to hold some medications  As no general anesthesia was used in today's procedure, you should not experience any side effects related to anesthesia  If you have a problem specifically related to your procedure, please call our office at [phone]  Problems not related to your procedure should be directed to your primary care physician

## 2022-03-29 ENCOUNTER — TELEPHONE (OUTPATIENT)
Dept: PAIN MEDICINE | Facility: CLINIC | Age: 67
End: 2022-03-29

## 2022-04-18 ENCOUNTER — OFFICE VISIT (OUTPATIENT)
Dept: PAIN MEDICINE | Facility: MEDICAL CENTER | Age: 67
End: 2022-04-18
Payer: MEDICARE

## 2022-04-18 VITALS
HEIGHT: 69 IN | BODY MASS INDEX: 32.58 KG/M2 | HEART RATE: 68 BPM | WEIGHT: 220 LBS | SYSTOLIC BLOOD PRESSURE: 125 MMHG | DIASTOLIC BLOOD PRESSURE: 71 MMHG

## 2022-04-18 DIAGNOSIS — M48.061 SPINAL STENOSIS OF LUMBAR REGION WITH RADICULOPATHY: ICD-10-CM

## 2022-04-18 DIAGNOSIS — M79.2 NEUROPATHIC PAIN: ICD-10-CM

## 2022-04-18 DIAGNOSIS — M79.18 MYOFASCIAL PAIN SYNDROME: ICD-10-CM

## 2022-04-18 DIAGNOSIS — G89.29 CHRONIC LEFT-SIDED LOW BACK PAIN WITH LEFT-SIDED SCIATICA: ICD-10-CM

## 2022-04-18 DIAGNOSIS — M54.16 LUMBAR RADICULOPATHY: Primary | ICD-10-CM

## 2022-04-18 DIAGNOSIS — M51.26 PROTRUSION OF LUMBAR INTERVERTEBRAL DISC: ICD-10-CM

## 2022-04-18 DIAGNOSIS — M54.42 CHRONIC LEFT-SIDED LOW BACK PAIN WITH LEFT-SIDED SCIATICA: ICD-10-CM

## 2022-04-18 DIAGNOSIS — M54.16 SPINAL STENOSIS OF LUMBAR REGION WITH RADICULOPATHY: ICD-10-CM

## 2022-04-18 PROCEDURE — 99213 OFFICE O/P EST LOW 20 MIN: CPT | Performed by: NURSE PRACTITIONER

## 2022-04-18 RX ORDER — TIZANIDINE 4 MG/1
4 TABLET ORAL 3 TIMES DAILY
Qty: 90 TABLET | Refills: 2 | Status: SHIPPED | OUTPATIENT
Start: 2022-04-18 | End: 2022-07-13 | Stop reason: SDUPTHER

## 2022-04-18 RX ORDER — GABAPENTIN 300 MG/1
300 CAPSULE ORAL 2 TIMES DAILY
Qty: 60 CAPSULE | Refills: 2 | Status: SHIPPED | OUTPATIENT
Start: 2022-04-18 | End: 2022-07-13 | Stop reason: SDUPTHER

## 2022-04-18 NOTE — PROGRESS NOTES
Assessment  1  Lumbar radiculopathy    2  Spinal stenosis of lumbar region with radiculopathy    3  Neuropathic pain    4  Myofascial pain syndrome    5  Protrusion of lumbar intervertebral disc    6  Chronic left-sided low back pain with left-sided sciatica        Plan  I discussed with the patient that since there has been moderate  improvement in the pain symptoms that we will hold off on any repeat injections at this point in time  However, I reviewed with the patient that if his symptoms should return, worsen, and/or experience new pain symptoms, he should call our office  to discuss repeating the injection  Continue gabapentin and tizanidine, these were both refilled today  Follow-up visit in 3 months for medication refills          My impressions and treatment recommendations were discussed in detail with the patient who verbalized understanding and had no further questions  Discharge instructions were provided  I personally saw and examined the patient and I agree with the above discussed plan of care  No orders of the defined types were placed in this encounter  New Medications Ordered This Visit   Medications    gabapentin (NEURONTIN) 300 mg capsule     Sig: Take 1 capsule (300 mg total) by mouth 2 (two) times a day     Dispense:  60 capsule     Refill:  2    tiZANidine (ZANAFLEX) 4 mg tablet     Sig: Take 1 tablet (4 mg total) by mouth 3 (three) times a day     Dispense:  90 tablet     Refill:  2       History of Present Illness    Aki Allan is a 69 y o  male presents for follow-up related to his chronic left low back and leg pain symptoms  Today he rates his pain 3/10 this is occasional bothersome in the morning and described as shooting  He is status post a left L5 lumbar epidural steroid injection with Dr Sujata Whalen on March 22, 2022 and he does report 50% of ongoing relief    Patient tells me that he is currently doing a lot of work in his yd and he is able to work about 4 hours a day     He continues to use gabapentin 300 mg twice daily and tizanidine 4 mg 3 times daily his medications do provide adequate relief without side effects or issues  I have personally reviewed and/or updated the patient's past medical history, past surgical history, family history, social history, current medications, allergies, and vital signs today  Review of Systems   Musculoskeletal: Positive for back pain  All other systems reviewed and are negative        Patient Active Problem List   Diagnosis    Chronic left-sided low back pain    Essential hypertension    Hyperglycemia    Mixed hyperlipidemia    Polyarticular arthritis    Pain syndrome, chronic    Psoriasis    Protrusion of lumbar intervertebral disc    Lumbar radiculopathy    Spinal stenosis of lumbar region with radiculopathy    Osteoarthritis of knee    Gastroesophageal reflux disease without esophagitis    Class 1 obesity due to excess calories without serious comorbidity with body mass index (BMI) of 31 0 to 31 9 in adult    Numbness of left hand    Trigger finger of left thumb    Left carpal tunnel syndrome    Ulnar neuropathy of left upper extremity    Right calf pain    Neuropathic pain       Past Medical History:   Diagnosis Date    Arthritis 2013    Bilateral carpal tunnel syndrome     Chronic pain disorder     low back    GERD (gastroesophageal reflux disease)     Hyperlipidemia     Low back pain     Lumbar disc disease     Lumbar spinal stenosis     Neck pain     had cervical fusion    Obesity     Psoriasis     right elbow    Tremor     left leg    Wears dentures     full dentures    Wears glasses        Past Surgical History:   Procedure Laterality Date    CERVICAL LAMINECTOMY      COLONOSCOPY  07/2015    multiple polyps, repeat in 3 years    JOINT REPLACEMENT      right TKR    KNEE SURGERY Right 2002    20 years ago-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    LAMINECTOMY      ORTHOPEDIC SURGERY      POPLITEAL SYNOVIAL CYST EXCISION      last assessed: 2015    MS REVISE MEDIAN N/CARPAL TUNNEL SURG Left 2020    Procedure: RELEASE CARPAL TUNNEL;  Surgeon: John Ayala MD;  [facility];  Service: Orthopedics    MS REVISE MEDIAN N/CARPAL TUNNEL SURG Right 10/20/2020    Procedure: RELEASE CARPAL TUNNEL - Open;  Surgeon: John Ayala MD;  [facility];  Service: Orthopedics    SPINE SURGERY      TONSILLECTOMY AND ADENOIDECTOMY      last assessed: 2015    TOTAL KNEE ARTHROPLASTY      last assessed: 2015       Family History   Problem Relation Age of Onset    Allergies Father         seasonal    Lung cancer Family     No Known Problems Mother        Social History     Occupational History    Not on file   Tobacco Use    Smoking status: Former Smoker     Packs/day: 2 00     Years: 10 00     Pack years: 20 00     Types: Cigarettes     Quit date: 2013     Years since quittin 5    Smokeless tobacco: Never Used   Vaping Use    Vaping Use: Never used   Substance and Sexual Activity    Alcohol use: Not Currently     Alcohol/week: 12 0 standard drinks     Types: 12 Cans of beer per week    Drug use: Never    Sexual activity: Not Currently     Partners: Female, Male       Current Outpatient Medications on File Prior to Visit   Medication Sig    adalimumab (HUMIRA) 40 mg/0 8 mL PSKT Inject 40 mg under the skin every 14 (fourteen) days    simvastatin (ZOCOR) 20 mg tablet TAKE 1 TABLET BY MOUTH  DAILY    [DISCONTINUED] gabapentin (NEURONTIN) 300 mg capsule Take 1 capsule (300 mg total) by mouth 2 (two) times a day    [DISCONTINUED] tiZANidine (ZANAFLEX) 4 mg tablet Take 1 tablet (4 mg total) by mouth 3 (three) times a day     No current facility-administered medications on file prior to visit         No Known Allergies    Physical Exam    /71   Pulse 68   Ht 5' 9" (1 753 m)   Wt 99 8 kg (220 lb)   BMI 32 49 kg/m²     Constitutional: normal, well developed, well nourished, alert, in no distress and non-toxic and no overt pain behavior    Eyes: anicteric  HEENT: grossly intact  Neck: supple, symmetric, trachea midline and no masses   Pulmonary:even and unlabored  Cardiovascular:No edema or pitting edema present  Skin:Normal without rashes or lesions and well hydrated  Psychiatric:Mood and affect appropriate  Neurologic:Cranial Nerves II-XII grossly intact  Musculoskeletal:normal    Imaging

## 2022-05-12 ENCOUNTER — RA CDI HCC (OUTPATIENT)
Dept: OTHER | Facility: HOSPITAL | Age: 67
End: 2022-05-12

## 2022-05-12 NOTE — PROGRESS NOTES
[address]  coding opportunities       Chart reviewed, no opportunity found: CHART REVIEWED, NO OPPORTUNITY FOUND        Patients Insurance     Medicare Insurance: Medicare

## 2022-05-31 ENCOUNTER — APPOINTMENT (OUTPATIENT)
Dept: LAB | Facility: MEDICAL CENTER | Age: 67
End: 2022-05-31
Payer: MEDICARE

## 2022-05-31 DIAGNOSIS — Z12.5 SCREENING FOR PROSTATE CANCER: ICD-10-CM

## 2022-05-31 DIAGNOSIS — R73.9 HYPERGLYCEMIA: ICD-10-CM

## 2022-05-31 DIAGNOSIS — I10 ESSENTIAL HYPERTENSION: ICD-10-CM

## 2022-05-31 DIAGNOSIS — E78.2 MIXED HYPERLIPIDEMIA: ICD-10-CM

## 2022-05-31 LAB
ALBUMIN SERPL BCP-MCNC: 3.7 G/DL (ref 3.5–5)
ALP SERPL-CCNC: 103 U/L (ref 46–116)
ALT SERPL W P-5'-P-CCNC: 20 U/L (ref 12–78)
ANION GAP SERPL CALCULATED.3IONS-SCNC: 0 MMOL/L (ref 4–13)
AST SERPL W P-5'-P-CCNC: 16 U/L (ref 5–45)
BASOPHILS # BLD AUTO: 0.03 THOUSANDS/ΜL (ref 0–0.1)
BASOPHILS NFR BLD AUTO: 1 % (ref 0–1)
BILIRUB SERPL-MCNC: 0.51 MG/DL (ref 0.2–1)
BUN SERPL-MCNC: 18 MG/DL (ref 5–25)
CALCIUM SERPL-MCNC: 9.5 MG/DL (ref 8.3–10.1)
CHLORIDE SERPL-SCNC: 108 MMOL/L (ref 100–108)
CHOLEST SERPL-MCNC: 152 MG/DL
CO2 SERPL-SCNC: 28 MMOL/L (ref 21–32)
CREAT SERPL-MCNC: 0.84 MG/DL (ref 0.6–1.3)
EOSINOPHIL # BLD AUTO: 0.11 THOUSAND/ΜL (ref 0–0.61)
EOSINOPHIL NFR BLD AUTO: 2 % (ref 0–6)
ERYTHROCYTE [DISTWIDTH] IN BLOOD BY AUTOMATED COUNT: 13 % (ref 11.6–15.1)
EST. AVERAGE GLUCOSE BLD GHB EST-MCNC: 114 MG/DL
GFR SERPL CREATININE-BSD FRML MDRD: 89 ML/MIN/1.73SQ M
GLUCOSE P FAST SERPL-MCNC: 102 MG/DL (ref 65–99)
HBA1C MFR BLD: 5.6 %
HCT VFR BLD AUTO: 46.8 % (ref 36.5–49.3)
HDLC SERPL-MCNC: 35 MG/DL
HGB BLD-MCNC: 15.2 G/DL (ref 12–17)
IMM GRANULOCYTES # BLD AUTO: 0.01 THOUSAND/UL (ref 0–0.2)
IMM GRANULOCYTES NFR BLD AUTO: 0 % (ref 0–2)
LDLC SERPL CALC-MCNC: 107 MG/DL (ref 0–100)
LYMPHOCYTES # BLD AUTO: 1.57 THOUSANDS/ΜL (ref 0.6–4.47)
LYMPHOCYTES NFR BLD AUTO: 34 % (ref 14–44)
MCH RBC QN AUTO: 33.3 PG (ref 26.8–34.3)
MCHC RBC AUTO-ENTMCNC: 32.5 G/DL (ref 31.4–37.4)
MCV RBC AUTO: 102 FL (ref 82–98)
MONOCYTES # BLD AUTO: 0.32 THOUSAND/ΜL (ref 0.17–1.22)
MONOCYTES NFR BLD AUTO: 7 % (ref 4–12)
NEUTROPHILS # BLD AUTO: 2.53 THOUSANDS/ΜL (ref 1.85–7.62)
NEUTS SEG NFR BLD AUTO: 56 % (ref 43–75)
NONHDLC SERPL-MCNC: 117 MG/DL
NRBC BLD AUTO-RTO: 0 /100 WBCS
PLATELET # BLD AUTO: 216 THOUSANDS/UL (ref 149–390)
PMV BLD AUTO: 9.5 FL (ref 8.9–12.7)
POTASSIUM SERPL-SCNC: 4.6 MMOL/L (ref 3.5–5.3)
PROT SERPL-MCNC: 6.8 G/DL (ref 6.4–8.2)
PSA SERPL-MCNC: 0.6 NG/ML (ref 0–4)
RBC # BLD AUTO: 4.57 MILLION/UL (ref 3.88–5.62)
SODIUM SERPL-SCNC: 136 MMOL/L (ref 136–145)
TRIGL SERPL-MCNC: 48 MG/DL
TSH SERPL DL<=0.05 MIU/L-ACNC: 1.3 UIU/ML (ref 0.45–4.5)
WBC # BLD AUTO: 4.57 THOUSAND/UL (ref 4.31–10.16)

## 2022-05-31 PROCEDURE — G0103 PSA SCREENING: HCPCS

## 2022-05-31 PROCEDURE — 84443 ASSAY THYROID STIM HORMONE: CPT

## 2022-05-31 PROCEDURE — 80061 LIPID PANEL: CPT

## 2022-05-31 PROCEDURE — 83036 HEMOGLOBIN GLYCOSYLATED A1C: CPT

## 2022-05-31 PROCEDURE — 85025 COMPLETE CBC W/AUTO DIFF WBC: CPT

## 2022-05-31 PROCEDURE — 36415 COLL VENOUS BLD VENIPUNCTURE: CPT

## 2022-05-31 PROCEDURE — 80053 COMPREHEN METABOLIC PANEL: CPT

## 2022-06-02 ENCOUNTER — OFFICE VISIT (OUTPATIENT)
Dept: FAMILY MEDICINE CLINIC | Facility: CLINIC | Age: 67
End: 2022-06-02
Payer: MEDICARE

## 2022-06-02 ENCOUNTER — TELEPHONE (OUTPATIENT)
Dept: FAMILY MEDICINE CLINIC | Facility: CLINIC | Age: 67
End: 2022-06-02

## 2022-06-02 VITALS
BODY MASS INDEX: 31.4 KG/M2 | DIASTOLIC BLOOD PRESSURE: 68 MMHG | TEMPERATURE: 97.3 F | SYSTOLIC BLOOD PRESSURE: 118 MMHG | HEIGHT: 69 IN | WEIGHT: 212 LBS

## 2022-06-02 DIAGNOSIS — G89.4 PAIN SYNDROME, CHRONIC: ICD-10-CM

## 2022-06-02 DIAGNOSIS — I10 ESSENTIAL HYPERTENSION: Primary | ICD-10-CM

## 2022-06-02 DIAGNOSIS — M54.16 SPINAL STENOSIS OF LUMBAR REGION WITH RADICULOPATHY: ICD-10-CM

## 2022-06-02 DIAGNOSIS — E78.2 MIXED HYPERLIPIDEMIA: Primary | ICD-10-CM

## 2022-06-02 DIAGNOSIS — R73.9 HYPERGLYCEMIA: ICD-10-CM

## 2022-06-02 DIAGNOSIS — E78.2 MIXED HYPERLIPIDEMIA: ICD-10-CM

## 2022-06-02 DIAGNOSIS — M48.061 SPINAL STENOSIS OF LUMBAR REGION WITH RADICULOPATHY: ICD-10-CM

## 2022-06-02 DIAGNOSIS — K21.9 GASTROESOPHAGEAL REFLUX DISEASE WITHOUT ESOPHAGITIS: ICD-10-CM

## 2022-06-02 PROCEDURE — 99214 OFFICE O/P EST MOD 30 MIN: CPT | Performed by: FAMILY MEDICINE

## 2022-06-02 RX ORDER — ATORVASTATIN CALCIUM 20 MG/1
20 TABLET, FILM COATED ORAL DAILY
Qty: 90 TABLET | Refills: 1
Start: 2022-06-02 | End: 2022-06-28 | Stop reason: SDUPTHER

## 2022-06-02 NOTE — ASSESSMENT & PLAN NOTE
Lipid profile is acceptable  Patient would like to change to a formulary preferred medication, left at home will call me with the name

## 2022-06-02 NOTE — PROGRESS NOTES
Assessment/Plan:    Essential hypertension  Diet/lifestyle controlled  Continue same  Recheck 6 months    Hyperglycemia  HbA1c is 5 6, continue lifestyle modifications, dietary restrictions    Mixed hyperlipidemia  Lipid profile is acceptable  Patient would like to change to a formulary preferred medication, left at home will call me with the name  Psoriasis  Continue Humira, follow-up with Dermatology  Gastroesophageal reflux disease without esophagitis  Diet controlled  Spinal stenosis of lumbar region with radiculopathy  Follow-up with pain management  Class 1 obesity due to excess calories without serious comorbidity with body mass index (BMI) of 31 0 to 31 9 in adult  Diet discussed       Diagnoses and all orders for this visit:    Essential hypertension  -     Comprehensive metabolic panel; Future  -     CBC and differential; Future  -     UA (URINE) with reflex to Scope; Future    Hyperglycemia  -     Comprehensive metabolic panel; Future  -     Hemoglobin A1C; Future  -     UA (URINE) with reflex to Scope; Future    Mixed hyperlipidemia  -     Comprehensive metabolic panel; Future  -     CBC and differential; Future  -     Lipid panel; Future  -     TSH, 3rd generation; Future    Gastroesophageal reflux disease without esophagitis    Pain syndrome, chronic    Spinal stenosis of lumbar region with radiculopathy          Subjective:   Chief Complaint   Patient presents with    Hypertension    Hyperlipidemia     No refills needed           Patient ID: Sarabjit Neal is a 69 y o  male  Is here for follow-up on his hypertension, hyperlipidemia, reflux, and chronic pain syndrome  Overall he seems to be doing well  Injection therapy has been helpful  Still has significant right calf pain if he overdoes it  This is likely neurogenic in nature with his ease  It is tolerable at this time, he has seen a surgeon and is not considered a surgical candidate    He had recent lab work which he would like to review  The following portions of the patient's history were reviewed and updated as appropriate: allergies, current medications, past family history, past medical history, past social history, past surgical history and problem list     Review of Systems   Constitutional: Negative for appetite change, chills, diaphoresis, fatigue, fever and unexpected weight change (Designed 8 lb weight loss)  HENT: Negative for congestion, ear pain, hearing loss, nosebleeds, postnasal drip, rhinorrhea, sinus pressure, sore throat, tinnitus and trouble swallowing  Eyes: Negative for photophobia, pain, discharge, redness, itching and visual disturbance  Respiratory: Negative for cough, chest tightness, shortness of breath and wheezing  Cardiovascular: Negative for chest pain, palpitations and leg swelling  Denies orthopnea , dyspnea on exertion   Gastrointestinal: Negative for abdominal distention, abdominal pain, blood in stool, constipation, diarrhea, nausea and vomiting  Endocrine: Negative  Genitourinary: Negative for difficulty urinating, dysuria, flank pain, frequency, hematuria and urgency  Denies nocturia , erectile dysfunction   Musculoskeletal: Positive for arthralgias, back pain and gait problem  Negative for joint swelling and myalgias  Skin: Negative for pallor, rash and wound  Denies skin lesions   Allergic/Immunologic: Negative for environmental allergies, food allergies and immunocompromised state  Neurological: Negative for dizziness, tremors, seizures, syncope, speech difficulty, weakness, numbness and headaches  Hematological: Negative for adenopathy  Does not bruise/bleed easily  Psychiatric/Behavioral: Negative for behavioral problems, confusion, sleep disturbance and suicidal ideas  The patient is not nervous/anxious            Objective:      /68   Temp (!) 97 3 °F (36 3 °C)   Ht 5' 9" (1 753 m)   Wt 96 2 kg (212 lb)   BMI 31 31 kg/m² Physical Exam  Vitals and nursing note reviewed  Constitutional:       Appearance: He is well-developed  He is obese  HENT:      Head: Normocephalic and atraumatic  Right Ear: Tympanic membrane and ear canal normal       Left Ear: Tympanic membrane and ear canal normal       Nose: Nose normal    Eyes:      Conjunctiva/sclera: Conjunctivae normal       Pupils: Pupils are equal, round, and reactive to light  Neck:      Thyroid: No thyromegaly  Vascular: No JVD  Trachea: No tracheal deviation  Cardiovascular:      Rate and Rhythm: Normal rate and regular rhythm  Heart sounds: No murmur heard  Pulmonary:      Effort: Pulmonary effort is normal       Breath sounds: Normal breath sounds  No wheezing or rales  Abdominal:      General: Bowel sounds are normal       Palpations: Abdomen is soft  There is no mass  Tenderness: There is no abdominal tenderness  There is no guarding or rebound  Musculoskeletal:         General: No deformity  Cervical back: Normal range of motion and neck supple  Lumbar back: Tenderness present  Decreased range of motion  Lymphadenopathy:      Cervical: No cervical adenopathy  Skin:     General: Skin is warm and dry  Findings: No lesion or rash  Nails: There is no clubbing  Neurological:      Mental Status: He is alert and oriented to person, place, and time  Cranial Nerves: No cranial nerve deficit  Motor: No abnormal muscle tone  Coordination: Coordination normal       Deep Tendon Reflexes: Reflexes are normal and symmetric  Reflexes normal    Psychiatric:         Judgment: Judgment normal        BMI Counseling: Body mass index is 31 31 kg/m²  The BMI is above normal  Nutrition recommendations include moderation in carbohydrate intake, increasing intake of lean protein and reducing intake of saturated and trans fat  Rationale for BMI follow-up plan is due to patient being overweight or obese  BMI Counseling:  Body mass index is 31 31 kg/m²  Follow-up plan was not completed due to elderly patient (70 years old) where weight reduction/weight gain would complicate underlying health condition such as: illness or physical disability  Depression Screening and Follow-up Plan: Patient was screened for depression during today's encounter  They screened negative with a PHQ-2 score of 0

## 2022-06-28 DIAGNOSIS — E78.2 MIXED HYPERLIPIDEMIA: ICD-10-CM

## 2022-06-28 RX ORDER — ATORVASTATIN CALCIUM 20 MG/1
20 TABLET, FILM COATED ORAL DAILY
Qty: 90 TABLET | Refills: 1 | Status: SHIPPED | OUTPATIENT
Start: 2022-06-28

## 2022-07-06 ENCOUNTER — APPOINTMENT (OUTPATIENT)
Dept: RADIOLOGY | Facility: MEDICAL CENTER | Age: 67
End: 2022-07-06
Payer: MEDICARE

## 2022-07-06 ENCOUNTER — OFFICE VISIT (OUTPATIENT)
Dept: FAMILY MEDICINE CLINIC | Facility: CLINIC | Age: 67
End: 2022-07-06
Payer: MEDICARE

## 2022-07-06 VITALS
BODY MASS INDEX: 31.4 KG/M2 | SYSTOLIC BLOOD PRESSURE: 122 MMHG | HEIGHT: 69 IN | WEIGHT: 212 LBS | TEMPERATURE: 97 F | DIASTOLIC BLOOD PRESSURE: 70 MMHG

## 2022-07-06 DIAGNOSIS — M25.562 CHRONIC PAIN OF LEFT KNEE: Primary | ICD-10-CM

## 2022-07-06 DIAGNOSIS — M25.562 CHRONIC PAIN OF LEFT KNEE: ICD-10-CM

## 2022-07-06 DIAGNOSIS — G89.29 CHRONIC PAIN OF LEFT KNEE: ICD-10-CM

## 2022-07-06 DIAGNOSIS — G89.29 CHRONIC PAIN OF LEFT KNEE: Primary | ICD-10-CM

## 2022-07-06 PROCEDURE — 20610 DRAIN/INJ JOINT/BURSA W/O US: CPT | Performed by: FAMILY MEDICINE

## 2022-07-06 PROCEDURE — 99213 OFFICE O/P EST LOW 20 MIN: CPT | Performed by: FAMILY MEDICINE

## 2022-07-06 PROCEDURE — 73562 X-RAY EXAM OF KNEE 3: CPT

## 2022-07-06 RX ORDER — BETAMETHASONE SODIUM PHOSPHATE AND BETAMETHASONE ACETATE 3; 3 MG/ML; MG/ML
6 INJECTION, SUSPENSION INTRA-ARTICULAR; INTRALESIONAL; INTRAMUSCULAR; SOFT TISSUE
Status: COMPLETED | OUTPATIENT
Start: 2022-07-06 | End: 2022-07-06

## 2022-07-06 RX ADMIN — BETAMETHASONE SODIUM PHOSPHATE AND BETAMETHASONE ACETATE 6 MG: 3; 3 INJECTION, SUSPENSION INTRA-ARTICULAR; INTRALESIONAL; INTRAMUSCULAR; SOFT TISSUE at 10:31

## 2022-07-06 NOTE — PROGRESS NOTES
Assessment/Plan:         Diagnoses and all orders for this visit:    Chronic pain of left knee  -     XR knee 3 vw left non injury; Future  -     Large joint arthrocentesis: L knee          Subjective:   Chief Complaint   Patient presents with    Knee Pain    Foot Swelling     Left knee pain   B/L feet swollen           Patient ID: Mahesh Alvarez is a 69 y o  male  He is complaining of left knee pain for years  Comes and goes, he is status post right knee replacement  He has been taking Tylenol and Advil without relief  Nose swelling over the last week which goes down into his leg and foot as well  The following portions of the patient's history were reviewed and updated as appropriate: allergies, current medications, past family history, past medical history, past social history, past surgical history and problem list     Review of Systems   Constitutional: Negative for chills and fever  HENT: Negative for ear pain and sore throat  Eyes: Negative for pain and visual disturbance  Respiratory: Negative for cough and shortness of breath  Cardiovascular: Negative for chest pain and palpitations  Gastrointestinal: Negative for abdominal pain and vomiting  Genitourinary: Negative for dysuria and hematuria  Musculoskeletal: Positive for arthralgias, back pain (Sees pain management ) and joint swelling  Skin: Negative for color change and rash  Neurological: Negative for seizures and syncope  All other systems reviewed and are negative  Objective:      /70   Temp (!) 97 °F (36 1 °C)   Ht 5' 9" (1 753 m)   Wt 96 2 kg (212 lb)   BMI 31 31 kg/m²          Physical Exam  Vitals and nursing note reviewed  Constitutional:       General: He is not in acute distress  Appearance: He is well-developed  HENT:      Head: Normocephalic and atraumatic  Eyes:      Conjunctiva/sclera: Conjunctivae normal    Cardiovascular:      Rate and Rhythm: Normal rate and regular rhythm  Pulses: Normal pulses  Heart sounds: Normal heart sounds  Pulmonary:      Effort: Pulmonary effort is normal    Abdominal:      General: Abdomen is flat  Palpations: Abdomen is soft  Musculoskeletal:      Left knee: Swelling present  Decreased range of motion  Tenderness present over the medial joint line and lateral joint line  Right lower le+ Edema present  Left lower le+ Edema present  Neurological:      Mental Status: He is alert and oriented to person, place, and time  Psychiatric:         Judgment: Judgment normal        Large joint arthrocentesis: L knee  Universal Protocol:  Consent given by: patient  Timeout called at: 2022 10:20 AM   Patient understanding: patient states understanding of the procedure being performed  Patient identity confirmed: verbally with patient    Supporting Documentation  Indications: pain and joint swelling   Procedure Details  Location: knee - L knee  Needle size: 25 G  Approach: anterolateral  Medications administered: 6 mg betamethasone acetate-betamethasone sodium phosphate 6 (3-3) mg/mL    Aspirate amount: 0 mL    Dressing:  Sterile dressing applied    Patient is placed in a seated position  Using the anterolateral approach, a wheal was raised with combination of 1 mL 1% lidocaine 1 mL 0 5% bupivacaine  The knee was then accessed using 25 gauge needle and the rest of the prep was instilled  Then using a 60 mL syringe and 18 gauge Angiocath, the joint space was accessed, but no fluid was aspirated  The steroid was then injected, sterile dressing was applied  Patient tolerated well  He was instructed not to do anything physical for at least 48 hours

## 2022-07-12 ENCOUNTER — TELEPHONE (OUTPATIENT)
Dept: FAMILY MEDICINE CLINIC | Facility: CLINIC | Age: 67
End: 2022-07-12

## 2022-07-12 DIAGNOSIS — G89.29 CHRONIC PAIN OF LEFT KNEE: Primary | ICD-10-CM

## 2022-07-12 DIAGNOSIS — M25.562 CHRONIC PAIN OF LEFT KNEE: Primary | ICD-10-CM

## 2022-07-12 RX ORDER — PREDNISONE 10 MG/1
TABLET ORAL
Qty: 30 TABLET | Refills: 0 | Status: SHIPPED | OUTPATIENT
Start: 2022-07-12 | End: 2022-12-02

## 2022-07-12 NOTE — TELEPHONE ENCOUNTER
I sent in some oral prednisone, hopefully that will help settle down the swelling and get some further improvement with the knee  If things persist I would recommend he see Orthopedics

## 2022-07-12 NOTE — TELEPHONE ENCOUNTER
----- Message from Tamra Chung DO sent at 7/12/2022 11:03 AM EDT -----  X-ray showed severe arthritis in the knee  How did he do with the injection?

## 2022-07-13 ENCOUNTER — OFFICE VISIT (OUTPATIENT)
Dept: PAIN MEDICINE | Facility: MEDICAL CENTER | Age: 67
End: 2022-07-13
Payer: MEDICARE

## 2022-07-13 VITALS
BODY MASS INDEX: 31.55 KG/M2 | SYSTOLIC BLOOD PRESSURE: 110 MMHG | HEIGHT: 69 IN | WEIGHT: 213 LBS | OXYGEN SATURATION: 98 % | DIASTOLIC BLOOD PRESSURE: 52 MMHG | HEART RATE: 72 BPM | TEMPERATURE: 97.8 F

## 2022-07-13 DIAGNOSIS — M54.16 LUMBAR RADICULOPATHY: ICD-10-CM

## 2022-07-13 DIAGNOSIS — M48.061 SPINAL STENOSIS OF LUMBAR REGION WITH RADICULOPATHY: ICD-10-CM

## 2022-07-13 DIAGNOSIS — M79.18 MYOFASCIAL PAIN SYNDROME: ICD-10-CM

## 2022-07-13 DIAGNOSIS — M79.2 NEUROPATHIC PAIN: ICD-10-CM

## 2022-07-13 DIAGNOSIS — G89.4 CHRONIC PAIN SYNDROME: Primary | ICD-10-CM

## 2022-07-13 DIAGNOSIS — M54.50 CHRONIC BILATERAL LOW BACK PAIN WITHOUT SCIATICA: ICD-10-CM

## 2022-07-13 DIAGNOSIS — M54.16 SPINAL STENOSIS OF LUMBAR REGION WITH RADICULOPATHY: ICD-10-CM

## 2022-07-13 DIAGNOSIS — G89.29 CHRONIC BILATERAL LOW BACK PAIN WITHOUT SCIATICA: ICD-10-CM

## 2022-07-13 PROCEDURE — 99214 OFFICE O/P EST MOD 30 MIN: CPT | Performed by: NURSE PRACTITIONER

## 2022-07-13 RX ORDER — TIZANIDINE 4 MG/1
4 TABLET ORAL 3 TIMES DAILY
Qty: 90 TABLET | Refills: 3 | Status: SHIPPED | OUTPATIENT
Start: 2022-07-13

## 2022-07-13 RX ORDER — GABAPENTIN 300 MG/1
300 CAPSULE ORAL 2 TIMES DAILY
Qty: 60 CAPSULE | Refills: 3 | Status: SHIPPED | OUTPATIENT
Start: 2022-07-13

## 2022-07-13 NOTE — PROGRESS NOTES
Assessment  1  Chronic pain syndrome    2  Chronic bilateral low back pain without sciatica    3  Neuropathic pain    4  Myofascial pain syndrome    5  Lumbar radiculopathy    6  Spinal stenosis of lumbar region with radiculopathy        Plan  While the patient was in the office today, I did have a thorough conversation with the patient regarding their chronic pain syndrome, symptoms, medication regimen, and treatment plan  With regards to the lower extremity edema, I did explain to the patient that when he is sitting he should definitely keep his feet elevated to try to encourage and decrease the swelling and that if it continues to worsen or does not improve he should continue to follow up with his primary care provider  The patient was agreeable and verbalized an understanding  I discussed with the patient with regards to the left knee pain, it may take another week or so for the injection he just had with his primary care provider to help and if it does not he may want to seriously consider following up with an orthopedic surgeon for an evaluation and treatment plan options  The patient was agreeable and verbalized an understanding  With regards to the medication management, since he is noting moderate stable overall relief of his chronic low back and lower extremity radicular symptoms as a result of his medication management, I feel it is reasonable and appropriate continue with the gabapentin and tizanidine as prescribed  The patient was agreeable and verbalized an understanding  The patient is schedule a follow-up office visit in 16 weeks and at that point in time we will re-evaluate his medication management and treatment plan options  The patient was agreeable and verbalized an understanding  My impressions and treatment recommendations were discussed in detail with the patient who verbalized understanding and had no further questions    Discharge instructions were provided  I personally saw and examined the patient and I agree with the above discussed plan of care  No orders of the defined types were placed in this encounter  New Medications Ordered This Visit   Medications    gabapentin (NEURONTIN) 300 mg capsule     Sig: Take 1 capsule (300 mg total) by mouth 2 (two) times a day     Dispense:  60 capsule     Refill:  3    tiZANidine (ZANAFLEX) 4 mg tablet     Sig: Take 1 tablet (4 mg total) by mouth 3 (three) times a day     Dispense:  90 tablet     Refill:  3       History of Present Illness    Aki Bennett is a 69 y o  male who is currently being treated for his chronic low back and leg pain secondary to lumbar stenosis  The patient reports that since his last office visit overall his pain symptoms have remained relatively the same with regards to his low back and leg pain  However, over the past few weeks he has had worsening left knee pain and new onset left-greater-than-right lower extremity edema  The patient reports that he has followed up with his primary care provider who did try to take some of the fluid off of his left knee and gave him a left knee injection earlier this week and is starting him on a prednisone taper to see if that will help with the swelling and pain  The patient presents today for regular medication follow-up visit as he is currently managing his pain with gabapentin 300 mg b i d  and tizanidine 4 mg t i d     I have personally reviewed and/or updated the patient's past medical history, past surgical history, family history, social history, current medications, allergies, and vital signs today  Review of Systems   Respiratory: Negative for shortness of breath  Cardiovascular: Negative for chest pain  Gastrointestinal: Negative for constipation, diarrhea, nausea and vomiting  Musculoskeletal: Positive for back pain, gait problem and joint swelling (feet)  Negative for arthralgias and myalgias  Skin: Negative for rash  Neurological: Negative for dizziness, seizures and weakness  All other systems reviewed and are negative  Patient Active Problem List   Diagnosis    Chronic bilateral low back pain without sciatica    Essential hypertension    Hyperglycemia    Mixed hyperlipidemia    Polyarticular arthritis    Chronic pain syndrome    Psoriasis    Protrusion of lumbar intervertebral disc    Lumbar radiculopathy    Spinal stenosis of lumbar region with radiculopathy    Osteoarthritis of knee    Gastroesophageal reflux disease without esophagitis    Class 1 obesity due to excess calories without serious comorbidity with body mass index (BMI) of 31 0 to 31 9 in adult    Numbness of left hand    Trigger finger of left thumb    Left carpal tunnel syndrome    Ulnar neuropathy of left upper extremity    Right calf pain    Neuropathic pain    Myofascial pain syndrome       Past Medical History:   Diagnosis Date    Arthritis 2013    Bilateral carpal tunnel syndrome     Chronic pain disorder     low back    GERD (gastroesophageal reflux disease)     Hyperlipidemia     Low back pain     Lumbar disc disease     Lumbar spinal stenosis     Neck pain     had cervical fusion    Obesity     Psoriasis     right elbow    Tremor     left leg    Wears dentures     full dentures    Wears glasses        Past Surgical History:   Procedure Laterality Date    CERVICAL LAMINECTOMY      COLONOSCOPY  07/2015    multiple polyps, repeat in 3 years    JOINT REPLACEMENT      right TKR    KNEE SURGERY Right 2002    20 years ago-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    LAMINECTOMY      ORTHOPEDIC SURGERY      POPLITEAL SYNOVIAL CYST EXCISION      last assessed: 04/22/2015    [place] [facility] N/CARPAL TUNNEL SURG Left 9/25/2020    Procedure: RELEASE CARPAL TUNNEL;  Surgeon:  Sarika Morales MD;  [facility];  Service: Orthopedics    [place] REVISE MEDIAN N/CARPAL TUNNEL SURG Right 10/20/2020    Procedure: RELEASE CARPAL TUNNEL - Open;  Surgeon: Jennifer Bosch MD;  [facility];  Service: Orthopedics    SPINE SURGERY      TONSILLECTOMY AND ADENOIDECTOMY      last assessed: 2015    TOTAL KNEE ARTHROPLASTY      last assessed: 2015       Family History   Problem Relation Age of Onset    Allergies Father         seasonal    Lung cancer Family     No Known Problems Mother        Social History     Occupational History    Not on file   Tobacco Use    Smoking status: Former Smoker     Packs/day: 2 00     Years: 10 00     Pack years: 20 00     Types: Cigarettes     Quit date: 2013     Years since quittin 8    Smokeless tobacco: Never Used   Vaping Use    Vaping Use: Never used   Substance and Sexual Activity    Alcohol use: Not Currently     Alcohol/week: 12 0 standard drinks     Types: 12 Cans of beer per week    Drug use: Never    Sexual activity: Not Currently     Partners: Female, Male       Current Outpatient Medications on File Prior to Visit   Medication Sig    adalimumab (HUMIRA) 40 mg/0 8 mL PSKT Inject 40 mg under the skin every 14 (fourteen) days    atorvastatin (LIPITOR) 20 mg tablet Take 1 tablet (20 mg total) by mouth daily    predniSONE 10 mg tablet 5 x 2 days, 4 x 2 days, 3 x 2 days,2 x 2 days, 1 x 2 days   [DISCONTINUED] gabapentin (NEURONTIN) 300 mg capsule Take 1 capsule (300 mg total) by mouth 2 (two) times a day    [DISCONTINUED] tiZANidine (ZANAFLEX) 4 mg tablet Take 1 tablet (4 mg total) by mouth 3 (three) times a day     No current facility-administered medications on file prior to visit  No Known Allergies    Physical Exam    /52   Pulse 72   Temp 97 8 °F (36 6 °C)   Ht 5' 9" (1 753 m)   Wt 96 6 kg (213 lb)   SpO2 98%   BMI 31 45 kg/m²     Constitutional: normal, well developed, well nourished, alert, in no distress and non-toxic and no overt pain behavior    Eyes: anicteric  HEENT: grossly intact  Neck: supple, symmetric, trachea midline and no masses   Pulmonary:even and unlabored  Cardiovascular: The patient has greater than right +1 pitting edema upon exam   Skin:Normal without rashes or lesions and well hydrated  Psychiatric:Mood and affect appropriate  Neurologic:Cranial Nerves II-XII grossly intact  Musculoskeletal:The patient's gait is slightly antalgic, but steady without the use of any assistive devices      Imaging

## 2022-07-19 ENCOUNTER — HOSPITAL ENCOUNTER (EMERGENCY)
Facility: HOSPITAL | Age: 67
Discharge: HOME | End: 2022-07-19
Attending: EMERGENCY MEDICINE | Admitting: EMERGENCY MEDICINE
Payer: MEDICARE

## 2022-07-19 VITALS
DIASTOLIC BLOOD PRESSURE: 78 MMHG | SYSTOLIC BLOOD PRESSURE: 159 MMHG | RESPIRATION RATE: 18 BRPM | HEART RATE: 48 BPM | OXYGEN SATURATION: 96 % | WEIGHT: 215.17 LBS | BODY MASS INDEX: 31.77 KG/M2 | TEMPERATURE: 98.1 F

## 2022-07-19 DIAGNOSIS — S61.210A LACERATION OF RIGHT INDEX FINGER WITHOUT FOREIGN BODY WITHOUT DAMAGE TO NAIL, INITIAL ENCOUNTER: Primary | ICD-10-CM

## 2022-07-19 PROCEDURE — 99283 EMERGENCY DEPT VISIT LOW MDM: CPT

## 2022-07-19 PROCEDURE — 99282 EMERGENCY DEPT VISIT SF MDM: CPT | Performed by: PHYSICIAN ASSISTANT

## 2022-07-19 NOTE — ED PROVIDER NOTES
History  Chief Complaint   Patient presents with    Finger Injury     Pt  c/o cutting his right ring finger on a piece of glass yesterday and when he went to change the bandage today the skin looks like it is coming apart and it is bleeding   -thinners and last tetanus shot was possibly 3 years ago      Patient is a 70 y/o male, right hand dominant, UTD on tetanus, presenting to the ED for evaluation of finger injury  Pt states around 9PM last night he washes his glasses when he cut his right index finger on the glass  Bleeding controlled with pressure and dressing  Pt woke up this morning and noticed wound still bleeding, pressure dressing applied  Pt has full ROM of finger  Pt does not take any blood thinners  Pt without numbness/tingling  Prior to Admission Medications   Prescriptions Last Dose Informant Patient Reported? Taking? adalimumab (HUMIRA) 40 mg/0 8 mL PSKT   Yes No   Sig: Inject 40 mg under the skin every 14 (fourteen) days   atorvastatin (LIPITOR) 20 mg tablet   No No   Sig: Take 1 tablet (20 mg total) by mouth daily   gabapentin (NEURONTIN) 300 mg capsule   No No   Sig: Take 1 capsule (300 mg total) by mouth 2 (two) times a day   predniSONE 10 mg tablet   No No   Sig: 5 x 2 days, 4 x 2 days, 3 x 2 days,2 x 2 days, 1 x 2 days     tiZANidine (ZANAFLEX) 4 mg tablet   No No   Sig: Take 1 tablet (4 mg total) by mouth 3 (three) times a day      Facility-Administered Medications: None       Past Medical History:   Diagnosis Date    Arthritis 2013    Bilateral carpal tunnel syndrome     Chronic pain disorder     low back    GERD (gastroesophageal reflux disease)     Hyperlipidemia     Low back pain     Lumbar disc disease     Lumbar spinal stenosis     Neck pain     had cervical fusion    Obesity     Psoriasis     right elbow    Tremor     left leg    Wears dentures     full dentures    Wears glasses        Past Surgical History:   Procedure Laterality Date    CERVICAL LAMINECTOMY  COLONOSCOPY  2015    multiple polyps, repeat in 3 years    JOINT REPLACEMENT      right TKR    KNEE SURGERY Right     20 years ago-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    LAMINECTOMY      ORTHOPEDIC SURGERY      POPLITEAL SYNOVIAL CYST EXCISION      last assessed: 2015    [place] [facility] N/CARPAL TUNNEL SURG Left 2020    Procedure: RELEASE CARPAL TUNNEL;  Surgeon: Yohana Gonzales MD;  [facility];  Service: Orthopedics    [place] [facility] N/CARPAL TUNNEL SURG Right 10/20/2020    Procedure: RELEASE CARPAL TUNNEL - Open;  Surgeon: Yohana Gonzales MD;  [facility];  Service: Orthopedics    SPINE SURGERY      TONSILLECTOMY AND ADENOIDECTOMY      last assessed: 2015    TOTAL KNEE ARTHROPLASTY      last assessed: 2015       Family History   Problem Relation Age of Onset    Allergies Father         seasonal    Lung cancer Family     No Known Problems Mother      I have reviewed and agree with the history as documented  E-Cigarette/Vaping    E-Cigarette Use Never User      E-Cigarette/Vaping Substances    Nicotine No     THC No     CBD No     Flavoring No     Other No     Unknown No      Social History     Tobacco Use    Smoking status: Former Smoker     Packs/day: 2 00     Years: 10 00     Pack years: 20 00     Types: Cigarettes     Quit date: 2013     Years since quittin 8    Smokeless tobacco: Never Used   Vaping Use    Vaping Use: Never used   Substance Use Topics    Alcohol use: Not Currently     Alcohol/week: 12 0 standard drinks     Types: 12 Cans of beer per week    Drug use: Never       Review of Systems   Skin: Positive for wound  All other systems reviewed and are negative  Physical Exam  Physical Exam  Constitutional:       Appearance: Normal appearance  HENT:      Head: Normocephalic and atraumatic        Right Ear: External ear normal       Left Ear: External ear normal       Nose: Nose normal       Mouth/Throat: Lips: Pink  Mouth: Mucous membranes are moist    Eyes:      Extraocular Movements: Extraocular movements intact  Conjunctiva/sclera: Conjunctivae normal    Pulmonary:      Effort: No tachypnea or respiratory distress  Musculoskeletal:        Hands:       Cervical back: Normal range of motion and neck supple  Comments: Neurovascularly intact distally  5/5 strength bilateral upper extremities  Radial pulse 2 +  Cap refill <2 seconds  Sensation intact  Skin:     General: Skin is warm  Capillary Refill: Capillary refill takes less than 2 seconds  Neurological:      Mental Status: He is alert and oriented to person, place, and time  GCS: GCS eye subscore is 4  GCS verbal subscore is 5  GCS motor subscore is 6  Psychiatric:         Mood and Affect: Mood and affect normal          Speech: Speech normal          Vital Signs  ED Triage Vitals [07/19/22 0929]   Temperature Pulse Respirations Blood Pressure SpO2   98 1 °F (36 7 °C) 63 18 162/82 98 %      Temp Source Heart Rate Source Patient Position - Orthostatic VS BP Location FiO2 (%)   Oral -- Sitting Left arm --      Pain Score       --           Vitals:    07/19/22 0929   BP: 162/82   Pulse: 63   Patient Position - Orthostatic VS: Sitting         Visual Acuity      ED Medications  Medications - No data to display    Diagnostic Studies  Results Reviewed     None                 No orders to display              Procedures  Procedures         ED Course                                             MDM  Number of Diagnoses or Management Options  Laceration of right index finger without foreign body without damage to nail, initial encounter  Diagnosis management comments: Patient is a 70 y/o male, right hand dominant, UTD on tetanus, presenting to the ED for evaluation of finger injury      Superficial laceration to right index finger, will clean wound, apply surgifoam and dress  F/u with PCP    Patient verbalizes understanding and agrees with plan  The management plan was discussed in detail with the patient at bedside and all questions were answered  Prior to discharge, I provided both verbal and written instructions  I discussed with the patient the signs and symptoms for which to return to the emergency department  All questions were answered and patient was comfortable with the plan of care and discharged to home  The patient agrees to return to the Emergency Department for concerns and/or progression of illness  Disposition  Final diagnoses:   Laceration of right index finger without foreign body without damage to nail, initial encounter     Time reflects when diagnosis was documented in both MDM as applicable and the Disposition within this note     Time User Action Codes Description Comment    7/19/2022 11:47 AM Samantha Morrison Add [S60 210A] Laceration of right index finger without foreign body without damage to nail, initial encounter       ED Disposition     ED Disposition   Discharge    Condition   Stable    Date/Time   Tue Jul 19, 2022 11:47 AM    Comment   Marcine Brooke Chari Sever discharge to home/self care  Follow-up Information     Follow up With Specialties Details Why [address] [place] [address]  [phone]            Patient's Medications   Discharge Prescriptions    No medications on file       No discharge procedures on file      PDMP Review       Value Time User    PDMP Reviewed  Yes 10/20/2020  8:06 AM Blaise Siemens, PA-C          ED Provider  Electronically Signed by           Aly Quiroz PA-C  07/19/22 5290

## 2022-10-10 ENCOUNTER — OFFICE VISIT (OUTPATIENT)
Dept: PAIN MEDICINE | Facility: MEDICAL CENTER | Age: 67
End: 2022-10-10
Payer: MEDICARE

## 2022-10-10 VITALS
HEIGHT: 69 IN | BODY MASS INDEX: 32.44 KG/M2 | SYSTOLIC BLOOD PRESSURE: 153 MMHG | TEMPERATURE: 97.9 F | HEART RATE: 62 BPM | WEIGHT: 219 LBS | DIASTOLIC BLOOD PRESSURE: 74 MMHG

## 2022-10-10 DIAGNOSIS — M54.16 LUMBAR RADICULOPATHY: Primary | ICD-10-CM

## 2022-10-10 DIAGNOSIS — M51.26 PROTRUSION OF LUMBAR INTERVERTEBRAL DISC: ICD-10-CM

## 2022-10-10 DIAGNOSIS — M54.16 SPINAL STENOSIS OF LUMBAR REGION WITH RADICULOPATHY: ICD-10-CM

## 2022-10-10 DIAGNOSIS — M48.061 SPINAL STENOSIS OF LUMBAR REGION WITH RADICULOPATHY: ICD-10-CM

## 2022-10-10 PROCEDURE — 99214 OFFICE O/P EST MOD 30 MIN: CPT | Performed by: PHYSICIAN ASSISTANT

## 2022-10-10 NOTE — PATIENT INSTRUCTIONS
Epidural Steroid Injection   WHAT YOU NEED TO KNOW:   What do I need to know about an epidural steroid injection (BLADE)? An BLADE is a procedure to inject steroid medicine into the epidural space  The epidural space is between your spinal cord and vertebrae  Steroids reduce inflammation and fluid buildup in your spine that may be causing pain  You may be given pain medicine along with the steroids  How do I prepare for an BLADE? Your healthcare provider will talk to you about how to prepare for your procedure  He or she will tell you what medicines to take or not take on the day of your procedure  You may need to stop taking blood thinners or other medicines several days before your procedure  You may need to adjust any diabetes medicine you take on the day of your procedure  Steroid medicine can increase your blood sugar level  Arrange for someone to drive you home when you are discharged  What will happen during an BLADE? You will be given medicine to numb the procedure area  You will be awake for the procedure, but you will not feel pain  You may also be given medicine to help you relax  Contrast liquid will be used to help your healthcare provider see the area better  Tell the healthcare provider if you have ever had an allergic reaction to contrast liquid  Your healthcare provider may place the needle into your neck area, middle of your back, or tailbone area  He may inject the medicine next to the nerves that are causing your pain  He may instead inject the medicine into a larger area of the epidural space  This helps the medicine spread to more nerves  Your healthcare provider will use a fluoroscope to help guide the needle to the right place  A fluoroscope is a type of x-ray  After the procedure, a bandage will be placed over the injection site to prevent infection  What will happen after an BLADE? You will have a bandage over the injection site to prevent infection   Your healthcare provider will tell you when you can bathe and any activity guidelines  You will be able to go home  What are the risks of an BLADE? You may have temporary or permanent nerve damage or paralysis  You may have bleeding or develop a serious infection, such as meningitis (swelling of the brain coverings)  An abscess may also develop  An abscess is a pus-filled area under the skin  You may need surgery to fix the abscess  You may have a seizure, anxiety, or trouble sleeping  If you are a man, you may have temporary erectile dysfunction (not able to have an erection)  CARE AGREEMENT:   You have the right to help plan your care  Learn about your health condition and how it may be treated  Discuss treatment options with your healthcare providers to decide what care you want to receive  You always have the right to refuse treatment  The above information is an  only  It is not intended as medical advice for individual conditions or treatments  Talk to your doctor, nurse or pharmacist before following any medical regimen to see if it is safe and effective for you  © Copyright [employer] [facility] 2022 Information is for End User's use only and may not be sold, redistributed or otherwise used for commercial purposes   All illustrations and images included in CareNotes® are the copyrighted property of [employer]  or [address] [place] [address] [employer]

## 2022-10-10 NOTE — PROGRESS NOTES
Assessment:  1  Lumbar radiculopathy    2  Spinal stenosis of lumbar region with radiculopathy    3  Protrusion of lumbar intervertebral disc        Plan:  While the patient was in the office today, I did have a thorough conversation regarding their chronic pain syndrome, medication management, and treatment plan options  Due to the patient's increasing right lower extremity radicular symptoms and given his significant improvement with injection therapy in the past, I have recommended scheduling a lumbar interlaminar epidural steroid injection L5-S1  Complete risks and benefits including bleeding, infection, tissue reaction, nerve injury and allergic reaction were discussed  The approach was demonstrated using models and literature was provided  Verbal and written consent was obtained  Continue gabapentin 300 mg b i d  and tizanidine 4 mg t i d  as needed for spasms  He does not require refills on today's visit  My impressions and treatment recommendations were discussed in detail with the patient who verbalized understanding and had no further questions  Discharge instructions were provided  I personally saw and examined the patient and I agree with the above discussed plan of care  Orders Placed This Encounter   Procedures   • FL spine and pain procedure     Standing Status:   Future     Standing Expiration Date:   10/10/2026     Order Specific Question:   Reason for Exam:     Answer:   LESI L5-S1     Order Specific Question:   Anticoagulant hold needed? Answer:   no     No orders of the defined types were placed in this encounter  History of Present Illness:  Aki Jimenez is a 69 y o  male who presents for a follow up office visit in regards to Back Pain (Lumbar/)  The patient’s current symptoms include chronic radicular low back pain rated a 5/10 on the pain scale described as an intermittent sharp, throbbing pain with associated numbness and tingling    The pain radiates down the lateral and posterior aspects of both lower extremities, more so on the right as of late  His last lumbar interlaminar epidural steroid injection done in March provided greater than 50% reduction in pain for 3 months  He takes gabapentin and tizanidine with moderate relief and no side effects  I have personally reviewed and/or updated the patient's past medical history, past surgical history, family history, social history, current medications, allergies, and vital signs today  Review of Systems   Respiratory: Negative for shortness of breath  Cardiovascular: Negative for chest pain  Gastrointestinal: Negative for constipation, diarrhea, nausea and vomiting  Musculoskeletal: Positive for gait problem and joint swelling (feet)  Negative for arthralgias and myalgias  Skin: Negative for rash  Neurological: Negative for dizziness, seizures and weakness  All other systems reviewed and are negative        Patient Active Problem List   Diagnosis   • Chronic bilateral low back pain without sciatica   • Essential hypertension   • Hyperglycemia   • Mixed hyperlipidemia   • Polyarticular arthritis   • Chronic pain syndrome   • Psoriasis   • Protrusion of lumbar intervertebral disc   • Lumbar radiculopathy   • Spinal stenosis of lumbar region with radiculopathy   • Osteoarthritis of knee   • Gastroesophageal reflux disease without esophagitis   • Class 1 obesity due to excess calories without serious comorbidity with body mass index (BMI) of 31 0 to 31 9 in adult   • Numbness of left hand   • Trigger finger of left thumb   • Left carpal tunnel syndrome   • Ulnar neuropathy of left upper extremity   • Right calf pain   • Neuropathic pain   • Myofascial pain syndrome       Past Medical History:   Diagnosis Date   • Arthritis 2013   • Bilateral carpal tunnel syndrome    • Chronic pain disorder     low back   • GERD (gastroesophageal reflux disease)    • Hyperlipidemia    • Low back pain    • Lumbar disc disease    • Lumbar spinal stenosis    • Neck pain     had cervical fusion   • Obesity    • Psoriasis     right elbow   • Tremor     left leg   • Wears dentures     full dentures   • Wears glasses        Past Surgical History:   Procedure Laterality Date   • CERVICAL LAMINECTOMY     • COLONOSCOPY  2015    multiple polyps, repeat in 3 years   • JOINT REPLACEMENT      right TKR   • KNEE SURGERY Right     20 years ago-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   • LAMINECTOMY     • ORTHOPEDIC SURGERY     • POPLITEAL SYNOVIAL CYST EXCISION      last assessed: 2015   • NE REVISE MEDIAN N/CARPAL TUNNEL SURG Left 2020    Procedure: RELEASE CARPAL TUNNEL;  Surgeon: Melvina Fagan MD;  Location: [facility];  Service: Orthopedics   • NE REVISE MEDIAN N/CARPAL TUNNEL SURG Right 10/20/2020    Procedure: RELEASE CARPAL TUNNEL - Open;  Surgeon:  Melvina Fagan MD;  Location: [facility];  Service: Orthopedics   • SPINE SURGERY     • TONSILLECTOMY AND ADENOIDECTOMY      last assessed: 2015   • TOTAL KNEE ARTHROPLASTY      last assessed: 2015       Family History   Problem Relation Age of Onset   • Allergies Father         seasonal   • Lung cancer Family    • No Known Problems Mother        Social History     Occupational History   • Not on file   Tobacco Use   • Smoking status: Former Smoker     Packs/day: 2 00     Years: 10 00     Pack years: 20 00     Types: Cigarettes     Quit date: 2013     Years since quittin 0   • Smokeless tobacco: Never Used   Vaping Use   • Vaping Use: Never used   Substance and Sexual Activity   • Alcohol use: Not Currently     Alcohol/week: 12 0 standard drinks     Types: 12 Cans of beer per week   • Drug use: Never   • Sexual activity: Not Currently     Partners: Female       Current Outpatient Medications on File Prior to Visit   Medication Sig   • adalimumab (HUMIRA) 40 mg/0 8 mL PSKT Inject 40 mg under the skin every 14 (fourteen) days   • atorvastatin (LIPITOR) 20 mg tablet Take 1 tablet (20 mg total) by mouth daily   • gabapentin (NEURONTIN) 300 mg capsule Take 1 capsule (300 mg total) by mouth 2 (two) times a day   • tiZANidine (ZANAFLEX) 4 mg tablet Take 1 tablet (4 mg total) by mouth 3 (three) times a day   • predniSONE 10 mg tablet 5 x 2 days, 4 x 2 days, 3 x 2 days,2 x 2 days, 1 x 2 days  (Patient not taking: Reported on 10/10/2022)     No current facility-administered medications on file prior to visit  No Known Allergies    Physical Exam:    /74   Pulse 62   Temp 97 9 °F (36 6 °C)   Ht 5' 9" (1 753 m)   Wt 99 3 kg (219 lb)   BMI 32 34 kg/m²     Constitutional:normal, well developed, well nourished, alert, in no distress and non-toxic and no overt pain behavior  Eyes:anicteric  HEENT:grossly intact  Neck:supple, symmetric, trachea midline and no masses   Pulmonary:even and unlabored  Cardiovascular:No edema or pitting edema present  Skin:Normal without rashes or lesions and well hydrated  Psychiatric:Mood and affect appropriate  Neurologic:Cranial Nerves II-XII grossly intact  Musculoskeletal:  Gait is slow but stable  Tender paraspinal muscles of the lumbar spine  Positive straight leg raise test on the right, negative on the left

## 2022-11-16 ENCOUNTER — HOSPITAL ENCOUNTER (OUTPATIENT)
Dept: RADIOLOGY | Facility: MEDICAL CENTER | Age: 67
Discharge: HOME | End: 2022-11-16

## 2022-11-16 VITALS
TEMPERATURE: 97.7 F | HEART RATE: 60 BPM | RESPIRATION RATE: 18 BRPM | SYSTOLIC BLOOD PRESSURE: 129 MMHG | DIASTOLIC BLOOD PRESSURE: 73 MMHG | OXYGEN SATURATION: 95 %

## 2022-11-16 DIAGNOSIS — M54.16 LUMBAR RADICULOPATHY: ICD-10-CM

## 2022-11-16 RX ORDER — METHYLPREDNISOLONE ACETATE 80 MG/ML
80 INJECTION, SUSPENSION INTRA-ARTICULAR; INTRALESIONAL; INTRAMUSCULAR; PARENTERAL; SOFT TISSUE ONCE
Status: COMPLETED | OUTPATIENT
Start: 2022-11-16 | End: 2022-11-16

## 2022-11-16 RX ADMIN — IOHEXOL 2 ML: 300 INJECTION, SOLUTION INTRAVENOUS at 10:13

## 2022-11-16 RX ADMIN — METHYLPREDNISOLONE ACETATE 80 MG: 80 INJECTION, SUSPENSION INTRA-ARTICULAR; INTRALESIONAL; INTRAMUSCULAR; PARENTERAL; SOFT TISSUE at 10:13

## 2022-11-16 NOTE — H&P
History of Present Illness: The patient is a 69 y o  male who presents with complaints of back and leg pain    Past Medical History:   Diagnosis Date   • Arthritis 2013   • Bilateral carpal tunnel syndrome    • Chronic pain disorder     low back   • GERD (gastroesophageal reflux disease)    • Hyperlipidemia    • Low back pain    • Lumbar disc disease    • Lumbar spinal stenosis    • Neck pain     had cervical fusion   • Obesity    • Psoriasis     right elbow   • Tremor     left leg   • Wears dentures     full dentures   • Wears glasses        Past Surgical History:   Procedure Laterality Date   • CERVICAL LAMINECTOMY     • COLONOSCOPY  07/2015    multiple polyps, repeat in 3 years   • JOINT REPLACEMENT      right TKR   • KNEE SURGERY Right 2002    20 years ago-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   • LAMINECTOMY     • ORTHOPEDIC SURGERY     • POPLITEAL SYNOVIAL CYST EXCISION      last assessed: 04/22/2015   • UT REVISE MEDIAN N/CARPAL TUNNEL SURG Left 9/25/2020    Procedure: RELEASE CARPAL TUNNEL;  Surgeon: John Ayala MD;  Location: [facility];  Service: Orthopedics   • UT REVISE MEDIAN N/CARPAL TUNNEL SURG Right 10/20/2020    Procedure: RELEASE CARPAL TUNNEL - Open;  Surgeon: John Ayala MD;  Location: [facility];  Service: Orthopedics   • SPINE SURGERY     • TONSILLECTOMY AND ADENOIDECTOMY      last assessed: 04/22/2015   • TOTAL KNEE ARTHROPLASTY      last assessed: 05/12/2015         Current Outpatient Medications:   •  adalimumab (HUMIRA) 40 mg/0 8 mL PSKT, Inject 40 mg under the skin every 14 (fourteen) days, Disp: , Rfl:   •  atorvastatin (LIPITOR) 20 mg tablet, TAKE 1 TABLET BY MOUTH  DAILY, Disp: 90 tablet, Rfl: 1  •  gabapentin (NEURONTIN) 300 mg capsule, Take 1 capsule (300 mg total) by mouth 2 (two) times a day, Disp: 60 capsule, Rfl: 3  •  predniSONE 10 mg tablet, 5 x 2 days, 4 x 2 days, 3 x 2 days,2 x 2 days, 1 x 2 days   (Patient not taking: Reported on 10/10/2022), Disp: 30 tablet, Rfl: 0  • tiZANidine (ZANAFLEX) 4 mg tablet, Take 1 tablet (4 mg total) by mouth 3 (three) times a day, Disp: 90 tablet, Rfl: 3    Current Facility-Administered Medications:   •  iohexol (OMNIPAQUE) 300 mg/mL injection 2 mL, 2 mL, Epidural, Once, Kelsy Route, DO  •  methylPREDNISolone acetate (DEPO-MEDROL) injection 80 mg, 80 mg, Epidural, Once, Kelsy Route, DO    No Known Allergies    Physical Exam:   Vitals:    11/16/22 1001   BP: 136/75   Pulse: 61   Resp: 18   Temp: 97 7 °F (36 5 °C)   SpO2: 95%     General: Awake, Alert, Oriented x 3, Mood and affect appropriate  Respiratory: Respirations even and unlabored  Cardiovascular: Peripheral pulses intact; no edema  Musculoskeletal Exam: back and leg pain    ASA Score: 3    Patient/Chart Verification  Patient ID Verified: Verbal  ID Band Applied: No  Consents Confirmed: Procedural, To be obtained in the Pre-Procedure area  H&P( within 30 days) Verified: To be obtained in the Pre-Procedure area  Interval H&P(within 24 hr) Complete (required for Outpatients and Surgery Admit only): To be obtained in the Pre-Procedure area  Allergies Reviewed: Yes  Anticoag/NSAID held?: NA  Currently on antibiotics?: No    Assessment:   1   Lumbar radiculopathy        Plan: L5-S1 LESI

## 2022-11-16 NOTE — DISCHARGE INSTRUCTIONS
Epidural Steroid Injection   WHAT YOU NEED TO KNOW:   An epidural steroid injection (BLADE) is a procedure to inject steroid medicine into the epidural space  The epidural space is between your spinal cord and vertebrae  Steroids reduce inflammation and fluid buildup in your spine that may be causing pain  You may be given pain medicine along with the steroids  ACTIVITY  Do not drive or operate machinery today  No strenuous activity today - bending, lifting, etc   You may resume normal activites starting tomorrow - start slowly and as tolerated  You may shower today, but no tub baths or hot tubs  You may have numbness for several hours from the local anesthetic  Please use caution and common sense, especially with weight-bearing activities  CARE OF THE INJECTION SITE  If you have soreness or pain, apply ice to the area today (20 minutes on/20 minutes off)  Starting tomorrow, you may use warm, moist heat or ice if needed  You may have an increase or change in your discomfort for 36-48 hours after your treatment  Apply ice and continue with any pain medication you have been prescribed  Notify the [facility] if you have any of the following: redness, drainage, swelling, headache, stiff neck or fever above 100°F     SPECIAL INSTRUCTIONS  Our office will contact you in approximately 7 days for a progress report  MEDICATIONS  Continue to take all routine medications  Our office may have instructed you to hold some medications  As no general anesthesia was used in today's procedure, you should not experience any side effects related to anesthesia  If you are diabetic, the steroids used in today's injection may temporarily increase your blood sugar levels after the first few days after your injection  Please keep a close eye on your sugars and alert the doctor who manages your diabetes if your sugars are significantly high from your baseline or you are symptomatic       If you have a problem specifically related to your procedure, please call our office at [phone]  Problems not related to your procedure should be directed to your primary care physician

## 2022-11-23 ENCOUNTER — RA CDI HCC (OUTPATIENT)
Dept: OTHER | Facility: HOSPITAL | Age: 67
End: 2022-11-23

## 2022-11-23 ENCOUNTER — TELEPHONE (OUTPATIENT)
Dept: PAIN MEDICINE | Facility: CLINIC | Age: 67
End: 2022-11-23

## 2022-12-01 ENCOUNTER — APPOINTMENT (OUTPATIENT)
Dept: LAB | Facility: MEDICAL CENTER | Age: 67
End: 2022-12-01

## 2022-12-01 DIAGNOSIS — I10 ESSENTIAL HYPERTENSION: ICD-10-CM

## 2022-12-01 DIAGNOSIS — E78.2 MIXED HYPERLIPIDEMIA: ICD-10-CM

## 2022-12-01 DIAGNOSIS — R73.9 HYPERGLYCEMIA: ICD-10-CM

## 2022-12-01 LAB
ALBUMIN SERPL BCP-MCNC: 3.3 G/DL (ref 3.5–5)
ALP SERPL-CCNC: 94 U/L (ref 46–116)
ALT SERPL W P-5'-P-CCNC: 21 U/L (ref 12–78)
ANION GAP SERPL CALCULATED.3IONS-SCNC: 5 MMOL/L (ref 4–13)
AST SERPL W P-5'-P-CCNC: 18 U/L (ref 5–45)
BASOPHILS # BLD MANUAL: 0.05 THOUSAND/UL (ref 0–0.1)
BASOPHILS NFR MAR MANUAL: 1 % (ref 0–1)
BILIRUB SERPL-MCNC: 0.63 MG/DL (ref 0.2–1)
BILIRUB UR QL STRIP: NEGATIVE
BUN SERPL-MCNC: 17 MG/DL (ref 5–25)
CALCIUM ALBUM COR SERPL-MCNC: 9.7 MG/DL (ref 8.3–10.1)
CALCIUM SERPL-MCNC: 9.1 MG/DL (ref 8.3–10.1)
CHLORIDE SERPL-SCNC: 108 MMOL/L (ref 96–108)
CHOLEST SERPL-MCNC: 120 MG/DL
CLARITY UR: CLEAR
CO2 SERPL-SCNC: 24 MMOL/L (ref 21–32)
COLOR UR: COLORLESS
CREAT SERPL-MCNC: 0.8 MG/DL (ref 0.6–1.3)
EOSINOPHIL # BLD MANUAL: 0 THOUSAND/UL (ref 0–0.4)
EOSINOPHIL NFR BLD MANUAL: 0 % (ref 0–6)
ERYTHROCYTE [DISTWIDTH] IN BLOOD BY AUTOMATED COUNT: 13 % (ref 11.6–15.1)
GFR SERPL CREATININE-BSD FRML MDRD: 91 ML/MIN/1.73SQ M
GLUCOSE P FAST SERPL-MCNC: 98 MG/DL (ref 65–99)
GLUCOSE UR STRIP-MCNC: NEGATIVE MG/DL
HCT VFR BLD AUTO: 45.8 % (ref 36.5–49.3)
HDLC SERPL-MCNC: 31 MG/DL
HGB BLD-MCNC: 15.2 G/DL (ref 12–17)
HGB UR QL STRIP.AUTO: NEGATIVE
KETONES UR STRIP-MCNC: NEGATIVE MG/DL
LDLC SERPL CALC-MCNC: 75 MG/DL (ref 0–100)
LEUKOCYTE ESTERASE UR QL STRIP: NEGATIVE
LYMPHOCYTES # BLD AUTO: 1.68 THOUSAND/UL (ref 0.6–4.47)
LYMPHOCYTES # BLD AUTO: 37 % (ref 14–44)
MACROCYTES BLD QL AUTO: PRESENT
MCH RBC QN AUTO: 33.9 PG (ref 26.8–34.3)
MCHC RBC AUTO-ENTMCNC: 33.2 G/DL (ref 31.4–37.4)
MCV RBC AUTO: 102 FL (ref 82–98)
MONOCYTES # BLD AUTO: 0.23 THOUSAND/UL (ref 0–1.22)
MONOCYTES NFR BLD: 5 % (ref 4–12)
NEUTROPHILS # BLD MANUAL: 2.46 THOUSAND/UL (ref 1.85–7.62)
NEUTS BAND NFR BLD MANUAL: 1 % (ref 0–8)
NEUTS SEG NFR BLD AUTO: 53 % (ref 43–75)
NITRITE UR QL STRIP: NEGATIVE
NONHDLC SERPL-MCNC: 89 MG/DL
PH UR STRIP.AUTO: 6.5 [PH]
PLATELET # BLD AUTO: 174 THOUSANDS/UL (ref 149–390)
PLATELET BLD QL SMEAR: ADEQUATE
PMV BLD AUTO: 9.7 FL (ref 8.9–12.7)
POTASSIUM SERPL-SCNC: 3.8 MMOL/L (ref 3.5–5.3)
PROT SERPL-MCNC: 7 G/DL (ref 6.4–8.4)
PROT UR STRIP-MCNC: NEGATIVE MG/DL
RBC # BLD AUTO: 4.49 MILLION/UL (ref 3.88–5.62)
RBC MORPH BLD: PRESENT
SODIUM SERPL-SCNC: 137 MMOL/L (ref 135–147)
SP GR UR STRIP.AUTO: 1.01 (ref 1–1.03)
TRIGL SERPL-MCNC: 68 MG/DL
TSH SERPL DL<=0.05 MIU/L-ACNC: 1.16 UIU/ML (ref 0.45–4.5)
UROBILINOGEN UR STRIP-ACNC: <2 MG/DL
VARIANT LYMPHS # BLD AUTO: 3 %
WBC # BLD AUTO: 4.55 THOUSAND/UL (ref 4.31–10.16)

## 2022-12-02 ENCOUNTER — OFFICE VISIT (OUTPATIENT)
Dept: FAMILY MEDICINE CLINIC | Facility: CLINIC | Age: 67
End: 2022-12-02

## 2022-12-02 VITALS
WEIGHT: 215 LBS | SYSTOLIC BLOOD PRESSURE: 118 MMHG | BODY MASS INDEX: 31.84 KG/M2 | TEMPERATURE: 97.6 F | HEIGHT: 69 IN | DIASTOLIC BLOOD PRESSURE: 68 MMHG

## 2022-12-02 DIAGNOSIS — K21.9 GASTROESOPHAGEAL REFLUX DISEASE WITHOUT ESOPHAGITIS: ICD-10-CM

## 2022-12-02 DIAGNOSIS — J06.9 VIRAL URI: ICD-10-CM

## 2022-12-02 DIAGNOSIS — G89.4 CHRONIC PAIN SYNDROME: ICD-10-CM

## 2022-12-02 DIAGNOSIS — E66.09 CLASS 1 OBESITY DUE TO EXCESS CALORIES WITHOUT SERIOUS COMORBIDITY WITH BODY MASS INDEX (BMI) OF 31.0 TO 31.9 IN ADULT: ICD-10-CM

## 2022-12-02 DIAGNOSIS — I10 ESSENTIAL HYPERTENSION: Primary | ICD-10-CM

## 2022-12-02 DIAGNOSIS — F17.211 NICOTINE DEPENDENCE, CIGARETTES, IN REMISSION: ICD-10-CM

## 2022-12-02 DIAGNOSIS — R73.9 HYPERGLYCEMIA: ICD-10-CM

## 2022-12-02 DIAGNOSIS — E78.2 MIXED HYPERLIPIDEMIA: ICD-10-CM

## 2022-12-02 DIAGNOSIS — Z12.5 SCREENING FOR PROSTATE CANCER: ICD-10-CM

## 2022-12-02 DIAGNOSIS — Z12.2 ENCOUNTER FOR SCREENING FOR LUNG CANCER: ICD-10-CM

## 2022-12-02 LAB
EST. AVERAGE GLUCOSE BLD GHB EST-MCNC: 117 MG/DL
HBA1C MFR BLD: 5.7 %

## 2022-12-02 RX ORDER — BROMPHENIRAMINE MALEATE, PSEUDOEPHEDRINE HYDROCHLORIDE, AND DEXTROMETHORPHAN HYDROBROMIDE 2; 30; 10 MG/5ML; MG/5ML; MG/5ML
5 SYRUP ORAL 4 TIMES DAILY PRN
Qty: 240 ML | Refills: 0 | Status: SHIPPED | OUTPATIENT
Start: 2022-12-02

## 2022-12-02 NOTE — ASSESSMENT & PLAN NOTE
Favorable lipid profile, LDL mildly low, but overall stable    Continue atorvastatin 20 mg daily, recheck in 6 months

## 2022-12-02 NOTE — PROGRESS NOTES
Chief Complaint   Patient presents with   • Hypertension   • Hyperlipidemia     No refills needed     Name: John Cummings      : 1955      MRN: [id number]  Encounter Provider: Howard King DO  Encounter Date: 2022   Encounter department: [facility]    Assessment & Plan     1  Essential hypertension  Assessment & Plan:  Lifestyle/diet controlled  Recheck 6 months    Orders:  -     CBC and differential; Future; Expected date: 2023  -     Comprehensive metabolic panel; Future; Expected date: 2023  -     Lipid panel; Future; Expected date: 2023    2  Encounter for screening for lung cancer  -     CT lung screening program; Future; Expected date: 2023    3  Nicotine dependence, cigarettes, in remission  -     CT lung screening program; Future; Expected date: 2023    4  Mixed hyperlipidemia  Assessment & Plan:  Favorable lipid profile, LDL mildly low, but overall stable  Continue atorvastatin 20 mg daily, recheck in 6 months    Orders:  -     CBC and differential; Future; Expected date: 2023  -     Comprehensive metabolic panel; Future; Expected date: 2023  -     Lipid panel; Future; Expected date: 2023    5  Hyperglycemia  Assessment & Plan:  Stable, recheck lab 6 months    Orders:  -     CBC and differential; Future; Expected date: 2023  -     Comprehensive metabolic panel; Future; Expected date: 2023  -     Hemoglobin A1C; Future; Expected date: 2023  -     UA (URINE) with reflex to Scope; Future; Expected date: 2023    6  Class 1 obesity due to excess calories without serious comorbidity with body mass index (BMI) of 31 0 to 31 9 in adult    7  Gastroesophageal reflux disease without esophagitis    8  Screening for prostate cancer  -     PSA, Total Screen; Future; Expected date: 2023    9  Viral URI  -     brompheniramine-pseudoephedrine-DM 30-2-10 MG/5ML syrup;  Take 5 mL by mouth 4 (four) times a day as needed for congestion or cough    10  Chronic pain syndrome  Assessment & Plan:  Follow-up with pain management  Subjective      He presents to the office for follow-up on his hypertension, hyperlipidemia, weight, sugar, reflux, and update me on his chronic pain  Recently had a back injection and he is doing much better at this time  Trying to eat sensibly and get more physical activity was able to lose some weight  Does report that he has had some respiratory symptoms also associated with nausea vomiting and diarrhea that started over 7 days ago  He did 2 COVID test the last being 2 days ago both were negative  He is feeling better from that, still has some residual congestion and cough  Review of Systems   Constitutional: Negative for appetite change, chills, diaphoresis, fatigue, fever and unexpected weight change  HENT: Positive for congestion, postnasal drip and rhinorrhea  Negative for ear pain, hearing loss, nosebleeds, sinus pressure, sore throat, tinnitus and trouble swallowing  Eyes: Negative for photophobia, pain, discharge, redness, itching and visual disturbance  Respiratory: Positive for cough (Minimal)  Negative for chest tightness, shortness of breath and wheezing  Cardiovascular: Negative for chest pain, palpitations and leg swelling  Denies orthopnea , dyspnea on exertion   Gastrointestinal: Negative for abdominal distention, abdominal pain, blood in stool, constipation, diarrhea, nausea and vomiting  Endocrine: Negative  Genitourinary: Negative for difficulty urinating, dysuria, flank pain, frequency, hematuria and urgency  Denies nocturia , erectile dysfunction   Musculoskeletal: Positive for arthralgias and back pain  Negative for gait problem, joint swelling and myalgias  Pain actually fairly well controlled at this time  Skin: Negative for pallor, rash and wound          Denies skin lesions   Allergic/Immunologic: Negative for environmental allergies, food allergies and immunocompromised state  Neurological: Negative for dizziness, tremors, seizures, syncope, speech difficulty, weakness, numbness and headaches  Hematological: Negative for adenopathy  Does not bruise/bleed easily  Psychiatric/Behavioral: Negative for behavioral problems, confusion, sleep disturbance and suicidal ideas  The patient is not nervous/anxious  Current Outpatient Medications on File Prior to Visit   Medication Sig   • adalimumab (HUMIRA) 40 mg/0 8 mL PSKT Inject 40 mg under the skin every 14 (fourteen) days   • atorvastatin (LIPITOR) 20 mg tablet TAKE 1 TABLET BY MOUTH  DAILY   • gabapentin (NEURONTIN) 300 mg capsule Take 1 capsule (300 mg total) by mouth 2 (two) times a day   • tiZANidine (ZANAFLEX) 4 mg tablet Take 1 tablet (4 mg total) by mouth 3 (three) times a day   • [DISCONTINUED] predniSONE 10 mg tablet 5 x 2 days, 4 x 2 days, 3 x 2 days,2 x 2 days, 1 x 2 days  (Patient not taking: Reported on 10/10/2022)       Objective     /68   Temp 97 6 °F (36 4 °C)   Ht 5' 9" (1 753 m)   Wt 97 5 kg (215 lb)   BMI 31 75 kg/m²     Physical Exam  Constitutional:       Appearance: He is well-developed and well-nourished  He is obese  HENT:      Head: Normocephalic and atraumatic  Right Ear: Tympanic membrane and ear canal normal       Left Ear: Tympanic membrane and ear canal normal       Nose: Nose normal       Mouth/Throat:      Mouth: Oropharynx is clear and moist  Mucous membranes are moist    Eyes:      Extraocular Movements: EOM normal       Conjunctiva/sclera: Conjunctivae normal       Pupils: Pupils are equal, round, and reactive to light  Neck:      Thyroid: No thyromegaly  Vascular: No JVD  Trachea: No tracheal deviation  Cardiovascular:      Rate and Rhythm: Normal rate and regular rhythm  Pulses: Intact distal pulses  Heart sounds: No murmur heard    Pulmonary:      Effort: Pulmonary effort is normal  Breath sounds: Normal breath sounds  No wheezing or rales  Abdominal:      General: Bowel sounds are normal       Palpations: Abdomen is soft  There is no mass  Tenderness: There is no abdominal tenderness  There is no guarding or rebound  Musculoskeletal:         General: No tenderness, deformity or edema  Cervical back: Normal range of motion and neck supple  Lymphadenopathy:      Cervical: No cervical adenopathy  Skin:     General: Skin is warm and dry  Findings: No lesion or rash  Nails: There is no clubbing  Neurological:      Mental Status: He is alert and oriented to person, place, and time  Cranial Nerves: No cranial nerve deficit  Motor: No abnormal muscle tone  Coordination: Coordination normal       Gait: Gait normal       Deep Tendon Reflexes: Reflexes are normal and symmetric   Reflexes normal    Psychiatric:         Mood and Affect: Mood and affect and mood normal          Behavior: Behavior normal          Judgment: Judgment normal        Clotilde Beverly, DO

## 2022-12-13 ENCOUNTER — OFFICE VISIT (OUTPATIENT)
Dept: PAIN MEDICINE | Facility: MEDICAL CENTER | Age: 67
End: 2022-12-13

## 2022-12-13 VITALS
BODY MASS INDEX: 31.99 KG/M2 | WEIGHT: 216 LBS | OXYGEN SATURATION: 96 % | DIASTOLIC BLOOD PRESSURE: 62 MMHG | SYSTOLIC BLOOD PRESSURE: 110 MMHG | HEART RATE: 76 BPM | HEIGHT: 69 IN

## 2022-12-13 DIAGNOSIS — M48.061 SPINAL STENOSIS OF LUMBAR REGION WITH RADICULOPATHY: ICD-10-CM

## 2022-12-13 DIAGNOSIS — M79.18 MYOFASCIAL PAIN SYNDROME: ICD-10-CM

## 2022-12-13 DIAGNOSIS — M54.16 SPINAL STENOSIS OF LUMBAR REGION WITH RADICULOPATHY: ICD-10-CM

## 2022-12-13 DIAGNOSIS — M54.16 LUMBAR RADICULOPATHY: Primary | ICD-10-CM

## 2022-12-13 RX ORDER — GABAPENTIN 300 MG/1
300 CAPSULE ORAL 2 TIMES DAILY
Qty: 60 CAPSULE | Refills: 3 | Status: SHIPPED | OUTPATIENT
Start: 2022-12-13

## 2022-12-13 RX ORDER — TIZANIDINE 4 MG/1
4 TABLET ORAL 3 TIMES DAILY
Qty: 90 TABLET | Refills: 3 | Status: SHIPPED | OUTPATIENT
Start: 2022-12-13

## 2022-12-13 NOTE — PROGRESS NOTES
Assessment:  1  Lumbar radiculopathy    2  Spinal stenosis of lumbar region with radiculopathy    3  Myofascial pain syndrome        Plan:  While the patient was in the office today, I did have a thorough conversation regarding their chronic pain syndrome, medication management, and treatment plan options  The patient underwent an L5-S1 interlaminar epidural steroid injection on 11/16/2022 and reports approximately 80% reduction in lower extremity pain  He has been dealing with the same chronic pain pattern for many years and finds that the injections last for about 3 months  He would like to proactively schedule the next injection at the end of February in anticipation of the results of the procedure wearing off  Of course he could cancel the procedure if his symptoms are still well controlled  I have provided refills for his tizanidine and gabapentin  Follow-up after the procedure  Complete risks and benefits including bleeding, infection, tissue reaction, nerve injury and allergic reaction were discussed  The approach was demonstrated using models and literature was provided  Verbal and written consent was obtained  My impressions and treatment recommendations were discussed in detail with the patient who verbalized understanding and had no further questions  Discharge instructions were provided  I personally saw and examined the patient and I agree with the above discussed plan of care  Orders Placed This Encounter   Procedures   • [place] spine and pain procedure     After February 16,2023     Standing Status:   Future     Standing Expiration Date:   12/13/2026     Order Specific Question:   Reason for Exam:     Answer:   L5-S1 LESI     Order Specific Question:   Anticoagulant hold needed?      Answer:   no     New Medications Ordered This Visit   Medications   • tiZANidine (ZANAFLEX) 4 mg tablet     Sig: Take 1 tablet (4 mg total) by mouth 3 (three) times a day     Dispense:  90 tablet Refill:  3   • gabapentin (NEURONTIN) 300 mg capsule     Sig: Take 1 capsule (300 mg total) by mouth 2 (two) times a day     Dispense:  60 capsule     Refill:  3       History of Present Illness:  Richard Gutierrez is a 69 y o  male who presents for a follow up office visit in regards to Back Pain  The patient’s current symptoms include chronic radicular low back pain that he currently rates a 3 out of 10 on the pain scale describes it as a constant shooting pain down both lower extremities but the left more so  On 11/16/2022 he underwent an L5-S1 interlaminar epidural steroid injection and reports 80% reduction of pain  He states the injections usually last about 3 months give or take  He is maintained on gabapentin 300 mg twice daily and tizanidine 4 mg 3 times daily with moderate relief of his residual pain without side effects  I have personally reviewed and/or updated the patient's past medical history, past surgical history, family history, social history, current medications, allergies, and vital signs today  Review of Systems   Respiratory: Negative for shortness of breath  Cardiovascular: Negative for chest pain  Gastrointestinal: Negative for constipation, diarrhea, nausea and vomiting  Musculoskeletal: Positive for joint swelling  Negative for arthralgias, gait problem and myalgias  Skin: Negative for rash  Neurological: Negative for dizziness, seizures and weakness  All other systems reviewed and are negative        Patient Active Problem List   Diagnosis   • Chronic bilateral low back pain without sciatica   • Essential hypertension   • Hyperglycemia   • Mixed hyperlipidemia   • Polyarticular arthritis   • Chronic pain syndrome   • Psoriasis   • Protrusion of lumbar intervertebral disc   • Lumbar radiculopathy   • Spinal stenosis of lumbar region with radiculopathy   • Osteoarthritis of knee   • Gastroesophageal reflux disease without esophagitis   • Class 1 obesity due to excess calories without serious comorbidity with body mass index (BMI) of 31 0 to 31 9 in adult   • Numbness of left hand   • Trigger finger of left thumb   • Left carpal tunnel syndrome   • Ulnar neuropathy of left upper extremity   • Right calf pain   • Neuropathic pain   • Myofascial pain syndrome       Past Medical History:   Diagnosis Date   • Arthritis 2013   • Bilateral carpal tunnel syndrome    • Chronic pain disorder     low back   • GERD (gastroesophageal reflux disease)    • Hyperlipidemia    • Low back pain    • Lumbar disc disease    • Lumbar spinal stenosis    • Neck pain     had cervical fusion   • Obesity    • Psoriasis     right elbow   • Tremor     left leg   • Wears dentures     full dentures   • Wears glasses        Past Surgical History:   Procedure Laterality Date   • CERVICAL LAMINECTOMY     • COLONOSCOPY  07/2015    multiple polyps, repeat in 3 years   • JOINT REPLACEMENT      right TKR   • KNEE SURGERY Right 2002    20 years ago-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   • LAMINECTOMY     • ORTHOPEDIC SURGERY     • POPLITEAL SYNOVIAL CYST EXCISION      last assessed: 04/22/2015   • [place] NEUROPLASTY &/TRANSPOS MEDIAN NRV CARPAL MAGO Left 9/25/2020    Procedure: RELEASE CARPAL TUNNEL;  Surgeon: June Chavez MD;  Location: [facility];  Service: Orthopedics   • [place] NEUROPLASTY &/TRANSPOS MEDIAN NRV CARPAL Alexis Hernandze Right 10/20/2020    Procedure: RELEASE CARPAL TUNNEL - Open;  Surgeon:  June Chavez MD;  Location: [facility];  Service: Orthopedics   • SPINE SURGERY     • TONSILLECTOMY AND ADENOIDECTOMY      last assessed: 04/22/2015   • TOTAL KNEE ARTHROPLASTY      last assessed: 05/12/2015       Family History   Problem Relation Age of Onset   • Allergies Father         seasonal   • Lung cancer Family    • No Known Problems Mother        Social History     Occupational History   • Not on file   Tobacco Use   • Smoking status: Former     Packs/day: 2 00     Years: 10 00     Pack years: 20 00     Types: Cigarettes Quit date: 2013     Years since quittin 2   • Smokeless tobacco: Never   Vaping Use   • Vaping Use: Never used   Substance and Sexual Activity   • Alcohol use: Not Currently     Alcohol/week: 12 0 standard drinks     Types: 12 Cans of beer per week   • Drug use: Never   • Sexual activity: Not Currently     Partners: Female       Current Outpatient Medications on File Prior to Visit   Medication Sig   • adalimumab (HUMIRA) 40 mg/0 8 mL PSKT Inject 40 mg under the skin every 14 (fourteen) days   • atorvastatin (LIPITOR) 20 mg tablet TAKE 1 TABLET BY MOUTH  DAILY   • [DISCONTINUED] gabapentin (NEURONTIN) 300 mg capsule Take 1 capsule (300 mg total) by mouth 2 (two) times a day   • [DISCONTINUED] tiZANidine (ZANAFLEX) 4 mg tablet Take 1 tablet (4 mg total) by mouth 3 (three) times a day   • brompheniramine-pseudoephedrine-DM 30-2-10 MG/5ML syrup Take 5 mL by mouth 4 (four) times a day as needed for congestion or cough (Patient not taking: Reported on 2022)     No current facility-administered medications on file prior to visit  No Known Allergies    Physical Exam:    /62   Pulse 76   Ht 5' 9" (1 753 m)   Wt 98 kg (216 lb)   SpO2 96%   BMI 31 90 kg/m²     Constitutional:normal, well developed, well nourished, alert, in no distress and non-toxic and no overt pain behavior  Eyes:anicteric  HEENT:grossly intact  Neck:supple, symmetric, trachea midline and no masses   Pulmonary:even and unlabored  Cardiovascular:No edema or pitting edema present  Skin:Normal without rashes or lesions and well hydrated  Psychiatric:Mood and affect appropriate  Neurologic:Cranial Nerves II-XII grossly intact  Musculoskeletal: Gait is slightly antalgic      Imaging

## 2022-12-13 NOTE — PATIENT INSTRUCTIONS
Epidural Steroid Injection   WHAT YOU NEED TO KNOW:   What do I need to know about an epidural steroid injection (BLADE)? An BLADE is a procedure to inject steroid medicine into the epidural space  The epidural space is between your spinal cord and vertebrae  Steroids reduce inflammation and fluid buildup in your spine that may be causing pain  You may be given pain medicine along with the steroids  How do I prepare for an BLADE? Your healthcare provider will talk to you about how to prepare for your procedure  He or she will tell you what medicines to take or not take on the day of your procedure  You may need to stop taking blood thinners or other medicines several days before your procedure  You may need to adjust any diabetes medicine you take on the day of your procedure  Steroid medicine can increase your blood sugar level  Arrange for someone to drive you home when you are discharged  What will happen during an BLADE? You will be given medicine to numb the procedure area  You will be awake for the procedure, but you will not feel pain  You may also be given medicine to help you relax  Contrast liquid will be used to help your healthcare provider see the area better  Tell the healthcare provider if you have ever had an allergic reaction to contrast liquid  Your healthcare provider may place the needle into your neck area, middle of your back, or tailbone area  He may inject the medicine next to the nerves that are causing your pain  He may instead inject the medicine into a larger area of the epidural space  This helps the medicine spread to more nerves  Your healthcare provider will use a fluoroscope to help guide the needle to the right place  A fluoroscope is a type of x-ray  After the procedure, a bandage will be placed over the injection site to prevent infection  What will happen after an BLADE? You will have a bandage over the injection site to prevent infection   Your healthcare provider will tell you when you can bathe and any activity guidelines  You will be able to go home  What are the risks of an BLADE? You may have temporary or permanent nerve damage or paralysis  You may have bleeding or develop a serious infection, such as meningitis (swelling of the brain coverings)  An abscess may also develop  An abscess is a pus-filled area under the skin  You may need surgery to fix the abscess  You may have a seizure, anxiety, or trouble sleeping  If you are a man, you may have temporary erectile dysfunction (not able to have an erection)  CARE AGREEMENT:   You have the right to help plan your care  Learn about your health condition and how it may be treated  Discuss treatment options with your healthcare providers to decide what care you want to receive  You always have the right to refuse treatment  The above information is an  only  It is not intended as medical advice for individual conditions or treatments  Talk to your doctor, nurse or pharmacist before following any medical regimen to see if it is safe and effective for you  © Copyright [employer] [facility] 2022 Information is for End User's use only and may not be sold, redistributed or otherwise used for commercial purposes   All illustrations and images included in CareNotes® are the copyrighted property of [employer]  or [address] [place] [address] [employer]

## 2023-02-21 ENCOUNTER — HOSPITAL ENCOUNTER (OUTPATIENT)
Dept: RADIOLOGY | Facility: MEDICAL CENTER | Age: 68
Discharge: HOME | End: 2023-02-21
Admitting: PHYSICAL MEDICINE & REHABILITATION

## 2023-02-21 ENCOUNTER — APPOINTMENT (OUTPATIENT)
Dept: LAB | Facility: MEDICAL CENTER | Age: 68
End: 2023-02-21

## 2023-02-21 VITALS
HEART RATE: 67 BPM | TEMPERATURE: 98.6 F | OXYGEN SATURATION: 98 % | DIASTOLIC BLOOD PRESSURE: 75 MMHG | RESPIRATION RATE: 18 BRPM | SYSTOLIC BLOOD PRESSURE: 136 MMHG

## 2023-02-21 DIAGNOSIS — L40.0 PSORIASIS VULGARIS: ICD-10-CM

## 2023-02-21 DIAGNOSIS — Z01.89 RADIOLOGICAL EXAMINATION, NOT ELSEWHERE CLASSIFIED: ICD-10-CM

## 2023-02-21 DIAGNOSIS — M54.16 LUMBAR RADICULOPATHY: ICD-10-CM

## 2023-02-21 DIAGNOSIS — Z79.899 ENCOUNTER FOR LONG-TERM (CURRENT) USE OF OTHER MEDICATIONS: ICD-10-CM

## 2023-02-21 LAB
ALBUMIN SERPL BCP-MCNC: 3.9 G/DL (ref 3.5–5)
ALP SERPL-CCNC: 101 U/L (ref 46–116)
ALT SERPL W P-5'-P-CCNC: 17 U/L (ref 12–78)
ANION GAP SERPL CALCULATED.3IONS-SCNC: 5 MMOL/L (ref 4–13)
AST SERPL W P-5'-P-CCNC: 13 U/L (ref 5–45)
BASOPHILS # BLD AUTO: 0.04 THOUSANDS/ÂΜL (ref 0–0.1)
BASOPHILS NFR BLD AUTO: 1 % (ref 0–1)
BILIRUB SERPL-MCNC: 0.64 MG/DL (ref 0.2–1)
BUN SERPL-MCNC: 18 MG/DL (ref 5–25)
CALCIUM SERPL-MCNC: 9.1 MG/DL (ref 8.3–10.1)
CHLORIDE SERPL-SCNC: 110 MMOL/L (ref 96–108)
CO2 SERPL-SCNC: 25 MMOL/L (ref 21–32)
CREAT SERPL-MCNC: 0.78 MG/DL (ref 0.6–1.3)
EOSINOPHIL # BLD AUTO: 0.08 THOUSAND/ÂΜL (ref 0–0.61)
EOSINOPHIL NFR BLD AUTO: 2 % (ref 0–6)
ERYTHROCYTE [DISTWIDTH] IN BLOOD BY AUTOMATED COUNT: 13 % (ref 11.6–15.1)
GFR SERPL CREATININE-BSD FRML MDRD: 91 ML/MIN/1.73SQ M
GLUCOSE SERPL-MCNC: 108 MG/DL (ref 65–140)
HCT VFR BLD AUTO: 45.7 % (ref 36.5–49.3)
HGB BLD-MCNC: 15.3 G/DL (ref 12–17)
IMM GRANULOCYTES # BLD AUTO: 0.02 THOUSAND/UL (ref 0–0.2)
IMM GRANULOCYTES NFR BLD AUTO: 1 % (ref 0–2)
LYMPHOCYTES # BLD AUTO: 1.67 THOUSANDS/ÂΜL (ref 0.6–4.47)
LYMPHOCYTES NFR BLD AUTO: 39 % (ref 14–44)
MCH RBC QN AUTO: 33.9 PG (ref 26.8–34.3)
MCHC RBC AUTO-ENTMCNC: 33.5 G/DL (ref 31.4–37.4)
MCV RBC AUTO: 101 FL (ref 82–98)
MONOCYTES # BLD AUTO: 0.32 THOUSAND/ÂΜL (ref 0.17–1.22)
MONOCYTES NFR BLD AUTO: 7 % (ref 4–12)
NEUTROPHILS # BLD AUTO: 2.19 THOUSANDS/ÂΜL (ref 1.85–7.62)
NEUTS SEG NFR BLD AUTO: 50 % (ref 43–75)
NRBC BLD AUTO-RTO: 0 /100 WBCS
PLATELET # BLD AUTO: 221 THOUSANDS/UL (ref 149–390)
PMV BLD AUTO: 9.6 FL (ref 8.9–12.7)
POTASSIUM SERPL-SCNC: 4.2 MMOL/L (ref 3.5–5.3)
PROT SERPL-MCNC: 7.1 G/DL (ref 6.4–8.4)
RBC # BLD AUTO: 4.51 MILLION/UL (ref 3.88–5.62)
SODIUM SERPL-SCNC: 140 MMOL/L (ref 135–147)
WBC # BLD AUTO: 4.32 THOUSAND/UL (ref 4.31–10.16)

## 2023-02-21 RX ORDER — METHYLPREDNISOLONE ACETATE 80 MG/ML
80 INJECTION, SUSPENSION INTRA-ARTICULAR; INTRALESIONAL; INTRAMUSCULAR; PARENTERAL; SOFT TISSUE ONCE
Status: COMPLETED | OUTPATIENT
Start: 2023-02-21 | End: 2023-02-21

## 2023-02-21 RX ORDER — METHYLPREDNISOLONE ACETATE 80 MG/ML
80 INJECTION, SUSPENSION INTRA-ARTICULAR; INTRALESIONAL; INTRAMUSCULAR; PARENTERAL; SOFT TISSUE ONCE
Status: DISCONTINUED | OUTPATIENT
Start: 2023-02-21 | End: 2023-02-21

## 2023-02-21 RX ADMIN — METHYLPREDNISOLONE ACETATE 80 MG: 80 INJECTION, SUSPENSION INTRA-ARTICULAR; INTRALESIONAL; INTRAMUSCULAR; PARENTERAL; SOFT TISSUE at 09:58

## 2023-02-21 RX ADMIN — IOHEXOL 2 ML: 300 INJECTION, SOLUTION INTRAVENOUS at 09:58

## 2023-02-21 NOTE — DISCHARGE INSTRUCTIONS
Epidural Steroid Injection   WHAT YOU NEED TO KNOW:   An epidural steroid injection (BLADE) is a procedure to inject steroid medicine into the epidural space  The epidural space is between your spinal cord and vertebrae  Steroids reduce inflammation and fluid buildup in your spine that may be causing pain  You may be given pain medicine along with the steroids  ACTIVITY  Do not drive or operate machinery today  No strenuous activity today - bending, lifting, etc   You may resume normal activites starting tomorrow - start slowly and as tolerated  You may shower today, but no tub baths or hot tubs  You may have numbness for several hours from the local anesthetic  Please use caution and common sense, especially with weight-bearing activities  CARE OF THE INJECTION SITE  If you have soreness or pain, apply ice to the area today (20 minutes on/20 minutes off)  Starting tomorrow, you may use warm, moist heat or ice if needed  You may have an increase or change in your discomfort for 36-48 hours after your treatment  Apply ice and continue with any pain medication you have been prescribed  Notify [facility] if you have any of the following: redness, drainage, swelling, headache, stiff neck or fever above 100°F     SPECIAL INSTRUCTIONS  Our office will contact you in approximately 7 days for a progress report  MEDICATIONS  Continue to take all routine medications  Our office may have instructed you to hold some medications  As no general anesthesia was used in today's procedure, you should not experience any side effects related to anesthesia  If you are diabetic, the steroids used in today's injection may temporarily increase your blood sugar levels after the first few days after your injection  Please keep a close eye on your sugars and alert the doctor who manages your diabetes if your sugars are significantly high from your baseline or you are symptomatic       If you have a problem specifically related to your procedure, please call our office at [phone]  Problems not related to your procedure should be directed to your primary care physician

## 2023-02-21 NOTE — H&P
History of Present Illness: The patient is a 77 y o  male who presents with complaints of back and leg pain    Past Medical History:   Diagnosis Date   • Arthritis 2013   • Bilateral carpal tunnel syndrome    • Chronic pain disorder     low back   • GERD (gastroesophageal reflux disease)    • Hyperlipidemia    • Low back pain    • Lumbar disc disease    • Lumbar spinal stenosis    • Neck pain     had cervical fusion   • Obesity    • Psoriasis     right elbow   • Tremor     left leg   • Wears dentures     full dentures   • Wears glasses        Past Surgical History:   Procedure Laterality Date   • CERVICAL LAMINECTOMY     • COLONOSCOPY  07/2015    multiple polyps, repeat in 3 years   • JOINT REPLACEMENT      right TKR   • KNEE SURGERY Right 2002    20 years ago-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   • LAMINECTOMY     • ORTHOPEDIC SURGERY     • POPLITEAL SYNOVIAL CYST EXCISION      last assessed: 04/22/2015   • [place] NEUROPLASTY &/TRANSPOS MEDIAN NRV CARPAL TUNNE Left 9/25/2020    Procedure: RELEASE CARPAL TUNNEL;  Surgeon: Jennifer Bosch MD;  Location[facility];  Service: Orthopedics   • [place] NEUROPLASTY &/TRANSPOS MEDIAN NRV CARPAL Deward Che Right 10/20/2020    Procedure: RELEASE CARPAL TUNNEL - Open;  Surgeon:  Jennifer Bosch MD;  Location[facility];  Service: Orthopedics   • SPINE SURGERY     • TONSILLECTOMY AND ADENOIDECTOMY      last assessed: 04/22/2015   • TOTAL KNEE ARTHROPLASTY      last assessed: 05/12/2015         Current Outpatient Medications:   •  adalimumab (HUMIRA) 40 mg/0 8 mL PSKT, Inject 40 mg under the skin every 14 (fourteen) days, Disp: , Rfl:   •  atorvastatin (LIPITOR) 20 mg tablet, TAKE 1 TABLET BY MOUTH  DAILY, Disp: 90 tablet, Rfl: 1  •  brompheniramine-pseudoephedrine-DM 30-2-10 MG/5ML syrup, Take 5 mL by mouth 4 (four) times a day as needed for congestion or cough (Patient not taking: Reported on 12/13/2022), Disp: 240 mL, Rfl: 0  •  gabapentin (NEURONTIN) 300 mg capsule, Take 1 capsule (300 mg total) by mouth 2 (two) times a day, Disp: 60 capsule, Rfl: 3  •  tiZANidine (ZANAFLEX) 4 mg tablet, Take 1 tablet (4 mg total) by mouth 3 (three) times a day, Disp: 90 tablet, Rfl: 3    No Known Allergies    Physical Exam:   Vitals:    02/21/23 0947   BP: 142/81   Pulse: 66   Resp: 18   Temp: 98 6 °F (37 °C)   SpO2: 99%     General: Awake, Alert, Oriented x 3, Mood and affect appropriate  Respiratory: Respirations even and unlabored  Cardiovascular: Peripheral pulses intact; no edema  Musculoskeletal Exam: back and leg pain    ASA Score: 2    Patient/Chart Verification  Patient ID Verified: Verbal  ID Band Applied: No  Consents Confirmed: Procedural, To be obtained in the Pre-Procedure area  Interval H&P(within 24 hr) Complete (required for Outpatients and Surgery Admit only): To be obtained in the Pre-Procedure area  Allergies Reviewed: Yes  Anticoag/NSAID held?: NA  Currently on antibiotics?: No    Assessment:   1   Lumbar radiculopathy        Plan: L5-S1 LESI

## 2023-02-22 LAB
GAMMA INTERFERON BACKGROUND BLD IA-ACNC: 0.04 IU/ML
M TB IFN-G BLD-IMP: NEGATIVE
M TB IFN-G CD4+ BCKGRND COR BLD-ACNC: 0 IU/ML
M TB IFN-G CD4+ BCKGRND COR BLD-ACNC: 0 IU/ML
MITOGEN IGNF BCKGRD COR BLD-ACNC: >10 IU/ML

## 2023-02-28 ENCOUNTER — TELEPHONE (OUTPATIENT)
Dept: RADIOLOGY | Facility: MEDICAL CENTER | Age: 68
End: 2023-02-28

## 2023-03-21 ENCOUNTER — OFFICE VISIT (OUTPATIENT)
Dept: PAIN MEDICINE | Facility: MEDICAL CENTER | Age: 68
End: 2023-03-21

## 2023-03-21 VITALS
DIASTOLIC BLOOD PRESSURE: 68 MMHG | HEIGHT: 69 IN | WEIGHT: 226 LBS | BODY MASS INDEX: 33.47 KG/M2 | SYSTOLIC BLOOD PRESSURE: 126 MMHG | OXYGEN SATURATION: 98 % | HEART RATE: 66 BPM

## 2023-03-21 DIAGNOSIS — G89.29 CHRONIC BILATERAL LOW BACK PAIN WITH BILATERAL SCIATICA: ICD-10-CM

## 2023-03-21 DIAGNOSIS — M54.41 CHRONIC BILATERAL LOW BACK PAIN WITH BILATERAL SCIATICA: ICD-10-CM

## 2023-03-21 DIAGNOSIS — M47.816 LUMBAR SPONDYLOSIS: Primary | ICD-10-CM

## 2023-03-21 DIAGNOSIS — M54.42 CHRONIC BILATERAL LOW BACK PAIN WITH BILATERAL SCIATICA: ICD-10-CM

## 2023-03-21 DIAGNOSIS — M48.061 SPINAL STENOSIS OF LUMBAR REGION WITHOUT NEUROGENIC CLAUDICATION: ICD-10-CM

## 2023-03-21 NOTE — PATIENT INSTRUCTIONS
Lumbar Radiofrequency Ablation   WHAT YOU NEED TO KNOW:   What do I need to know about lumbar radiofrequency ablation? Lumbar radiofrequency ablation (RFA) is a procedure used to treat facet joint pain in your lower back  Facet joints are found at the back of each vertebra  A needle electrode is used to send electrical currents to the nerves in your facet joint  The electrical currents create heat that damages the nerve so it cannot send pain signals  How do I prepare for lumbar RFA? Your healthcare provider will talk to you about how to prepare for this procedure  You may be told to not to eat or drink anything after midnight on the day of your procedure  Your provider will tell you what medicines to take or not take on the day of your procedure  What will happen during lumbar RFA? You will lie on your stomach  You will be given local anesthesia to numb the area of your back where the needle electrode will be inserted  You may be given a sedative to help keep you relaxed  You may still feel pressure or pushing during the procedure, but you should not feel any pain  Your healthcare provider will use fluoroscopy (a type of x-ray) to guide the needle electrode to the nerves near your facet joint  Your healthcare provider may touch the affected nerve to make sure the needle electrode is in the right place  You will feel tingling or pressure when your provider does this  Your provider will then apply local anesthesia to the nerve to numb it  This will prevent you from feeling pain when your provider applies heat to the nerve  Your provider will then apply heat to the nerve using the needle electrode  Your provider may need to apply heat to more than one nerve  Your provider will remove the needle electrode and apply a bandage over the area  What are the risks of lumbar RFA? You may have pain, numbness, tingling, or burning in the area where the lumbar RFA was done  These normally go away within 6 weeks  The needle electrode may injure your spinal nerves  This may cause permanent leg weakness or nerve pain  CARE AGREEMENT:   You have the right to help plan your care  Learn about your health condition and how it may be treated  Discuss treatment options with your healthcare providers to decide what care you want to receive  You always have the right to refuse treatment  The above information is an  only  It is not intended as medical advice for individual conditions or treatments  Talk to your doctor, nurse or pharmacist before following any medical regimen to see if it is safe and effective for you  © Copyright [employer] 2022 Information is for End User's use only and may not be sold, redistributed or otherwise used for commercial purposes

## 2023-03-21 NOTE — PROGRESS NOTES
Assessment:  1  Lumbar spondylosis    2  Spinal stenosis of lumbar region without neurogenic claudication    3  Chronic bilateral low back pain with bilateral sciatica        Plan:  While the patient was in the office today, I did have a thorough conversation regarding their chronic pain syndrome, medication management, and treatment plan options  The patient's low back pain persists despite time, relative rest, activity modification and therapy  Based on the patient's symptoms examination, I suspect that the pain is being generated by the lumbar facet joints  Unfortunately, studies have demonstrated that history and examination alone are unreliable  We will schedule the patient for diagnostic lumbar medial branch blockade bilateral L3-5 using the double block paradigm  If the patient receives significant relief of appropriate duration with lidocaine 2%, we will confirm with Marcaine 0 5%  If the patient demonstrates appropriate response to medial branch blockade we will schedule for radiofrequency ablation to provide long-term relief  Complete risks and benefits including bleeding, infection, tissue reaction, nerve injury and allergic reaction were discussed  The approach was demonstrated using models and literature was provided  Verbal and written consent was obtained  Consider repeat epidural steroid injection if the radicular pain should increase  My impressions and treatment recommendations were discussed in detail with the patient who verbalized understanding and had no further questions  Discharge instructions were provided  I personally saw and examined the patient and I agree with the above discussed plan of care  Orders Placed This Encounter   Procedures   • FL spine and pain procedure     Standing Status:   Future     Standing Expiration Date:   3/21/2027     Order Specific Question:   Reason for Exam:     Answer:   b/l L3-5 MBB #1     Order Specific Question:   Anticoagulant hold needed? Answer:   no     No orders of the defined types were placed in this encounter  History of Present Illness:  Aki Prado is a 77 y o  male who presents for a follow up office visit in regards to low back pain  The patient’s current symptoms include chronic low back pain that he presently rates a 5 out of 10 on the pain scale describes it as a constant shooting pain across the lumbar spine  He has intermittent radiation into the bilateral lower extremities  He reports that the last L5-S1 interlaminar epidural steroid injection did not provide much relief however his main complaint on today's visit is more of the axial component  He has difficulty with prolonged standing and finds relief with leaning forward or laying down  I have personally reviewed and/or updated the patient's past medical history, past surgical history, family history, social history, current medications, allergies, and vital signs today  Review of Systems   Musculoskeletal: Positive for back pain         Patient Active Problem List   Diagnosis   • Chronic bilateral low back pain without sciatica   • Essential hypertension   • Hyperglycemia   • Mixed hyperlipidemia   • Polyarticular arthritis   • Chronic pain syndrome   • Psoriasis   • Protrusion of lumbar intervertebral disc   • Lumbar radiculopathy   • Spinal stenosis of lumbar region with radiculopathy   • Osteoarthritis of knee   • Gastroesophageal reflux disease without esophagitis   • Class 1 obesity due to excess calories without serious comorbidity with body mass index (BMI) of 31 0 to 31 9 in adult   • Numbness of left hand   • Trigger finger of left thumb   • Left carpal tunnel syndrome   • Ulnar neuropathy of left upper extremity   • Right calf pain   • Neuropathic pain   • Myofascial pain syndrome       Past Medical History:   Diagnosis Date   • Arthritis 2013   • Bilateral carpal tunnel syndrome    • Chronic pain disorder     low back   • GERD (gastroesophageal reflux disease)    • Hyperlipidemia    • Low back pain    • Lumbar disc disease    • Lumbar spinal stenosis    • Neck pain     had cervical fusion   • Obesity    • Psoriasis     right elbow   • Tremor     left leg   • Wears dentures     full dentures   • Wears glasses        Past Surgical History:   Procedure Laterality Date   • CERVICAL LAMINECTOMY     • COLONOSCOPY  2015    multiple polyps, repeat in 3 years   • JOINT REPLACEMENT      right TKR   • KNEE SURGERY Right     20 years ago-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   • LAMINECTOMY     • ORTHOPEDIC SURGERY     • POPLITEAL SYNOVIAL CYST EXCISION      last assessed: 2015   • [place] NEUROPLASTY &/TRANSPOS MEDIAN NRV CARPAL TUNNE Left 2020    Procedure: RELEASE CARPAL TUNNEL;  Surgeon: Coleen Jurado MD;  Location: [facility];  Service: Orthopedics   • [place] NEUROPLASTY &/TRANSPOS MEDIAN NRV CARPAL Dwyane Rosin Right 10/20/2020    Procedure: RELEASE CARPAL TUNNEL - Open;  Surgeon:  Coleen Jurado MD;  Location: [facility];  Service: Orthopedics   • SPINE SURGERY     • TONSILLECTOMY AND ADENOIDECTOMY      last assessed: 2015   • TOTAL KNEE ARTHROPLASTY      last assessed: 2015       Family History   Problem Relation Age of Onset   • Allergies Father         seasonal   • Lung cancer Family    • No Known Problems Mother        Social History     Occupational History   • Not on file   Tobacco Use   • Smoking status: Former     Packs/day: 2 00     Years: 10 00     Pack years: 20 00     Types: Cigarettes     Quit date: 2013     Years since quittin 4   • Smokeless tobacco: Never   Vaping Use   • Vaping Use: Never used   Substance and Sexual Activity   • Alcohol use: Not Currently     Alcohol/week: 12 0 standard drinks     Types: 12 Cans of beer per week   • Drug use: Never   • Sexual activity: Not Currently     Partners: Female       Current Outpatient Medications on File Prior to Visit   Medication Sig   • adalimumab (HUMIRA) 40 mg/0 8 mL PSKT Inject 40 mg under the skin every 14 (fourteen) days   • atorvastatin (LIPITOR) 20 mg tablet TAKE 1 TABLET BY MOUTH  DAILY   • gabapentin (NEURONTIN) 300 mg capsule Take 1 capsule (300 mg total) by mouth 2 (two) times a day   • tiZANidine (ZANAFLEX) 4 mg tablet Take 1 tablet (4 mg total) by mouth 3 (three) times a day   • brompheniramine-pseudoephedrine-DM 30-2-10 MG/5ML syrup Take 5 mL by mouth 4 (four) times a day as needed for congestion or cough (Patient not taking: Reported on 12/13/2022)     No current facility-administered medications on file prior to visit  No Known Allergies    Physical Exam:    /68   Pulse 66   Ht 5' 9" (1 753 m)   Wt 103 kg (226 lb)   SpO2 98%   BMI 33 37 kg/m²     Constitutional:normal, well developed, well nourished, alert, in no distress and non-toxic and no overt pain behavior  Eyes:anicteric  HEENT:grossly intact  Neck:supple, symmetric, trachea midline and no masses   Pulmonary:even and unlabored  Cardiovascular:No edema or pitting edema present  Skin:Normal without rashes or lesions and well hydrated  Psychiatric:Mood and affect appropriate  Neurologic:Cranial Nerves II-XII grossly intact  Musculoskeletal: Tender to palpation over bilateral lumbar facet joints with positive facet loading test bilaterally, limited range of motion and pain with extension  Negative straight leg raise test bilaterally      Imaging

## 2023-04-25 ENCOUNTER — TELEPHONE (OUTPATIENT)
Dept: RADIOLOGY | Facility: MEDICAL CENTER | Age: 68
End: 2023-04-25

## 2023-04-25 NOTE — TELEPHONE ENCOUNTER
Pain diary reviewed by Dr Collier Gaucher; proceed with MBB #2; I will call and coordinate       Bilateral L3-5

## 2023-05-01 NOTE — TELEPHONE ENCOUNTER
Returned call and Left message for patient to call me back DIRECTLY to schedule  Left my DIRECT phone # [phone] on message

## 2023-05-02 NOTE — TELEPHONE ENCOUNTER
Called patient and scheduled:     MBB #2 on 5/23    Reviewed instructions: , NPO 1 hour prior, loose-fitting/comfortable clothes, if ill/fever/infx/abx to call and reschedule  Also pain level at leat 5/10 and refrain from PRN, as-needed pain meds 6h prior  Patient stated verbal understanding

## 2023-05-23 ENCOUNTER — HOSPITAL ENCOUNTER (OUTPATIENT)
Dept: RADIOLOGY | Facility: MEDICAL CENTER | Age: 68
Discharge: HOME | End: 2023-05-23
Admitting: PHYSICAL MEDICINE & REHABILITATION

## 2023-05-23 VITALS
HEART RATE: 76 BPM | DIASTOLIC BLOOD PRESSURE: 79 MMHG | OXYGEN SATURATION: 97 % | TEMPERATURE: 98.4 F | SYSTOLIC BLOOD PRESSURE: 148 MMHG | RESPIRATION RATE: 18 BRPM

## 2023-05-23 DIAGNOSIS — M47.816 LUMBAR SPONDYLOSIS: ICD-10-CM

## 2023-05-23 RX ORDER — BUPIVACAINE HYDROCHLORIDE 5 MG/ML
3 INJECTION, SOLUTION EPIDURAL; INTRACAUDAL ONCE
Status: COMPLETED | OUTPATIENT
Start: 2023-05-23 | End: 2023-05-23

## 2023-05-23 RX ADMIN — BUPIVACAINE HYDROCHLORIDE 3 ML: 5 INJECTION, SOLUTION EPIDURAL; INTRACAUDAL at 15:00

## 2023-05-23 NOTE — DISCHARGE INSTRUCTIONS

## 2023-05-23 NOTE — H&P
History of Present Illness: The patient is a 77 y o  male who presents with complaints of bilateral lower back pain    Past Medical History:   Diagnosis Date   • Arthritis 2013   • Bilateral carpal tunnel syndrome    • Chronic pain disorder     low back   • GERD (gastroesophageal reflux disease)    • Hyperlipidemia    • Low back pain    • Lumbar disc disease    • Lumbar spinal stenosis    • Neck pain     had cervical fusion   • Obesity    • Psoriasis     right elbow   • Tremor     left leg   • Wears dentures     full dentures   • Wears glasses        Past Surgical History:   Procedure Laterality Date   • CERVICAL LAMINECTOMY     • COLONOSCOPY  07/2015    multiple polyps, repeat in 3 years   • JOINT REPLACEMENT      right TKR   • KNEE SURGERY Right 2002    20 years ago-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   • LAMINECTOMY     • ORTHOPEDIC SURGERY     • POPLITEAL SYNOVIAL CYST EXCISION      last assessed: 04/22/2015   • [place] NEUROPLASTY &/TRANSPOS MEDIAN NRV CARPAL TUNNE Left 9/25/2020    Procedure: RELEASE CARPAL TUNNEL;  Surgeon: Adela Stewart MD;  Location: [facility];  Service: Orthopedics   • [place] NEUROPLASTY &/TRANSPOS MEDIAN NRV CARPAL Sudie Cinthya Right 10/20/2020    Procedure: RELEASE CARPAL TUNNEL - Open;  Surgeon:  Adela Stewart MD;  Location: [facility];  Service: Orthopedics   • SPINE SURGERY     • TONSILLECTOMY AND ADENOIDECTOMY      last assessed: 04/22/2015   • TOTAL KNEE ARTHROPLASTY      last assessed: 05/12/2015         Current Outpatient Medications:   •  adalimumab (HUMIRA) 40 mg/0 8 mL PSKT, Inject 40 mg under the skin every 14 (fourteen) days, Disp: , Rfl:   •  atorvastatin (LIPITOR) 20 mg tablet, TAKE 1 TABLET BY MOUTH ONCE  DAILY, Disp: 90 tablet, Rfl: 3  •  brompheniramine-pseudoephedrine-DM 30-2-10 MG/5ML syrup, Take 5 mL by mouth 4 (four) times a day as needed for congestion or cough (Patient not taking: Reported on 12/13/2022), Disp: 240 mL, Rfl: 0  •  gabapentin (NEURONTIN) 300 mg capsule, TAKE 1 CAPSULE(300 MG) BY MOUTH TWICE DAILY, Disp: 60 capsule, Rfl: 3  •  tiZANidine (ZANAFLEX) 4 mg tablet, TAKE 1 TABLET(4 MG) BY MOUTH THREE TIMES DAILY, Disp: 90 tablet, Rfl: 3    No Known Allergies    Physical Exam:   Vitals:    05/23/23 1444   BP: 113/67   Pulse: 76   Resp: 18   Temp: 98 4 °F (36 9 °C)   SpO2: 97%     General: Awake, Alert, Oriented x 3, Mood and affect appropriate  Respiratory: Respirations even and unlabored  Cardiovascular: Peripheral pulses intact; no edema  Musculoskeletal Exam: bilateral lower back pain    ASA Score: 3    Patient/Chart Verification  Patient ID Verified: Verbal  ID Band Applied: No  Consents Confirmed: Procedural, To be obtained in the Pre-Procedure area  H&P( within 30 days) Verified: To be obtained in the Pre-Procedure area  Interval H&P(within 24 hr) Complete (required for Outpatients and Surgery Admit only): To be obtained in the Pre-Procedure area  Allergies Reviewed: Yes  Anticoag/NSAID held?: NA  Currently on antibiotics?: No    Assessment:   1   Lumbar spondylosis        Plan: EVERARDO L3-5 MBB #2

## 2023-05-26 ENCOUNTER — RA CDI HCC (OUTPATIENT)
Dept: OTHER | Facility: HOSPITAL | Age: 68
End: 2023-05-26

## 2023-05-26 ENCOUNTER — TELEPHONE (OUTPATIENT)
Dept: RADIOLOGY | Facility: MEDICAL CENTER | Age: 68
End: 2023-05-26

## 2023-05-26 NOTE — TELEPHONE ENCOUNTER
Pain diary reviewed by Dr Bhatti Sample; proceed with RFA and f/u; I will call and coordinate       EVERARDO L3-5

## 2023-05-31 NOTE — TELEPHONE ENCOUNTER
Reviewed instructions: , NPO 1 hour prior, loose-fitting/comfortable clothing, if ill/fever/infx/abx to call and reschedule  No need to hold any meds prior  Patient stated verbal understanding       R procedure scheduled 6/20/23   L Procedure scheduled 7/5/23  F/U scheduled 8/4/23 with PA

## 2023-06-02 ENCOUNTER — APPOINTMENT (OUTPATIENT)
Dept: LAB | Facility: MEDICAL CENTER | Age: 68
End: 2023-06-02
Payer: MEDICARE

## 2023-06-02 DIAGNOSIS — E78.2 MIXED HYPERLIPIDEMIA: ICD-10-CM

## 2023-06-02 DIAGNOSIS — I10 ESSENTIAL HYPERTENSION: ICD-10-CM

## 2023-06-02 DIAGNOSIS — Z12.5 SCREENING FOR PROSTATE CANCER: ICD-10-CM

## 2023-06-02 DIAGNOSIS — R73.9 HYPERGLYCEMIA: ICD-10-CM

## 2023-06-02 LAB
ALBUMIN SERPL BCP-MCNC: 3.6 G/DL (ref 3.5–5)
ALP SERPL-CCNC: 103 U/L (ref 46–116)
ALT SERPL W P-5'-P-CCNC: 18 U/L (ref 12–78)
ANION GAP SERPL CALCULATED.3IONS-SCNC: 0 MMOL/L (ref 4–13)
AST SERPL W P-5'-P-CCNC: 11 U/L (ref 5–45)
BASOPHILS # BLD AUTO: 0.05 THOUSANDS/ÂΜL (ref 0–0.1)
BASOPHILS NFR BLD AUTO: 1 % (ref 0–1)
BILIRUB SERPL-MCNC: 0.72 MG/DL (ref 0.2–1)
BILIRUB UR QL STRIP: NEGATIVE
BUN SERPL-MCNC: 14 MG/DL (ref 5–25)
CALCIUM SERPL-MCNC: 9 MG/DL (ref 8.3–10.1)
CHLORIDE SERPL-SCNC: 109 MMOL/L (ref 96–108)
CHOLEST SERPL-MCNC: 119 MG/DL
CLARITY UR: CLEAR
CO2 SERPL-SCNC: 27 MMOL/L (ref 21–32)
COLOR UR: COLORLESS
CREAT SERPL-MCNC: 0.8 MG/DL (ref 0.6–1.3)
EOSINOPHIL # BLD AUTO: 0.1 THOUSAND/ÂΜL (ref 0–0.61)
EOSINOPHIL NFR BLD AUTO: 2 % (ref 0–6)
ERYTHROCYTE [DISTWIDTH] IN BLOOD BY AUTOMATED COUNT: 12.9 % (ref 11.6–15.1)
EST. AVERAGE GLUCOSE BLD GHB EST-MCNC: 111 MG/DL
GFR SERPL CREATININE-BSD FRML MDRD: 90 ML/MIN/1.73SQ M
GLUCOSE P FAST SERPL-MCNC: 97 MG/DL (ref 65–99)
GLUCOSE UR STRIP-MCNC: NEGATIVE MG/DL
HBA1C MFR BLD: 5.5 %
HCT VFR BLD AUTO: 46 % (ref 36.5–49.3)
HDLC SERPL-MCNC: 36 MG/DL
HGB BLD-MCNC: 15.3 G/DL (ref 12–17)
HGB UR QL STRIP.AUTO: NEGATIVE
IMM GRANULOCYTES # BLD AUTO: 0.02 THOUSAND/UL (ref 0–0.2)
IMM GRANULOCYTES NFR BLD AUTO: 0 % (ref 0–2)
KETONES UR STRIP-MCNC: NEGATIVE MG/DL
LDLC SERPL CALC-MCNC: 69 MG/DL (ref 0–100)
LEUKOCYTE ESTERASE UR QL STRIP: NEGATIVE
LYMPHOCYTES # BLD AUTO: 1.87 THOUSANDS/ÂΜL (ref 0.6–4.47)
LYMPHOCYTES NFR BLD AUTO: 33 % (ref 14–44)
MCH RBC QN AUTO: 33.3 PG (ref 26.8–34.3)
MCHC RBC AUTO-ENTMCNC: 33.3 G/DL (ref 31.4–37.4)
MCV RBC AUTO: 100 FL (ref 82–98)
MONOCYTES # BLD AUTO: 0.39 THOUSAND/ÂΜL (ref 0.17–1.22)
MONOCYTES NFR BLD AUTO: 7 % (ref 4–12)
NEUTROPHILS # BLD AUTO: 3.2 THOUSANDS/ÂΜL (ref 1.85–7.62)
NEUTS SEG NFR BLD AUTO: 57 % (ref 43–75)
NITRITE UR QL STRIP: NEGATIVE
NONHDLC SERPL-MCNC: 83 MG/DL
NRBC BLD AUTO-RTO: 0 /100 WBCS
PH UR STRIP.AUTO: 7 [PH]
PLATELET # BLD AUTO: 198 THOUSANDS/UL (ref 149–390)
PMV BLD AUTO: 9.6 FL (ref 8.9–12.7)
POTASSIUM SERPL-SCNC: 4.1 MMOL/L (ref 3.5–5.3)
PROT SERPL-MCNC: 6.5 G/DL (ref 6.4–8.4)
PROT UR STRIP-MCNC: NEGATIVE MG/DL
PSA SERPL-MCNC: 0.61 NG/ML (ref 0–4)
RBC # BLD AUTO: 4.59 MILLION/UL (ref 3.88–5.62)
SODIUM SERPL-SCNC: 136 MMOL/L (ref 135–147)
SP GR UR STRIP.AUTO: 1.01 (ref 1–1.03)
TRIGL SERPL-MCNC: 70 MG/DL
UROBILINOGEN UR STRIP-ACNC: <2 MG/DL
WBC # BLD AUTO: 5.63 THOUSAND/UL (ref 4.31–10.16)

## 2023-06-02 PROCEDURE — 83036 HEMOGLOBIN GLYCOSYLATED A1C: CPT

## 2023-06-02 PROCEDURE — 81003 URINALYSIS AUTO W/O SCOPE: CPT

## 2023-06-02 PROCEDURE — 80053 COMPREHEN METABOLIC PANEL: CPT

## 2023-06-02 PROCEDURE — 85025 COMPLETE CBC W/AUTO DIFF WBC: CPT

## 2023-06-02 PROCEDURE — 36415 COLL VENOUS BLD VENIPUNCTURE: CPT

## 2023-06-02 PROCEDURE — 80061 LIPID PANEL: CPT

## 2023-06-02 PROCEDURE — G0103 PSA SCREENING: HCPCS

## 2023-06-06 ENCOUNTER — OFFICE VISIT (OUTPATIENT)
Dept: FAMILY MEDICINE CLINIC | Facility: CLINIC | Age: 68
End: 2023-06-06
Payer: MEDICARE

## 2023-06-06 VITALS
WEIGHT: 216.4 LBS | SYSTOLIC BLOOD PRESSURE: 132 MMHG | HEIGHT: 69 IN | DIASTOLIC BLOOD PRESSURE: 78 MMHG | BODY MASS INDEX: 32.05 KG/M2

## 2023-06-06 DIAGNOSIS — E78.2 MIXED HYPERLIPIDEMIA: ICD-10-CM

## 2023-06-06 DIAGNOSIS — Z00.00 MEDICARE ANNUAL WELLNESS VISIT, SUBSEQUENT: ICD-10-CM

## 2023-06-06 DIAGNOSIS — Z12.2 ENCOUNTER FOR SCREENING FOR LUNG CANCER: ICD-10-CM

## 2023-06-06 DIAGNOSIS — F17.211 NICOTINE DEPENDENCE, CIGARETTES, IN REMISSION: ICD-10-CM

## 2023-06-06 DIAGNOSIS — K21.9 GASTROESOPHAGEAL REFLUX DISEASE WITHOUT ESOPHAGITIS: ICD-10-CM

## 2023-06-06 DIAGNOSIS — R73.9 HYPERGLYCEMIA: ICD-10-CM

## 2023-06-06 DIAGNOSIS — L40.9 PSORIASIS: ICD-10-CM

## 2023-06-06 DIAGNOSIS — E66.09 CLASS 1 OBESITY DUE TO EXCESS CALORIES WITHOUT SERIOUS COMORBIDITY WITH BODY MASS INDEX (BMI) OF 31.0 TO 31.9 IN ADULT: ICD-10-CM

## 2023-06-06 DIAGNOSIS — E55.9 VITAMIN D DEFICIENCY: ICD-10-CM

## 2023-06-06 DIAGNOSIS — M54.16 SPINAL STENOSIS OF LUMBAR REGION WITH RADICULOPATHY: ICD-10-CM

## 2023-06-06 DIAGNOSIS — M48.061 SPINAL STENOSIS OF LUMBAR REGION WITH RADICULOPATHY: ICD-10-CM

## 2023-06-06 DIAGNOSIS — G89.29 CHRONIC BILATERAL LOW BACK PAIN WITHOUT SCIATICA: ICD-10-CM

## 2023-06-06 DIAGNOSIS — I10 ESSENTIAL HYPERTENSION: Primary | ICD-10-CM

## 2023-06-06 DIAGNOSIS — M54.50 CHRONIC BILATERAL LOW BACK PAIN WITHOUT SCIATICA: ICD-10-CM

## 2023-06-06 PROBLEM — M79.661 RIGHT CALF PAIN: Status: RESOLVED | Noted: 2021-02-24 | Resolved: 2023-06-06

## 2023-06-06 PROCEDURE — G0439 PPPS, SUBSEQ VISIT: HCPCS | Performed by: FAMILY MEDICINE

## 2023-06-06 PROCEDURE — 99214 OFFICE O/P EST MOD 30 MIN: CPT | Performed by: FAMILY MEDICINE

## 2023-06-06 NOTE — PROGRESS NOTES
Assessment and Plan:     Problem List Items Addressed This Visit        Digestive    Gastroesophageal reflux disease without esophagitis       Cardiovascular and Mediastinum    Essential hypertension - Primary     Diet lifestyle controlled  Recheck 6 months            Nervous and Auditory    Spinal stenosis of lumbar region with radiculopathy       Musculoskeletal and Integument    Psoriasis     Follow-up with dermatology, continue Humira  Other    Chronic bilateral low back pain without sciatica     Continue gabapentin, follow-up with pain management  Hyperglycemia     HbA1c is improved at 5 5, continue diet and lifestyle modifications         Relevant Orders    Comprehensive metabolic panel    Hemoglobin A1C    TSH, 3rd generation    T4    Mixed hyperlipidemia     Lipid profile is improved, continue atorvastatin 20 mg daily  Relevant Orders    Comprehensive metabolic panel    Lipid panel    TSH, 3rd generation    T4    Class 1 obesity due to excess calories without serious comorbidity with body mass index (BMI) of 31 0 to 31 9 in adult   Other Visit Diagnoses     Encounter for screening for lung cancer        Relevant Orders    [facility] lung screening program    Nicotine dependence, cigarettes, in remission        Relevant Orders    [facility] lung screening program    Medicare annual wellness visit, subsequent        Vitamin D deficiency        Relevant Orders    Vitamin D 25 hydroxy        BMI Counseling: Body mass index is 31 96 kg/m²  The BMI is above normal  Nutrition recommendations include decreasing portion sizes, encouraging healthy choices of fruits and vegetables, moderation in carbohydrate intake, increasing intake of lean protein and reducing intake of saturated and trans fat  Exercise recommendations include exercising 3-5 times per week  Rationale for BMI follow-up plan is due to patient being overweight or obese       Depression Screening and Follow-up Plan: Patient was screened for depression during today's encounter  They screened negative with a PHQ-2 score of 0  Preventive health issues were discussed with patient, and age appropriate screening tests were ordered as noted in patient's After Visit Summary  Personalized health advice and appropriate referrals for health education or preventive services given if needed, as noted in patient's After Visit Summary  History of Present Illness:     Patient presents for a Medicare Wellness Visit    He presents to the office for follow-up on his hypertension, spinal stenosis, chronic bilateral back pain, sugar, cholesterol, obesity, GERD, and to update me on his pain management  He is also here for a Medicare wellness visit  He continues to see pain management and sounds like they are planning to do a rhizotomy in the near future  He is up-to-date with eye exam, he is due for colonoscopy  Patient Care Team:  Manjula Mena DO as PCP - DO Jennifer Cruz PA-C Donnal Prudent, MD Mario Ruelas DO     Review of Systems:     Review of Systems   Constitutional: Negative for appetite change, chills, diaphoresis, fatigue, fever and unexpected weight change  HENT: Negative for congestion, ear pain, hearing loss, nosebleeds, postnasal drip, rhinorrhea, sinus pressure, sore throat, tinnitus and trouble swallowing  Eyes: Negative for photophobia, pain, discharge, redness, itching and visual disturbance  Respiratory: Negative for cough, chest tightness, shortness of breath and wheezing  Cardiovascular: Negative for chest pain, palpitations and leg swelling  Denies orthopnea , dyspnea on exertion   Gastrointestinal: Negative for abdominal distention, abdominal pain, blood in stool, constipation, diarrhea, nausea and vomiting  Endocrine: Negative      Genitourinary: Negative for difficulty urinating, dysuria, flank pain, frequency, hematuria and urgency  Denies nocturia , erectile dysfunction   Musculoskeletal: Negative for arthralgias, back pain, gait problem, joint swelling and myalgias  Skin: Negative for pallor, rash and wound  Denies skin lesions   Allergic/Immunologic: Negative for environmental allergies, food allergies and immunocompromised state  Neurological: Negative for dizziness, tremors, seizures, syncope, speech difficulty, weakness, numbness and headaches  Hematological: Negative for adenopathy  Does not bruise/bleed easily  Psychiatric/Behavioral: Negative for behavioral problems, confusion, sleep disturbance and suicidal ideas  The patient is not nervous/anxious           Problem List:     Patient Active Problem List   Diagnosis   • Chronic bilateral low back pain without sciatica   • Essential hypertension   • Hyperglycemia   • Mixed hyperlipidemia   • Polyarticular arthritis   • Chronic pain syndrome   • Psoriasis   • Protrusion of lumbar intervertebral disc   • Lumbar radiculopathy   • Spinal stenosis of lumbar region with radiculopathy   • Osteoarthritis of knee   • Gastroesophageal reflux disease without esophagitis   • Class 1 obesity due to excess calories without serious comorbidity with body mass index (BMI) of 31 0 to 31 9 in adult   • Numbness of left hand   • Trigger finger of left thumb   • Left carpal tunnel syndrome   • Ulnar neuropathy of left upper extremity   • Neuropathic pain   • Myofascial pain syndrome   • Lumbar spondylosis      Past Medical and Surgical History:     Past Medical History:   Diagnosis Date   • Arthritis 2013   • Bilateral carpal tunnel syndrome    • Chronic pain disorder     low back   • GERD (gastroesophageal reflux disease)    • Hyperlipidemia    • Low back pain    • Lumbar disc disease    • Lumbar spinal stenosis    • Neck pain     had cervical fusion   • Obesity    • Psoriasis     right elbow   • Tremor     left leg   • Wears dentures     full dentures   • Wears glasses Past Surgical History:   Procedure Laterality Date   • CERVICAL LAMINECTOMY     • COLONOSCOPY  2015    multiple polyps, repeat in 3 years   • JOINT REPLACEMENT      right TKR   • KNEE SURGERY Right     20 years ago-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   • LAMINECTOMY     • ORTHOPEDIC SURGERY     • POPLITEAL SYNOVIAL CYST EXCISION      last assessed: 2015   • NE NEUROPLASTY &/TRANSPOS MEDIAN NRV CARPAL TUNNE Left 2020    Procedure: RELEASE CARPAL TUNNEL;  Surgeon: Darrel Davies MD;  Location: [facility];  Service: Orthopedics   • NE NEUROPLASTY &/TRANSPOS MEDIAN NRV CARPAL Connie Pitcher Right 10/20/2020    Procedure: RELEASE CARPAL TUNNEL - Open;  Surgeon: Darrel Davies MD;  Location: [facility];  Service: Orthopedics   • SPINE SURGERY     • TONSILLECTOMY AND ADENOIDECTOMY      last assessed: 2015   • TOTAL KNEE ARTHROPLASTY      last assessed: 2015      Family History:     Family History   Problem Relation Age of Onset   • Allergies Father         seasonal   • Lung cancer Family    • No Known Problems Mother       Social History:     Social History     Socioeconomic History   • Marital status:       Spouse name: None   • Number of children: 3   • Years of education: Less than high school   • Highest education level: None   Occupational History   • None   Tobacco Use   • Smoking status: Former     Packs/day: 2 00     Years: 10 00     Total pack years: 20 00     Types: Cigarettes     Quit date: 2013     Years since quittin 7   • Smokeless tobacco: Never   Vaping Use   • Vaping Use: Never used   Substance and Sexual Activity   • Alcohol use: Not Currently     Alcohol/week: 12 0 standard drinks of alcohol     Types: 12 Cans of beer per week   • Drug use: Never   • Sexual activity: Not Currently     Partners: Female   Other Topics Concern   • None   Social History Narrative    Home DME brace-neck brace    Home DME wheelchair-manual wheelchair used to ambulate    Uses safety equipment-seatbelts     Social Determinants of Health     Financial Resource Strain: Low Risk  (5/31/2023)    Overall Financial Resource Strain (CARDIA)    • Difficulty of Paying Living Expenses: Not hard at all   Food Insecurity: Not on file   Transportation Needs: No Transportation Needs (5/31/2023)    PRAPARE - Transportation    • Lack of Transportation (Medical): No    • Lack of Transportation (Non-Medical): No   Physical Activity: Not on file   Stress: Not on file   Social Connections: Not on file   Intimate Partner Violence: Not on file   Housing Stability: Not on file      Medications and Allergies:     Current Outpatient Medications   Medication Sig Dispense Refill   • adalimumab (HUMIRA) 40 mg/0 8 mL PSKT Inject 40 mg under the skin every 14 (fourteen) days     • atorvastatin (LIPITOR) 20 mg tablet TAKE 1 TABLET BY MOUTH ONCE  DAILY 90 tablet 3   • gabapentin (NEURONTIN) 300 mg capsule TAKE 1 CAPSULE(300 MG) BY MOUTH TWICE DAILY 60 capsule 3   • tiZANidine (ZANAFLEX) 4 mg tablet TAKE 1 TABLET(4 MG) BY MOUTH THREE TIMES DAILY 90 tablet 3     No current facility-administered medications for this visit       No Known Allergies   Immunizations:     Immunization History   Administered Date(s) Administered   • COVID-19 MODERNA VACC 0 5 ML IM 05/17/2021, 06/12/2021, 12/08/2021, 10/20/2022   • INFLUENZA 10/07/2013, 10/17/2014, 10/18/2014, 10/22/2015, 10/25/2016, 11/07/2017, 11/08/2018, 11/07/2019, 09/24/2021, 09/22/2022, 09/22/2022   • Influenza Quadrivalent Preservative Free 3 years and older IM 10/22/2015, 11/07/2017   • Influenza Quadrivalent, 6-35 Months IM 10/25/2016   • Influenza, high dose seasonal 0 7 mL 11/08/2018, 11/07/2019, 09/12/2020   • Influenza, seasonal, injectable 10/18/2014   • Pneumococcal Conjugate 13-Valent 10/25/2016   • Pneumococcal Polysaccharide PPV23 08/12/2015   • Tdap 10/22/2015, 05/02/2017   • Zoster 08/12/2015      Health Maintenance:         Topic Date Due   • Lung Cancer Screening 05/07/2016   • Colorectal Cancer Screening  10/02/2023   • Hepatitis C Screening  Completed         Topic Date Due   • Hepatitis A Vaccine (1 of 2 - Risk 2-dose series) Never done   • Hepatitis B Vaccine (1 of 3 - Risk 3-dose series) Never done   • Pneumococcal Vaccine: 65+ Years (3 - PPSV23 if available, else PCV20) 08/12/2020   • COVID-19 Vaccine (5 - Booster for Sandip Peppers series) 12/15/2022      Medicare Screening Tests and Risk Assessments:     Nubia Gutierrez is here for his Subsequent Wellness visit  Last Medicare Wellness visit information reviewed, patient interviewed, no change since last AWV  Health Risk Assessment:   Patient rates overall health as good  Patient feels that their physical health rating is same  Patient is satisfied with their life  Eyesight was rated as same  Hearing was rated as same  Patient feels that their emotional and mental health rating is same  Patients states they are never, rarely angry  Patient states they are never, rarely unusually tired/fatigued  Pain experienced in the last 7 days has been a lot  Patient's pain rating has been 4/10  Patient states that he has experienced no weight loss or gain in last 6 months  Depression Screening:   PHQ-2 Score: 0      Fall Risk Screening: In the past year, patient has experienced: history of falling in past year    Injured during fall?: No    Feels unsteady when standing or walking?: No    Worried about falling?: No      Home Safety:  Patient does not have trouble with stairs inside or outside of their home  Patient has working smoke alarms and has working carbon monoxide detector  Home safety hazards include: none  Nutrition:   Current diet is Regular  Medications:   Patient is not currently taking any over-the-counter supplements  Patient is able to manage medications       Activities of Daily Living (ADLs)/Instrumental Activities of Daily Living (IADLs):   Walk and transfer into and out of bed and chair?: Yes  Dress and groom yourself?: Yes    Bathe or shower yourself?: Yes    Feed yourself? Yes  Do your laundry/housekeeping?: Yes  Manage your money, pay your bills and track your expenses?: Yes  Make your own meals?: Yes    Do your own shopping?: Yes    Previous Hospitalizations:   Any hospitalizations or ED visits within the last 12 months?: No      Advance Care Planning:   Living will: Yes    Durable POA for healthcare: Yes    Advanced directive: Yes    Advanced directive counseling given: Yes    End of Life Decisions reviewed with patient: Yes    Provider agrees with end of life decisions: Yes      Comments: Patient would still like to be resuscitated if he is found pulseless and unresponsive  However, if he is deemed terminal or nonrecoverable, he would want to be made comfortable and have life support withdrawn  Cognitive Screening:   Provider or family/friend/caregiver concerned regarding cognition?: No    PREVENTIVE SCREENINGS      Cardiovascular Screening:    General: Screening Not Indicated and History Lipid Disorder      Diabetes Screening:     General: Screening Current      Colorectal Cancer Screening:     General: Screening Current      Prostate Cancer Screening:    General: Screening Current      Osteoporosis Screening:    General: Screening Not Indicated      Abdominal Aortic Aneurysm (AAA) Screening:    Risk factors include: age between 73-67 yo and tobacco use        General: Screening Current      Hepatitis C Screening:    General: Screening Current    Screening, Brief Intervention, and Referral to Treatment (SBIRT)    Screening  Typical number of drinks in a day: 0  Typical number of drinks in a week: 0  Interpretation: Low risk drinking behavior  AUDIT-C Screenin) How often did you have a drink containing alcohol in the past year? never  2) How many drinks did you have on a typical day when you were drinking in the past year? 0  3) How often did you have 6 or more drinks on one occasion in the past year? "never    AUDIT-C Score: 0  Interpretation: Score 0-3 (male): Negative screen for alcohol misuse    Single Item Drug Screening:  How often have you used an illegal drug (including marijuana) or a prescription medication for non-medical reasons in the past year? never    Single Item Drug Screen Score: 0  Interpretation: Negative screen for possible drug use disorder    No results found  Physical Exam:     /78   Ht 5' 9\" (1 753 m)   Wt 98 2 kg (216 lb 6 4 oz)   BMI 31 96 kg/m²     Physical Exam  Vitals and nursing note reviewed  Constitutional:       General: He is not in acute distress  Appearance: He is well-developed  HENT:      Head: Normocephalic and atraumatic  Right Ear: Tympanic membrane normal       Left Ear: Tympanic membrane normal       Nose: Nose normal       Mouth/Throat:      Mouth: Mucous membranes are moist    Eyes:      General: No scleral icterus  Extraocular Movements: Extraocular movements intact  Conjunctiva/sclera: Conjunctivae normal    Cardiovascular:      Rate and Rhythm: Normal rate and regular rhythm  Pulses: Normal pulses  Heart sounds: Normal heart sounds  No murmur heard  Pulmonary:      Effort: Pulmonary effort is normal  No respiratory distress  Breath sounds: Normal breath sounds  Abdominal:      General: Abdomen is flat  Palpations: Abdomen is soft  Tenderness: There is no abdominal tenderness  Musculoskeletal:         General: No swelling  Cervical back: Neck supple  Lumbar back: Tenderness present  Decreased range of motion  Skin:     General: Skin is warm and dry  Capillary Refill: Capillary refill takes less than 2 seconds  Neurological:      General: No focal deficit present  Mental Status: He is alert  Motor: No weakness     Psychiatric:         Mood and Affect: Mood normal           Hari Citizen, DO  "

## 2023-06-06 NOTE — PATIENT INSTRUCTIONS
Medicare Preventive Visit Patient Instructions  Thank you for completing your Welcome to Medicare Visit or Medicare Annual Wellness Visit today  Your next wellness visit will be due in one year (6/6/2024)  The screening/preventive services that you may require over the next 5-10 years are detailed below  Some tests may not apply to you based off risk factors and/or age  Screening tests ordered at today's visit but not completed yet may show as past due  Also, please note that scanned in results may not display below  Preventive Screenings:  Service Recommendations Previous Testing/Comments   Colorectal Cancer Screening  · Colonoscopy    · Fecal Occult Blood Test (FOBT)/Fecal Immunochemical Test (FIT)  · Fecal DNA/Cologuard Test  · Flexible Sigmoidoscopy Age: 39-70 years old   Colonoscopy: every 10 years (May be performed more frequently if at higher risk)  OR  FOBT/FIT: every 1 year  OR  Cologuard: every 3 years  OR  Sigmoidoscopy: every 5 years  Screening may be recommended earlier than age 39 if at higher risk for colorectal cancer  Also, an individualized decision between you and your healthcare provider will decide whether screening between the ages of 74-80 would be appropriate   Colonoscopy: 10/02/2018  FOBT/FIT: Not on file  Cologuard: Not on file  Sigmoidoscopy: Not on file    Screening Current     Prostate Cancer Screening Individualized decision between patient and health care provider in men between ages of 53-78   Medicare will cover every 12 months beginning on the day after your 50th birthday PSA: 0 61 ng/mL     Screening Current     Hepatitis C Screening Once for adults born between 1945 and 1965  More frequently in patients at high risk for Hepatitis C Hep C Antibody: 01/25/2019    Screening Current   Diabetes Screening 1-2 times per year if you're at risk for diabetes or have pre-diabetes Fasting glucose: 97 mg/dL (6/2/2023)  A1C: 5 5 % (6/2/2023)  Screening Current   Cholesterol Screening Once every 5 years if you don't have a lipid disorder  May order more often based on risk factors  Lipid panel: 06/02/2023  Screening Not Indicated  History Lipid Disorder      Other Preventive Screenings Covered by Medicare:  1  Abdominal Aortic Aneurysm (AAA) Screening: covered once if your at risk  You're considered to be at risk if you have a family history of AAA or a male between the age of 73-68 who smoking at least 100 cigarettes in your lifetime  2  Lung Cancer Screening: covers low dose CT scan once per year if you meet all of the following conditions: (1) Age 50-69; (2) No signs or symptoms of lung cancer; (3) Current smoker or have quit smoking within the last 15 years; (4) You have a tobacco smoking history of at least 20 pack years (packs per day x number of years you smoked); (5) You get a written order from a healthcare provider  3  Glaucoma Screening: covered annually if you're considered high risk: (1) You have diabetes OR (2) Family history of glaucoma OR (3)  aged 48 and older OR (3)  American aged 72 and older  3  Osteoporosis Screening: covered every 2 years if you meet one of the following conditions: (1) Have a vertebral abnormality; (2) On glucocorticoid therapy for more than 3 months; (3) Have primary hyperparathyroidism; (4) On osteoporosis medications and need to assess response to drug therapy  5  HIV Screening: covered annually if you're between the age of 12-76  Also covered annually if you are younger than 13 and older than 72 with risk factors for HIV infection  For pregnant patients, it is covered up to 3 times per pregnancy      Immunizations:  Immunization Recommendations   Influenza Vaccine Annual influenza vaccination during flu season is recommended for all persons aged >= 6 months who do not have contraindications   Pneumococcal Vaccine   * Pneumococcal conjugate vaccine = PCV13 (Prevnar 13), PCV15 (Vaxneuvance), PCV20 (Prevnar 20)  * Pneumococcal polysaccharide vaccine = PPSV23 (Pneumovax) Adults 2364 years old: 1-3 doses may be recommended based on certain risk factors  Adults 72 years old: 1-2 doses may be recommended based off what pneumonia vaccine you previously received   Hepatitis B Vaccine 3 dose series if at intermediate or high risk (ex: diabetes, end stage renal disease, liver disease)   Tetanus (Td) Vaccine - COST NOT COVERED BY MEDICARE PART B Following completion of primary series, a booster dose should be given every 10 years to maintain immunity against tetanus  Td may also be given as tetanus wound prophylaxis  Tdap Vaccine - COST NOT COVERED BY MEDICARE PART B Recommended at least once for all adults  For pregnant patients, recommended with each pregnancy  Shingles Vaccine (Shingrix) - COST NOT COVERED BY MEDICARE PART B  2 shot series recommended in those aged 48 and above     Health Maintenance Due:      Topic Date Due   • Lung Cancer Screening  05/07/2016   • Colorectal Cancer Screening  10/02/2023   • Hepatitis C Screening  Completed     Immunizations Due:      Topic Date Due   • Hepatitis A Vaccine (1 of 2 - Risk 2-dose series) Never done   • Hepatitis B Vaccine (1 of 3 - Risk 3-dose series) Never done   • Pneumococcal Vaccine: 65+ Years (3 - PPSV23 if available, else PCV20) 08/12/2020   • COVID-19 Vaccine (5 - Booster for Chasity Shadow series) 12/15/2022     Advance Directives   What are advance directives? Advance directives are legal documents that state your wishes and plans for medical care  These plans are made ahead of time in case you lose your ability to make decisions for yourself  Advance directives can apply to any medical decision, such as the treatments you want, and if you want to donate organs  What are the types of advance directives? There are many types of advance directives, and each state has rules about how to use them  You may choose a combination of any of the following:  · Living will:   This is a written record of the treatment you want  You can also choose which treatments you do not want, which to limit, and which to stop at a certain time  This includes surgery, medicine, IV fluid, and tube feedings  · Durable power of  for healthcare [facility]): This is a written record that states who you want to make healthcare choices for you when you are unable to make them for yourself  This person, called a proxy, is usually a family member or a friend  You may choose more than 1 proxy  · Do not resuscitate (DNR) order:  A DNR order is used in case your heart stops beating or you stop breathing  It is a request not to have certain forms of treatment, such as CPR  A DNR order may be included in other types of advance directives  · Medical directive: This covers the care that you want if you are in a coma, near death, or unable to make decisions for yourself  You can list the treatments you want for each condition  Treatment may include pain medicine, surgery, blood transfusions, dialysis, IV or tube feedings, and a ventilator (breathing machine)  · Values history: This document has questions about your views, beliefs, and how you feel and think about life  This information can help others choose the care that you would choose  Why are advance directives important? An advance directive helps you control your care  Although spoken wishes may be used, it is better to have your wishes written down  Spoken wishes can be misunderstood, or not followed  Treatments may be given even if you do not want them  An advance directive may make it easier for your family to make difficult choices about your care  Fall Prevention    Fall prevention  includes ways to make your home and other areas safer  It also includes ways you can move more carefully to prevent a fall  Health conditions that cause changes in your blood pressure, vision, or muscle strength and coordination may increase your risk for falls   Medicines may also increase your risk for falls if they make you dizzy, weak, or sleepy  Fall prevention tips:   · Stand or sit up slowly  · Use assistive devices as directed  · Wear shoes that fit well and have soles that   · Wear a personal alarm  · Stay active  · Manage your medical conditions  Home Safety Tips:  · Add items to prevent falls in the bathroom  · Keep paths clear  · Install bright lights in your home  · Keep items you use often on shelves within reach  · Paint or place reflective tape on the edges of your stairs  Weight Management   Why it is important to manage your weight:  Being overweight increases your risk of health conditions such as heart disease, high blood pressure, type 2 diabetes, and certain types of cancer  It can also increase your risk for osteoarthritis, sleep apnea, and other respiratory problems  Aim for a slow, steady weight loss  Even a small amount of weight loss can lower your risk of health problems  How to lose weight safely:  A safe and healthy way to lose weight is to eat fewer calories and get regular exercise  You can lose up about 1 pound a week by decreasing the number of calories you eat by 500 calories each day  Healthy meal plan for weight management:  A healthy meal plan includes a variety of foods, contains fewer calories, and helps you stay healthy  A healthy meal plan includes the following:  · Eat whole-grain foods more often  A healthy meal plan should contain fiber  Fiber is the part of grains, fruits, and vegetables that is not broken down by your body  Whole-grain foods are healthy and provide extra fiber in your diet  Some examples of whole-grain foods are whole-wheat breads and pastas, oatmeal, brown rice, and bulgur  · Eat a variety of vegetables every day  Include dark, leafy greens such as spinach, kale, gadiel greens, and mustard greens   Eat yellow and orange vegetables such as carrots, sweet potatoes, and winter squash  · Eat a variety of fruits every day  Choose fresh or canned fruit (canned in its own juice or light syrup) instead of juice  Fruit juice has very little or no fiber  · Eat low-fat dairy foods  Drink fat-free (skim) milk or 1% milk  Eat fat-free yogurt and low-fat cottage cheese  Try low-fat cheeses such as mozzarella and other reduced-fat cheeses  · Choose meat and other protein foods that are low in fat  Choose beans or other legumes such as split peas or lentils  Choose fish, skinless poultry (chicken or turkey), or lean cuts of red meat (beef or pork)  Before you cook meat or poultry, cut off any visible fat  · Use less fat and oil  Try baking foods instead of frying them  Add less fat, such as margarine, sour cream, regular salad dressing and mayonnaise to foods  Eat fewer high-fat foods  Some examples of high-fat foods include french fries, doughnuts, ice cream, and cakes  · Eat fewer sweets  Limit foods and drinks that are high in sugar  This includes candy, cookies, regular soda, and sweetened drinks  Exercise:  Exercise at least 30 minutes per day on most days of the week  Some examples of exercise include walking, biking, dancing, and swimming  You can also fit in more physical activity by taking the stairs instead of the elevator or parking farther away from stores  Ask your healthcare provider about the best exercise plan for you  © Copyright [employer] 2018 Information is for End User's use only and may not be sold, redistributed or otherwise used for commercial purposes   All illustrations and images included in CareNotes® are the copyrighted property of A D A M , Inc  or [address] [place] [address] [employer]

## 2023-06-20 ENCOUNTER — HOSPITAL ENCOUNTER (OUTPATIENT)
Dept: RADIOLOGY | Facility: MEDICAL CENTER | Age: 68
Discharge: HOME | End: 2023-06-20
Payer: MEDICARE

## 2023-06-20 ENCOUNTER — TELEPHONE (OUTPATIENT)
Dept: PAIN MEDICINE | Facility: MEDICAL CENTER | Age: 68
End: 2023-06-20

## 2023-06-20 VITALS
HEART RATE: 55 BPM | DIASTOLIC BLOOD PRESSURE: 70 MMHG | OXYGEN SATURATION: 96 % | TEMPERATURE: 98 F | RESPIRATION RATE: 18 BRPM | SYSTOLIC BLOOD PRESSURE: 147 MMHG

## 2023-06-20 DIAGNOSIS — M47.816 LUMBAR SPONDYLOSIS: ICD-10-CM

## 2023-06-20 PROCEDURE — 64636 DESTROY L/S FACET JNT ADDL: CPT | Performed by: PHYSICAL MEDICINE & REHABILITATION

## 2023-06-20 PROCEDURE — 64635 DESTROY LUMB/SAC FACET JNT: CPT | Performed by: PHYSICAL MEDICINE & REHABILITATION

## 2023-06-20 RX ORDER — BUPIVACAINE HCL/PF 2.5 MG/ML
5 VIAL (ML) INJECTION ONCE
Status: COMPLETED | OUTPATIENT
Start: 2023-06-20 | End: 2023-06-20

## 2023-06-20 RX ADMIN — Medication 5 ML: at 11:22

## 2023-06-20 NOTE — DISCHARGE INSTRUCTIONS
Medial Branch Radiofrequency Ablation     WHAT YOU NEED TO KNOW:   Medial branch radiofrequency ablation (RFA) is a procedure used to treat facet joint pain in your neck, mid back, or lower back  Facet joints are found at the back of each vertebra  A needle electrode is used to send electrical currents to the nerves in your facet joint  The electrical currents create heat that damages the nerve so it cannot send pain signals  ACTIVITY  Do not drive or operate machinery today  No strenuous activity today - bending, lifting, etc   You may shower today, but do not sit in a tub of water  Resume normal activities tomorrow as tolerated  CARE OF THE INJECTION SITE  If you have soreness or pain, apply ice to the area today (20 minutes on/20 minutes off)  Starting tomorrow, you may use warm, moist heat or ice if needed  Notify [facility] if you have any of the following: redness, drainage, swelling, or fever above 100°F     SPECIAL INSTRUCTIONS  Our office will call you tomorrow for a progress report and make an appointment for a follow up visit in 4 weeks  If you feel a sunburn-like sensation in the area of your procedure, call our office  MEDICATIONS  Continue to take all routine medications  Our office may have instructed you to hold some medications  As no general anesthesia was used in today's procedure, you should not experience any side effects related to anesthesia  If you have a problem specifically related to your procedure, please call our office at [phone]  Problems not related to your procedure should be directed to your primary care physician

## 2023-06-20 NOTE — H&P
History of Present Illness: The patient is a 77 y o  male who presents with complaints of right low back pain    Past Medical History:   Diagnosis Date   • Arthritis 2013   • Bilateral carpal tunnel syndrome    • Chronic pain disorder     low back   • GERD (gastroesophageal reflux disease)    • Hyperlipidemia    • Low back pain    • Lumbar disc disease    • Lumbar spinal stenosis    • Neck pain     had cervical fusion   • Obesity    • Psoriasis     right elbow   • Tremor     left leg   • Wears dentures     full dentures   • Wears glasses        Past Surgical History:   Procedure Laterality Date   • CERVICAL LAMINECTOMY     • COLONOSCOPY  07/2015    multiple polyps, repeat in 3 years   • JOINT REPLACEMENT      right TKR   • KNEE SURGERY Right 2002    20 years ago-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   • LAMINECTOMY     • ORTHOPEDIC SURGERY     • POPLITEAL SYNOVIAL CYST EXCISION      last assessed: 04/22/2015   • [place] NEUROPLASTY &/TRANSPOS MEDIAN NRV CARPAL TUNNE Left 9/25/2020    Procedure: RELEASE CARPAL TUNNEL;  Surgeon: Quirino Medina MD;  [facility];  Service: Orthopedics   • [place] NEUROPLASTY &/TRANSPOS MEDIAN NRV CARPAL Beryle Rule Right 10/20/2020    Procedure: RELEASE CARPAL TUNNEL - Open;  Surgeon:  Quirino Medina MD;  [facility];  Service: Orthopedics   • SPINE SURGERY     • TONSILLECTOMY AND ADENOIDECTOMY      last assessed: 04/22/2015   • TOTAL KNEE ARTHROPLASTY      last assessed: 05/12/2015         Current Outpatient Medications:   •  adalimumab (HUMIRA) 40 mg/0 8 mL PSKT, Inject 40 mg under the skin every 14 (fourteen) days, Disp: , Rfl:   •  atorvastatin (LIPITOR) 20 mg tablet, TAKE 1 TABLET BY MOUTH ONCE  DAILY, Disp: 90 tablet, Rfl: 3  •  gabapentin (NEURONTIN) 300 mg capsule, TAKE 1 CAPSULE(300 MG) BY MOUTH TWICE DAILY, Disp: 60 capsule, Rfl: 3  •  tiZANidine (ZANAFLEX) 4 mg tablet, TAKE 1 TABLET(4 MG) BY MOUTH THREE TIMES DAILY, Disp: 90 tablet, Rfl: 3    No Known Allergies    Physical Exam: Vitals:    06/20/23 1100   BP: 135/76   Pulse: (!) 53   Resp: 16   Temp: 98 °F (36 7 °C)   SpO2: 95%     General: Awake, Alert, Oriented x 3, Mood and affect appropriate  Respiratory: Respirations even and unlabored  Cardiovascular: Peripheral pulses intact; no edema  Musculoskeletal Exam: right low back pain    ASA Score: 3    Patient/Chart Verification  Patient ID Verified: Verbal  Consents Confirmed: Procedural, To be obtained in the Pre-Procedure area  H&P( within 30 days) Verified: To be obtained in the Pre-Procedure area  Allergies Reviewed: Yes  Anticoag/NSAID held?: NA  Currently on antibiotics?: No  Pregnancy denied?: NA    Assessment:   1   Lumbar spondylosis        Plan: R L3-5 RFA

## 2023-06-21 NOTE — TELEPHONE ENCOUNTER
FYI-    Pt did well over night no s/s of infection or sunburn sensation  Aware it takes 2 weeks to notice pain relief and 4-6 weeks for full pain effect to be achieved  Aware to medicate as previous for discomfort and may use ice or heat  Confirmed next appt  Call if any questions or concerns prior to next appt

## 2023-07-05 ENCOUNTER — HOSPITAL ENCOUNTER (OUTPATIENT)
Dept: RADIOLOGY | Facility: MEDICAL CENTER | Age: 68
Discharge: HOME | End: 2023-07-05
Admitting: PHYSICAL MEDICINE & REHABILITATION
Payer: MEDICARE

## 2023-07-05 ENCOUNTER — TELEPHONE (OUTPATIENT)
Dept: PAIN MEDICINE | Facility: MEDICAL CENTER | Age: 68
End: 2023-07-05

## 2023-07-05 VITALS
OXYGEN SATURATION: 97 % | TEMPERATURE: 98.2 F | DIASTOLIC BLOOD PRESSURE: 79 MMHG | HEART RATE: 66 BPM | SYSTOLIC BLOOD PRESSURE: 164 MMHG | RESPIRATION RATE: 18 BRPM

## 2023-07-05 DIAGNOSIS — M47.816 LUMBAR SPONDYLOSIS: ICD-10-CM

## 2023-07-05 PROCEDURE — 64636 DESTROY L/S FACET JNT ADDL: CPT | Performed by: PHYSICAL MEDICINE & REHABILITATION

## 2023-07-05 PROCEDURE — 64635 DESTROY LUMB/SAC FACET JNT: CPT | Performed by: PHYSICAL MEDICINE & REHABILITATION

## 2023-07-05 RX ORDER — BUPIVACAINE HCL/PF 2.5 MG/ML
5 VIAL (ML) INJECTION ONCE
Status: COMPLETED | OUTPATIENT
Start: 2023-07-05 | End: 2023-07-05

## 2023-07-05 RX ADMIN — Medication 5 ML: at 11:28

## 2023-07-05 NOTE — DISCHARGE INSTRUCTIONS

## 2023-07-05 NOTE — H&P
History of Present Illness: The patient is a 77 y.o. male who presents with complaints of left low back pain    Past Medical History:   Diagnosis Date   • Arthritis 2013   • Bilateral carpal tunnel syndrome    • Chronic pain disorder     low back   • GERD (gastroesophageal reflux disease)    • Hyperlipidemia    • Low back pain    • Lumbar disc disease    • Lumbar spinal stenosis    • Neck pain     had cervical fusion   • Obesity    • Psoriasis     right elbow   • Tremor     left leg   • Wears dentures     full dentures   • Wears glasses        Past Surgical History:   Procedure Laterality Date   • CERVICAL LAMINECTOMY     • COLONOSCOPY  07/2015    multiple polyps, repeat in 3 years   • JOINT REPLACEMENT      right TKR   • KNEE SURGERY Right 2002    20 years ago-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   • LAMINECTOMY     • ORTHOPEDIC SURGERY     • POPLITEAL SYNOVIAL CYST EXCISION      last assessed: 04/22/2015   • [place] NEUROPLASTY &/TRANSPOS MEDIAN NRV CARPAL TUNNE Left 9/25/2020    Procedure: RELEASE CARPAL TUNNEL;  Surgeon: Bowen Rahman MD;  [facility];  Service: Orthopedics   • [place] NEUROPLASTY &/TRANSPOS MEDIAN NRV CARPAL Portillo Marcio Right 10/20/2020    Procedure: RELEASE CARPAL TUNNEL - Open;  Surgeon:  Bowen Rahman MD;  [facility];  Service: Orthopedics   • SPINE SURGERY     • TONSILLECTOMY AND ADENOIDECTOMY      last assessed: 04/22/2015   • TOTAL KNEE ARTHROPLASTY      last assessed: 05/12/2015         Current Outpatient Medications:   •  adalimumab (HUMIRA) 40 mg/0.8 mL PSKT, Inject 40 mg under the skin every 14 (fourteen) days, Disp: , Rfl:   •  atorvastatin (LIPITOR) 20 mg tablet, TAKE 1 TABLET BY MOUTH ONCE  DAILY, Disp: 90 tablet, Rfl: 3  •  gabapentin (NEURONTIN) 300 mg capsule, TAKE 1 CAPSULE(300 MG) BY MOUTH TWICE DAILY, Disp: 60 capsule, Rfl: 3  •  tiZANidine (ZANAFLEX) 4 mg tablet, TAKE 1 TABLET(4 MG) BY MOUTH THREE TIMES DAILY, Disp: 90 tablet, Rfl: 3    No Known Allergies    Physical Exam: Vitals:    07/05/23 1101   BP: 120/70   Pulse: 65   Resp: 18   Temp: 98.2 °F (36.8 °C)   SpO2: 96%     General: Awake, Alert, Oriented x 3, Mood and affect appropriate  Respiratory: Respirations even and unlabored  Cardiovascular: Peripheral pulses intact; no edema  Musculoskeletal Exam: left low back pain    ASA Score: 3    Patient/Chart Verification  Patient ID Verified: Verbal  ID Band Applied: No  Consents Confirmed: Procedural, To be obtained in the Pre-Procedure area  H&P( within 30 days) Verified: To be obtained in the Pre-Procedure area  Interval H&P(within 24 hr) Complete (required for Outpatients and Surgery Admit only): To be obtained in the Pre-Procedure area  Allergies Reviewed: Yes  Anticoag/NSAID held?: NA  Currently on antibiotics?: No    Assessment:   1.  Lumbar spondylosis        Plan: L L3-5 RFA

## 2023-07-06 NOTE — TELEPHONE ENCOUNTER
S/W pt. Pt stated needle sites look good, denies S&S of infection, denies fevers, denies soreness-just a little tender and denies sun burn like sensation. Advised pt if he does get pain to take his prescribed or OTC pain medications and/or use ice/heat and that it takes 4 to 6 weeks to see the full effect. Confirmed next appt w/ pt. Pt verbalized understanding.

## 2023-08-04 ENCOUNTER — OFFICE VISIT (OUTPATIENT)
Dept: PAIN MEDICINE | Facility: MEDICAL CENTER | Age: 68
End: 2023-08-04

## 2023-08-04 VITALS
SYSTOLIC BLOOD PRESSURE: 128 MMHG | WEIGHT: 220 LBS | HEIGHT: 69 IN | HEART RATE: 67 BPM | OXYGEN SATURATION: 96 % | DIASTOLIC BLOOD PRESSURE: 70 MMHG | BODY MASS INDEX: 32.58 KG/M2

## 2023-08-04 DIAGNOSIS — M79.18 MYOFASCIAL PAIN SYNDROME: ICD-10-CM

## 2023-08-04 DIAGNOSIS — M47.816 LUMBAR SPONDYLOSIS: Primary | ICD-10-CM

## 2023-08-04 DIAGNOSIS — M48.062 SPINAL STENOSIS OF LUMBAR REGION WITH NEUROGENIC CLAUDICATION: ICD-10-CM

## 2023-08-04 DIAGNOSIS — M48.061 SPINAL STENOSIS OF LUMBAR REGION WITH RADICULOPATHY: ICD-10-CM

## 2023-08-04 DIAGNOSIS — M17.12 ARTHRITIS OF LEFT KNEE: ICD-10-CM

## 2023-08-04 DIAGNOSIS — M54.16 SPINAL STENOSIS OF LUMBAR REGION WITH RADICULOPATHY: ICD-10-CM

## 2023-08-04 RX ORDER — GABAPENTIN 300 MG/1
300 CAPSULE ORAL 2 TIMES DAILY
Qty: 60 CAPSULE | Refills: 2 | Status: SHIPPED | OUTPATIENT
Start: 2023-08-04

## 2023-08-04 RX ORDER — TIZANIDINE 4 MG/1
4 TABLET ORAL 3 TIMES DAILY
Qty: 90 TABLET | Refills: 2 | Status: SHIPPED | OUTPATIENT
Start: 2023-08-04

## 2023-08-04 NOTE — PROGRESS NOTES
Assessment:  1. Lumbar spondylosis    2. Spinal stenosis of lumbar region with radiculopathy    3. Spinal stenosis of lumbar region with neurogenic claudication    4. Myofascial pain syndrome    5. Arthritis of left knee        Plan:  While the patient was in the office today, I did have a thorough conversation regarding their chronic pain syndrome, medication management, and treatment plan options. The patient underwent lumbar RFA procedure on 7/5/2023, left side, and 6/20/2023, the right side and is able to report greater than 50% reduction in pain. He is very pleased with the results of the procedure. He was made aware that if the pain would return that this procedure can be repeated in 12 months. After discussing options I recommend that he proceed with an orthopedic consultation in regards to the left knee pain. I have placed a referral for him to consult with Dr. Narda Castle. Continue current medication regimen including gabapentin and tizanidine. I have provided refills on those medications on today's visit. I will have him follow-up in the office in 3 to 4 months or sooner if needed. My impressions and treatment recommendations were discussed in detail with the patient who verbalized understanding and had no further questions. Discharge instructions were provided. I personally saw and examined the patient and I agree with the above discussed plan of care.     Orders Placed This Encounter   Procedures   • Ambulatory referral to Orthopedic Surgery     Standing Status:   Future     Standing Expiration Date:   8/4/2024     Referral Priority:   Routine     Referral Type:   Consult - AMB     Referral Reason:   Specialty Services Required     Referred to Provider:   Ricky Byers DO     Requested Specialty:   Orthopedic Surgery     Number of Visits Requested:   1     Expiration Date:   8/4/2024     New Medications Ordered This Visit   Medications   • tiZANidine (ZANAFLEX) 4 mg tablet     Sig: Take 1 tablet (4 mg total) by mouth 3 (three) times a day     Dispense:  90 tablet     Refill:  2   • gabapentin (NEURONTIN) 300 mg capsule     Sig: Take 1 capsule (300 mg total) by mouth 2 (two) times a day     Dispense:  60 capsule     Refill:  2       History of Present Illness:  Christa Boland is a 77 y.o. male who presents for a follow up office visit in regards to low back pain. The patient’s current symptoms include chronic low back pain that he presently rates a 2 out of 10 on the pain scale describes it as an intermittent pressure-like pain. Patient denies any lower extremity radicular pain but does admit to weakness. He is also complaining of increasing left knee pain. He underwent a corticosteroid injection with his primary care physician previously and he states it did not provide much relief. The patient underwent bilateral L3-5 radiofrequency ablation done 4 and 6 weeks ago and is able to report greater than 50% reduction in pain. He is very pleased with the results of the procedure. I have personally reviewed and/or updated the patient's past medical history, past surgical history, family history, social history, current medications, allergies, and vital signs today. Review of Systems   Respiratory: Negative for shortness of breath. Cardiovascular: Negative for chest pain. Gastrointestinal: Negative for constipation, diarrhea, nausea and vomiting. Musculoskeletal: Positive for back pain and gait problem. Negative for arthralgias, joint swelling and myalgias. Skin: Negative for rash. Neurological: Negative for dizziness, seizures and weakness. All other systems reviewed and are negative.       Patient Active Problem List   Diagnosis   • Chronic bilateral low back pain without sciatica   • Essential hypertension   • Hyperglycemia   • Mixed hyperlipidemia   • Polyarticular arthritis   • Chronic pain syndrome   • Psoriasis   • Protrusion of lumbar intervertebral disc   • Lumbar radiculopathy   • Spinal stenosis of lumbar region with radiculopathy   • Osteoarthritis of knee   • Gastroesophageal reflux disease without esophagitis   • Class 1 obesity due to excess calories without serious comorbidity with body mass index (BMI) of 31.0 to 31.9 in adult   • Numbness of left hand   • Trigger finger of left thumb   • Left carpal tunnel syndrome   • Ulnar neuropathy of left upper extremity   • Neuropathic pain   • Myofascial pain syndrome   • Lumbar spondylosis       Past Medical History:   Diagnosis Date   • Arthritis 2013   • Bilateral carpal tunnel syndrome    • Chronic pain disorder     low back   • GERD (gastroesophageal reflux disease)    • Hyperlipidemia    • Low back pain    • Lumbar disc disease    • Lumbar spinal stenosis    • Neck pain     had cervical fusion   • Obesity    • Psoriasis     right elbow   • Tremor     left leg   • Wears dentures     full dentures   • Wears glasses        Past Surgical History:   Procedure Laterality Date   • CERVICAL LAMINECTOMY     • COLONOSCOPY  07/2015    multiple polyps, repeat in 3 years   • JOINT REPLACEMENT      right TKR   • KNEE SURGERY Right 2002    20 years ago-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   • LAMINECTOMY     • ORTHOPEDIC SURGERY     • POPLITEAL SYNOVIAL CYST EXCISION      last assessed: 04/22/2015   • [facility] NEUROPLASTY &/TRANSPOS MEDIAN NRV CARPAL TUNNE Left 9/25/2020    Procedure: RELEASE CARPAL TUNNEL;  Surgeon: Areli Luna MD;  Location: [facility];  Service: Orthopedics   • [facility] NEUROPLASTY &/TRANSPOS MEDIAN NRV CARPAL Dominique Pike Right 10/20/2020    Procedure: RELEASE CARPAL TUNNEL - Open;  Surgeon:  Areli Luna MD;  Location: [facility];  Service: Orthopedics   • SPINE SURGERY     • TONSILLECTOMY AND ADENOIDECTOMY      last assessed: 04/22/2015   • TOTAL KNEE ARTHROPLASTY      last assessed: 05/12/2015       Family History   Problem Relation Age of Onset   • Allergies Father         seasonal   • Lung cancer Family    • No Known Problems Mother        Social History     Occupational History   • Not on file   Tobacco Use   • Smoking status: Former     Packs/day: 1.50     Years: 10.00     Total pack years: 15.00     Types: Cigarettes     Quit date: 2013     Years since quittin.8   • Smokeless tobacco: Never   Vaping Use   • Vaping Use: Never used   Substance and Sexual Activity   • Alcohol use: Not Currently     Alcohol/week: 12.0 standard drinks of alcohol     Types: 12 Cans of beer per week   • Drug use: Never   • Sexual activity: Not Currently     Partners: Male       Current Outpatient Medications on File Prior to Visit   Medication Sig   • adalimumab (HUMIRA) 40 mg/0.8 mL PSKT Inject 40 mg under the skin every 14 (fourteen) days   • atorvastatin (LIPITOR) 20 mg tablet TAKE 1 TABLET BY MOUTH ONCE  DAILY   • [DISCONTINUED] gabapentin (NEURONTIN) 300 mg capsule TAKE 1 CAPSULE(300 MG) BY MOUTH TWICE DAILY   • [DISCONTINUED] tiZANidine (ZANAFLEX) 4 mg tablet TAKE 1 TABLET(4 MG) BY MOUTH THREE TIMES DAILY     No current facility-administered medications on file prior to visit. No Known Allergies    Physical Exam:    /70   Pulse 67   Ht 5' 9" (1.753 m)   Wt 99.8 kg (220 lb)   SpO2 96%   BMI 32.49 kg/m²     Constitutional:normal, well developed, well nourished, alert, in no distress and non-toxic and no overt pain behavior.   Eyes:anicteric  HEENT:grossly intact  Neck:supple, symmetric, trachea midline and no masses   Pulmonary:even and unlabored  Cardiovascular:No edema or pitting edema present  Skin:Normal without rashes or lesions and well hydrated  Psychiatric:Mood and affect appropriate  Neurologic:Cranial Nerves II-XII grossly intact  Musculoskeletal: Gait is antalgic    Imaging

## 2023-08-24 ENCOUNTER — TELEPHONE (OUTPATIENT)
Dept: PAIN MEDICINE | Facility: CLINIC | Age: 68
End: 2023-08-24

## 2023-08-24 NOTE — TELEPHONE ENCOUNTER
S/w pt, confirmed he did not call the office to refill gabapentin. Pt is aware his rx was sent to [facility] on 8/4 with 2 refills. Pt to fu on 12/7. Advised pt to cb if questions or issues arise. Pt verbalized understanding and appreciation.

## 2023-08-29 ENCOUNTER — APPOINTMENT (OUTPATIENT)
Dept: RADIOLOGY | Facility: MEDICAL CENTER | Age: 68
End: 2023-08-29
Payer: MEDICARE

## 2023-08-29 ENCOUNTER — OFFICE VISIT (OUTPATIENT)
Dept: OBGYN CLINIC | Facility: MEDICAL CENTER | Age: 68
End: 2023-08-29
Payer: MEDICARE

## 2023-08-29 VITALS
DIASTOLIC BLOOD PRESSURE: 73 MMHG | HEART RATE: 69 BPM | HEIGHT: 69 IN | SYSTOLIC BLOOD PRESSURE: 114 MMHG | BODY MASS INDEX: 33.18 KG/M2 | WEIGHT: 224 LBS

## 2023-08-29 DIAGNOSIS — M19.09 PRIMARY OSTEOARTHRITIS, OTHER SPECIFIED SITE: ICD-10-CM

## 2023-08-29 DIAGNOSIS — M25.59 PAIN IN OTHER SPECIFIED JOINT: ICD-10-CM

## 2023-08-29 DIAGNOSIS — M17.12 PRIMARY OSTEOARTHRITIS OF LEFT KNEE: Primary | ICD-10-CM

## 2023-08-29 DIAGNOSIS — M25.562 LEFT KNEE PAIN, UNSPECIFIED CHRONICITY: ICD-10-CM

## 2023-08-29 DIAGNOSIS — Z96.651 HISTORY OF TOTAL KNEE ARTHROPLASTY, RIGHT: ICD-10-CM

## 2023-08-29 PROCEDURE — 99214 OFFICE O/P EST MOD 30 MIN: CPT | Performed by: STUDENT IN AN ORGANIZED HEALTH CARE EDUCATION/TRAINING PROGRAM

## 2023-08-29 PROCEDURE — 73564 X-RAY EXAM KNEE 4 OR MORE: CPT

## 2023-08-29 PROCEDURE — 77073 BONE LENGTH STUDIES: CPT

## 2023-08-29 RX ORDER — CHLORHEXIDINE GLUCONATE 0.12 MG/ML
15 RINSE ORAL ONCE
OUTPATIENT
Start: 2023-08-29 | End: 2023-08-29

## 2023-08-29 RX ORDER — GABAPENTIN 300 MG/1
300 CAPSULE ORAL ONCE
OUTPATIENT
Start: 2023-08-29 | End: 2023-08-29

## 2023-08-29 RX ORDER — FOLIC ACID 1 MG/1
1 TABLET ORAL DAILY
Qty: 30 TABLET | Refills: 1 | Status: SHIPPED | OUTPATIENT
Start: 2023-08-29

## 2023-08-29 RX ORDER — CEFAZOLIN SODIUM 2 G/50ML
2000 SOLUTION INTRAVENOUS ONCE
OUTPATIENT
Start: 2023-08-29 | End: 2023-08-29

## 2023-08-29 RX ORDER — ACETAMINOPHEN 325 MG/1
975 TABLET ORAL ONCE
OUTPATIENT
Start: 2023-08-29 | End: 2023-08-29

## 2023-08-29 RX ORDER — MULTIVIT-MIN/IRON FUM/FOLIC AC 7.5 MG-4
1 TABLET ORAL DAILY
Qty: 30 TABLET | Refills: 1 | Status: SHIPPED | OUTPATIENT
Start: 2023-08-29

## 2023-08-29 RX ORDER — TRANEXAMIC ACID 10 MG/ML
1000 INJECTION, SOLUTION INTRAVENOUS ONCE
OUTPATIENT
Start: 2023-08-29 | End: 2023-08-29

## 2023-08-29 RX ORDER — SODIUM CHLORIDE, SODIUM LACTATE, POTASSIUM CHLORIDE, CALCIUM CHLORIDE 600; 310; 30; 20 MG/100ML; MG/100ML; MG/100ML; MG/100ML
125 INJECTION, SOLUTION INTRAVENOUS CONTINUOUS
OUTPATIENT
Start: 2023-08-29

## 2023-08-29 RX ORDER — ASCORBIC ACID 500 MG
500 TABLET ORAL 2 TIMES DAILY
Qty: 60 TABLET | Refills: 1 | Status: SHIPPED | OUTPATIENT
Start: 2023-08-29

## 2023-08-29 RX ORDER — MELATONIN
2000 DAILY
Qty: 60 TABLET | Refills: 1 | Status: SHIPPED | OUTPATIENT
Start: 2023-08-29

## 2023-08-29 RX ORDER — CHLORHEXIDINE GLUCONATE 4 G/100ML
SOLUTION TOPICAL DAILY PRN
OUTPATIENT
Start: 2023-08-29

## 2023-08-29 NOTE — PROGRESS NOTES
Knee New Office Note    Assessment:     1. Primary osteoarthritis of left knee    2. Left knee pain, unspecified chronicity    3. History of total knee arthroplasty, right        Plan:     Problem List Items Addressed This Visit        Musculoskeletal and Integument    Osteoarthritis of knee - Primary   Other Visit Diagnoses     Left knee pain, unspecified chronicity        Relevant Orders    XR knee 4+ vw left injury    XR bone length (scanogram)    History of total knee arthroplasty, right              Findings today are consistent with left severe knee osteoarthritis. Imaging and prognosis was reviewed with the patient today. He c/o left knee pain that is most localized to the medial compartment and significantly limiting his ADLs. He has previously failed IA CSI, HEP and low-impact activities. The patient has elected to proceed with left TKA. Risks and benefits of surgery to include but not limited to bleeding, infection, damage to surrounding structures, hardware failure, instability, fracture, dislocation, need for further surgery, continued pain, stiffness, blood clots, stroke, and heart attack was discussed with the patient. Informed consent was signed today in the office. The patient has met with our surgical schedulers and our preoperative joint replacement pathway has been initiated. All questions were answered. Patient will follow-up 2 weeks post operatively. Subjective:     Patient ID: Seng Guerrier is a 77 y.o. male. Chief Complaint:  HPI:  77 y.o. male presents to the office for evaluation of left knee pain ongoing for over 1 year. He does not recall any specific injury. He experiences medial left knee pain made worse with transitional activities. He also has difficulty with ambulating. He has tried a cortisone injection with only 1 week of relief. He has performed at home exercises with minimal relief. He has history of right total knee arthroplasty performed by Dr. Yasmine Suarez.      Allergy:  No Known Allergies  Medications:  all current active meds have been reviewed  Past Medical History:  Past Medical History:   Diagnosis Date   • Arthritis 2013   • Bilateral carpal tunnel syndrome    • Chronic pain disorder     low back   • GERD (gastroesophageal reflux disease)    • Hyperlipidemia    • Low back pain    • Lumbar disc disease    • Lumbar spinal stenosis    • Neck pain     had cervical fusion   • Obesity    • Psoriasis     right elbow   • Tremor     left leg   • Wears dentures     full dentures   • Wears glasses      Past Surgical History:  Past Surgical History:   Procedure Laterality Date   • CERVICAL LAMINECTOMY     • COLONOSCOPY  07/2015    multiple polyps, repeat in 3 years   • JOINT REPLACEMENT      right TKR   • KNEE SURGERY Right 2002    20 years ago-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   • LAMINECTOMY     • ORTHOPEDIC SURGERY     • POPLITEAL SYNOVIAL CYST EXCISION      last assessed: 04/22/2015   • [facility] NEUROPLASTY &/TRANSPOS MEDIAN NRV CARPAL TUNNE Left 9/25/2020    Procedure: RELEASE CARPAL TUNNEL;  Surgeon: Roldan Turcios MD;  Location: [facility];  Service: Orthopedics   • [facility] NEUROPLASTY &/TRANSPOS MEDIAN NRV CARPAL Tiarra Jonh Right 10/20/2020    Procedure: RELEASE CARPAL TUNNEL - Open;  Surgeon:  Roldan Turcios MD;  Location[facility];  Service: Orthopedics   • SPINE SURGERY     • TONSILLECTOMY AND ADENOIDECTOMY      last assessed: 04/22/2015   • TOTAL KNEE ARTHROPLASTY      last assessed: 05/12/2015     Family History:  Family History   Problem Relation Age of Onset   • Allergies Father         seasonal   • Lung cancer Family    • No Known Problems Mother      Social History:  Social History     Substance and Sexual Activity   Alcohol Use Not Currently   • Alcohol/week: 12.0 standard drinks of alcohol   • Types: 12 Cans of beer per week     Social History     Substance and Sexual Activity   Drug Use Never     Social History     Tobacco Use   Smoking Status Former   • Packs/day: 1.50   • Years: 10.00 • Total pack years: 15.00   • Types: Cigarettes   • Quit date: 2013   • Years since quittin.9   Smokeless Tobacco Never           ROS:  General: Per HPI  Skin: Negative, except if noted below  HEENT: Negative  Respiratory: Negative  Cardiovascular: Negative  Gastrointestinal: Negative  Urinary: Negative  Vascular: Negative  Musculoskeletal: Positive per HPI   Neurologic: Positive per HPI  Endocrine: Negative    Objective:  BP Readings from Last 1 Encounters:   23 114/73      Wt Readings from Last 1 Encounters:   23 102 kg (224 lb)        Respiratory:   non-labored respirations    Lymphatics:  no palpable lymph nodes    Gait:   antalgic    Neurologic:   Alert and oriented times 3  Patient with normal sensation except as noted below  Deep tendon reflexes 2+ except as noted in MSK exam    Bilateral Lower Extremity:  Left Knee: Inspection:  Skin intact    Overall limb alignment varus    Effusion: none    ROM 3-90 with pain    Extensor Lag: none    Palpation: medial Joint line tenderness to palpation    AP Stability at 90 deg stable    M/L stability in full extension stable    M/L stability in midflexion stable    Motor: 5/5 Q/HS/TA/GS/P    Pulses: 2+ DP / 2+ PT    SILT DP/SP/S/S/TN    Right knee: Inspection:  Well healed total knee incision    Overall limb alignment neutral    Effusion: none    ROM 0-115 w/o pain    Extensor Lag: none    Palpation: no tenderness to palpation    AP Stability at 90 deg stable    M/L stability in full extension stable    M/L stability in midflexion stable    Motor: 5/5 Q/HS/TA/GS/P    Pulses: 2+ DP / 2+ PT    SILT DP/SP/S/S/TN    Imaging:  My interpretation XR AP scanogram/AP bilateral knee/lateral/maria/sunrise left knee: severe medial joint space narrowing, subchondral sclerosis, subchondral cysts, osteophyte formation. No fracture or dislocation.      BMI:   Estimated body mass index is 33.08 kg/m² as calculated from the following:    Height as of this encounter: 5' 9" (1.753 m). Weight as of this encounter: 102 kg (224 lb). BSA:   Estimated body surface area is 2.17 meters squared as calculated from the following:    Height as of this encounter: 5' 9" (1.753 m). Weight as of this encounter: 102 kg (224 lb).            Scribe Attestation    I,:  Dave Nicholas am acting as a scribe while in the presence of the attending physician.:       I,:  Zina Wilson, DO personally performed the services described in this documentation    as scribed in my presence.:

## 2023-09-08 ENCOUNTER — HOSPITAL ENCOUNTER (OUTPATIENT)
Dept: CT IMAGING | Facility: HOSPITAL | Age: 68
Discharge: HOME | End: 2023-09-08
Payer: MEDICARE

## 2023-09-08 DIAGNOSIS — Z12.2 ENCOUNTER FOR SCREENING FOR LUNG CANCER: ICD-10-CM

## 2023-09-08 DIAGNOSIS — F17.211 NICOTINE DEPENDENCE, CIGARETTES, IN REMISSION: ICD-10-CM

## 2023-09-08 PROCEDURE — 71271 CT THORAX LUNG CANCER SCR C-: CPT

## 2023-10-12 ENCOUNTER — EVALUATION (OUTPATIENT)
Dept: PHYSICAL THERAPY | Facility: CLINIC | Age: 68
End: 2023-10-12
Payer: MEDICARE

## 2023-10-12 DIAGNOSIS — M17.12 PRIMARY OSTEOARTHRITIS OF LEFT KNEE: ICD-10-CM

## 2023-10-12 PROCEDURE — 97161 PT EVAL LOW COMPLEX 20 MIN: CPT

## 2023-10-12 PROCEDURE — 97110 THERAPEUTIC EXERCISES: CPT

## 2023-10-12 NOTE — PROGRESS NOTES
PT Evaluation     Today's date: 10/12/2023  Patient name: Mani Hector  : 1955  MRN: [id number]  Referring provider: Cliff Rea PA-C  Dx:   Encounter Diagnosis     ICD-10-CM    1. Primary osteoarthritis of left knee  M17.12 Ambulatory referral to Physical Therapy          Start Time: 08  Stop Time: 0915  Total time in clinic (min): 45 minutes    Assessment  Assessment details: Pt is a 77y.o. year old male presenting to physical therapy for Primary osteoarthritis of left knee  He presents with the following impairments: good b/l quad activation, increased tightness of b/l quadriceps, mild hip flexion and knee flexion strength deficits, decreased LLE knee flexion ROM, and decreased SLS ability affecting his function with standing, walking, sitting prolonged periods of time, sleeping, lifting, squatting, ADLs, and functional ability. Pt will benefit from skilled physical therapy to address functional limitations noted in evaluation and meet patient goals. Pt was educated on proper exercises to perform up until surgery to maintain and improve LLE function to improve outcomes after surgery, pt understood education well. Impairments: abnormal muscle firing, abnormal or restricted ROM, activity intolerance, impaired physical strength, lacks appropriate home exercise program, pain with function and poor body mechanics    Symptom irritability: moderateBarriers to therapy: Pre operative evaluation, TKA scheduled for 23  Understanding of Dx/Px/POC: good   Prognosis: good    Goals  ST. Pt will be independent with HEP. 2. Pt will improve L knee flexion ROM by 15-20 degrees post surgically to improve functional ability. 3. Pt will improve L quad activation post op to fair to improve gait ability. 4. Pt will resolve swelling post TKA to improve functional ability. LT. Pt will improve quad activation to good/WNL to improve gait ability.    2. Pt will improve L knee strength grossly by 2 grades or more to improve functional ability. 3. Pt will improve L knee ROM to WNL to improve ability to navigate stairs. Plan  Plan details: Will be seen again on  after TKA on . Patient would benefit from: PT eval and skilled physical therapy  Planned modality interventions: biofeedback, manual electrical stimulation, microcurrent electrical stimulation, TENS, electrical stimulation/Russian stimulation, thermotherapy: hydrocollator packs, cryotherapy and unattended electrical stimulation  Planned therapy interventions: abdominal trunk stabilization, joint mobilization, manual therapy, massage, ADL retraining, neuromuscular re-education, body mechanics training, patient education, postural training, strengthening, stretching, therapeutic activities, therapeutic exercise, flexibility, functional ROM exercises and home exercise program  Frequency: 2x week  Duration in weeks: 6  Treatment plan discussed with: patient        Subjective Evaluation    History of Present Illness  Mechanism of injury: Pt presents to the clinic with history of L knee osteoarthritis and is scheduled for TKA on 23, presents today for pre operative visit. Pt stated that he has had the pain for a few years and has tried injections and PT for his L knee pain which did not help that much. Pt stated that he has difficulty standing or sitting for prolonged periods of time, navigating stairs, squatting, and lifting things off of the ground. Pt stated that his knee gets very tight when sitting and recently when he has been lying down to try to sleep.    Patient Goals  Patient goals for therapy: decreased pain, improved balance, increased motion, increased strength, independence with ADLs/IADLs and return to sport/leisure activities    Pain  Current pain ratin  At best pain ratin  At worst pain ratin  Quality: tight, discomfort and sharp          Objective     Active Range of Motion   Left Knee   Flexion: 108 degrees Extension: 0 degrees     Right Knee   Flexion: 115 degrees   Extension: 0 degrees     Additional Active Range of Motion Details  Increased tightness noted of b/l quadriceps. Strength/Myotome Testing     Left Hip   Planes of Motion   Flexion: 4  Abduction: 4+    Right Hip   Planes of Motion   Flexion: 4  Abduction: 4+    Left Knee   Flexion: 4  Extension: 4+  Quadriceps contraction: good    Right Knee   Flexion: 4  Extension: 4+  Quadriceps contraction: good    Tests     Additional Tests Details  L SLS: 3 seconds. R SLS: 2 seconds.              Precautions: scheduled for TKA on 11/24    Date 10/12            Visit # IE            FOTO IE             Re-eval IE              Manuals 10/12            LLE stretching             L patellar mobs                                       Neuro Re-Ed 10/12            Quad sets 10x5"            clamshells             bridges             LAQ             SAQ             Ankle pumps 10x                         Ther Ex 10/12            bike             Heel slides 5x10"            SLR 10x w QS            Standing hip abd/ext             Quad stretch 3x30"            Leg press                                       Ther Activity 10/12            Mini squats             Step ups             Gait Training 10/12            laps                          Modalities 10/12            ice prn

## 2023-10-18 ENCOUNTER — TELEPHONE (OUTPATIENT)
Dept: OBGYN CLINIC | Facility: HOSPITAL | Age: 68
End: 2023-10-18

## 2023-10-18 NOTE — TELEPHONE ENCOUNTER
Preoperative Elective Admission Assessment    Who does pt live with: lives alone  What kind of home: apartment  How do they enter the home: front  How many levels in home: 1   # of steps to enter home: 4 to enter 4 more  # of steps to second floor: n/a  Are there handrails: Yes  Are there landings: Yes  Sleeping arrangement: first/entry floor  Where is Bathroom: entry level  Where is the tub or shower: Step in bath tub w/ grab bars, denies shower chair  Dogs or ther pets: 2 cats     First Floor Setup:   Is there a bathroom: Yes  Where would pt sleep: bed     DME: grab bars, rolling walker, and cane  We discussed clearing pathways in the home and making sure there is accessibly to use the walker, for example, removing throw rugs. Patient's Current Level of Function: Ambulates: Independently and ADLs: Independent    Post-op Caregiver: relative  Caregiver Name and phone number for Inpatient discharge needs: Shruthi Miranda  Currently receive any HHC/aides/community supports: No     Post-op Transport: relative  To/from hospital: relative  To/from PT 2-3x/week: relative  Uses community transport now: No     Outpatient Physical Therapy Site:  [facility]: [facility]  pre and post-op appts scheduled? Yes     Medication Management: self and out of bottle  Preferred Pharmacy for Post-op Medications[facility]  Blood Management Vitamin Regimen: Pt confirms taking as prescribed  Post-op anticoagulant: to be determined by surgical team postoperatively     DC Plan: Pt plans to be discharged home      Barriers to DC identified preoperatively: none identified    BMI: 33.08    Patient Education:  Pt educated on post-op pain, early mobilization (POD0), LOS goals, OP PT goals, and preoperative bathing. Patient educated that our goal is to appropriately discharge patient based off their post-op function while striving to maintain maximal independence.  The goal is to discharge patient to home and for them to attend outpatient physical therapy.     Assigned to care team? Yes

## 2023-11-01 NOTE — PRE-PROCEDURE INSTRUCTIONS
Pre-Surgery Instructions:   Medication Instructions    ascorbic acid (VITAMIN C) 500 MG tablet Hold day of surgery. atorvastatin (LIPITOR) 20 mg tablet Take night before surgery    cholecalciferol (VITAMIN D3) 1,000 units tablet Hold day of surgery. folic acid (FOLVITE) 1 mg tablet Hold day of surgery. Multiple Vitamins-Minerals (multivitamin with minerals) tablet Hold day of surgery. See above  Medication instructions for day surgery reviewed. Please use only a sip of water to take your instructed medications. Avoid all over the counter vitamins, supplements and NSAIDS for one week prior to surgery per anesthesia guidelines. Tylenol is ok to take as needed. You will receive a call one business day prior to surgery with an arrival time and hospital directions. If your surgery is scheduled on a Monday, the hospital will be calling you on the Friday prior to your surgery. If you have not heard from anyone by 8pm, please call the hospital supervisor through the hospital  at [phone]. Kirill Pearson [phone]). Do not eat or drink anything after midnight the night before your surgery, including candy, mints, lifesavers, or chewing gum. Do not drink alcohol 24hrs before your surgery. Try not to smoke at least 24hrs before your surgery. Follow the pre surgery showering instructions as listed in the [facility] Surgical Experience Booklet” or otherwise provided by your surgeon's office. Do not use a blade to shave the surgical area 1 week before surgery. It is okay to use a clean electric clippers up to 24 hours before surgery. Do not apply any lotions, creams, including makeup, cologne, deodorant, or perfumes after showering on the day of your surgery. Do not use dry shampoo, hair spray, hair gel, or any type of hair products. No contact lenses, eye make-up, or artificial eyelashes. Remove nail polish, including gel polish, and any artificial, gel, or acrylic nails if possible.  Remove all jewelry including rings and body piercing jewelry. Wear causal clothing that is easy to take on and off. Consider your type of surgery. Keep any valuables, jewelry, piercings at home. Please bring any specially ordered equipment (sling, braces) if indicated. Arrange for a responsible person to drive you to and from the hospital on the day of your surgery. Visitor Guidelines discussed. Call the surgeon's office with any new illnesses, exposures, or additional questions prior to surgery. Please reference your [facility] Surgical Experience Booklet” for additional information to prepare for your upcoming surgery. (See Geriatric Assessment below. .. Cognitive Assessment:   CAM:   TUG <15 sec:   Falls (last 6 months): 0  Hand  score:  -Attempt 1:  -Attempt 2:  -Attempt 3:  Mikel Total Score: 19  PHQ- 9 Depression Scale:0  Nutrition Assessment Score:14  METS:7.99  Health goals:  -What are your overall health goals? (quit smoking, wt. loss, rest, decrease stress) Be more active    -What brings you strength? (family, friends, [employer], health) family    -What activities are important to you? (exercise, reading, travel, work) Pt likes to bowel

## 2023-11-02 ENCOUNTER — APPOINTMENT (OUTPATIENT)
Dept: LAB | Facility: HOSPITAL | Age: 68
End: 2023-11-02
Payer: MEDICARE

## 2023-11-02 DIAGNOSIS — M17.12 PRIMARY OSTEOARTHRITIS OF LEFT KNEE: ICD-10-CM

## 2023-11-02 LAB
ABO GROUP BLD: NORMAL
ALBUMIN SERPL BCP-MCNC: 4.2 G/DL (ref 3.5–5)
ALP SERPL-CCNC: 103 U/L (ref 34–104)
ALT SERPL W P-5'-P-CCNC: 15 U/L (ref 7–52)
ANION GAP SERPL CALCULATED.3IONS-SCNC: 4 MMOL/L
APTT PPP: 27 SECONDS (ref 23–37)
AST SERPL W P-5'-P-CCNC: 15 U/L (ref 13–39)
ATRIAL RATE: 62 BPM
BASOPHILS # BLD AUTO: 0.05 THOUSANDS/ÂΜL (ref 0–0.1)
BASOPHILS NFR BLD AUTO: 1 % (ref 0–1)
BILIRUB SERPL-MCNC: 0.73 MG/DL (ref 0.2–1)
BLD GP AB SCN SERPL QL: NEGATIVE
BUN SERPL-MCNC: 14 MG/DL (ref 5–25)
CALCIUM SERPL-MCNC: 9 MG/DL (ref 8.4–10.2)
CHLORIDE SERPL-SCNC: 106 MMOL/L (ref 96–108)
CO2 SERPL-SCNC: 27 MMOL/L (ref 21–32)
CREAT SERPL-MCNC: 0.67 MG/DL (ref 0.6–1.3)
EOSINOPHIL # BLD AUTO: 0.14 THOUSAND/ÂΜL (ref 0–0.61)
EOSINOPHIL NFR BLD AUTO: 3 % (ref 0–6)
ERYTHROCYTE [DISTWIDTH] IN BLOOD BY AUTOMATED COUNT: 13 % (ref 11.6–15.1)
EST. AVERAGE GLUCOSE BLD GHB EST-MCNC: 126 MG/DL
GFR SERPL CREATININE-BSD FRML MDRD: 97 ML/MIN/1.73SQ M
GLUCOSE P FAST SERPL-MCNC: 98 MG/DL (ref 65–99)
HBA1C MFR BLD: 6 %
HCT VFR BLD AUTO: 44.8 % (ref 36.5–49.3)
HGB BLD-MCNC: 15 G/DL (ref 12–17)
IMM GRANULOCYTES # BLD AUTO: 0.02 THOUSAND/UL (ref 0–0.2)
IMM GRANULOCYTES NFR BLD AUTO: 0 % (ref 0–2)
INR PPP: 1.03 (ref 0.84–1.19)
LYMPHOCYTES # BLD AUTO: 2.27 THOUSANDS/ÂΜL (ref 0.6–4.47)
LYMPHOCYTES NFR BLD AUTO: 44 % (ref 14–44)
MCH RBC QN AUTO: 32.7 PG (ref 26.8–34.3)
MCHC RBC AUTO-ENTMCNC: 33.5 G/DL (ref 31.4–37.4)
MCV RBC AUTO: 98 FL (ref 82–98)
MONOCYTES # BLD AUTO: 0.3 THOUSAND/ÂΜL (ref 0.17–1.22)
MONOCYTES NFR BLD AUTO: 6 % (ref 4–12)
NEUTROPHILS # BLD AUTO: 2.38 THOUSANDS/ÂΜL (ref 1.85–7.62)
NEUTS SEG NFR BLD AUTO: 46 % (ref 43–75)
NRBC BLD AUTO-RTO: 0 /100 WBCS
P AXIS: 35 DEGREES
PLATELET # BLD AUTO: 199 THOUSANDS/UL (ref 149–390)
PMV BLD AUTO: 8.7 FL (ref 8.9–12.7)
POTASSIUM SERPL-SCNC: 4.1 MMOL/L (ref 3.5–5.3)
PR INTERVAL: 192 MS
PROT SERPL-MCNC: 6.8 G/DL (ref 6.4–8.4)
PROTHROMBIN TIME: 13.7 SECONDS (ref 11.6–14.5)
QRS AXIS: -60 DEGREES
QRSD INTERVAL: 126 MS
QT INTERVAL: 436 MS
QTC INTERVAL: 442 MS
RBC # BLD AUTO: 4.59 MILLION/UL (ref 3.88–5.62)
RH BLD: POSITIVE
SODIUM SERPL-SCNC: 137 MMOL/L (ref 135–147)
SPECIMEN EXPIRATION DATE: NORMAL
T WAVE AXIS: -2 DEGREES
VENTRICULAR RATE: 62 BPM
WBC # BLD AUTO: 5.16 THOUSAND/UL (ref 4.31–10.16)

## 2023-11-02 PROCEDURE — 86900 BLOOD TYPING SEROLOGIC ABO: CPT

## 2023-11-02 PROCEDURE — 80053 COMPREHEN METABOLIC PANEL: CPT

## 2023-11-02 PROCEDURE — 86850 RBC ANTIBODY SCREEN: CPT

## 2023-11-02 PROCEDURE — 85610 PROTHROMBIN TIME: CPT

## 2023-11-02 PROCEDURE — 93005 ELECTROCARDIOGRAM TRACING: CPT

## 2023-11-02 PROCEDURE — 83036 HEMOGLOBIN GLYCOSYLATED A1C: CPT

## 2023-11-02 PROCEDURE — 93010 ELECTROCARDIOGRAM REPORT: CPT | Performed by: INTERNAL MEDICINE

## 2023-11-02 PROCEDURE — 36415 COLL VENOUS BLD VENIPUNCTURE: CPT

## 2023-11-02 PROCEDURE — 85025 COMPLETE CBC W/AUTO DIFF WBC: CPT

## 2023-11-02 PROCEDURE — 85730 THROMBOPLASTIN TIME PARTIAL: CPT

## 2023-11-02 PROCEDURE — 86901 BLOOD TYPING SEROLOGIC RH(D): CPT

## 2023-11-06 PROBLEM — Z01.818 PREOPERATIVE CLEARANCE: Status: ACTIVE | Noted: 2023-11-06

## 2023-11-06 PROBLEM — R73.01 IMPAIRED FASTING GLUCOSE: Status: ACTIVE | Noted: 2023-11-06

## 2023-11-06 NOTE — PATIENT INSTRUCTIONS
Contact surgical nurse  navigator with any questions regarding preoperative plan or schedule.   Stop all over the counter supplements, herbal, naturopathic  medications for 1 week prior to surgery UNLESS prescribed by your surgeon  Hold NSAIDS (i.e. advil, alleve, motrin, ibuprofen, celebrex) minimum 7 days prior to surgery  Hold Asprin minimum 7 days prior to surgery  Recommend using Tylenol ( acetaminophen ) 500mg every eight hours during the first week post discharge in conjunction with any additional pain medicine prescribed by your surgeon  Use bowel medicines prescribed by your surgeon ( colace) daily post op during the first 1-2 weeks to avoid post operative constipation issues  Call [phone] with any post discharge concerns or medical issues  The morning of surgery take only the following medication with small sip of water: none

## 2023-11-06 NOTE — PROGRESS NOTES
Internal Medicine Pre-Operative Evaluation:     Reason for Visit: Pre-operative Evaluation for Risk Stratification and Optimization    Patient ID: Parker Trujillo is a 77 y.o. male. Surgery: Arthroplasty of left knee  Referring Provider: Dr Megan Duran      Recommendations to Proceed withSurgery    Patient is considered to be Low risk for Medium risk procedure. After evaluation and discussion with patient with emphasis that all surgery has some degree of inherent risk it is determined this procedure is of acceptable risk  medically. Patient may proceed with planned procedure      Assessment      Primary osteoarthritis left knee  Failed conservative measures  Electing to undergo total joint arthroplasty    Pre-operative Medical Evaluation for planned surgery  Patient meets preoperative quality goals as noted below  Recommendations as listed in PLAN section below  Contact surgical nurse  navigator with any questions regarding preoperative plan or schedule.     Impaired fasting glucose  Hgb A1c 6.0  Recommend following DM diet  Monitor FBS    GERD  Cont PPI prn  Monitor for nausea/vomiting    Chronic LBP  F/b pain mgmt    Obesity  Recommend ongoing attempts at weight loss  Current BMI 32          Patient Active Problem List   Diagnosis   • Chronic bilateral low back pain without sciatica   • Essential hypertension   • Mixed hyperlipidemia   • Polyarticular arthritis   • Chronic pain syndrome   • Psoriasis   • Protrusion of lumbar intervertebral disc   • Lumbar radiculopathy   • Spinal stenosis of lumbar region with radiculopathy   • Osteoarthritis of knee   • Gastroesophageal reflux disease without esophagitis   • Numbness of left hand   • Trigger finger of left thumb   • Left carpal tunnel syndrome   • Ulnar neuropathy of left upper extremity   • Neuropathic pain   • Myofascial pain syndrome   • Lumbar spondylosis   • DDD (degenerative disc disease), cervical   • Neck pain   • Impaired fasting glucose   • Preoperative clearance        Plan:     1. Further preoperative workup as follows:   - none no further testing required may proceed with surgery    2. Medication Management/Recommendations:   - hold aspirin 7 days prior to surgery  - avoid use of NSAID such as motrin, advil, aleve for 7 days prior to surgery  - hold all OTC herbal or nutritional supplements 7 days before surgery    3. Patient requires further consultation with:   No Consults Required    4. Discharge Planning / Barriers to Discharge  none identified - patients has post discharge therapy plan in place, transportation arranged for discharge day, adequate family support at home to assist with discharge to home. Subjective:           History of Present Illness:     Aki Pugh is a 77 y.o. male who presents to the office today for a preoperative consultation at the request of surgeon. The patient understands this is an elective procedure and not emergent. They are electing to undergo planned procedure with an understanding that all surgery has inherent risk. They have worked with their surgeon and failed conservative treatment measures. Today they present for preoperative risk assessment and recommendations for optimization in preparation for surgery. Pt seen in surgical optimization center for upcoming proposed surgery. They have failed previous conservative measures and have elected surgical intervention. Pt meets presurgical lab and BMI optimization goals. Upon interview questioning patient is able to perform greater than 4 METs workload in daily life without any reported cardio-pulmonary symptoms. ROS: No TIA's or unusual headaches, no dysphagia. No prolonged cough. No dyspnea or chest pain on exertion. No abdominal pain, change in bowel habits, black or bloody stools. No urinary tract or BPH symptoms. Positive reported pain in arthritic joint. Positive difficulty with gait. No skin rashes or issues. Objective:      /74   Pulse 59   Ht 5' 9" (1.753 m)   Wt 101 kg (223 lb 6.4 oz)   BMI 32.99 kg/m²       General Appearance: no distress, conversive  HEENT: PERRLA, conjuctiva normal; oropharynx clear; mucous membranes moist;   Neck:  Supple, no lymphadenopathy or thyromegaly  Lungs: breath sounds normal, normal respiratory effort, no retractions, expiratory effort normal  CV: normal heart sounds S1/S2, PMI normal   ABD: soft non tender, no masses , no hepatic or splenomegaly  EXT: DP pulses intact, no lymphadenopathy, no edema  Skin: normal turgor, normal texture, no rash  Psych: affect normal, mood normal  Neuro: AAOx3        The following portions of the patient's history were reviewed and updated as appropriate: allergies, current medications, past family history, past medical history, past social history, past surgical history and problem list.     Past History:       Past Medical History:   Diagnosis Date   • Arthritis 2013   • Bilateral carpal tunnel syndrome    • Chronic pain disorder     low back   • GERD (gastroesophageal reflux disease)    • Hyperlipidemia    • Low back pain    • Lumbar disc disease    • Lumbar spinal stenosis    • Neck pain     had cervical fusion   • Obesity    • Psoriasis     right elbow   • Tremor     left leg   • Wears dentures     full dentures   • Wears glasses     Past Surgical History:   Procedure Laterality Date   • CERVICAL LAMINECTOMY     • COLONOSCOPY  07/2015    multiple polyps, repeat in 3 years   • JOINT REPLACEMENT      right TKR   • KNEE SURGERY Right 2002    20 years ago-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   • LAMINECTOMY     • ORTHOPEDIC SURGERY     • POPLITEAL SYNOVIAL CYST EXCISION      last assessed: 04/22/2015   • [place] NEUROPLASTY &/TRANSPOS MEDIAN NRV CARPAL TUNNE Left 9/25/2020    Procedure: RELEASE CARPAL TUNNEL;  Surgeon:  Pa Patel MD;  Location: [facility];  Service: Orthopedics   • ME NEUROPLASTY &/TRANSPOS MEDIAN NRV CARPAL Valerie Luís Right 10/20/2020 Procedure: RELEASE CARPAL TUNNEL - Open;  Surgeon: Billy Oneal MD;  Location: [facility];  Service: Orthopedics   • SPINE SURGERY     • TONSILLECTOMY AND ADENOIDECTOMY      last assessed: 04/22/2015   • TOTAL KNEE ARTHROPLASTY      last assessed: 05/12/2015          Social History     Tobacco Use   • Smoking status: Former     Packs/day: 1.50     Years: 10.00     Total pack years: 15.00     Types: Cigarettes     Quit date: 9/24/2013     Years since quitting: 10.1   • Smokeless tobacco: Never   Vaping Use   • Vaping Use: Never used   Substance Use Topics   • Alcohol use: Not Currently     Alcohol/week: 12.0 standard drinks of alcohol     Types: 12 Cans of beer per week   • Drug use: Never     Family History   Problem Relation Age of Onset   • Allergies Father         seasonal   • Lung cancer Family    • No Known Problems Mother           Allergies:     No Known Allergies     Current Medications:     Current Outpatient Medications   Medication Instructions   • adalimumab (HUMIRA) 40 mg, Subcutaneous, Every 14 days, Pt had last dose on 10/27/23- Will be on hold for surgery on 11/24/23   • ascorbic acid (VITAMIN C) 500 mg, Oral, 2 times daily   • atorvastatin (LIPITOR) 20 mg tablet TAKE 1 TABLET BY MOUTH ONCE  DAILY   • cholecalciferol (VITAMIN D3) 2,000 Units, Oral, Daily   • folic acid (FOLVITE) 1 mg, Oral, Daily   • Multiple Vitamins-Minerals (multivitamin with minerals) tablet 1 tablet, Oral, Daily             PRE-OP WORKSHEET DATA    Assessment of Pre-Operative Risks     MLJ Quality Hard Stops:  BMI (<40) : Estimated body mass index is 32.99 kg/m² as calculated from the following:    Height as of this encounter: 5' 9" (1.753 m). Weight as of this encounter: 101 kg (223 lb 6.4 oz). Hgb ( >11):    Lab Results   Component Value Date    HGB 15.0 11/02/2023     HbA1c (<7.0) :   Lab Results   Component Value Date    HGBA1C 6.0 (H) 11/02/2023     GFR (>60) (Less then 45 = Nephrology consult):  CrCl cannot be calculated (Patient's most recent lab result is older than the maximum 7 days allowed. ). Active Decompensated Chronic Conditions which would delay surgery  No acutely decompensated medical issues such as recent CVA, MI, new onset arrhythmia, severe aortic stenosis, CHF, uncontrolled COPD       Exercise Capacity: (if less the 4 mets consider functional assessment via cardiac stress testing or consultation)    Able to walk 2 blocks without symptoms?: Yes  Able to walk 1 flights without symptoms?: Yes    Assessment of intra and post operative respiratory, hemodynamic and thrombotic risks     Prior Anesthesia Reactions: No     Personal history of venous thromboembolic disease? No    History of steroid use > 5 mg for >2 weeks within last year? No    Cardiac Risk Estimation: per the Revised Cardiac Risk Index (Circ. 100:1043, 1999),     The patient's risk factors for cardiac complications include :  none    Aki Aldana has a in hospital cardiac risk of RCI RISK CLASS I (0 risk factors, risk of major cardiac compl. appr. 0.5%) based on RCRI calculator          Pre-Op Data Reviewed:       Laboratory Results: I have personally reviewed the pertinent laboratory results/reports     EKG:I have personally reviewed pertinent reports. . I personally reviewed and interpreted available tracings in the electronic medical record    Normal sinus rhythm with sinus arrhythmia  Left axis deviation  Non-specific intra-ventricular conduction block  Nonspecific T wave abnormality  Abnormal ECG  No previous ECGs available  Confirmed by Daphne Hamilton ([id number]) on 11/2/2023    OLD RECORDS: reviewed old records in the chart review section if EHR on day of visit.     Previous cardiopulmonary studies within the past year:  Echocardiogram: no  Cardiac Catheterization: no  Stress Test: no      Time of visit including pre-visit chart review, visit and post-visit coordination of plan and care , review of pre-surgical lab work, preparation and time spent documenting note in electronic medical record, time spent face-to-face in physical examination answering patient questions by care team 45 minutes             [address] [facility]

## 2023-11-06 NOTE — H&P (VIEW-ONLY)
Internal Medicine Pre-Operative Evaluation:     Reason for Visit: Pre-operative Evaluation for Risk Stratification and Optimization    Patient ID: Sarah Dickerson is a 77 y.o. male. Surgery: Arthroplasty of left knee  Referring Provider: Dr Daniela Pruitt      Recommendations to Proceed withSurgery    Patient is considered to be Low risk for Medium risk procedure. After evaluation and discussion with patient with emphasis that all surgery has some degree of inherent risk it is determined this procedure is of acceptable risk  medically. Patient may proceed with planned procedure      Assessment      Primary osteoarthritis left knee  Failed conservative measures  Electing to undergo total joint arthroplasty    Pre-operative Medical Evaluation for planned surgery  Patient meets preoperative quality goals as noted below  Recommendations as listed in PLAN section below  Contact surgical nurse  navigator with any questions regarding preoperative plan or schedule.     Impaired fasting glucose  Hgb A1c 6.0  Recommend following DM diet  Monitor FBS    GERD  Cont PPI prn  Monitor for nausea/vomiting    Chronic LBP  F/b pain mgmt    Obesity  Recommend ongoing attempts at weight loss  Current BMI 32          Patient Active Problem List   Diagnosis   • Chronic bilateral low back pain without sciatica   • Essential hypertension   • Mixed hyperlipidemia   • Polyarticular arthritis   • Chronic pain syndrome   • Psoriasis   • Protrusion of lumbar intervertebral disc   • Lumbar radiculopathy   • Spinal stenosis of lumbar region with radiculopathy   • Osteoarthritis of knee   • Gastroesophageal reflux disease without esophagitis   • Numbness of left hand   • Trigger finger of left thumb   • Left carpal tunnel syndrome   • Ulnar neuropathy of left upper extremity   • Neuropathic pain   • Myofascial pain syndrome   • Lumbar spondylosis   • DDD (degenerative disc disease), cervical   • Neck pain   • Impaired fasting glucose   • Preoperative clearance        Plan:     1. Further preoperative workup as follows:   - none no further testing required may proceed with surgery    2. Medication Management/Recommendations:   - hold aspirin 7 days prior to surgery  - avoid use of NSAID such as motrin, advil, aleve for 7 days prior to surgery  - hold all OTC herbal or nutritional supplements 7 days before surgery    3. Patient requires further consultation with:   No Consults Required    4. Discharge Planning / Barriers to Discharge  none identified - patients has post discharge therapy plan in place, transportation arranged for discharge day, adequate family support at home to assist with discharge to home. Subjective:           History of Present Illness:     Aki Leon is a 77 y.o. male who presents to the office today for a preoperative consultation at the request of surgeon. The patient understands this is an elective procedure and not emergent. They are electing to undergo planned procedure with an understanding that all surgery has inherent risk. They have worked with their surgeon and failed conservative treatment measures. Today they present for preoperative risk assessment and recommendations for optimization in preparation for surgery. Pt seen in surgical optimization center for upcoming proposed surgery. They have failed previous conservative measures and have elected surgical intervention. Pt meets presurgical lab and BMI optimization goals. Upon interview questioning patient is able to perform greater than 4 METs workload in daily life without any reported cardio-pulmonary symptoms. ROS: No TIA's or unusual headaches, no dysphagia. No prolonged cough. No dyspnea or chest pain on exertion. No abdominal pain, change in bowel habits, black or bloody stools. No urinary tract or BPH symptoms. Positive reported pain in arthritic joint. Positive difficulty with gait. No skin rashes or issues. Objective:      /74   Pulse 59   Ht 5' 9" (1.753 m)   Wt 101 kg (223 lb 6.4 oz)   BMI 32.99 kg/m²       General Appearance: no distress, conversive  HEENT: PERRLA, conjuctiva normal; oropharynx clear; mucous membranes moist;   Neck:  Supple, no lymphadenopathy or thyromegaly  Lungs: breath sounds normal, normal respiratory effort, no retractions, expiratory effort normal  CV: normal heart sounds S1/S2, PMI normal   ABD: soft non tender, no masses , no hepatic or splenomegaly  EXT: DP pulses intact, no lymphadenopathy, no edema  Skin: normal turgor, normal texture, no rash  Psych: affect normal, mood normal  Neuro: AAOx3        The following portions of the patient's history were reviewed and updated as appropriate: allergies, current medications, past family history, past medical history, past social history, past surgical history and problem list.     Past History:       Past Medical History:   Diagnosis Date   • Arthritis 2013   • Bilateral carpal tunnel syndrome    • Chronic pain disorder     low back   • GERD (gastroesophageal reflux disease)    • Hyperlipidemia    • Low back pain    • Lumbar disc disease    • Lumbar spinal stenosis    • Neck pain     had cervical fusion   • Obesity    • Psoriasis     right elbow   • Tremor     left leg   • Wears dentures     full dentures   • Wears glasses     Past Surgical History:   Procedure Laterality Date   • CERVICAL LAMINECTOMY     • COLONOSCOPY  07/2015    multiple polyps, repeat in 3 years   • JOINT REPLACEMENT      right TKR   • KNEE SURGERY Right 2002    20 years ago-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   • LAMINECTOMY     • ORTHOPEDIC SURGERY     • POPLITEAL SYNOVIAL CYST EXCISION      last assessed: 04/22/2015   • [place] NEUROPLASTY &/TRANSPOS MEDIAN NRV CARPAL TUNNE Left 9/25/2020    Procedure: RELEASE CARPAL TUNNEL;  Surgeon:  Stephie Otero MD;  Location[facility];  Service: Orthopedics   • [place] NEUROPLASTY &/TRANSPOS MEDIAN NRV CARPAL Oleta Fleischer Right 10/20/2020 Procedure: RELEASE CARPAL TUNNEL - Open;  Surgeon: Valeria Buckley MD;  Location[facility];  Service: Orthopedics   • SPINE SURGERY     • TONSILLECTOMY AND ADENOIDECTOMY      last assessed: 04/22/2015   • TOTAL KNEE ARTHROPLASTY      last assessed: 05/12/2015          Social History     Tobacco Use   • Smoking status: Former     Packs/day: 1.50     Years: 10.00     Total pack years: 15.00     Types: Cigarettes     Quit date: 9/24/2013     Years since quitting: 10.1   • Smokeless tobacco: Never   Vaping Use   • Vaping Use: Never used   Substance Use Topics   • Alcohol use: Not Currently     Alcohol/week: 12.0 standard drinks of alcohol     Types: 12 Cans of beer per week   • Drug use: Never     Family History   Problem Relation Age of Onset   • Allergies Father         seasonal   • Lung cancer Family    • No Known Problems Mother           Allergies:     No Known Allergies     Current Medications:     Current Outpatient Medications   Medication Instructions   • adalimumab (HUMIRA) 40 mg, Subcutaneous, Every 14 days, Pt had last dose on 10/27/23- Will be on hold for surgery on 11/24/23   • ascorbic acid (VITAMIN C) 500 mg, Oral, 2 times daily   • atorvastatin (LIPITOR) 20 mg tablet TAKE 1 TABLET BY MOUTH ONCE  DAILY   • cholecalciferol (VITAMIN D3) 2,000 Units, Oral, Daily   • folic acid (FOLVITE) 1 mg, Oral, Daily   • Multiple Vitamins-Minerals (multivitamin with minerals) tablet 1 tablet, Oral, Daily             PRE-OP WORKSHEET DATA    Assessment of Pre-Operative Risks     MLJ Quality Hard Stops:  BMI (<40) : Estimated body mass index is 32.99 kg/m² as calculated from the following:    Height as of this encounter: 5' 9" (1.753 m). Weight as of this encounter: 101 kg (223 lb 6.4 oz). Hgb ( >11):    Lab Results   Component Value Date    HGB 15.0 11/02/2023     HbA1c (<7.0) :   Lab Results   Component Value Date    HGBA1C 6.0 (H) 11/02/2023     GFR (>60) (Less then 45 = Nephrology consult):  CrCl cannot be calculated (Patient's most recent lab result is older than the maximum 7 days allowed. ). Active Decompensated Chronic Conditions which would delay surgery  No acutely decompensated medical issues such as recent CVA, MI, new onset arrhythmia, severe aortic stenosis, CHF, uncontrolled COPD       Exercise Capacity: (if less the 4 mets consider functional assessment via cardiac stress testing or consultation)    Able to walk 2 blocks without symptoms?: Yes  Able to walk 1 flights without symptoms?: Yes    Assessment of intra and post operative respiratory, hemodynamic and thrombotic risks     Prior Anesthesia Reactions: No     Personal history of venous thromboembolic disease? No    History of steroid use > 5 mg for >2 weeks within last year? No    Cardiac Risk Estimation: per the Revised Cardiac Risk Index (Circ. 100:1043, 1999),     The patient's risk factors for cardiac complications include :  none    Aki Joseph has a in hospital cardiac risk of RCI RISK CLASS I (0 risk factors, risk of major cardiac compl. appr. 0.5%) based on RCRI calculator          Pre-Op Data Reviewed:       Laboratory Results: I have personally reviewed the pertinent laboratory results/reports     EKG:I have personally reviewed pertinent reports. . I personally reviewed and interpreted available tracings in the electronic medical record    Normal sinus rhythm with sinus arrhythmia  Left axis deviation  Non-specific intra-ventricular conduction block  Nonspecific T wave abnormality  Abnormal ECG  No previous ECGs available  Confirmed by Daphne Hamilton ([id number]) on 11/2/2023    OLD RECORDS: reviewed old records in the chart review section if EHR on day of visit.     Previous cardiopulmonary studies within the past year:  Echocardiogram: no  Cardiac Catheterization: no  Stress Test: no      Time of visit including pre-visit chart review, visit and post-visit coordination of plan and care , review of pre-surgical lab work, preparation and time spent documenting note in electronic medical record, time spent face-to-face in physical examination answering patient questions by care team 45 minutes             [address] [facility]

## 2023-11-15 ENCOUNTER — OFFICE VISIT (OUTPATIENT)
Age: 68
End: 2023-11-15

## 2023-11-15 VITALS
WEIGHT: 223.4 LBS | HEART RATE: 59 BPM | DIASTOLIC BLOOD PRESSURE: 74 MMHG | SYSTOLIC BLOOD PRESSURE: 137 MMHG | HEIGHT: 69 IN | BODY MASS INDEX: 33.09 KG/M2

## 2023-11-15 DIAGNOSIS — E78.2 MIXED HYPERLIPIDEMIA: ICD-10-CM

## 2023-11-15 DIAGNOSIS — R73.01 IMPAIRED FASTING GLUCOSE: ICD-10-CM

## 2023-11-15 DIAGNOSIS — I10 ESSENTIAL HYPERTENSION: Primary | ICD-10-CM

## 2023-11-15 DIAGNOSIS — M17.12 PRIMARY OSTEOARTHRITIS OF LEFT KNEE: ICD-10-CM

## 2023-11-15 DIAGNOSIS — M54.16 LUMBAR RADICULOPATHY: ICD-10-CM

## 2023-11-15 DIAGNOSIS — Z01.818 PREOPERATIVE CLEARANCE: ICD-10-CM

## 2023-11-22 ENCOUNTER — ANESTHESIA EVENT (OUTPATIENT)
Dept: PERIOP | Facility: HOSPITAL | Age: 68
End: 2023-11-22
Payer: MEDICARE

## 2023-11-23 NOTE — DISCHARGE INSTR - AVS FIRST PAGE
Dr. Hayde Tuttle Knee Replacement    What to Expect/Activity  It is normal to have some discomfort in your knee for several days to weeks. You are weight bearing as tolerated to your operative leg with assist devices. Please use crutches/walker when ambulating until your follow-up  Swelling and discomfort in the knee is normal for several days after surgery. For the first 2-3 days, use ice around the knee to help. Use for 20-30 minutes every 1-2 hours for 48 hours, while awake. You may continue beyond 48 hours as needed. Place one or two pillows underneath your calf, not your knee, to reduce swelling. Physical therapy on your own at home should start as soon as possible (see below). Please perform heel slides and extension exercises on your own as well (see diagram). Please use incentive spirometer 10 times per hour while awake (see diagram). Dressing/Wound Care/Bathing  You may remove your toe-to-groin dressing 24 hours after surgery. There will be a surgical dressing over your incision that stays in place until follow-up unless water gets under the bandage and then it should be removed. You may start showering 24 hours after surgery, the surgical dressing will remain in place. Please pat the dressing dry. If you notice the dressing appears saturated or is starting to come off, please replace with dry dressing. You can keep the dressing in place until follow-up in the office. Do not place any creams, ointments or gels on or around the incision. No baths, swimming or submerging until cleared by Dr. Dominguez Ou may resume your usual medications, except Humira, hold until post op visit (about 2 weeks).   Please take the following medications:  Anti-coagulation (blood clot prevention) - aspirin 81mg twice daily for 4 weeks  Pain medication:  Narcotic: Take as directed  NSAID/Anti-inflammatory: Take as directed  Tylenol 1000mg every 8 hours  Zofran (ondasetron) - 4mg every 8 hours as needed for nausea  Stool softeners (senna/colace) - take daily to prevent constipation as narcotic pain medication causes constipation  Antibiotic - take as directed if prescribed   If you have questions or pain concerns, please contact the office. Pain medication cannot remove all post-operative pain. Follow up/Call if:  The findings of your surgery will be explained to you and your family immediately after surgery. However, in the post-operative period, during recovery from anesthesia you may not fully remember or fully understand what was said. This will be again gone over when you return for your post-op appointment.   Please contact Dr. Alicea Heading office if you experience the following:  Excessive bleeding (bleeding through your dressing)  Fever greater than 101 degrees F after 48 hours (low grade fevers the day or two after surgery are normal)  Persistent nausea or vomiting  Decreased sensation or discoloration of the operative limb  Pain or swelling that is getting worse and not better with medication    Dr. Korin Thornton: [phone]

## 2023-11-24 ENCOUNTER — ANESTHESIA (OUTPATIENT)
Dept: PERIOP | Facility: HOSPITAL | Age: 68
End: 2023-11-24
Payer: MEDICARE

## 2023-11-24 ENCOUNTER — APPOINTMENT (OUTPATIENT)
Dept: RADIOLOGY | Facility: HOSPITAL | Age: 68
End: 2023-11-24
Payer: MEDICARE

## 2023-11-24 ENCOUNTER — HOSPITAL ENCOUNTER (OUTPATIENT)
Facility: HOSPITAL | Age: 68
Setting detail: OUTPATIENT SURGERY
Discharge: HOME | End: 2023-11-25
Attending: STUDENT IN AN ORGANIZED HEALTH CARE EDUCATION/TRAINING PROGRAM | Admitting: STUDENT IN AN ORGANIZED HEALTH CARE EDUCATION/TRAINING PROGRAM
Payer: MEDICARE

## 2023-11-24 DIAGNOSIS — M17.12 PRIMARY OSTEOARTHRITIS OF LEFT KNEE: Primary | ICD-10-CM

## 2023-11-24 PROBLEM — R73.03 PRE-DIABETES: Status: ACTIVE | Noted: 2023-11-24

## 2023-11-24 PROCEDURE — C1776 JOINT DEVICE (IMPLANTABLE): HCPCS | Performed by: STUDENT IN AN ORGANIZED HEALTH CARE EDUCATION/TRAINING PROGRAM

## 2023-11-24 PROCEDURE — C1713 ANCHOR/SCREW BN/BN,TIS/BN: HCPCS | Performed by: STUDENT IN AN ORGANIZED HEALTH CARE EDUCATION/TRAINING PROGRAM

## 2023-11-24 PROCEDURE — C9290 INJ, BUPIVACAINE LIPOSOME: HCPCS | Performed by: ANESTHESIOLOGY

## 2023-11-24 PROCEDURE — 97163 PT EVAL HIGH COMPLEX 45 MIN: CPT

## 2023-11-24 PROCEDURE — 27447 TOTAL KNEE ARTHROPLASTY: CPT | Performed by: STUDENT IN AN ORGANIZED HEALTH CARE EDUCATION/TRAINING PROGRAM

## 2023-11-24 PROCEDURE — 73560 X-RAY EXAM OF KNEE 1 OR 2: CPT

## 2023-11-24 DEVICE — ATTUNE PATELLA MEDIALIZED DOME 35MM CEMENTED AOX
Type: IMPLANTABLE DEVICE | Site: KNEE | Status: FUNCTIONAL
Brand: ATTUNE

## 2023-11-24 DEVICE — ATTUNE KNEE SYSTEM TIBIAL BASE FIXED BEARING SIZE 6 CEMENTED
Type: IMPLANTABLE DEVICE | Site: KNEE | Status: FUNCTIONAL
Brand: ATTUNE

## 2023-11-24 DEVICE — ATTUNE KNEE SYSTEM TIBIAL INSERT FIXED BEARING MEDIAL STABILIZED LEFT AOX 7, 7MM
Type: IMPLANTABLE DEVICE | Site: KNEE | Status: FUNCTIONAL
Brand: ATTUNE

## 2023-11-24 DEVICE — ATTUNE KNEE SYSTEM FEMORAL CRUCIATE RETAINING SIZE 7 LEFT CEMENTED
Type: IMPLANTABLE DEVICE | Site: KNEE | Status: FUNCTIONAL
Brand: ATTUNE

## 2023-11-24 DEVICE — SMARTSET HIGH PERFORMANCE MV MEDIUM VISCOSITY BONE CEMENT 40G
Type: IMPLANTABLE DEVICE | Site: KNEE | Status: FUNCTIONAL
Brand: SMARTSET

## 2023-11-24 RX ORDER — MAGNESIUM HYDROXIDE 1200 MG/15ML
LIQUID ORAL AS NEEDED
Status: DISCONTINUED | OUTPATIENT
Start: 2023-11-24 | End: 2023-11-24 | Stop reason: HOSPADM

## 2023-11-24 RX ORDER — DOCUSATE SODIUM 100 MG/1
100 CAPSULE, LIQUID FILLED ORAL 2 TIMES DAILY
Status: DISCONTINUED | OUTPATIENT
Start: 2023-11-24 | End: 2023-11-25 | Stop reason: HOSPADM

## 2023-11-24 RX ORDER — SODIUM CHLORIDE, SODIUM LACTATE, POTASSIUM CHLORIDE, CALCIUM CHLORIDE 600; 310; 30; 20 MG/100ML; MG/100ML; MG/100ML; MG/100ML
125 INJECTION, SOLUTION INTRAVENOUS CONTINUOUS
Status: DISCONTINUED | OUTPATIENT
Start: 2023-11-24 | End: 2023-11-24

## 2023-11-24 RX ORDER — GABAPENTIN 300 MG/1
300 CAPSULE ORAL ONCE
Status: COMPLETED | OUTPATIENT
Start: 2023-11-24 | End: 2023-11-24

## 2023-11-24 RX ORDER — ONDANSETRON 2 MG/ML
4 INJECTION INTRAMUSCULAR; INTRAVENOUS ONCE AS NEEDED
Status: DISCONTINUED | OUTPATIENT
Start: 2023-11-24 | End: 2023-11-24 | Stop reason: HOSPADM

## 2023-11-24 RX ORDER — ONDANSETRON 2 MG/ML
4 INJECTION INTRAMUSCULAR; INTRAVENOUS EVERY 6 HOURS PRN
Status: DISCONTINUED | OUTPATIENT
Start: 2023-11-24 | End: 2023-11-25 | Stop reason: HOSPADM

## 2023-11-24 RX ORDER — PHENYLEPHRINE HCL IN 0.9% NACL 1 MG/10 ML
SYRINGE (ML) INTRAVENOUS AS NEEDED
Status: DISCONTINUED | OUTPATIENT
Start: 2023-11-24 | End: 2023-11-24

## 2023-11-24 RX ORDER — ATORVASTATIN CALCIUM 20 MG/1
20 TABLET, FILM COATED ORAL
Status: DISCONTINUED | OUTPATIENT
Start: 2023-11-24 | End: 2023-11-25 | Stop reason: HOSPADM

## 2023-11-24 RX ORDER — FENTANYL CITRATE/PF 50 MCG/ML
25 SYRINGE (ML) INJECTION
Status: DISCONTINUED | OUTPATIENT
Start: 2023-11-24 | End: 2023-11-24 | Stop reason: HOSPADM

## 2023-11-24 RX ORDER — AMOXICILLIN 250 MG
1 CAPSULE ORAL DAILY
Qty: 30 TABLET | Refills: 0 | Status: SHIPPED | OUTPATIENT
Start: 2023-11-24

## 2023-11-24 RX ORDER — ASCORBIC ACID 500 MG
500 TABLET ORAL 2 TIMES DAILY
Status: DISCONTINUED | OUTPATIENT
Start: 2023-11-24 | End: 2023-11-25 | Stop reason: HOSPADM

## 2023-11-24 RX ORDER — PROPOFOL 10 MG/ML
INJECTION, EMULSION INTRAVENOUS AS NEEDED
Status: DISCONTINUED | OUTPATIENT
Start: 2023-11-24 | End: 2023-11-24

## 2023-11-24 RX ORDER — GABAPENTIN 300 MG/1
300 CAPSULE ORAL
Status: DISCONTINUED | OUTPATIENT
Start: 2023-11-24 | End: 2023-11-25 | Stop reason: HOSPADM

## 2023-11-24 RX ORDER — PANTOPRAZOLE SODIUM 40 MG/1
40 TABLET, DELAYED RELEASE ORAL
Status: DISCONTINUED | OUTPATIENT
Start: 2023-11-24 | End: 2023-11-25 | Stop reason: HOSPADM

## 2023-11-24 RX ORDER — ONDANSETRON 2 MG/ML
INJECTION INTRAMUSCULAR; INTRAVENOUS AS NEEDED
Status: DISCONTINUED | OUTPATIENT
Start: 2023-11-24 | End: 2023-11-24

## 2023-11-24 RX ORDER — TRANEXAMIC ACID 10 MG/ML
1000 INJECTION, SOLUTION INTRAVENOUS ONCE
Status: COMPLETED | OUTPATIENT
Start: 2023-11-24 | End: 2023-11-24

## 2023-11-24 RX ORDER — CHLORHEXIDINE GLUCONATE ORAL RINSE 1.2 MG/ML
15 SOLUTION DENTAL ONCE
Status: COMPLETED | OUTPATIENT
Start: 2023-11-24 | End: 2023-11-24

## 2023-11-24 RX ORDER — DEXAMETHASONE SODIUM PHOSPHATE 10 MG/ML
INJECTION, SOLUTION INTRAMUSCULAR; INTRAVENOUS AS NEEDED
Status: DISCONTINUED | OUTPATIENT
Start: 2023-11-24 | End: 2023-11-24

## 2023-11-24 RX ORDER — ACETAMINOPHEN 325 MG/1
650 TABLET ORAL EVERY 4 HOURS PRN
Status: DISCONTINUED | OUTPATIENT
Start: 2023-11-24 | End: 2023-11-25 | Stop reason: HOSPADM

## 2023-11-24 RX ORDER — MIDAZOLAM HYDROCHLORIDE 2 MG/2ML
INJECTION, SOLUTION INTRAMUSCULAR; INTRAVENOUS AS NEEDED
Status: DISCONTINUED | OUTPATIENT
Start: 2023-11-24 | End: 2023-11-24

## 2023-11-24 RX ORDER — HYDROMORPHONE HCL/PF 1 MG/ML
0.5 SYRINGE (ML) INJECTION
Status: DISCONTINUED | OUTPATIENT
Start: 2023-11-24 | End: 2023-11-24 | Stop reason: HOSPADM

## 2023-11-24 RX ORDER — OXYCODONE HYDROCHLORIDE 5 MG/1
5 TABLET ORAL EVERY 4 HOURS PRN
Qty: 42 TABLET | Refills: 0 | Status: SHIPPED | OUTPATIENT
Start: 2023-11-24 | End: 2023-12-04

## 2023-11-24 RX ORDER — SODIUM CHLORIDE, SODIUM LACTATE, POTASSIUM CHLORIDE, CALCIUM CHLORIDE 600; 310; 30; 20 MG/100ML; MG/100ML; MG/100ML; MG/100ML
100 INJECTION, SOLUTION INTRAVENOUS CONTINUOUS
Status: DISCONTINUED | OUTPATIENT
Start: 2023-11-24 | End: 2023-11-25 | Stop reason: HOSPADM

## 2023-11-24 RX ORDER — BUPIVACAINE HYDROCHLORIDE 2.5 MG/ML
INJECTION, SOLUTION EPIDURAL; INFILTRATION; INTRACAUDAL AS NEEDED
Status: DISCONTINUED | OUTPATIENT
Start: 2023-11-24 | End: 2023-11-24 | Stop reason: HOSPADM

## 2023-11-24 RX ORDER — CALCIUM CARBONATE 500 MG/1
1000 TABLET, CHEWABLE ORAL DAILY PRN
Status: DISCONTINUED | OUTPATIENT
Start: 2023-11-24 | End: 2023-11-25 | Stop reason: HOSPADM

## 2023-11-24 RX ORDER — CEFAZOLIN SODIUM 2 G/50ML
2000 SOLUTION INTRAVENOUS ONCE
Status: COMPLETED | OUTPATIENT
Start: 2023-11-24 | End: 2023-11-24

## 2023-11-24 RX ORDER — CEFAZOLIN SODIUM 2 G/50ML
2000 SOLUTION INTRAVENOUS EVERY 8 HOURS
Status: COMPLETED | OUTPATIENT
Start: 2023-11-24 | End: 2023-11-25

## 2023-11-24 RX ORDER — HYDROMORPHONE HCL/PF 1 MG/ML
SYRINGE (ML) INJECTION AS NEEDED
Status: DISCONTINUED | OUTPATIENT
Start: 2023-11-24 | End: 2023-11-24

## 2023-11-24 RX ORDER — KETOROLAC TROMETHAMINE 30 MG/ML
INJECTION, SOLUTION INTRAMUSCULAR; INTRAVENOUS AS NEEDED
Status: DISCONTINUED | OUTPATIENT
Start: 2023-11-24 | End: 2023-11-24 | Stop reason: HOSPADM

## 2023-11-24 RX ORDER — SODIUM CHLORIDE 9 MG/ML
125 INJECTION, SOLUTION INTRAVENOUS CONTINUOUS
Status: DISCONTINUED | OUTPATIENT
Start: 2023-11-24 | End: 2023-11-24

## 2023-11-24 RX ORDER — OXYCODONE HYDROCHLORIDE 5 MG/1
5 TABLET ORAL EVERY 4 HOURS PRN
Status: DISCONTINUED | OUTPATIENT
Start: 2023-11-24 | End: 2023-11-25 | Stop reason: HOSPADM

## 2023-11-24 RX ORDER — ACETAMINOPHEN 500 MG
1000 TABLET ORAL EVERY 8 HOURS
Qty: 60 TABLET | Refills: 0 | Status: SHIPPED | OUTPATIENT
Start: 2023-11-24

## 2023-11-24 RX ORDER — LIDOCAINE HCL/PF 100 MG/5ML
SYRINGE (ML) INJECTION AS NEEDED
Status: DISCONTINUED | OUTPATIENT
Start: 2023-11-24 | End: 2023-11-24

## 2023-11-24 RX ORDER — ACETAMINOPHEN 325 MG/1
975 TABLET ORAL EVERY 8 HOURS
Status: DISCONTINUED | OUTPATIENT
Start: 2023-11-24 | End: 2023-11-25 | Stop reason: HOSPADM

## 2023-11-24 RX ORDER — ACETAMINOPHEN 325 MG/1
975 TABLET ORAL ONCE
Status: COMPLETED | OUTPATIENT
Start: 2023-11-24 | End: 2023-11-24

## 2023-11-24 RX ORDER — CEFADROXIL 500 MG/1
500 CAPSULE ORAL EVERY 12 HOURS SCHEDULED
Qty: 10 CAPSULE | Refills: 0 | Status: SHIPPED | OUTPATIENT
Start: 2023-11-24 | End: 2023-11-29

## 2023-11-24 RX ORDER — BUPIVACAINE HYDROCHLORIDE 5 MG/ML
INJECTION, SOLUTION EPIDURAL; INTRACAUDAL AS NEEDED
Status: DISCONTINUED | OUTPATIENT
Start: 2023-11-24 | End: 2023-11-24

## 2023-11-24 RX ORDER — SENNOSIDES 8.6 MG
1 TABLET ORAL DAILY
Status: DISCONTINUED | OUTPATIENT
Start: 2023-11-24 | End: 2023-11-25 | Stop reason: HOSPADM

## 2023-11-24 RX ORDER — OXYCODONE HYDROCHLORIDE 10 MG/1
10 TABLET ORAL EVERY 4 HOURS PRN
Status: DISCONTINUED | OUTPATIENT
Start: 2023-11-24 | End: 2023-11-25 | Stop reason: HOSPADM

## 2023-11-24 RX ORDER — CHLORHEXIDINE GLUCONATE 4 G/100ML
SOLUTION TOPICAL DAILY PRN
Status: DISCONTINUED | OUTPATIENT
Start: 2023-11-24 | End: 2023-11-24 | Stop reason: HOSPADM

## 2023-11-24 RX ORDER — FENTANYL CITRATE 50 UG/ML
INJECTION, SOLUTION INTRAMUSCULAR; INTRAVENOUS AS NEEDED
Status: DISCONTINUED | OUTPATIENT
Start: 2023-11-24 | End: 2023-11-24

## 2023-11-24 RX ORDER — FOLIC ACID 1 MG/1
1 TABLET ORAL DAILY
Status: DISCONTINUED | OUTPATIENT
Start: 2023-11-24 | End: 2023-11-25 | Stop reason: HOSPADM

## 2023-11-24 RX ORDER — SIMETHICONE 80 MG
80 TABLET,CHEWABLE ORAL 4 TIMES DAILY PRN
Status: DISCONTINUED | OUTPATIENT
Start: 2023-11-24 | End: 2023-11-25 | Stop reason: HOSPADM

## 2023-11-24 RX ORDER — CELECOXIB 200 MG/1
200 CAPSULE ORAL 2 TIMES DAILY
Qty: 60 CAPSULE | Refills: 0 | Status: SHIPPED | OUTPATIENT
Start: 2023-11-24

## 2023-11-24 RX ORDER — ROCURONIUM BROMIDE 10 MG/ML
INJECTION, SOLUTION INTRAVENOUS AS NEEDED
Status: DISCONTINUED | OUTPATIENT
Start: 2023-11-24 | End: 2023-11-24

## 2023-11-24 RX ORDER — ONDANSETRON 4 MG/1
4 TABLET, ORALLY DISINTEGRATING ORAL EVERY 6 HOURS PRN
Qty: 20 TABLET | Refills: 0 | Status: SHIPPED | OUTPATIENT
Start: 2023-11-24

## 2023-11-24 RX ADMIN — SENNOSIDES 8.6 MG: 8.6 TABLET, FILM COATED ORAL at 14:10

## 2023-11-24 RX ADMIN — OXYCODONE HYDROCHLORIDE AND ACETAMINOPHEN 500 MG: 500 TABLET ORAL at 17:39

## 2023-11-24 RX ADMIN — FENTANYL CITRATE 50 MCG: 50 INJECTION INTRAMUSCULAR; INTRAVENOUS at 11:09

## 2023-11-24 RX ADMIN — Medication 1 TABLET: at 14:10

## 2023-11-24 RX ADMIN — PROPOFOL 200 MG: 10 INJECTION, EMULSION INTRAVENOUS at 10:47

## 2023-11-24 RX ADMIN — SODIUM CHLORIDE, SODIUM LACTATE, POTASSIUM CHLORIDE, AND CALCIUM CHLORIDE 100 ML/HR: .6; .31; .03; .02 INJECTION, SOLUTION INTRAVENOUS at 14:11

## 2023-11-24 RX ADMIN — Medication 50 MCG: at 10:47

## 2023-11-24 RX ADMIN — SODIUM CHLORIDE 125 ML/HR: 0.9 INJECTION, SOLUTION INTRAVENOUS at 08:57

## 2023-11-24 RX ADMIN — FOLIC ACID 1 MG: 1 TABLET ORAL at 14:10

## 2023-11-24 RX ADMIN — TRANEXAMIC ACID 1000 MG: 10 INJECTION, SOLUTION INTRAVENOUS at 10:57

## 2023-11-24 RX ADMIN — ATORVASTATIN CALCIUM 20 MG: 20 TABLET, FILM COATED ORAL at 16:25

## 2023-11-24 RX ADMIN — HYDROMORPHONE HYDROCHLORIDE 0.5 MG: 1 INJECTION, SOLUTION INTRAMUSCULAR; INTRAVENOUS; SUBCUTANEOUS at 12:00

## 2023-11-24 RX ADMIN — DOCUSATE SODIUM 100 MG: 100 CAPSULE, LIQUID FILLED ORAL at 17:39

## 2023-11-24 RX ADMIN — SUGAMMADEX 200 MG: 100 INJECTION, SOLUTION INTRAVENOUS at 12:08

## 2023-11-24 RX ADMIN — GABAPENTIN 300 MG: 300 CAPSULE ORAL at 08:52

## 2023-11-24 RX ADMIN — FENTANYL CITRATE 100 MCG: 50 INJECTION INTRAMUSCULAR; INTRAVENOUS at 09:56

## 2023-11-24 RX ADMIN — ONDANSETRON 4 MG: 2 INJECTION INTRAMUSCULAR; INTRAVENOUS at 11:57

## 2023-11-24 RX ADMIN — PANTOPRAZOLE SODIUM 40 MG: 40 TABLET, DELAYED RELEASE ORAL at 14:10

## 2023-11-24 RX ADMIN — ACETAMINOPHEN 325MG 975 MG: 325 TABLET ORAL at 14:10

## 2023-11-24 RX ADMIN — DEXAMETHASONE SODIUM PHOSPHATE 10 MG: 10 INJECTION INTRAMUSCULAR; INTRAVENOUS at 11:10

## 2023-11-24 RX ADMIN — LIDOCAINE HYDROCHLORIDE 100 MG: 20 INJECTION INTRAVENOUS at 10:47

## 2023-11-24 RX ADMIN — CHLORHEXIDINE GLUCONATE 15 ML: 1.2 RINSE ORAL at 08:52

## 2023-11-24 RX ADMIN — ACETAMINOPHEN 325MG 975 MG: 325 TABLET ORAL at 08:52

## 2023-11-24 RX ADMIN — SODIUM CHLORIDE: 0.9 INJECTION, SOLUTION INTRAVENOUS at 11:41

## 2023-11-24 RX ADMIN — GABAPENTIN 300 MG: 300 CAPSULE ORAL at 21:26

## 2023-11-24 RX ADMIN — ROCURONIUM BROMIDE 50 MG: 10 INJECTION, SOLUTION INTRAVENOUS at 10:48

## 2023-11-24 RX ADMIN — BUPIVACAINE HYDROCHLORIDE 10 ML: 5 INJECTION, SOLUTION EPIDURAL; INTRACAUDAL at 09:56

## 2023-11-24 RX ADMIN — ACETAMINOPHEN 325MG 975 MG: 325 TABLET ORAL at 21:26

## 2023-11-24 RX ADMIN — CEFAZOLIN SODIUM 2000 MG: 2 SOLUTION INTRAVENOUS at 10:05

## 2023-11-24 RX ADMIN — ASPIRIN 81 MG: 81 TABLET, COATED ORAL at 21:26

## 2023-11-24 RX ADMIN — BUPIVACAINE 10 ML: 13.3 INJECTION, SUSPENSION, LIPOSOMAL INFILTRATION at 09:56

## 2023-11-24 RX ADMIN — CEFAZOLIN SODIUM 2000 MG: 2 SOLUTION INTRAVENOUS at 17:42

## 2023-11-24 RX ADMIN — MIDAZOLAM 2 MG: 1 INJECTION INTRAMUSCULAR; INTRAVENOUS at 09:56

## 2023-11-24 RX ADMIN — FENTANYL CITRATE 50 MCG: 50 INJECTION INTRAMUSCULAR; INTRAVENOUS at 11:06

## 2023-11-24 NOTE — PLAN OF CARE
Problem: PAIN - ADULT  Goal: Verbalizes/displays adequate comfort level or baseline comfort level  Description: Interventions:  - Encourage patient to monitor pain and request assistance  - Assess pain using appropriate pain scale  - Administer analgesics based on type and severity of pain and evaluate response  - Implement non-pharmacological measures as appropriate and evaluate response  - Consider cultural and social influences on pain and pain management  - Notify physician/advanced practitioner if interventions unsuccessful or patient reports new pain  Outcome: Progressing     Problem: RESPIRATORY - ADULT  Goal: Achieves optimal ventilation and oxygenation  Description: INTERVENTIONS:  - Assess for changes in respiratory status  - Assess for changes in mentation and behavior  - Position to facilitate oxygenation and minimize respiratory effort  - Oxygen administered by appropriate delivery if ordered  - Initiate smoking cessation education as indicated  - Encourage broncho-pulmonary hygiene including cough, deep breathe, Incentive Spirometry  - Assess the need for suctioning and aspirate as needed  - Assess and instruct to report SOB or any respiratory difficulty  - Respiratory Therapy support as indicated  Outcome: Progressing     Problem: GASTROINTESTINAL - ADULT  Goal: Minimal or absence of nausea and/or vomiting  Description: INTERVENTIONS:  - Administer IV fluids if ordered to ensure adequate hydration  - Maintain NPO status until nausea and vomiting are resolved  - Nasogastric tube if ordered  - Administer ordered antiemetic medications as needed  - Provide nonpharmacologic comfort measures as appropriate  - Advance diet as tolerated, if ordered  - Consider nutrition services referral to assist patient with adequate nutrition and appropriate food choices  Outcome: Progressing     Problem: GENITOURINARY - ADULT  Goal: Absence of urinary retention  Description: INTERVENTIONS:  - Assess patient’s ability to void and empty bladder  - Monitor I/O  - Bladder scan as needed  - Discuss with physician/AP medications to alleviate retention as needed  - Discuss catheterization for long term situations as appropriate  Outcome: Progressing     Problem: METABOLIC, FLUID AND ELECTROLYTES - ADULT  Goal: Fluid balance maintained  Description: INTERVENTIONS:  - Monitor labs   - Monitor I/O and WT  - Instruct patient on fluid and nutrition as appropriate  - Assess for signs & symptoms of volume excess or deficit  Outcome: Progressing     Problem: SKIN/TISSUE INTEGRITY - ADULT  Goal: Incision(s), wounds(s) or drain site(s) healing without S/S of infection  Description: INTERVENTIONS  - Assess and document dressing, incision, wound bed, drain sites and surrounding tissue  - Provide patient and family education  - Perform skin care/dressing changes every 4 hours  Outcome: Progressing     Problem: HEMATOLOGIC - ADULT  Goal: Maintains hematologic stability  Description: INTERVENTIONS  - Assess for signs and symptoms of bleeding or hemorrhage  - Monitor labs  - Administer supportive blood products/factors as ordered and appropriate  Outcome: Progressing     Problem: SAFETY ADULT  Goal: Patient will remain free of falls  Description: INTERVENTIONS:  - Educate patient/family on patient safety including physical limitations  - Instruct patient to call for assistance with activity   - Consult OT/PT to assist with strengthening/mobility   - Keep Call bell within reach  - Keep bed low and locked with side rails adjusted as appropriate  - Keep care items and personal belongings within reach  - Initiate and maintain comfort rounds  - Make Fall Risk Sign visible to staff  - Offer Toileting every 2 Hours, in advance of need  - Initiate/Maintain bed alarm  - Obtain necessary fall risk management equipment: walker  - Apply yellow socks and bracelet for high fall risk patients  - Consider moving patient to room near nurses station  Outcome: Progressing

## 2023-11-24 NOTE — ANESTHESIA PREPROCEDURE EVALUATION
Procedure:  ARTHROPLASTY KNEE TOTAL (Left: Knee)    Relevant Problems   CARDIO   (+) Essential hypertension   (+) Mixed hyperlipidemia      GI/HEPATIC   (+) Gastroesophageal reflux disease without esophagitis      MUSCULOSKELETAL   (+) Chronic bilateral low back pain without sciatica   (+) DDD (degenerative disc disease), cervical   (+) Lumbar spondylosis   (+) Myofascial pain syndrome   (+) Osteoarthritis of knee      NEURO/PSYCH   (+) Chronic bilateral low back pain without sciatica   (+) Chronic pain syndrome   (+) Myofascial pain syndrome        Physical Exam    Airway    Mallampati score: I  TM Distance: >3 FB  Neck ROM: full     Dental       Cardiovascular  Cardiovascular exam normal    Pulmonary  Pulmonary exam normal     Other Findings        Anesthesia Plan  ASA Score- 2     Anesthesia Type- spinal with ASA Monitors. Additional Monitors:     Airway Plan:     Comment: Adductor block d/w pt consent obtained . Plan Factors-Exercise tolerance (METS): >4 METS. Chart reviewed. Existing labs reviewed. Patient summary reviewed. Patient is not a current smoker. Induction-     Postoperative Plan-     Informed Consent- Anesthetic plan and risks discussed with patient. I personally reviewed this patient with the CRNA. Discussed and agreed on the Anesthesia Plan with the CRNA. Gisela Lott

## 2023-11-24 NOTE — PLAN OF CARE
Problem: PHYSICAL THERAPY ADULT  Goal: Performs mobility at highest level of function for planned discharge setting. See evaluation for individualized goals. Description: Treatment/Interventions: Functional transfer training, LE strengthening/ROM, Elevations, Therapeutic exercise, Endurance training, Patient/family training, Bed mobility, Gait training, Spoke to nursing  Equipment Recommended: Ilir Serna (pt has)       See flowsheet documentation for full assessment, interventions and recommendations. Note: Prognosis: Good  Problem List: Decreased strength, Decreased range of motion, Decreased endurance, Impaired balance, Decreased mobility  Assessment: Pt. 68 y.o.male s/p L TKR 11/24/23 2* to Osteoarthritis of left knee w/ Primary osteoarthritis of left knee (M17.12). Pt referred to PT for mobility assessment & D/C planning. Please see above for information re: home set-up & PLOF as well as objective findings during PT assessment. PTA, pt reports being I w/o AD. On eval, pt functioning below baseline hence will continue skilled PT to improve function & safety. Pt require S for bed mobility & minAx1 for transfers & amb w/ RW + cues for techniques & safety. Gait deviations as above but no gross LOB noted. The patient's AM-PAC Basic Mobility Inpatient Short Form Raw Score is 18. A Raw score of greater than 16 suggests the patient may benefit from discharge to home. Please also refer to the recommendation of the Physical Therapist for safe discharge planning. From PT standpoint, will anticipate safe return to home w/ family support when medically cleared. Will recommend Level III (minimum resource intensity) rehab services. No SOB & dizziness reported t/o session. Nsg staff most recent vital signs as follows: /66 (BP Location: Right arm)   Pulse 77   Temp (!) 97.2 °F (36.2 °C) (Temporal)   Resp 16   Wt 101 kg (222 lb 14.2 oz)   SpO2 94%   BMI 32.91 kg/m² .  At end of session, pt OOB in chair in stable condition, call bell & phone in reach, all lines intact. Fall precautions reinforced w/ good understanding. CM to follow. Nsg staff to continue to mobilized pt (OOB in chair for all meals & ambulate in room/unit) as tolerated to prevent further decline in function. Will also recommend [facility] for daily amb &/or daily OOB in chair as appropriate. Nsg notified. Co-eval was necessary to complete this PT eval for the pt's best interest given pt's medical acuity & complexity. Rehab Resource Intensity Level, PT: III (Minimum Resource Intensity)    See flowsheet documentation for full assessment.

## 2023-11-24 NOTE — ANESTHESIA PROCEDURE NOTES
Peripheral Block    Patient location during procedure: holding area  Start time: 11/24/2023 9:56 AM  Reason for block: procedure for pain, at surgeon's request and post-op pain management  Staffing  Performed by: Andrea Mohan DO  Authorized by: Andrea Mohan DO    Preanesthetic Checklist  Completed: patient identified, IV checked, site marked, risks and benefits discussed, surgical consent, monitors and equipment checked, pre-op evaluation and timeout performed  Peripheral Block  Patient position: supine  Prep: ChloraPrep  Patient monitoring: continuous pulse oximetry, frequent blood pressure checks and heart rate  Block type: Adductor Canal  Laterality: left  Injection technique: single-shot  Procedures: ultrasound guided, Ultrasound guidance required for the procedure to increase accuracy and safety of medication placement and decrease risk of complications.   Needle  Needle type: Stimuplex   Needle localization: ultrasound guidance  Assessment  Injection assessment: frequent aspiration, injected with ease, negative aspiration, no paresthesia on injection, no symptoms of intraneural/intravenous injection, negative for heart rate change, needle tip visualized at all times and incremental injection  Paresthesia pain: none  Post-procedure:  site cleaned  patient tolerated the procedure well with no immediate complications

## 2023-11-24 NOTE — OP NOTE
OPERATIVE REPORT  PATIENT NAME: Seng Guerrier  : 1955  MRN: [id number]  Pt Location:  [facility] OR ROOM 02    Surgery Date: 2023    Surgeon(s) and Role:     * Maki Jordan, DO - Primary     * FANNY Gao-C - [address] [place] - [address] - Assisting      Preop Diagnosis:  Primary osteoarthritis of left knee [M17.12]    Post-Op Diagnosis Codes:     * Primary osteoarthritis of left knee [M17.12]    Procedure(s):  Left - ARTHROPLASTY KNEE TOTAL    Specimens:  * No specimens in log *    Estimated Blood Loss:   100 cc    Drains:  * No LDAs found *    Anesthesia Type:   GETA     Operative Indications:  Primary osteoarthritis of left knee [M17.12]  68M with left knee pain 2/2 severe knee osteoarthritis. He c/o left knee pain that is most localized to the medial compartment and significantly limiting his ADLs. He has previously failed IA CSI, HEP and low-impact activities. The patient has elected to proceed with left TKA. Risks and benefits of surgery to include but not limited to bleeding, infection, damage to surrounding structures, hardware failure, instability, fracture, dislocation, need for further surgery, continued pain, stiffness, blood clots, stroke, and heart attack was discussed with the patient. Operative Findings:  Severe tricompartmental osteoarthritis   Pre-op ROM 5-90  Post-op ROM 0-120+ calf to thigh gravity-assisted flexion    Implant Name Type Inv.  Item Serial No.  Lot No. LRB No. Used Action   CEMENT BONE SMART SET GRAY MED VISC - [id number]  CEMENT BONE SMART SET GRAY MED VISC  DEPUY [id number] Left 1 Implanted   CEMENT BONE SMART SET GRAY MED VISC - [id number]  CEMENT BONE SMART SET GRAY MED [address] [employer] [address] [facility] [id number] Left 1 Implanted   BASEPLATE TIBIAL SZ 6 CMNT FX BRNG ATTUNE S PLUS - [id number]  BASEPLATE TIBIAL SZ 6 CMNT FX BRNG ATTUNE S PLUS  DEPUY [id number] Left 1 Implanted   BASEPLATE TIBIAL SZ 6 CMNT FX BRNG ATTUNE S PLUS - [id number]  BASEPLATE TIBIAL SZ 6 CMNT FX BRNG ATTUNE S PLUS  DEPUY [id number] Left 1 Implanted   INSERT TIB SZ 7 7MM LT FX BRNG MED STAB ATTUNE - [id number]  INSERT TIB SZ 7 7MM LT FX BRNG MED STAB ATTUNE  DEPUY [id number] Left 1 Implanted   COMPONENT FEM SZ 7 LT NRW CMNT CR ATTUNE - [id number]  COMPONENT FEM SZ 7 LT NRW CMNT CR ATTUNE  DEPUY [id number] Left 1 Implanted     Complications:   None    Knee Technique: Suture (direct) Repair  Knee Approach: Medial Parapatellar    Procedure and Technique:  Patient was seen in the preoperative holding area. Informed consent was confirmed and all questions were answered. Operative site was confirmed and marked. Patient was taken to the operating room and transferred to the operating room table. Anesthesia was performed as above. The patient was then placed supine and all bony prominences were well-padded. Left lower extremity was prepped and draped in usual sterile fashion with chlorhexidine scrub. Patient was given perioperative antibiotics prior to incision and SCDs were placed on the non-operative leg. A formal time-out was performed identifying the patient and confirmed operative site. A tourniquet was not used due to vascular calcifications on XR. A slightly medial midline incision was performed from 3 fingerbreadths above the patella to the tibial tubercle. This incision was carried down through skin and subcutaneous tissues to the level of the extensor mechanism. A small medial skin flap was created. A medial parapatellar approach was performed into the knee being careful to avoid the patellar tendon. The anterior horn of the medial and lateral meniscus was released. The medial peel was performed to the mid coronal line. Lateral patellofemoral plica was excised and the patella was everted. The fat pad was removed being careful to avoid the patellar tendon. Peripheral osteophytes removed at this time as was the anterior cruciate ligament.   The intramedullary femoral drill was now used just medial and anterior to the PCL insertion at the sulcus terminalis. A drop yesenia was used to confirm intramedullary placement of the drill. The distal femoral cutting jig was now inserted into the intramedullary canal set to 5° of valgus per pre-op template and 10 mm distal resection. Distal resection was checked with an Lee wing and deemed appropriate. The distal femur was cut. At this time the distal femoral cutting guide was removed and the sizing guide was placed on the distal femur. A posterior referencing system was used and pins placed with 3° of external rotation. The femur was sized to the above size. The appropriate cutting block was then placed over the pins and rotation was confirmed being parallel to the epicondylar access and perpendicular to [employer]. Retractors were placed to protect the collateral ligaments and the anterior, posterior, posterior chamfer, anterior chamfer was cut. The distal 5th cut was also performed. Bone fragments were removed with curved osteotome and then posterior Hohmann was placed to sublux the tibia forward. An extramedullary tibial guide was used veing cognizant of varus valgus alignment, slope and depth of resection. The guide was pinned in place and then a drop yesenia was used to confirm appropriate alignment. The tibia was cut and removed. At this time the bilateral menisci and posterior osteophytes of the femur were resected with the use of a laminar . Once adequate osteophytes were removed the knee was trialed with the above implants. The knee was found to have excellent stability with a 7 mm MS insert. At this time the patella was measured and resected to appropriate depth. The patella sized to the above size and 3 drill holes were performed. At this time the knee was again taken through range of motion and found to have excellent stability and excellent patellar tracking.   The trials were floated and rotation of tibia marked. The femoral lug drills were drilled. The femur and trial poly were removed and posterior Hohmann placed to sublux the tibia forward. The cemented tibial base plate was then aligned on the cut surface of the tibia matching where the trial was floated at approximately the medial 1/3 the tibial tubercle. The base plate was pinned in place and prep tower impacted. The Codie  for the cemented tibia was used 1st followed by keel punch. At this time all trials were removed and the knee was copiously irrigated with normal saline solution. The cemented base plate was impacted into place and assured to be flush and all excess cement was cleared. The MS polyethylene was impacted into the clean tray. The posterior Dala Reaper was then removed. The cemented femoral component was impacted in place and assured to be flush on all bony surfaces. Peripheral cement was removed. The patella cut surface was dried and the domed patella was cemented into place and held with a clamp. Peripheral cement was cleared and the clamp was held until cement cured. The knee was irrigated with Irrisept solution and then the remainder of the 3 L of normal saline solution. The joint local was injected in the posterior capsule and around the tissues of the knee. The knee was taken through a final range of motion and found to have excellent stability and patellar tracking. All instrument and sponge counts were correct x2. The arthrotomy was closed with #1 Stratafix suture. Subcutaneous tissues were closed with 2-0 Vicryl. The skin was closed with 3-0 Stratafix followed by Dermabond. A sterile Mepilex was placed along with a thigh foot Ace wrap. Patient was awoken from anesthesia and taken to recovery room in stable condition.       68M s/p L TKA 11/24  - multi-modal pain control  - ancef x 24 hrs, duricef x 5 days  - DVT ppx: aspirin 81mg BID x 4 weeks  - PT/OT  - WBAT  - ROM as tolerated, pillow/blankets under achilles not behind knee while in bed  - f/u 10-14 days      I was present for the entire procedure. and A physician assistant was required during the procedure for retraction, tissue handling, dissection and suturing.     Patient Disposition:  PACU       SIGNATURE: Bernabe Cali DO  DATE: November 24, 2023  TIME: 1:07 PM

## 2023-11-24 NOTE — ANESTHESIA POSTPROCEDURE EVALUATION
Post-Op Assessment Note    CV Status:  Stable    Pain management: adequate       Mental Status:  Alert and awake   Hydration Status:  Euvolemic   PONV Controlled:  Controlled   Airway Patency:  Patent     Post Op Vitals Reviewed: Yes    No anethesia notable event occurred.     Staff: Anesthesiologist               /75 (11/24/23 1247)    Temp     Pulse 60 (11/24/23 1247)   Resp 19 (11/24/23 1247)    SpO2 94 % (11/24/23 1247)

## 2023-11-24 NOTE — PHYSICAL THERAPY NOTE
PT EVALUATION    Pt. Name: Gabriel Phillips  Pt. Age: 77 y.o. MRN: [id number]  LENGTH OF STAY: 0      Admitting Diagnoses:   Primary osteoarthritis of left knee [M17.12]    Past Medical History:   Diagnosis Date    Arthritis 2013    Bilateral carpal tunnel syndrome     Chronic pain disorder     low back    GERD (gastroesophageal reflux disease)     Hyperlipidemia     Low back pain     Lumbar disc disease     Lumbar spinal stenosis     Neck pain     had cervical fusion    Obesity     Psoriasis     right elbow    Tremor     left leg    Wears dentures     full dentures    Wears glasses        Past Surgical History:   Procedure Laterality Date    CERVICAL LAMINECTOMY      COLONOSCOPY  07/2015    multiple polyps, repeat in 3 years    JOINT REPLACEMENT      right TKR    KNEE SURGERY Right 2002    20 years ago-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    [address]60 1-4 All      last assessed: 04/22/2015    [place] NEUROPLASTY &/TRANSPOS MEDIAN NRV CARPAL TUNNE Left 9/25/2020    Procedure: RELEASE CARPAL TUNNEL;  Surgeon: Kimberly Castillo MD;  Location: [facility];  Service: Orthopedics    [place] NEUROPLASTY &/TRANSPOS MEDIAN NRV CARPAL Henrry Kelly Right 10/20/2020    Procedure: RELEASE CARPAL TUNNEL - Open;  Surgeon: Kimberly Castillo MD;  Location: [facility];  Service: Orthopedics    [address] [facility]      last assessed: 04/22/2015    TOTAL KNEE ARTHROPLASTY      last assessed: 05/12/2015       Imaging Studies:  XR knee left 1 or 2 views    (Results Pending)         11/24/23 9403   PT Last Visit   PT Visit Date 11/24/23   Note Type   Note type Evaluation   Pain Assessment   Pain Score 1   Pain Location/Orientation Orientation: Left; Location: Knee   Restrictions/Precautions   LLE Weight Bearing Per Order WBAT   Other Precautions Multiple lines; Fall Risk;Pain   Home Living   Type of [address] [place] [address] Avenue One level;Stairs to enter with rails  (6STE up + 6STE down to apartment)   Bathroom Shower/Tub Tub/shower unit   Bathroom Toilet Standard   Bathroom Equipment Grab bars in shower; Shower chair; Other (Comment)  (has a grab bar for around the toilet but has not been installed yet)   Home Equipment Walker;Cane;Grab bars   Prior Function   Level of [place] Independent with ADLs; Independent with functional mobility; Independent with IADLS  (w/o AD)   Lives With Alone   Receives Help From [facility] in the last 6 months 0   Vocational Retired   Comments (+)    General   Additional Pertinent History h/o L TKR in 2015   Family/Caregiver Present No   Cognition   Overall Cognitive Status WFL   Arousal/Participation Alert   Orientation Level Oriented X4   Following Commands Follows all commands and directions without difficulty   Comments pleasant & cooperative   Subjective   Subjective Pt agreeable to PT eval.   RUE Assessment   RUE Assessment WFL  (4+/5 grossly)   LUE Assessment   LUE Assessment WFL  (4+/5 grossly)   RLE Assessment   RLE Assessment WFL  (4/5 grossly)   LLE Assessment   LLE Assessment X  (3-/5 grossly)   Coordination   Movements are Fluid and Coordinated 1   Sensation WFL   Bed Mobility   Supine to Sit 5  Supervision   Additional items HOB elevated; Bedrails; Increased time required;Verbal cues   Transfers   Sit to Stand 4  Minimal assistance   Additional items Assist x 1; Increased time required;Verbal cues; Bedrails   Stand to Sit 4  Minimal assistance   Additional items Assist x 1; Armrests; Increased time required;Verbal cues   Additional Comments cues for techniques & safety   Ambulation/Elevation   Gait pattern Antalgic; Wide THELMA; Decreased foot clearance;Decreased L stance; Step to;Excessively slow   Gait Assistance 4  Minimal assist   Additional items Assist x 1;Verbal cues; Tactile cues   Assistive Device Rolling walker   Distance 15'x1   Ambulation/Elevation Additional Comments no gross LOB noted   Balance   Static Sitting Good   Dynamic Sitting Fair +   Static Standing Fair -  (w/ RW)   Dynamic Standing Poor +  (w/ RW)   Ambulatory Poor +  (w/ RW)   Activity Tolerance   Activity Tolerance Patient tolerated treatment well   Nurse Made Aware RN Delilah Courtney   Assessment   Prognosis Good   Problem List Decreased strength;Decreased range of motion;Decreased endurance; Impaired balance;Decreased mobility   Assessment Pt. 68 y.o.male s/p L TKR 11/24/23 2* to Osteoarthritis of left knee w/ Primary osteoarthritis of left knee (M17.12). Pt referred to PT for mobility assessment & D/C planning. Please see above for information re: home set-up & PLOF as well as objective findings during PT assessment. PTA, pt reports being I w/o AD. On eval, pt functioning below baseline hence will continue skilled PT to improve function & safety. Pt require S for bed mobility & minAx1 for transfers & amb w/ RW + cues for techniques & safety. Gait deviations as above but no gross LOB noted. The patient's AM-PAC Basic Mobility Inpatient Short Form Raw Score is 18. A Raw score of greater than 16 suggests the patient may benefit from discharge to home. Please also refer to the recommendation of the Physical Therapist for safe discharge planning. From PT standpoint, will anticipate safe return to home w/ family support when medically cleared. Will recommend Level III (minimum resource intensity) rehab services. No SOB & dizziness reported t/o session. Nsg staff most recent vital signs as follows: /66 (BP Location: Right arm)   Pulse 77   Temp (!) 97.2 °F (36.2 °C) (Temporal)   Resp 16   Wt 101 kg (222 lb 14.2 oz)   SpO2 94%   BMI 32.91 kg/m² . At end of session, pt OOB in chair in stable condition, call bell & phone in reach, all lines intact. Fall precautions reinforced w/ good understanding. CM to follow. Nsg staff to continue to mobilized pt (OOB in chair for all meals & ambulate in room/unit) as tolerated to prevent further decline in function.  Will also recommend [facility] for daily amb &/or daily OOB in chair as appropriate. Nsg notified. Co-eval was necessary to complete this PT eval for the pt's best interest given pt's medical acuity & complexity. Goals   Patient Goals to go home   STG Expiration Date 12/01/23   Short Term Goal #1 Goals to be met in 7 days; pt will be able to: 1) inc strength & balance by 1/2 grade to improve overall functional mobility & dec fall risk; 2) inc bed mobility to modified I for pt to be able to get in/OOB safely w/ proper techniques 100% of the time, to dec caregiver burden & safely function at home; 3) inc transfers to modified I for pt to transition safely from one surface to another w/o % of the time, to dec caregiver burden & safely function at home; 4) inc amb w/ RW approx. >80' w/ modified I for pt to ambulate household distances w/o any % of the time, to dec caregiver burden & safely function at home; 5) negotiate stairs w/ S for inc safety during stair mgt inside/outside of home & dec caregiver burden; 6) pt/caregiver ed   PT Treatment Day 0   Plan   Treatment/Interventions Functional transfer training;LE strengthening/ROM; Elevations; Therapeutic exercise; Endurance training;Patient/family training;Bed mobility;Gait training;Spoke to nursing   PT Frequency 5-7x/wk; Twice a day   Discharge Recommendation   Rehab Resource Intensity Level, PT III (Minimum Resource Intensity)   Equipment Recommended Walker  (pt has)   Additional Comments restorative for daily amb   AM-PAC Basic Mobility Inpatient   Turning in Flat Bed Without Bedrails 3   Lying on Back to Sitting on Edge of Flat Bed Without Bedrails 3   Moving Bed to Chair 3   Standing Up From Chair Using Arms 3   Walk in Room 3   Climb 3-5 Stairs With Railing 3   Basic Mobility Inpatient Raw Score 18   Basic Mobility Standardized Score 41.05   Highest Level Of Mobility   JH-HLM Goal 6: Walk 10 steps or more   JH-HLM Achieved 6: Walk 10 steps or more   End of Consult Patient Position at End of Consult Bedside chair; All needs within reach   End of Consult Comments Pt in stable conditon. All needs in reach. All lines intact.    Hx/personal factors: co-morbidities, inaccessible home, home alone, advanced age, mutliple lines, use of AD, fall risk, and assist w/ ADL's  Examination: dec mobility, dec balance, dec endurance, dec amb, risk for falls  Clinical: unpredictable (ongoing medical status and risk for falls)  Complexity: high    Dheeraj Sos

## 2023-11-24 NOTE — INTERVAL H&P NOTE
H&P reviewed. After examining the patient I find no changes in the patients condition since the H&P had been written. Plan for left TKA.

## 2023-11-25 VITALS
DIASTOLIC BLOOD PRESSURE: 64 MMHG | BODY MASS INDEX: 32.91 KG/M2 | WEIGHT: 222.88 LBS | HEART RATE: 67 BPM | OXYGEN SATURATION: 96 % | SYSTOLIC BLOOD PRESSURE: 129 MMHG | TEMPERATURE: 98.4 F | RESPIRATION RATE: 19 BRPM

## 2023-11-25 LAB
ANION GAP SERPL CALCULATED.3IONS-SCNC: 2 MMOL/L
BUN SERPL-MCNC: 20 MG/DL (ref 5–25)
CALCIUM SERPL-MCNC: 8.9 MG/DL (ref 8.4–10.2)
CHLORIDE SERPL-SCNC: 107 MMOL/L (ref 96–108)
CO2 SERPL-SCNC: 29 MMOL/L (ref 21–32)
CREAT SERPL-MCNC: 0.67 MG/DL (ref 0.6–1.3)
ERYTHROCYTE [DISTWIDTH] IN BLOOD BY AUTOMATED COUNT: 12.9 % (ref 11.6–15.1)
GFR SERPL CREATININE-BSD FRML MDRD: 97 ML/MIN/1.73SQ M
GLUCOSE SERPL-MCNC: 107 MG/DL (ref 65–140)
HCT VFR BLD AUTO: 35.8 % (ref 36.5–49.3)
HGB BLD-MCNC: 11.9 G/DL (ref 12–17)
MCH RBC QN AUTO: 32.9 PG (ref 26.8–34.3)
MCHC RBC AUTO-ENTMCNC: 33.2 G/DL (ref 31.4–37.4)
MCV RBC AUTO: 99 FL (ref 82–98)
PLATELET # BLD AUTO: 190 THOUSANDS/UL (ref 149–390)
PMV BLD AUTO: 8.9 FL (ref 8.9–12.7)
POTASSIUM SERPL-SCNC: 4 MMOL/L (ref 3.5–5.3)
RBC # BLD AUTO: 3.62 MILLION/UL (ref 3.88–5.62)
SODIUM SERPL-SCNC: 138 MMOL/L (ref 135–147)
WBC # BLD AUTO: 11.81 THOUSAND/UL (ref 4.31–10.16)

## 2023-11-25 PROCEDURE — 99221 1ST HOSP IP/OBS SF/LOW 40: CPT

## 2023-11-25 PROCEDURE — 80048 BASIC METABOLIC PNL TOTAL CA: CPT | Performed by: PHYSICIAN ASSISTANT

## 2023-11-25 PROCEDURE — 99024 POSTOP FOLLOW-UP VISIT: CPT | Performed by: STUDENT IN AN ORGANIZED HEALTH CARE EDUCATION/TRAINING PROGRAM

## 2023-11-25 PROCEDURE — 97116 GAIT TRAINING THERAPY: CPT

## 2023-11-25 PROCEDURE — 85027 COMPLETE CBC AUTOMATED: CPT | Performed by: PHYSICIAN ASSISTANT

## 2023-11-25 PROCEDURE — 97166 OT EVAL MOD COMPLEX 45 MIN: CPT

## 2023-11-25 PROCEDURE — 97530 THERAPEUTIC ACTIVITIES: CPT

## 2023-11-25 RX ADMIN — ACETAMINOPHEN 325MG 975 MG: 325 TABLET ORAL at 14:10

## 2023-11-25 RX ADMIN — ACETAMINOPHEN 325MG 975 MG: 325 TABLET ORAL at 06:13

## 2023-11-25 RX ADMIN — ASPIRIN 81 MG: 81 TABLET, COATED ORAL at 08:02

## 2023-11-25 RX ADMIN — CEFAZOLIN SODIUM 2000 MG: 2 SOLUTION INTRAVENOUS at 03:46

## 2023-11-25 RX ADMIN — SENNOSIDES 8.6 MG: 8.6 TABLET, FILM COATED ORAL at 08:02

## 2023-11-25 RX ADMIN — DOCUSATE SODIUM 100 MG: 100 CAPSULE, LIQUID FILLED ORAL at 08:02

## 2023-11-25 RX ADMIN — FOLIC ACID 1 MG: 1 TABLET ORAL at 08:02

## 2023-11-25 RX ADMIN — Medication 1 TABLET: at 08:02

## 2023-11-25 RX ADMIN — OXYCODONE HYDROCHLORIDE AND ACETAMINOPHEN 500 MG: 500 TABLET ORAL at 08:02

## 2023-11-25 RX ADMIN — PANTOPRAZOLE SODIUM 40 MG: 40 TABLET, DELAYED RELEASE ORAL at 06:13

## 2023-11-25 NOTE — ASSESSMENT & PLAN NOTE
Patient admitted to orthopedic service s/p total left knee arthroplasty on 11/24 by Dr. Walda Litten  Doing well postoperatively. Neurovascularly intact.   Anticoagulation with aspirin 81 mg by mouth twice daily per orthopedics  Continue multimodal pain regimen  Disposition planning, follow-up and pain medications per primary  CBC and BMP collected, awaiting final results

## 2023-11-25 NOTE — PHYSICAL THERAPY NOTE
Physical Therapy Treatment Note    Patient's Name: Darshana Flynn  : 23 0912   PT Last Visit   PT Visit Date 23   Note Type   Note Type Treatment   Pain Assessment   Pain Assessment Tool 0-10   Pain Score 1   Pain Location/Orientation Orientation: Left; Location: Knee   Hospital Pain Intervention(s) Cold applied;Elevated   Precautions   Total Knee Replacement   (s/p L TKA 23)   Restrictions/Precautions   Weight Bearing Precautions Per Order Yes   LLE Weight Bearing Per Order WBAT   Other Precautions Fall Risk;Pain   General   Chart Reviewed Yes   Response to Previous Treatment Patient with no complaints from previous session. Family/Caregiver Present No   Subjective   Subjective Pt agreeable to mobilize. Bed Mobility   Supine to Sit Unable to assess   Sit to Supine Unable to assess   Additional Comments Pt greeted in chair. Transfers   Sit to Stand 5  Supervision   Additional items Armrests   Stand to Sit 5  Supervision   Additional items Armrests   Car transfer   (Verbally educated pt on car t/f)   Additional Comments RW   Ambulation/Elevation   Gait pattern Excessively slow; Short stride  (decreased L knee flexion during swing phase)   Gait Assistance 5  Supervision   Additional items Tactile cues; Verbal cues   Assistive Device Rolling walker   Distance 250'   Stair Management Assistance 5  Supervision   Additional items Verbal cues  (for sequencing w/ SPC)   Stair Management Technique One rail R;With cane;Nonreciprocal   Number of Stairs 10   Balance   Static Sitting Good   Dynamic Sitting Fair +   Static Standing Fair   Dynamic Standing Fair -   Ambulatory Fair -  (RW)   Endurance Deficit   Endurance Deficit Yes   Activity Tolerance   Activity Tolerance Patient tolerated treatment well   Nurse Made Aware ROLAND Blum   Assessment   Prognosis Excellent   Problem List Decreased strength;Decreased range of motion;Decreased endurance; Impaired balance;Decreased mobility Assessment Pt seen for PT treatment session w/ focus on t/f training, gait training, stair training, + d/c instructions. Pt demonstrated excellent progress as he was S for t/f, ambulation, + stair negotiation. Pt educated to alternate between resting w/ full knee extension and flexion at 90 deg, never rest w/ pillow under knee, to ambulate q hour, use CP to assist w/ pain management. Pt verbalized understanding of all education rendered. No questions after PT. Pt cleared for d/c home w/ HHPT. Barriers to Discharge Decreased caregiver support   Goals   Patient Goals go home   PT Treatment Day 1   Plan   Treatment/Interventions Functional transfer training;LE strengthening/ROM; Elevations; Therapeutic exercise; Endurance training;Patient/family training;Equipment eval/education;Gait training; Compensatory technique education;Spoke to nursing;Spoke to case management;OT   Progress Progressing toward goals   PT Frequency Twice a day   Discharge Recommendation   Rehab Resource Intensity Level, PT III (Minimum Resource Intensity)   Equipment Recommended   (pt already has RW)   AM-PAC Basic Mobility Inpatient   Turning in Flat Bed Without Bedrails 3   Lying on Back to Sitting on Edge of Flat Bed Without Bedrails 3   Moving Bed to Chair 3   Standing Up From Chair Using Arms 3   Walk in Room 3   Climb 3-5 Stairs With Railing 3   Basic Mobility Inpatient Raw Score 18   Basic Mobility Standardized Score 41.05   Highest Level Of Mobility   JH-HLM Goal 6: Walk 10 steps or more   JH-HLM Achieved 8: Walk 250 feet ot more   Education   Education Provided Mobility training;Assistive device   Patient Demonstrates acceptance/verbal understanding   End of Consult   Patient Position at End of Consult Bedside chair; All needs within reach  (BLE elevated, CP to L knee)     Daly Barth, PT, DPT

## 2023-11-25 NOTE — PLAN OF CARE
Problem: PHYSICAL THERAPY ADULT  Goal: Performs mobility at highest level of function for planned discharge setting. See evaluation for individualized goals. Description: Treatment/Interventions: Functional transfer training, LE strengthening/ROM, Elevations, Therapeutic exercise, Endurance training, Patient/family training, Bed mobility, Gait training, Spoke to nursing  Equipment Recommended: Zaynab Ibarra (pt has)       See flowsheet documentation for full assessment, interventions and recommendations. Outcome: Progressing  Note: Prognosis: Excellent  Problem List: Decreased strength, Decreased range of motion, Decreased endurance, Impaired balance, Decreased mobility  Assessment: Pt seen for PT treatment session w/ focus on t/f training, gait training, stair training, + d/c instructions. Pt demonstrated excellent progress as he was S for t/f, ambulation, + stair negotiation. Pt educated to alternate between resting w/ full knee extension and flexion at 90 deg, never rest w/ pillow under knee, to ambulate q hour, use CP to assist w/ pain management. Pt verbalized understanding of all education rendered. No questions after PT. Pt cleared for d/c home w/ HHPT. Barriers to Discharge: Decreased caregiver support     Rehab Resource Intensity Level, PT: III (Minimum Resource Intensity)    See flowsheet documentation for full assessment.

## 2023-11-25 NOTE — ASSESSMENT & PLAN NOTE
Follows with dermatology and receives Humira injection.   Currently on hold 2 weeks prior and post surgery  Continue outpatient follow-up with dermatology

## 2023-11-25 NOTE — ASSESSMENT & PLAN NOTE
With history of lumbar spondylosis, spinal stenosis of lumbar region with radiculopathy.   Continue home scheduled gabapentin and Flexeril as needed  No red flag symptoms  Continue follow-up outpatient with pain management

## 2023-11-25 NOTE — PROGRESS NOTES
Progress Note - Orthopedics   Aki Mecca Anderson 77 y.o. male MRN: [id number]  Unit/Bed#: E2 -01 Encounter: [id number]    Assessment/Plan:  68M s/p L TKA 11/24  - multi-modal pain control  - ancef x 24 hrs, duricef x 5 days  - DVT ppx: aspirin 81mg BID x 4 weeks  - PT/OT  - WBAT  - ROM as tolerated, pillow/blankets under achilles not behind knee while in bed  - Hemoglobin stable a drop in hemoglobin to greater than 2 g associated with acute blood loss anemia  - Cleared by PT for home today with home PT  - f/u 10-14 days      Subjective: Patient seen and examined sitting in chair at bedside. He is overall doing very well. He reports his pain slightly increased over the last hour or so as the block is wearing off but otherwise doing very well. He has been up ambulating with physical therapy. Denies fever/chills, chest pain/shortness of breath, nausea/vomiting/diarrhea. Vitals: Blood pressure 129/64, pulse 67, temperature 98.4 °F (36.9 °C), temperature source Temporal, resp. rate 19, weight 101 kg (222 lb 14.2 oz), SpO2 96 %. ,Body mass index is 32.91 kg/m². Intake/Output Summary (Last 24 hours) at 11/25/2023 1349  Last data filed at 11/25/2023 0801  Gross per 24 hour   Intake 480 ml   Output 1750 ml   Net -1270 ml       Invasive Devices       Peripheral Intravenous Line  Duration             Peripheral IV 11/24/23 Dorsal (posterior); Right Hand 1 day                  General: NAD  Ortho Exam:   Dressing clean dry and intact  Compartment soft proximal  Gross motor intact TA/EHL/GSC/peroneals  SILT DP/SP/saphenous/sural/tibial  Less than 2 secs cap refill    Lab, Imaging and other studies: CBC:   Lab Results   Component Value Date    WBC 11.81 (H) 11/25/2023    HGB 11.9 (L) 11/25/2023    HCT 35.8 (L) 11/25/2023    MCV 99 (H) 11/25/2023     11/25/2023    RBC 3.62 (L) 11/25/2023    MCH 32.9 11/25/2023    MCHC 33.2 11/25/2023    RDW 12.9 11/25/2023    MPV 8.9 11/25/2023     CMP:   Lab Results   Component Value Date    SODIUM 138 11/25/2023     11/25/2023    CO2 29 11/25/2023    BUN 20 11/25/2023    CREATININE 0.67 11/25/2023    CALCIUM 8.9 11/25/2023    EGFR 97 11/25/2023

## 2023-11-25 NOTE — PLAN OF CARE
Problem: PAIN - ADULT  Goal: Verbalizes/displays adequate comfort level or baseline comfort level  Description: Interventions:  - Encourage patient to monitor pain and request assistance  - Assess pain using appropriate pain scale  - Administer analgesics based on type and severity of pain and evaluate response  - Implement non-pharmacological measures as appropriate and evaluate response  - Consider cultural and social influences on pain and pain management  - Notify physician/advanced practitioner if interventions unsuccessful or patient reports new pain  Outcome: Progressing     Problem: DISCHARGE PLANNING  Goal: Discharge to home or other facility with appropriate resources  Description: INTERVENTIONS:  - Identify barriers to discharge w/patient and caregiver  - Arrange for needed discharge resources and transportation as appropriate  - Identify discharge learning needs (meds, wound care, etc.)  - Arrange for interpretive services to assist at discharge as needed  - Refer to Case Management Department for coordinating discharge planning if the patient needs post-hospital services based on physician/advanced practitioner order or complex needs related to functional status, cognitive ability, or social support system  Outcome: Progressing     Problem: METABOLIC, FLUID AND ELECTROLYTES - ADULT  Goal: Glucose maintained within target range  Description: INTERVENTIONS:  - Monitor Blood Glucose as ordered  - Assess for signs and symptoms of hyperglycemia and hypoglycemia  - Administer ordered medications to maintain glucose within target range  - Assess nutritional intake and initiate nutrition service referral as needed  Outcome: Progressing     Problem: HEMATOLOGIC - ADULT  Goal: Maintains hematologic stability  Description: INTERVENTIONS  - Assess for signs and symptoms of bleeding or hemorrhage  - Monitor labs  - Administer supportive blood products/factors as ordered and appropriate  Outcome: Progressing

## 2023-11-25 NOTE — PLAN OF CARE
Problem: PAIN - ADULT  Goal: Verbalizes/displays adequate comfort level or baseline comfort level  Description: Interventions:  - Encourage patient to monitor pain and request assistance  - Assess pain using appropriate pain scale  - Administer analgesics based on type and severity of pain and evaluate response  - Implement non-pharmacological measures as appropriate and evaluate response  - Consider cultural and social influences on pain and pain management  - Notify physician/advanced practitioner if interventions unsuccessful or patient reports new pain  Outcome: Progressing     Problem: SAFETY ADULT  Goal: Patient will remain free of falls  Description: INTERVENTIONS:  - Educate patient/family on patient safety including physical limitations  - Instruct patient to call for assistance with activity   - Consult OT/PT to assist with strengthening/mobility   - Keep Call bell within reach  - Keep bed low and locked with side rails adjusted as appropriate  - Keep care items and personal belongings within reach  - Initiate and maintain comfort rounds  - Make Fall Risk Sign visible to staff  - Offer Toileting every 2 Hours, in advance of need  - Initiate/Maintain bed alarm  - Obtain necessary fall risk management equipment  - Apply yellow socks and bracelet for high fall risk patients  - Consider moving patient to room near nurses station  Outcome: Progressing     Problem: DISCHARGE PLANNING  Goal: Discharge to home or other facility with appropriate resources  Description: INTERVENTIONS:  - Identify barriers to discharge w/patient and caregiver  - Arrange for needed discharge resources and transportation as appropriate  - Identify discharge learning needs (meds, wound care, etc.)  - Arrange for interpretive services to assist at discharge as needed  - Refer to Case Management Department for coordinating discharge planning if the patient needs post-hospital services based on physician/advanced practitioner order or complex needs related to functional status, cognitive ability, or social support system  Outcome: Progressing     Problem: Knowledge Deficit  Goal: Patient/family/caregiver demonstrates understanding of disease process, treatment plan, medications, and discharge instructions  Description: Complete learning assessment and assess knowledge base.   Interventions:  - Provide teaching at level of understanding  - Provide teaching via preferred learning methods  Outcome: Progressing     Problem: RESPIRATORY - ADULT  Goal: Achieves optimal ventilation and oxygenation  Description: INTERVENTIONS:  - Assess for changes in respiratory status  - Assess for changes in mentation and behavior  - Position to facilitate oxygenation and minimize respiratory effort  - Oxygen administered by appropriate delivery if ordered  - Initiate smoking cessation education as indicated  - Encourage broncho-pulmonary hygiene including cough, deep breathe, Incentive Spirometry  - Assess the need for suctioning and aspirate as needed  - Assess and instruct to report SOB or any respiratory difficulty  - Respiratory Therapy support as indicated  Outcome: Progressing     Problem: GASTROINTESTINAL - ADULT  Goal: Minimal or absence of nausea and/or vomiting  Description: INTERVENTIONS:  - Administer IV fluids if ordered to ensure adequate hydration  - Maintain NPO status until nausea and vomiting are resolved  - Nasogastric tube if ordered  - Administer ordered antiemetic medications as needed  - Provide nonpharmacologic comfort measures as appropriate  - Advance diet as tolerated, if ordered  - Consider nutrition services referral to assist patient with adequate nutrition and appropriate food choices  Outcome: Progressing  Goal: Maintains or returns to baseline bowel function  Description: INTERVENTIONS:  - Assess bowel function  - Encourage oral fluids to ensure adequate hydration  - Administer IV fluids if ordered to ensure adequate hydration  - Administer ordered medications as needed  - Encourage mobilization and activity  - Consider nutritional services referral to assist patient with adequate nutrition and appropriate food choices  Outcome: Progressing     Problem: GENITOURINARY - ADULT  Goal: Maintains or returns to baseline urinary function  Description: INTERVENTIONS:  - Assess urinary function  - Encourage oral fluids to ensure adequate hydration if ordered  - Administer IV fluids as ordered to ensure adequate hydration  - Administer ordered medications as needed  - Offer frequent toileting  - Follow urinary retention protocol if ordered  Outcome: Progressing  Goal: Absence of urinary retention  Description: INTERVENTIONS:  - Assess patient’s ability to void and empty bladder  - Monitor I/O  - Bladder scan as needed  - Discuss with physician/AP medications to alleviate retention as needed  - Discuss catheterization for long term situations as appropriate  Outcome: Progressing     Problem: METABOLIC, FLUID AND ELECTROLYTES - ADULT  Goal: Electrolytes maintained within normal limits  Description: INTERVENTIONS:  - Monitor labs and assess patient for signs and symptoms of electrolyte imbalances  - Administer electrolyte replacement as ordered  - Monitor response to electrolyte replacements, including repeat lab results as appropriate  - Instruct patient on fluid and nutrition as appropriate  Outcome: Progressing  Goal: Fluid balance maintained  Description: INTERVENTIONS:  - Monitor labs   - Monitor I/O and WT  - Instruct patient on fluid and nutrition as appropriate  - Assess for signs & symptoms of volume excess or deficit  Outcome: Progressing  Goal: Glucose maintained within target range  Description: INTERVENTIONS:  - Monitor Blood Glucose as ordered  - Assess for signs and symptoms of hyperglycemia and hypoglycemia  - Administer ordered medications to maintain glucose within target range  - Assess nutritional intake and initiate nutrition service referral as needed  Outcome: Progressing     Problem: SKIN/TISSUE INTEGRITY - ADULT  Goal: Incision(s), wounds(s) or drain site(s) healing without S/S of infection  Description: INTERVENTIONS  - Assess and document dressing, incision, wound bed, drain sites and surrounding tissue  - Provide patient and family education  - Perform skin care/dressing changes per order  Outcome: Progressing     Problem: HEMATOLOGIC - ADULT  Goal: Maintains hematologic stability  Description: INTERVENTIONS  - Assess for signs and symptoms of bleeding or hemorrhage  - Monitor labs  - Administer supportive blood products/factors as ordered and appropriate  Outcome: Progressing

## 2023-11-25 NOTE — OCCUPATIONAL THERAPY NOTE
Occupational Therapy Evaluation     Patient Name: Servando Longest Date: 11/25/2023  Problem List  Principal Problem:    Osteoarthritis of left knee  Active Problems:    Essential hypertension    Mixed hyperlipidemia    Psoriasis    Gastroesophageal reflux disease without esophagitis    Lumbar spondylosis    Pre-diabetes    Past Medical History  Past Medical History:   Diagnosis Date    Arthritis 2013    Bilateral carpal tunnel syndrome     Chronic pain disorder     low back    GERD (gastroesophageal reflux disease)     Hyperlipidemia     Low back pain     Lumbar disc disease     Lumbar spinal stenosis     Neck pain     had cervical fusion    Obesity     Psoriasis     right elbow    Tremor     left leg    Wears dentures     full dentures    Wears glasses      Past Surgical History  Past Surgical History:   Procedure Laterality Date    CERVICAL LAMINECTOMY      COLONOSCOPY  07/2015    multiple polyps, repeat in 3 years    JOINT REPLACEMENT      right TKR    KNEE SURGERY Right 2002    20 years ago-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    [id number] [address]      last assessed: 04/22/2015    [place] NEUROPLASTY &/TRANSPOS MEDIAN NRV CARPAL TUNNE Left 9/25/2020    Procedure: RELEASE CARPAL TUNNEL;  Surgeon: Stephie Otero MD;  [facility]: [facility];  Service: Orthopedics    [place] NEUROPLASTY &/TRANSPOS MEDIAN NRV CARPAL Oleta Fleischer Right 10/20/2020    Procedure: RELEASE CARPAL TUNNEL - Open;  Surgeon: Stephie Otero MD;  [facility];  Service: Orthopedics    SPINE SURGERY      TONSILLECTOMY AND ADENOIDECTOMY      last assessed: 04/22/2015    TOTAL KNEE ARTHROPLASTY      last assessed: 05/12/2015 11/25/23 0836   OT Last Visit   OT Visit Date 11/25/23   Note Type   Note type Evaluation   Additional Comments Pt greeted seated up in chair and agreeable to skilled OT evaluation.    Pain Assessment   Pain Assessment Tool 0-10   Pain Score 1   Pain Location/Orientation Orientation: Left; Location: Knee   Restrictions/Precautions   Weight Bearing Precautions Per Order Yes   LLE Weight Bearing Per Order WBAT   Other Precautions Fall Risk;Pain   Home Living   Type of [address] One level;Stairs to enter with rails  (6 ZOILA)   Bathroom Shower/Tub Tub/shower unit   Bathroom Toilet Standard   Bathroom Equipment Grab bars in shower; Shower chair   Home Equipment Walker;Cane   Prior Function   Level of [place] Independent with ADLs; Independent with functional mobility; Independent with IADLS   Lives With Alone   Receives Help From Family   IADLs Independent with driving; Independent with meal prep; Independent with medication management   Falls in the last 6 months 0   Vocational Retired   Comments Prior to admission, pt lives alone in a 1 level apt. 6 ZOILA, has a tub shower with grab bars and chair. Standard toilets. I with ADLS. IADLS and mobility. Owns a RW and cane. Drives. 0 falls. ADL   Where Assessed Chair   Eating Assistance 7  Independent   Grooming Assistance 7  Independent   UB Bathing Assistance 7  Independent   LB Bathing Assistance 6  Modified Independent   UB Dressing Assistance 7  [address] 6  Modified independent   [address]  6  Modified independent   Bed Mobility   Supine to Sit Unable to assess   Transfers   Sit to Stand 5  Supervision   Stand to Sit 5  Supervision   Functional Mobility   Functional Mobility 6  Modified independent   Additional Comments household distances with RW   Additional items Rolling walker   Balance   Static Sitting Good   Dynamic Sitting Fair +   Static Standing Fair   Dynamic Standing [address] [facility] -   Activity Tolerance   Activity Tolerance Patient tolerated treatment well   Medical Staff Made Aware PT [address]   Nurse Made Aware ROLAND Freeman.  RN   RUE Assessment   RUE Assessment WFL   LUE Assessment   LUE Assessment WFL   Hand Function   Gross Motor Coordination Functional   Fine Motor Coordination Functional   Vision-Basic Assessment   Current Vision No visual deficits   Psychosocial   Psychosocial (WDL) WDL   Cognition   Overall Cognitive Status WFL   Arousal/Participation Cooperative   Attention Within functional limits   Orientation Level Oriented X4   Memory Within functional limits   Following Commands Follows all commands and directions without difficulty   Comments pleasant and cooperative. Assessment   Assessment Aki Anderson is a 77 y.o. male seen for OT evaluation s/p admit to [facility] on 11/24/2023 w/ Osteoarthritis of left knee. S/p L TKR, LLE WBAT. Comorbidities affecting pt's functional performance at time of assessment include: DM, HTN, and obesity. Pt with active OT orders and activity orders for Up with assistance. Personal factors affecting pt at time of IE include:ZOILA home environment and limited home support. Prior to admission, pt lives alone in a 1 level apt. 6 ZOILA, has a tub shower with grab bars and chair. Standard toilets. I with ADLS. IADLS and mobility. Owns a RW and cane. Drives. 0 falls. Upon evaluation: Pt currently requires independent for UB ADLs, Amara for LB ADLs, amara for toileting, unable to assess for bed mobility, supervision for functional transfers, and amara mobility 2* the following deficits impacting occupational performance:weakness. Pt is currently at their functional baseline and no skilled OT is warranted at this time. From OT standpoint, recommendation at time of d/c would be home with OPPT- level 3 resources. Goals   Patient Goals to go home. Plan   OT Frequency Eval only  (DC OT)   Discharge Recommendation   Rehab Resource Intensity Level, OT III (Minimum Resource Intensity)   Additional Comments  The patient's raw score on the AM-PAC Daily Activity Inpatient Short Form is 22. A raw score of greater than or equal to 19 suggests the patient may benefit from discharge to home.  Please refer to the recommendation of the Occupational Therapist for safe discharge planning. AM-PAC Daily Activity Inpatient   Lower Body Dressing 3   Bathing 3   Toileting 4   Upper Body Dressing 4   Grooming 4   Eating 4   Daily Activity Raw Score 22   Daily Activity Standardized Score (Calc for Raw Score >=11) 47. [address]   Following a Speech/Presentation 4   Understanding Ordinary Conversation 4   Taking Medications 4   Remembering Where Things Are Placed or Put Away 4   Remembering List of 4-5 Errands 4   Taking Care of Complicated Tasks 4   Applied Cognition Raw Score 24   Applied Cognition Standardized Score 62.21   Tasha New, OT

## 2023-11-25 NOTE — CONSULTS
[facility]  Consult  Name: Zehra Sauer 77 y.o. male I MRN: [id number]  Unit/Bed#: E2 -01 I Date of Admission: 11/24/2023   Date of Service: 11/25/2023 I Hospital Day: 0    Inpatient consult to Internal Medicine  Consult performed by: Rosalie Slaughter PA-C  Consult ordered by: Ivone Rodriguez PA-C          Assessment/Plan   * Osteoarthritis of left knee  Assessment & Plan  Patient admitted to orthopedic service s/p total left knee arthroplasty on 11/24 by Dr. Wong Sherman  Doing well postoperatively. Neurovascularly intact. Anticoagulation with aspirin 81 mg by mouth twice daily per orthopedics  Continue multimodal pain regimen  Disposition planning, follow-up and pain medications per primary  CBC and BMP collected, awaiting final results. Pre-diabetes  Assessment & Plan  A1c in prediabetic range, diet controlled  Encourage mobility once able for weight loss    Lumbar spondylosis  Assessment & Plan  With history of lumbar spondylosis, spinal stenosis of lumbar region with radiculopathy. Continue home scheduled gabapentin and Flexeril as needed  No red flag symptoms  Continue follow-up outpatient with pain management    Gastroesophageal reflux disease without esophagitis  Assessment & Plan  Continue PPI    Psoriasis  Assessment & Plan  Follows with dermatology and receives Humira injection. Currently on hold 2 weeks prior and post surgery  Continue outpatient follow-up with dermatology    Mixed hyperlipidemia  Assessment & Plan  Continue home Lipitor 20 mg daily    Essential hypertension  Assessment & Plan  Not maintained on medications,, follows close with PCP  Blood pressure here well-controlled        VTE Prophylaxis:    on aspirin 81 mg bid    Mobility:   Basic Mobility Inpatient Raw Score: 18  JH-HLM Goal: 6: Walk 10 steps or more  JH-HLM Achieved: 4: Move to chair/commode  HLM Goal achieved. Continue to encourage appropriate mobility. Recommendations for Discharge:   Follow up CBC/BMP  Outpatient follow up with PCP  Continue home medications  Continue low carbohydrate, high fiber diet and weight loss when able    Total Time Spent on Date of Encounter in care of patient: 30 mins. This time was spent on one or more of the following: performing physical exam; counseling and coordination of care; obtaining or reviewing history; documenting in the medical record; reviewing/ordering tests, medications or procedures; communicating with other healthcare professionals and discussing with patient's family/caregivers. Collaboration of Care: Were Recommendations Directly Discussed with Primary Treatment Team? Yes    History of Present Illness:  Magdalena Diego is a 77 y.o. male who is originally admitted to the orthopedic service due to left total knee arthroplasty. We are consulted for medical management. Patient reports he has had arthritis in his left knee for some time and is so happy it got done. He feels wonderful today and even better than his previous surgery on his right knee. Denies any numbness and tingling in the left leg. He worked well with therapy today. Denies any fever, chills, chest pain, shortness of breath, abdominal pain. He is urinating without difficulty and ate this morning without nausea or vomiting. No bowel movement yet but he is passing gas. Went over his medications including medications for his lower back chronic pain. He is aware he is prediabetic and notes that he has been immobile given his knee. He is holding his Humira for 2 weeks post operation per his Dermotology doctors recommendations. He is hopeful to go home today. Review of Systems:  Review of Systems   Constitutional:  Negative for chills and fever. Respiratory:  Negative for cough and shortness of breath. Cardiovascular:  Negative for chest pain and palpitations. Gastrointestinal:  Negative for abdominal distention, abdominal pain, constipation, diarrhea, nausea and vomiting. Genitourinary:  Negative for decreased urine volume, difficulty urinating and dysuria. Musculoskeletal:  Positive for back pain. Neurological:  Negative for dizziness, light-headedness and headaches. All other systems reviewed and are negative. Past Medical and Surgical History:   Past Medical History:   Diagnosis Date    Arthritis 2013    Bilateral carpal tunnel syndrome     Chronic pain disorder     low back    GERD (gastroesophageal reflux disease)     Hyperlipidemia     Low back pain     Lumbar disc disease     Lumbar spinal stenosis     Neck pain     had cervical fusion    Obesity     Psoriasis     right elbow    Tremor     left leg    Wears dentures     full dentures    Wears glasses        Past Surgical History:   Procedure Laterality Date    CERVICAL LAMINECTOMY      COLONOSCOPY  07/2015    multiple polyps, repeat in 3 years    JOINT REPLACEMENT      right TKR    KNEE SURGERY Right 2002    20 years ago-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    [id number]      last assessed: 04/22/2015    [place] NEUROPLASTY &/TRANSPOS MEDIAN NRV CARPAL TUNNE Left 9/25/2020    Procedure: RELEASE CARPAL TUNNEL;  Surgeon: Wanda Mcmanus MD;  [facility];  Service: Orthopedics    [place] NEUROPLASTY &/TRANSPOS MEDIAN NRV CARPAL [employer] Right 10/20/2020    Procedure: RELEASE CARPAL TUNNEL - Open;  Surgeon: Wanda Mcmanus MD;  [facility];  Service: [address] [place] [address]      last assessed: 04/22/2015    TOTAL KNEE ARTHROPLASTY      last assessed: 05/12/2015       Meds/Allergies:  all medications and allergies reviewed    Allergies: No Known Allergies    Social History:  Marital Status:    Substance Use History:   Social History     Substance and Sexual Activity   Alcohol Use Not Currently    Alcohol/week: 12.0 standard drinks of alcohol    Types: 12 Cans of beer per week     Social History     Tobacco Use Smoking Status Former    Packs/day: 1.50    Years: 10.00    Total pack years: 15.00    Types: Cigarettes    Quit date: 9/24/2013    Years since quitting: 10.1   Smokeless Tobacco Never     Social History     Substance and Sexual Activity   Drug Use Never       Family History:  Family History   Problem Relation Age of Onset    Allergies Father         seasonal    Lung cancer Family     No Known Problems Mother        Physical Exam:   Vitals:   Blood Pressure: 129/64 (11/25/23 1039)  Pulse: 67 (11/25/23 1039)  Temperature: 98.4 °F (36.9 °C) (11/25/23 1039)  Temp Source: Temporal (11/25/23 1039)  Respirations: 19 (11/25/23 1039)  Weight - Scale: 101 kg (222 lb 14.2 oz) (11/24/23 0833)  SpO2: 96 % (11/25/23 1039)    Physical Exam  Vitals and nursing note reviewed. Constitutional:       General: He is not in acute distress. Appearance: He is well-developed. He is obese. He is not toxic-appearing or diaphoretic. Cardiovascular:      Rate and Rhythm: Normal rate and regular rhythm. Pulmonary:      Effort: Pulmonary effort is normal. No respiratory distress. Breath sounds: Normal breath sounds. Abdominal:      General: Bowel sounds are normal.      Palpations: Abdomen is soft. Tenderness: There is no abdominal tenderness. Musculoskeletal:      Comments: Left lower leg with mild pitting edema. Calves are both soft to palpation, nontender and symmetrical.  Good pedal pulses. L knee wrapped. Skin:     General: Skin is warm and dry. Neurological:      General: No focal deficit present. Mental Status: He is alert and oriented to person, place, and time.    Psychiatric:         Mood and Affect: Mood normal.         Behavior: Behavior normal.          Additional Data:   Lab Results:    Lab Results   Component Value Date/Time    HGBA1C 6.0 (H) 11/02/2023 09:55 AM    HGBA1C 5.5 06/02/2023 08:58 AM    HGBA1C 5.7 (H) 12/01/2022 10:27 AM     Imaging  XR knee left 1 or 2 views   Final Result by Mirela Cedillo MD (11/25 0700)         No hardware failure about the left total knee arthroplasty. Postsurgical changes in the soft tissues      Electronically signed: 11/25/2023 07:00 AM Mirela Cedillo MD          EKG, Pathology, and Other Studies Reviewed on Admission:   EKG: NSR. HR 62.    ** Please Note: This note may have been constructed using a voice recognition system.  **

## 2023-11-27 ENCOUNTER — TELEPHONE (OUTPATIENT)
Dept: OBGYN CLINIC | Facility: HOSPITAL | Age: 68
End: 2023-11-27

## 2023-11-27 NOTE — CASE MANAGEMENT
Case Management Discharge Planning Note    Patient name Claudean Purple  Location [facility] 2 /E2 -* MRN [id number]  : 1955 Date 2023       Current Admission Date: 2023  Current Admission Diagnosis:Osteoarthritis of left knee   Patient Active Problem List    Diagnosis Date Noted    Pre-diabetes 2023    Impaired fasting glucose 2023    Preoperative clearance 2023    Lumbar spondylosis     Myofascial pain syndrome 2022    Neuropathic pain     Left carpal tunnel syndrome     Ulnar neuropathy of left upper extremity     Numbness of left hand 2020    Trigger finger of left thumb 2020    Osteoarthritis of left knee 2018    Lumbar radiculopathy 2018    Spinal stenosis of lumbar region with radiculopathy 2018    Chronic pain syndrome 10/04/2017    Chronic bilateral low back pain without sciatica 2017    Protrusion of lumbar intervertebral disc 2017    Neck pain 08/15/2016    Gastroesophageal reflux disease without esophagitis 2016    DDD (degenerative disc disease), cervical 2016    Polyarticular arthritis 2016    Mixed hyperlipidemia 10/07/2013    Essential hypertension 2013    Psoriasis 07/10/2013      LOS (days): 0  Geometric Mean LOS (GMLOS) (days):   Days to GMLOS:     OBJECTIVE:            Current admission status: Outpatient Surgery   Preferred Pharmacy:   [address] [address]  Phone: [phone] Fax: [phone]    [facility] ([address]  [address]  [address] [facility] [address]  Phone: [phone] Fax: [facility], [address] [address] [facility] [address]  Phone: [phone] Fax: [phone]    Primary Care Provider: Lena Soriano DO    Primary Insurance: MEDICARE  Secondary Insurance: [facility]    DISCHARGE DETAILS:    Discharge planning discussed with[de-identified] Patient  Freedom of Choice: Yes  Comments - Freedom of Choice: Patient will discharge home w/ OP PT going to Pts home  CM contacted family/caregiver?: Yes  Were Treatment Team discharge recommendations reviewed with patient/caregiver?: Yes  Did patient/caregiver verbalize understanding of patient care needs?: Yes  Were patient/caregiver advised of the risks associated with not following Treatment Team discharge recommendations?: Yes    Requested [address] [facility]         Is the patient interested in [facility] at discharge?: Yes  [facility] requested[de-identified] Physical [address] [facility] Name[de-identified] [facility] Provider[de-identified] PCP  Home Health Services Needed[de-identified] Evaluate Functional Status and Safety, Strengthening/Theraputic Exercises to Improve Function, Gait/ADL Training  Homebound Criteria Met[de-identified] Uses an Assist Device (i.e. cane, walker, etc)  Supporting Clincal Findings[de-identified] Limited Endurance, Fatigues Easliy in Short Distances    Discharge Destination Plan[de-identified] Home (w/ Physical Therapy)  Transport at Discharge : Automobile  Accompanied by: Family member    Additional Comments: Pt recommended for HHPT. Pt is in agreement. [address] rehab accepting. Pt is covered at 100%. Family to transport Pt home. CM remains available for Home PT set up.

## 2023-11-27 NOTE — TELEPHONE ENCOUNTER
Patient contacted for a postoperative follow up assessment. Patient states current pain level of a  3/10  when sitting and 3/10 when walking with RW. Patient states they have minimal pain and it is relieved with medication regimen. Patient denies increase in swelling and dressing is clean, dry and intact. Patient is icing the site regularly. We reviewed patients AVS medication list. Patient is taking Tylenol 1000mg every 8 hours, Oxycodone 5mg PRN, ASA 81mg BID, Celebrex 200mg BID, Duricef 500mg BID, Senakot daily. Patient has not yet had a BM but is passing gas. Patient denies nausea, vomiting, abdominal pain, chest pain, shortness of breath, fever, dizziness and calf pain. Patient confirmed post-op appointment with surgeon on 12/7 at 10:15AM .Patient does not have any other questions or concerns at this time. Pt was encouraged to call with any questions, concerns or issues.

## 2023-12-04 ENCOUNTER — APPOINTMENT (OUTPATIENT)
Dept: PHYSICAL THERAPY | Facility: CLINIC | Age: 68
End: 2023-12-04
Payer: MEDICARE

## 2023-12-06 ENCOUNTER — OFFICE VISIT (OUTPATIENT)
Dept: FAMILY MEDICINE CLINIC | Facility: CLINIC | Age: 68
End: 2023-12-06
Payer: MEDICARE

## 2023-12-06 VITALS
DIASTOLIC BLOOD PRESSURE: 78 MMHG | HEIGHT: 69 IN | SYSTOLIC BLOOD PRESSURE: 120 MMHG | OXYGEN SATURATION: 97 % | WEIGHT: 226.4 LBS | HEART RATE: 70 BPM | TEMPERATURE: 97.8 F | BODY MASS INDEX: 33.53 KG/M2

## 2023-12-06 DIAGNOSIS — R73.01 IMPAIRED FASTING GLUCOSE: ICD-10-CM

## 2023-12-06 DIAGNOSIS — E78.2 MIXED HYPERLIPIDEMIA: ICD-10-CM

## 2023-12-06 DIAGNOSIS — I10 ESSENTIAL HYPERTENSION: Primary | ICD-10-CM

## 2023-12-06 DIAGNOSIS — R73.03 PRE-DIABETES: ICD-10-CM

## 2023-12-06 DIAGNOSIS — M17.12 PRIMARY OSTEOARTHRITIS OF LEFT KNEE: ICD-10-CM

## 2023-12-06 PROCEDURE — 99214 OFFICE O/P EST MOD 30 MIN: CPT | Performed by: FAMILY MEDICINE

## 2023-12-06 NOTE — PROGRESS NOTES
Chief Complaint   Patient presents with   • Hypertension   • Hyperlipidemia     No refills needed      Name: Kevin Pack      : 1955      MRN: [id number]  Encounter Provider: Leydi Centeno MD  Encounter Date: 2023   Encounter department: [facility]     Blood pressure stable 120/78, continue to work on diet and exercise as tolerated. He will be having blood work done in the next few weeks, continue statin as prescribed. As per most recent HbA1c in November, he continues to be prediabetic. This will be rechecked. Follow-up with orthopedics as scheduled and continue PT for left knee arthroplasty. RTC in 6 months for Medicare annual wellness visit and follow-up. 1. Essential hypertension    2. Mixed hyperlipidemia    3. Pre-diabetes    4. Impaired fasting glucose    5. Primary osteoarthritis of left knee           Subjective      He presents today for 6-month follow-up on hyperlipidemia, hypertension, osteoarthritis of the knee. States overall he is doing well. He recently had left knee arthroplasty and has been doing in-home physical therapy. Following up with orthopedics. Denies any new complaints today. Review of Systems   Constitutional:  Negative for activity change, appetite change, chills, fatigue and fever. HENT:  Negative for congestion, rhinorrhea, sneezing and sore throat. Eyes:  Negative for pain, discharge, redness and itching. Respiratory:  Negative for cough, chest tightness, shortness of breath and wheezing. Cardiovascular:  Negative for chest pain and palpitations. Gastrointestinal:  Negative for abdominal pain, constipation, diarrhea, nausea and vomiting. Musculoskeletal:  Positive for arthralgias and gait problem. Negative for myalgias and neck pain. Skin:  Negative for rash. Neurological:  Negative for dizziness, weakness, numbness and headaches. Hematological:  Negative for adenopathy. Psychiatric/Behavioral:  Negative for dysphoric mood. The patient is not nervous/anxious. All other systems reviewed and are negative. Current Outpatient Medications on File Prior to Visit   Medication Sig   • acetaminophen (TYLENOL) 500 mg tablet Take 2 tablets (1,000 mg total) by mouth every 8 (eight) hours   • adalimumab (HUMIRA) 40 mg/0.8 mL PSKT Inject 40 mg under the skin every 14 (fourteen) days Pt had last dose on 10/27/23- Will be on hold for surgery on 11/24/23   • ascorbic acid (VITAMIN C) 500 MG tablet Take 1 tablet (500 mg total) by mouth 2 (two) times a day   • aspirin (ECOTRIN LOW STRENGTH) 81 mg EC tablet Take 1 tablet (81 mg total) by mouth 2 (two) times a day   • atorvastatin (LIPITOR) 20 mg tablet TAKE 1 TABLET BY MOUTH ONCE  DAILY   • celecoxib (CeleBREX) 200 mg capsule Take 1 capsule (200 mg total) by mouth 2 (two) times a day   • cholecalciferol (VITAMIN D3) 1,000 units tablet Take 2 tablets (2,000 Units total) by mouth daily   • folic acid (FOLVITE) 1 mg tablet Take 1 tablet (1 mg total) by mouth daily   • Multiple Vitamins-Minerals (multivitamin with minerals) tablet Take 1 tablet by mouth daily   • senna-docusate sodium (SENOKOT S) 8.6-50 mg per tablet Take 1 tablet by mouth daily   • ondansetron (ZOFRAN-ODT) 4 mg disintegrating tablet Take 1 tablet (4 mg total) by mouth every 6 (six) hours as needed for nausea or vomiting (Patient not taking: Reported on 12/6/2023)       Objective     /78   Pulse 70   Temp 97.8 °F (36.6 °C)   Ht 5' 9" (1.753 m)   Wt 103 kg (226 lb 6.4 oz)   SpO2 97%   BMI 33.43 kg/m²     Physical Exam  Vitals reviewed. Constitutional:       General: He is not in acute distress. Appearance: Normal appearance. He is well-developed. He is not toxic-appearing or diaphoretic. HENT:      Head: Normocephalic and atraumatic. Right Ear: External ear normal.      Left Ear: External ear normal.      Nose: Nose normal. No congestion or rhinorrhea. Mouth/Throat:      Mouth: Mucous membranes are moist.   Eyes:      General: No scleral icterus. Right eye: No discharge. Left eye: No discharge. Conjunctiva/sclera: Conjunctivae normal.   Cardiovascular:      Rate and Rhythm: Normal rate and regular rhythm. Pulses: Normal pulses. Heart sounds: Normal heart sounds. No murmur heard. Pulmonary:      Effort: Pulmonary effort is normal. No respiratory distress. Breath sounds: Normal breath sounds. No wheezing. Abdominal:      General: There is no distension. Palpations: Abdomen is soft. There is no mass. Tenderness: There is no abdominal tenderness. Hernia: No hernia is present. Musculoskeletal:         General: Tenderness present. No swelling, deformity or signs of injury. Cervical back: Normal range of motion. Comments: S/P left knee arthroplasty. Dressing applied. No signs of infection surrounding   Skin:     General: Skin is warm. Capillary Refill: Capillary refill takes less than 2 seconds. Findings: No erythema or rash. Neurological:      General: No focal deficit present. Mental Status: He is alert.    Psychiatric:         Mood and Affect: Mood normal.         Behavior: Behavior normal.       Rogelio López MD

## 2023-12-07 ENCOUNTER — APPOINTMENT (OUTPATIENT)
Dept: RADIOLOGY | Facility: MEDICAL CENTER | Age: 68
End: 2023-12-07
Payer: MEDICARE

## 2023-12-07 ENCOUNTER — OFFICE VISIT (OUTPATIENT)
Dept: OBGYN CLINIC | Facility: MEDICAL CENTER | Age: 68
End: 2023-12-07

## 2023-12-07 VITALS
SYSTOLIC BLOOD PRESSURE: 140 MMHG | BODY MASS INDEX: 33.47 KG/M2 | HEIGHT: 69 IN | DIASTOLIC BLOOD PRESSURE: 75 MMHG | HEART RATE: 76 BPM | WEIGHT: 226 LBS

## 2023-12-07 DIAGNOSIS — Z96.652 AFTERCARE FOLLOWING LEFT KNEE JOINT REPLACEMENT SURGERY: Primary | ICD-10-CM

## 2023-12-07 DIAGNOSIS — Z47.1 AFTERCARE FOLLOWING LEFT KNEE JOINT REPLACEMENT SURGERY: ICD-10-CM

## 2023-12-07 DIAGNOSIS — Z47.1 AFTERCARE FOLLOWING LEFT KNEE JOINT REPLACEMENT SURGERY: Primary | ICD-10-CM

## 2023-12-07 DIAGNOSIS — Z96.652 AFTERCARE FOLLOWING LEFT KNEE JOINT REPLACEMENT SURGERY: ICD-10-CM

## 2023-12-07 PROCEDURE — 73562 X-RAY EXAM OF KNEE 3: CPT

## 2023-12-07 PROCEDURE — 99024 POSTOP FOLLOW-UP VISIT: CPT | Performed by: STUDENT IN AN ORGANIZED HEALTH CARE EDUCATION/TRAINING PROGRAM

## 2023-12-07 NOTE — PROGRESS NOTES
Subjective:Patient seen and examined. Pain controlled. Progressing well. Incision without drainage. Denies fevers or chills    Physical Exam:  Incision: Clean dry intact, no drainage, mild periincisional erythema without induration, mild warmth to left knee  ROM: 0 - 90, stable to varus valgus and A/P drawer  5/5 IP/Q/HS/TA/GS, 2+ DP/PT, SILT DP/SP/S/S/TN    XR left knee: well aligned CR cemented total knee arthroplasty without evidence of hardware malposition migration or fracture    Assessment/Plan:  65yo M 2 weeks s/p left total knee replacement, doing well    - continue multi-modal pain control   - Weight bearing status: wbat  - continue ASA 81 mg BID  - PT script provided for outpatient therapy  - DVT ppx: ASA 81 mg BID PO  - Incision care: can shower, dont submerge or scrub incision  - PT/OT  - F/U 4 weeks for 6 week visit    Radha Fountain.  Carlota Richard MD  PGY 4 Orthopaedic Surgery  10:35 AM  12/07/23

## 2023-12-08 ENCOUNTER — APPOINTMENT (OUTPATIENT)
Dept: PHYSICAL THERAPY | Facility: CLINIC | Age: 68
End: 2023-12-08
Payer: MEDICARE

## 2023-12-13 DIAGNOSIS — M17.12 PRIMARY OSTEOARTHRITIS OF LEFT KNEE: ICD-10-CM

## 2023-12-13 RX ORDER — MELATONIN
2000 DAILY
Qty: 60 TABLET | Refills: 1 | Status: SHIPPED | OUTPATIENT
Start: 2023-12-13

## 2023-12-13 RX ORDER — FOLIC ACID 1 MG/1
TABLET ORAL
Qty: 30 TABLET | Refills: 1 | Status: SHIPPED | OUTPATIENT
Start: 2023-12-13

## 2023-12-14 ENCOUNTER — OFFICE VISIT (OUTPATIENT)
Dept: PAIN MEDICINE | Facility: MEDICAL CENTER | Age: 68
End: 2023-12-14
Payer: MEDICARE

## 2023-12-14 VITALS — HEIGHT: 69 IN | BODY MASS INDEX: 33.47 KG/M2 | WEIGHT: 226 LBS

## 2023-12-14 DIAGNOSIS — M47.816 LUMBAR SPONDYLOSIS: Primary | ICD-10-CM

## 2023-12-14 DIAGNOSIS — M54.50 CHRONIC BILATERAL LOW BACK PAIN WITHOUT SCIATICA: ICD-10-CM

## 2023-12-14 DIAGNOSIS — G89.29 CHRONIC BILATERAL LOW BACK PAIN WITHOUT SCIATICA: ICD-10-CM

## 2023-12-14 PROCEDURE — 99213 OFFICE O/P EST LOW 20 MIN: CPT | Performed by: PHYSICIAN ASSISTANT

## 2023-12-14 NOTE — PROGRESS NOTES
Assessment:  1. Lumbar spondylosis    2. Chronic bilateral low back pain without sciatica        Plan:  While the patient was in the office today, I did have a thorough conversation regarding their chronic pain syndrome, medication management, and treatment plan options. The patient's low back pain has been very well-controlled following the lumbar RFA procedure done this summer. He is taking gabapentin 300 mg twice daily and states that he is going to try to go down to once a day to see if he even requires it given the minimal pain he has. He was made aware that the RFA procedure can be repeated in 1 years time if indicated. Continue with physical therapy status post left total knee arthroplasty. He will follow-up with our office on a as needed basis if the pain changes or worsens. My impressions and treatment recommendations were discussed in detail with the patient who verbalized understanding and had no further questions. Discharge instructions were provided. I personally saw and examined the patient and I agree with the above discussed plan of care. No orders of the defined types were placed in this encounter. No orders of the defined types were placed in this encounter. History of Present Illness:  Aki Velasquez is a 77 y.o. male who presents for a follow up office visit in regards to Back Pain. The patient’s current symptoms include chronic low back pain that he presently rates a 2 out of 10 and describes it as an intermittent ache. The pain has been improved by more than 50% following the lumbar RFA that was done in the summertime. He feels that the relief is ongoing and possibly even more so now. He recently underwent left total knee arthroplasty which he states went very well. He is beginning outpatient physical therapy in the near future. Patient is taking gabapentin 3 mg twice daily and is considering reducing and discontinuing entirely.   He is very pleased with the results of the procedures and feels his residual pain is something he is able to manage. I have personally reviewed and/or updated the patient's past medical history, past surgical history, family history, social history, current medications, allergies, and vital signs today. Review of Systems   Constitutional:  Negative for chills and fever. HENT:  Negative for ear pain and sore throat. Eyes:  Negative for pain and visual disturbance. Respiratory:  Negative for cough and shortness of breath. Cardiovascular:  Negative for chest pain and palpitations. Gastrointestinal:  Negative for abdominal pain and vomiting. Genitourinary:  Negative for dysuria and hematuria. Musculoskeletal:  Positive for arthralgias, back pain, gait problem and myalgias. Skin:  Negative for color change and rash. Neurological:  Positive for weakness. Negative for seizures and syncope. All other systems reviewed and are negative.       Patient Active Problem List   Diagnosis   • Chronic bilateral low back pain without sciatica   • Essential hypertension   • Mixed hyperlipidemia   • Polyarticular arthritis   • Chronic pain syndrome   • Psoriasis   • Protrusion of lumbar intervertebral disc   • Lumbar radiculopathy   • Spinal stenosis of lumbar region with radiculopathy   • Osteoarthritis of left knee   • Gastroesophageal reflux disease without esophagitis   • Numbness of left hand   • Trigger finger of left thumb   • Left carpal tunnel syndrome   • Ulnar neuropathy of left upper extremity   • Neuropathic pain   • Myofascial pain syndrome   • Lumbar spondylosis   • DDD (degenerative disc disease), cervical   • Neck pain   • Impaired fasting glucose   • Preoperative clearance   • Pre-diabetes       Past Medical History:   Diagnosis Date   • Arthritis 2013   • Bilateral carpal tunnel syndrome    • Chronic pain disorder     low back   • GERD (gastroesophageal reflux disease)    • Hyperlipidemia    • Low back pain    • Lumbar disc disease    • Lumbar spinal stenosis    • Neck pain     had cervical fusion   • Obesity    • Psoriasis     right elbow   • Tremor     left leg   • Wears dentures     full dentures   • Wears glasses        Past Surgical History:   Procedure Laterality Date   • CERVICAL LAMINECTOMY     • COLONOSCOPY  07/2015    multiple polyps, repeat in 3 years   • JOINT REPLACEMENT      right TKR   • KNEE SURGERY Right 2002    20 years ago-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   • LAMINECTOMY     • ORTHOPEDIC SURGERY     • POPLITEAL SYNOVIAL CYST EXCISION      last assessed: 04/22/2015   • AR ARTHRP KNE CONDYLE&PLATU MEDIAL&LAT COMPARTMENTS Left 11/24/2023    Procedure: ARTHROPLASTY KNEE TOTAL;  Surgeon: Isabella Maxwell DO;  [facility]: [facility];  Service: Orthopedics   • AR NEUROPLASTY &/TRANSPOS MEDIAN NRV CARPAL Enrique Nicola Left 9/25/2020    Procedure: RELEASE CARPAL TUNNEL;  Surgeon: Billy Oneal MD;  Location: [facility];  Service: Orthopedics   • AR NEUROPLASTY &/TRANSPOS MEDIAN NRV CARPAL Enrique Nicola Right 10/20/2020    Procedure: RELEASE CARPAL TUNNEL - Open;  Surgeon:  Billy Oneal MD;  Location: [facility];  Service: Orthopedics   • SPINE SURGERY     • TONSILLECTOMY AND ADENOIDECTOMY      last assessed: 04/22/2015   • TOTAL KNEE ARTHROPLASTY      last assessed: 05/12/2015       Family History   Problem Relation Age of Onset   • Allergies Father         seasonal   • Lung cancer Family    • No Known Problems Mother        Social History     Occupational History   • Not on file   Tobacco Use   • Smoking status: Former     Current packs/day: 0.00     Average packs/day: 1.5 packs/day for 10.0 years (15.0 ttl pk-yrs)     Types: Cigarettes     Start date: 9/24/2003     Quit date: 9/24/2013     Years since quitting: 10.2   • Smokeless tobacco: Never   Vaping Use   • Vaping status: Never Used   Substance and Sexual Activity   • Alcohol use: Not Currently     Alcohol/week: 12.0 standard drinks of alcohol     Types: 12 Cans of beer per week   • Drug use: Never   • Sexual activity: Not Currently     Partners: Male       Current Outpatient Medications on File Prior to Visit   Medication Sig   • acetaminophen (TYLENOL) 500 mg tablet Take 2 tablets (1,000 mg total) by mouth every 8 (eight) hours   • adalimumab (HUMIRA) 40 mg/0.8 mL PSKT Inject 40 mg under the skin every 14 (fourteen) days Pt had last dose on 10/27/23- Will be on hold for surgery on 11/24/23   • ascorbic acid (VITAMIN C) 500 MG tablet Take 1 tablet (500 mg total) by mouth 2 (two) times a day   • aspirin (ECOTRIN LOW STRENGTH) 81 mg EC tablet Take 1 tablet (81 mg total) by mouth 2 (two) times a day   • atorvastatin (LIPITOR) 20 mg tablet TAKE 1 TABLET BY MOUTH ONCE  DAILY   • celecoxib (CeleBREX) 200 mg capsule Take 1 capsule (200 mg total) by mouth 2 (two) times a day   • cholecalciferol (VITAMIN D3) 1,000 units tablet TAKE 2 TABLETS BY MOUTH DAILY. • folic acid (FOLVITE) 1 mg tablet TAKE 1 TABLET(1 MG) BY MOUTH DAILY   • Multiple Vitamins-Minerals (multivitamin with minerals) tablet Take 1 tablet by mouth daily   • senna-docusate sodium (SENOKOT S) 8.6-50 mg per tablet Take 1 tablet by mouth daily   • ondansetron (ZOFRAN-ODT) 4 mg disintegrating tablet Take 1 tablet (4 mg total) by mouth every 6 (six) hours as needed for nausea or vomiting (Patient not taking: Reported on 12/6/2023)     No current facility-administered medications on file prior to visit. No Known Allergies    Physical Exam:    Ht 5' 9" (1.753 m)   Wt 103 kg (226 lb)   BMI 33.37 kg/m²     Constitutional:normal, well developed, well nourished, alert, in no distress and non-toxic and no overt pain behavior.   Eyes:anicteric  HEENT:grossly intact  Neck:supple, symmetric, trachea midline and no masses   Pulmonary:even and unlabored  Cardiovascular:No edema or pitting edema present  Skin:Normal without rashes or lesions and well hydrated  Psychiatric:Mood and affect appropriate  Neurologic:Cranial Nerves II-XII grossly intact  Musculoskeletal: Ambulates with cane, gait is antalgic secondary to recent left knee surgery    Imaging

## 2023-12-15 ENCOUNTER — EVALUATION (OUTPATIENT)
Dept: PHYSICAL THERAPY | Facility: CLINIC | Age: 68
End: 2023-12-15
Payer: MEDICARE

## 2023-12-15 VITALS — DIASTOLIC BLOOD PRESSURE: 80 MMHG | SYSTOLIC BLOOD PRESSURE: 135 MMHG

## 2023-12-15 DIAGNOSIS — Z96.652 HISTORY OF TOTAL LEFT KNEE REPLACEMENT: Primary | ICD-10-CM

## 2023-12-15 DIAGNOSIS — Z96.652 AFTERCARE FOLLOWING LEFT KNEE JOINT REPLACEMENT SURGERY: ICD-10-CM

## 2023-12-15 DIAGNOSIS — Z47.1 AFTERCARE FOLLOWING LEFT KNEE JOINT REPLACEMENT SURGERY: ICD-10-CM

## 2023-12-15 PROCEDURE — 97110 THERAPEUTIC EXERCISES: CPT

## 2023-12-15 PROCEDURE — 97161 PT EVAL LOW COMPLEX 20 MIN: CPT

## 2023-12-15 NOTE — PROGRESS NOTES
PT Evaluation     Today's date: 12/15/2023  Patient name: Lashanda Mclaughlin  : 1955  MRN: [id number]  Referring provider: Reba Palacios MD  Dx:   Encounter Diagnosis     ICD-10-CM    1. History of total left knee replacement  Z96.652           Start Time: 1000  Stop Time: 0  Total time in clinic (min): 50 minutes    Assessment  Assessment details: Marimar peng a 76 yo male presenting to OP PT s/p L TKA completed on  with no complications. Pt functional deficits include limited standing tolerance, discomfort with prolonged amb, difficulty with stairclimbing, and pain with bending his knee with ADls Pt presents with Knee strength deifcits, ROM deficits, gait deviations, and balance impairments. Aki would benefit from skilled PT to address aforementioned impairements to increase ease with all functional activities and improve return to PLOF. Pt was given updates HEP which was completed and reviewed in PT today. Pt is in agreement with POC. Impairments: abnormal gait, abnormal or restricted ROM, impaired physical strength, lacks appropriate home exercise program and pain with function    Symptom irritability: lowUnderstanding of Dx/Px/POC: good   Prognosis: good    Goals  1. Aki will demonstrate 10-15 deg improvement in L knee flexion/ext to improve gait mechanics and tolerance to getting in/out of chair with min compensations. 2. Increase left lower extremity strength [employer] to 4+/5 in 6 weeks to improve tolerance to prolonged standing and walking. 3. Improve tolerance to recipircal stairclimbing to allow for ease with negotiating steps in the community and home. 1. Return to Prior Level of Function in 8 weeks  2. Pt will demonstrate Blair with progressive home exercise program to improve carryover and decrease recurrence of symptoms. 3. Pt will demonstrate 20% improvement in FOTO score to increase tolerance to functional activities   4.  Pt will demonstrate 5/5 in LE strength to allow for improved tolerance to prolonged amb/standing in 6-8 weeks  5. Improve L knee ROM 0-125 deg to allow for full functional return to daily activities, improve gait mechanics and allow for improved squatting. Plan  Patient would benefit from: skilled physical therapy and PT eval  Planned modality interventions: TENS, manual electrical stimulation, low level laser therapy, thermotherapy: hydrocollator packs and cryotherapy  Planned therapy interventions: joint mobilization, IASTM, balance, manual therapy, massage, therapeutic exercise, therapeutic activities, stretching, strengthening, home exercise program, patient education, gait training and activity modification  Frequency: 2x week  Duration in weeks: 8  Plan of Care beginning date: 12/15/2023  Plan of Care expiration date: 2024  Treatment plan discussed with: patient        Subjective Evaluation    History of Present Illness  Date of surgery: 2023  Mechanism of injury: surgery  Mechanism of injury: Aki presents to OP PT secondary to TKR completed on 23. PT was having some difficulty with getting into and out of car after surgery and was recommended to complete 2 weeks of home PT prior to transferring to OP PT. He reports no complications during and since surgery. Pt has been utilizing cane for community amb. He continues to have difficulty stair climbing, and has some discomfort sleeping due to LE positions. Pt has some limitations with prolonged standing, walking, and squatting.            Not a recurrent problem   Quality of life: good    Pain  Current pain ratin  At best pain ratin  At worst pain rating: 3  Location: Left knee  Relieving factors: rest and ice  Aggravating factors: standing, walking and stair climbing    Social Support  Steps to enter house: yes  Lives in: apartment    Employment status: not working  Hand dominance: right    Treatments  Current treatment: physical therapy        Objective Observations   Left Knee   Positive for edema and incision. Negative for drainage and trophic changes. Palpation   Left   No palpable tenderness to the rectus femoris.      Active Range of Motion   Left Knee   Flexion: 89 degrees   Extension: -10 degrees     Strength/Myotome Testing     Left Knee   Flexion: 4-  Extension: 4  Quadriceps contraction: fair             Precautions:   Past Medical History:   Diagnosis Date    Arthritis 2013    Bilateral carpal tunnel syndrome     Chronic pain disorder     low back    GERD (gastroesophageal reflux disease)     Hyperlipidemia     Low back pain     Lumbar disc disease     Lumbar spinal stenosis     Neck pain     had cervical fusion    Obesity     Psoriasis     right elbow    Tremor     left leg    Wears dentures     full dentures    Wears glasses        Date 12/15           Visit # 1           FOTO done           Re-eval                  Manuals 12/15            PROM of L Knee             Patella mobes L                                       Neuro Re-Ed             EO/EC             SLS             Lateral steps             Mini squats             Standing SLR 3 way  NV            HR/TR                          Ther Ex             Bike when able for ROM             Quad sets 5''x20            TKE sets 5''x20            Heel slides 10''x10            Gastroc st 3x30''            HS st  3x30''            Bridges             SLR             Ther Activity             Step ups, lat step downs  NV                         Gait Training                                       Modalities             CP 10'

## 2023-12-20 ENCOUNTER — OFFICE VISIT (OUTPATIENT)
Dept: PHYSICAL THERAPY | Facility: CLINIC | Age: 68
End: 2023-12-20
Payer: MEDICARE

## 2023-12-20 DIAGNOSIS — Z96.652 HISTORY OF TOTAL LEFT KNEE REPLACEMENT: Primary | ICD-10-CM

## 2023-12-20 DIAGNOSIS — Z96.652 AFTERCARE FOLLOWING LEFT KNEE JOINT REPLACEMENT SURGERY: ICD-10-CM

## 2023-12-20 DIAGNOSIS — Z47.1 AFTERCARE FOLLOWING LEFT KNEE JOINT REPLACEMENT SURGERY: ICD-10-CM

## 2023-12-20 PROCEDURE — 97110 THERAPEUTIC EXERCISES: CPT | Performed by: PHYSICAL THERAPIST

## 2023-12-20 PROCEDURE — 97010 HOT OR COLD PACKS THERAPY: CPT | Performed by: PHYSICAL THERAPIST

## 2023-12-20 PROCEDURE — 97140 MANUAL THERAPY 1/> REGIONS: CPT | Performed by: PHYSICAL THERAPIST

## 2023-12-20 NOTE — PROGRESS NOTES
"Daily Note     Today's date: 2023  Patient name: Aki Hilario  : 1955  MRN: [id number]  Referring provider: Hudson Smith MD  Dx:   Encounter Diagnosis     ICD-10-CM    1. History of total left knee replacement  Z96.652       2. Aftercare following left knee joint replacement surgery  Z47.1     Z96.652                      Subjective: Pt reports his knee is a little swollen today, but he has been walking frequently at home as well as icing every 2 hours.      Objective: See treatment diary below      Assessment:  Pt does well w progression of today session.  He is able to complete leg raises with minimal lag, and does well w progression of quad strengthening activities.  He is challenged w leg press.  He continues to be limited with knee flexion ROM, but does improve with mobs and PROM. Patient demonstrated fatigue post treatment and would benefit from continued PT.      Plan: Continue per plan of care.  Progress treatment as tolerated.       Precautions:   Past Medical History:   Diagnosis Date    Arthritis     Bilateral carpal tunnel syndrome     Chronic pain disorder     low back    GERD (gastroesophageal reflux disease)     Hyperlipidemia     Low back pain     Lumbar disc disease     Lumbar spinal stenosis     Neck pain     had cervical fusion    Obesity     Psoriasis     right elbow    Tremor     left leg    Wears dentures     full dentures    Wears glasses        23 L TKA  Date 12/15 12/20          Visit # 1 2          FOTO done           Re-eval              Manuals 12/15 12/20           PROM of L Knee  SF           Patella mobes L  SF                                     Neuro Re-Ed             EO/EC             SLS             Lateral steps  NV           Mini squats  10x           Standing SLR 3 way  NV 2x10 ea           HR/TR                          Ther Ex             Bike when able for ROM  3' arcs           Quad sets 5''x20 10x10\"           TKE sets 5''x20          " "  Heel slides 10''x10 20x10\" sb           Gastroc st 3x30'' 3x30\"           HS st  3x30''            Bridges  15x5\"           SLR  2x10           LAQs  20x5\"           S/l hip ABD  2x10           Leg press  20x 95#  SL 20x 45#                                     Ther Activity             Step ups, lat step downs  NV                         Gait Training                                       Modalities             CP 10'  10'                             "

## 2023-12-22 ENCOUNTER — APPOINTMENT (OUTPATIENT)
Dept: LAB | Facility: CLINIC | Age: 68
End: 2023-12-22
Payer: MEDICARE

## 2023-12-22 ENCOUNTER — OFFICE VISIT (OUTPATIENT)
Dept: PHYSICAL THERAPY | Facility: CLINIC | Age: 68
End: 2023-12-22
Payer: MEDICARE

## 2023-12-22 DIAGNOSIS — E78.2 MIXED HYPERLIPIDEMIA: ICD-10-CM

## 2023-12-22 DIAGNOSIS — E55.9 VITAMIN D DEFICIENCY: ICD-10-CM

## 2023-12-22 DIAGNOSIS — Z96.652 HISTORY OF TOTAL LEFT KNEE REPLACEMENT: Primary | ICD-10-CM

## 2023-12-22 DIAGNOSIS — R73.9 HYPERGLYCEMIA: ICD-10-CM

## 2023-12-22 DIAGNOSIS — Z47.1 AFTERCARE FOLLOWING LEFT KNEE JOINT REPLACEMENT SURGERY: ICD-10-CM

## 2023-12-22 DIAGNOSIS — Z96.652 AFTERCARE FOLLOWING LEFT KNEE JOINT REPLACEMENT SURGERY: ICD-10-CM

## 2023-12-22 LAB
25(OH)D3 SERPL-MCNC: 26.1 NG/ML (ref 30–100)
ALBUMIN SERPL BCP-MCNC: 4.1 G/DL (ref 3.5–5)
ALP SERPL-CCNC: 144 U/L (ref 34–104)
ALT SERPL W P-5'-P-CCNC: 13 U/L (ref 7–52)
ANION GAP SERPL CALCULATED.3IONS-SCNC: 7 MMOL/L
AST SERPL W P-5'-P-CCNC: 17 U/L (ref 13–39)
BILIRUB SERPL-MCNC: 0.93 MG/DL (ref 0.2–1)
BUN SERPL-MCNC: 20 MG/DL (ref 5–25)
CALCIUM SERPL-MCNC: 9.4 MG/DL (ref 8.4–10.2)
CHLORIDE SERPL-SCNC: 104 MMOL/L (ref 96–108)
CHOLEST SERPL-MCNC: 115 MG/DL
CO2 SERPL-SCNC: 27 MMOL/L (ref 21–32)
CREAT SERPL-MCNC: 0.63 MG/DL (ref 0.6–1.3)
GFR SERPL CREATININE-BSD FRML MDRD: 99 ML/MIN/1.73SQ M
GLUCOSE P FAST SERPL-MCNC: 92 MG/DL (ref 65–99)
HDLC SERPL-MCNC: 36 MG/DL
LDLC SERPL CALC-MCNC: 63 MG/DL (ref 0–100)
NONHDLC SERPL-MCNC: 79 MG/DL
POTASSIUM SERPL-SCNC: 4.2 MMOL/L (ref 3.5–5.3)
PROT SERPL-MCNC: 6.8 G/DL (ref 6.4–8.4)
SODIUM SERPL-SCNC: 138 MMOL/L (ref 135–147)
T4 SERPL-MCNC: 5.44 UG/DL (ref 6.09–12.23)
TRIGL SERPL-MCNC: 78 MG/DL
TSH SERPL DL<=0.05 MIU/L-ACNC: 1.89 UIU/ML (ref 0.45–4.5)

## 2023-12-22 PROCEDURE — 36415 COLL VENOUS BLD VENIPUNCTURE: CPT

## 2023-12-22 PROCEDURE — 97140 MANUAL THERAPY 1/> REGIONS: CPT | Performed by: PHYSICAL THERAPIST

## 2023-12-22 PROCEDURE — 97110 THERAPEUTIC EXERCISES: CPT | Performed by: PHYSICAL THERAPIST

## 2023-12-22 PROCEDURE — 84436 ASSAY OF TOTAL THYROXINE: CPT

## 2023-12-22 PROCEDURE — 80053 COMPREHEN METABOLIC PANEL: CPT

## 2023-12-22 PROCEDURE — 84443 ASSAY THYROID STIM HORMONE: CPT

## 2023-12-22 PROCEDURE — 82306 VITAMIN D 25 HYDROXY: CPT

## 2023-12-22 PROCEDURE — 97010 HOT OR COLD PACKS THERAPY: CPT | Performed by: PHYSICAL THERAPIST

## 2023-12-22 PROCEDURE — 80061 LIPID PANEL: CPT

## 2023-12-22 PROCEDURE — 83036 HEMOGLOBIN GLYCOSYLATED A1C: CPT

## 2023-12-22 NOTE — PROGRESS NOTES
"Daily Note     Today's date: 2023  Patient name: Aki Hilario  : 1955  MRN: [id number]  Referring provider: Hudson Smith MD  Dx:   Encounter Diagnosis     ICD-10-CM    1. History of total left knee replacement  Z96.652       2. Aftercare following left knee joint replacement surgery  Z47.1     Z96.652                      Subjective: Pt reports his knee feels looser after last session.      Objective: See treatment diary below      Assessment:  Pt has improved knee flexion ROM following manuals today.  He does well w SLR and has minimal pain t/o session.  He has lots of difficulty with trial of step ups on L LE.  Patient demonstrated fatigue post treatment and would benefit from continued PT.      Plan: Continue per plan of care.  Progress treatment as tolerated.       Precautions:   Past Medical History:   Diagnosis Date    Arthritis     Bilateral carpal tunnel syndrome     Chronic pain disorder     low back    GERD (gastroesophageal reflux disease)     Hyperlipidemia     Low back pain     Lumbar disc disease     Lumbar spinal stenosis     Neck pain     had cervical fusion    Obesity     Psoriasis     right elbow    Tremor     left leg    Wears dentures     full dentures    Wears glasses        23 L TKA  Date 12/15 12/20 12/22         Visit # 1 2 3         FOTO done           Re-eval              Manuals 12/15 12/20 12/22          PROM of L Knee  SF SF          Patella mobes L  SF SF                                    Neuro Re-Ed            EO/EC             SLS             Lateral steps  NV           Mini squats  10x 2x10          Standing SLR 3 way  NV 2x10 ea           HR/TR                          Ther Ex            Bike when able for ROM  3' arcs           Quad sets 5''x20 10x10\" 10x10\"          TKE sets 5''x20            Heel slides 10''x10 20x10\" sb 20x5\" SB          Gastroc st 3x30'' 3x30\"           HS st  3x30''            Bridges  15x5\" 15x5\"          SLR " " 2x10 2x10          LAQs  20x5\" 20x5\"          S/l hip ABD  2x10 2x10          Leg press  20x 95#  SL 20x 45# 20x 105#                                    Ther Activity             Step ups, lat step downs  NV  2x10 0R                       Gait Training                                       Modalities             CP 10'  10' 10'                              "

## 2023-12-23 LAB
EST. AVERAGE GLUCOSE BLD GHB EST-MCNC: 114 MG/DL
HBA1C MFR BLD: 5.6 %

## 2023-12-28 ENCOUNTER — OFFICE VISIT (OUTPATIENT)
Dept: PHYSICAL THERAPY | Facility: CLINIC | Age: 68
End: 2023-12-28
Payer: MEDICARE

## 2023-12-28 DIAGNOSIS — Z96.652 HISTORY OF TOTAL LEFT KNEE REPLACEMENT: Primary | ICD-10-CM

## 2023-12-28 DIAGNOSIS — Z47.1 AFTERCARE FOLLOWING LEFT KNEE JOINT REPLACEMENT SURGERY: ICD-10-CM

## 2023-12-28 DIAGNOSIS — Z96.652 AFTERCARE FOLLOWING LEFT KNEE JOINT REPLACEMENT SURGERY: ICD-10-CM

## 2023-12-28 PROCEDURE — 97110 THERAPEUTIC EXERCISES: CPT | Performed by: PHYSICAL THERAPIST

## 2023-12-28 PROCEDURE — 97140 MANUAL THERAPY 1/> REGIONS: CPT | Performed by: PHYSICAL THERAPIST

## 2023-12-28 NOTE — PROGRESS NOTES
"Daily Note     Today's date: 2023  Patient name: Aki Hilario  : 1955  MRN: [id number]  Referring provider: Hudson Smith MD  Dx:   Encounter Diagnosis     ICD-10-CM    1. History of total left knee replacement  Z96.652       2. Aftercare following left knee joint replacement surgery  Z47.1     Z96.652                      Subjective: Pt reports his knee swelling is going down.      Objective: L knee flexion AROM = 100 degrees  L knee flexion PROM = 108 degrees      Assessment: Pt does well w progression of today's session and is able to complete full revolutions on the bike.  He does well w leg press progression and will benefit from increase in weight NV.  He continues to improve his knee flexion ROM and improves following manuals. Patient demonstrated fatigue post treatment and would benefit from continued PT.      Plan: Continue per plan of care.  Progress treatment as tolerated.       Precautions:   Past Medical History:   Diagnosis Date    Arthritis     Bilateral carpal tunnel syndrome     Chronic pain disorder     low back    GERD (gastroesophageal reflux disease)     Hyperlipidemia     Low back pain     Lumbar disc disease     Lumbar spinal stenosis     Neck pain     had cervical fusion    Obesity     Psoriasis     right elbow    Tremor     left leg    Wears dentures     full dentures    Wears glasses        23 L TKA  Date 12/15 12/20 12/22 12/28        Visit # 1 2 3 4        FOTO done           Re-eval              Manuals 12/15 12/20 12/22 12/28         PROM of L Knee  SF SF SF         Patella mobes L  SF SF SF                                   Neuro Re-Ed           EO/EC             SLS             Lateral steps  NV           Mini squats  10x 2x10 2x10         Standing SLR 3 way  NV 2x10 ea           HR/TR                          Ther Ex           Bike when able for ROM  3' arcs  5' arcs         Quad sets 5''x20 10x10\" 10x10\"          TKE sets " "5''x20            Heel slides 10''x10 20x10\" sb 20x5\" SB 20x5\" SB         Gastroc st 3x30'' 3x30\"           Quad str    3x30\"         HS st  3x30''            Bridges  15x5\" 15x5\" 2x10         SLR  2x10 2x10 3x10         LAQs  20x5\" 20x5\" 2x15 2#         S/l hip ABD  2x10 2x10 3x10         Leg press  20x 95#  SL 20x 45# 20x 105# 3x10 115#         SL leg press    4x10 55#                      Ther Activity             Step ups, lat step downs  NV  2x10 0R                       Gait Training                                       Modalities             CP 10'  10' 10'                                "

## 2024-01-02 ENCOUNTER — OFFICE VISIT (OUTPATIENT)
Dept: OBGYN CLINIC | Facility: MEDICAL CENTER | Age: 69
End: 2024-01-02

## 2024-01-02 VITALS
HEIGHT: 69 IN | SYSTOLIC BLOOD PRESSURE: 149 MMHG | WEIGHT: 225 LBS | HEART RATE: 71 BPM | BODY MASS INDEX: 33.33 KG/M2 | DIASTOLIC BLOOD PRESSURE: 67 MMHG

## 2024-01-02 DIAGNOSIS — Z96.652 AFTERCARE FOLLOWING LEFT KNEE JOINT REPLACEMENT SURGERY: Primary | ICD-10-CM

## 2024-01-02 DIAGNOSIS — Z47.1 AFTERCARE FOLLOWING LEFT KNEE JOINT REPLACEMENT SURGERY: Primary | ICD-10-CM

## 2024-01-02 PROCEDURE — 99024 POSTOP FOLLOW-UP VISIT: CPT | Performed by: STUDENT IN AN ORGANIZED HEALTH CARE EDUCATION/TRAINING PROGRAM

## 2024-01-02 NOTE — PROGRESS NOTES
Subjective: 68 y.o. male presents to the office 6 weeks s/p left total knee arthroplasty performed on 11/24/2023. Patient seen and examined. Pain controlled, he does note medial knee pain along the arthrotomy. Progressing well. Ambulates with the assistance of a cane. Incision without drainage. He does note a rash to the distal left lower extremity. Denies fevers or chills    Physical Exam:  Incision: healed  ROM: 0-105 without pain  5/5 IP/Q/HS/TA/GS, 2+ DP/PT, SILT DP/SP/S/S/TN    No new imaging obtained today    Assessment/Plan:  6 weeks s/p left total knee arthroplasty performed on 11/24/2023    - continue multi-modal pain control   - Weight bearing status: as tolerated  - DVT ppx: completed  - He may put creams over his rash, likely stasis dermatitis. Continue use of compression sock  - PT/OT  - F/U in 6 weeks    Scribe Attestation      I,:  Zaida Navarrete am acting as a scribe while in the presence of the attending physician.:       I,:  Valeriy Topete DO personally performed the services described in this documentation    as scribed in my presence.:

## 2024-01-03 ENCOUNTER — OFFICE VISIT (OUTPATIENT)
Dept: PHYSICAL THERAPY | Facility: CLINIC | Age: 69
End: 2024-01-03
Payer: MEDICARE

## 2024-01-03 DIAGNOSIS — Z47.1 AFTERCARE FOLLOWING LEFT KNEE JOINT REPLACEMENT SURGERY: ICD-10-CM

## 2024-01-03 DIAGNOSIS — Z96.652 HISTORY OF TOTAL LEFT KNEE REPLACEMENT: Primary | ICD-10-CM

## 2024-01-03 DIAGNOSIS — Z96.652 AFTERCARE FOLLOWING LEFT KNEE JOINT REPLACEMENT SURGERY: ICD-10-CM

## 2024-01-03 PROCEDURE — 97140 MANUAL THERAPY 1/> REGIONS: CPT | Performed by: PHYSICAL THERAPIST

## 2024-01-03 PROCEDURE — 97110 THERAPEUTIC EXERCISES: CPT | Performed by: PHYSICAL THERAPIST

## 2024-01-03 NOTE — PROGRESS NOTES
"Daily Note     Today's date: 1/3/2024  Patient name: Aki Hilario  : 1955  MRN: [id number]  Referring provider: Hudson Smith MD  Dx:   Encounter Diagnosis     ICD-10-CM    1. History of total left knee replacement  Z96.652       2. Aftercare following left knee joint replacement surgery  Z47.1     Z96.652                      Subjective: Pt reports lots of pain and stiffness last night and his knee pain kept him up.      Objective: See treatment diary below      Assessment:  Pt has increased difficulty completing full revolutions on the recumbent bike due to today's stiffness.  He does well w increased weight on leg press and has improved knee flexion ROM following PROM and stretching. Patient demonstrated fatigue post treatment, exhibited good technique with therapeutic exercises, and would benefit from continued PT.      Plan: Continue per plan of care.  Progress treatment as tolerated.       Precautions:   Past Medical History:   Diagnosis Date    Arthritis     Bilateral carpal tunnel syndrome     Chronic pain disorder     low back    GERD (gastroesophageal reflux disease)     Hyperlipidemia     Low back pain     Lumbar disc disease     Lumbar spinal stenosis     Neck pain     had cervical fusion    Obesity     Psoriasis     right elbow    Tremor     left leg    Wears dentures     full dentures    Wears glasses        23 L TKA  Date 12/15 12/20 12/22 12/28 1/3       Visit # 1 2 3 4 5       FOTO done    NV       Re-eval              Manuals 12/15 12/20 12/22 12/28 1/3        PROM of L Knee  SF SF SF SF        Patella mobes L  SF SF SF SF                                  Neuro Re-Ed  12/20 12/22 12/28 1/3        EO/EC             SLS             Lateral steps  NV           Mini squats  10x 2x10 2x10 2x10        Standing SLR 3 way  NV 2x10 ea           HR/TR                          Ther Ex  12/20 12/22 12/28 1/3        Bike when able for ROM  3' arcs  5' arcs 6'        Quad sets 5''x20 10x10\" " "10x10\"          TKE sets 5''x20            Heel slides 10''x10 20x10\" sb 20x5\" SB 20x5\" SB 20x5\" SB        Gastroc st 3x30'' 3x30\"           Quad str    3x30\"         HS st  3x30''            Bridges  15x5\" 15x5\" 2x10 2x10        SLR  2x10 2x10 3x10 3x10        LAQs  20x5\" 20x5\" 2x15 2#         S/l hip ABD  2x10 2x10 3x10 3x10        Leg press  20x 95#  SL 20x 45# 20x 105# 3x10 115# 2x15 115#        SL leg press    4x10 55# 2x15 65#                     Ther Activity             Step ups, lat step downs  NV  2x10 0R  2x10 1R                     Gait Training                                       Modalities             CP 10'  10' 10'                                  "

## 2024-01-05 ENCOUNTER — OFFICE VISIT (OUTPATIENT)
Dept: PHYSICAL THERAPY | Facility: CLINIC | Age: 69
End: 2024-01-05
Payer: MEDICARE

## 2024-01-05 DIAGNOSIS — Z96.652 AFTERCARE FOLLOWING LEFT KNEE JOINT REPLACEMENT SURGERY: ICD-10-CM

## 2024-01-05 DIAGNOSIS — Z96.652 HISTORY OF TOTAL LEFT KNEE REPLACEMENT: Primary | ICD-10-CM

## 2024-01-05 DIAGNOSIS — Z47.1 AFTERCARE FOLLOWING LEFT KNEE JOINT REPLACEMENT SURGERY: ICD-10-CM

## 2024-01-05 PROCEDURE — 97110 THERAPEUTIC EXERCISES: CPT

## 2024-01-05 PROCEDURE — 97140 MANUAL THERAPY 1/> REGIONS: CPT

## 2024-01-05 PROCEDURE — 97112 NEUROMUSCULAR REEDUCATION: CPT

## 2024-01-05 NOTE — PROGRESS NOTES
Daily Note     Today's date: 2024  Patient name: Aki Hilario  : 1955  MRN: [id number]  Referring provider: Hudson Smith MD  Dx:   Encounter Diagnosis     ICD-10-CM    1. History of total left knee replacement  Z96.652       2. Aftercare following left knee joint replacement surgery  Z47.1     Z96.652           Start Time: 0930  Stop Time: 1010  Total time in clinic (min): 40 minutes    Subjective: Pt stated that today is the best that his knee has felt in a little while but it still feels stiff and has a bit of pain as well.       Objective: See treatment diary below      Assessment: Pt continues to have difficulty performing full revolutions on bike as warm up at beginning of session, but tolerated half revolutions with gradual improvement in ROM as warm up progressed and was able to perform full revolutions when pedaling backwards but not forwards. Pt tolerated manual L knee PROM and patellar mobilizations with reported decrease in stiffness post manual treatment. Pt demonstrated good quad activation today during knee extension activities and performed SLR with improved form and tolerance compared to last visit. Pt performed leg press activities with increased resistance well and would benefit from continued progression of resistance next visit. Pt would benefit from continued skilled PT to improve LLE strength, mobility, flexibility, balance, gait, and functional ability.         Plan: Continue per plan of care.  Progress treatment as tolerated.       Precautions:   Past Medical History:   Diagnosis Date    Arthritis 2013    Bilateral carpal tunnel syndrome     Chronic pain disorder     low back    GERD (gastroesophageal reflux disease)     Hyperlipidemia     Low back pain     Lumbar disc disease     Lumbar spinal stenosis     Neck pain     had cervical fusion    Obesity     Psoriasis     right elbow    Tremor     left leg    Wears dentures     full dentures    Wears glasses        23 L  "TKA  Date 12/15 12/20 12/22 12/28 1/3 1/5      Visit # 1 2 3 4 5 6      FOTO done    NV done      Re-eval              Manuals 12/15 12/20 12/22 12/28 1/3 1/5       PROM of L Knee  SF SF SF SF DK       Patella mobes L  SF SF SF SF DK                                 Neuro Re-Ed  12/20 12/22 12/28 1/3 1/5       EO/EC             SLS             Lateral steps  NV           Mini squats  10x 2x10 2x10 2x10 2x10       Standing SLR 3 way  NV 2x10 ea           HR/TR                          Ther Ex  12/20 12/22 12/28 1/3 1/5       Bike when able for ROM  3' arcs  5' arcs 6' 3' half revs, 2' reversed full       Quad sets 5''x20 10x10\" 10x10\"          TKE sets 5''x20            Heel slides 10''x10 20x10\" sb 20x5\" SB 20x5\" SB 20x5\" SB 20x5\" SB       Gastroc st 3x30'' 3x30\"           Quad str    3x30\"         HS st  3x30''            Bridges  15x5\" 15x5\" 2x10 2x10 2x10       SLR  2x10 2x10 3x10 3x10 3x10       LAQs  20x5\" 20x5\" 2x15 2#  2x15 2# SAQ       S/l hip ABD  2x10 2x10 3x10 3x10 3x10       Leg press  20x 95#  SL 20x 45# 20x 105# 3x10 115# 2x15 115# 2x15 125# BLE       SL leg press    4x10 55# 2x15 65# 2x15 75#                     Ther Activity      1/5       Step ups, lat step downs  NV  2x10 0R  2x10 1R 2x10                     Gait Training                                       Modalities             CP 10'  10' 10'                                    "

## 2024-01-10 ENCOUNTER — OFFICE VISIT (OUTPATIENT)
Dept: PHYSICAL THERAPY | Facility: CLINIC | Age: 69
End: 2024-01-10
Payer: MEDICARE

## 2024-01-10 DIAGNOSIS — Z96.652 AFTERCARE FOLLOWING LEFT KNEE JOINT REPLACEMENT SURGERY: ICD-10-CM

## 2024-01-10 DIAGNOSIS — Z96.652 HISTORY OF TOTAL LEFT KNEE REPLACEMENT: Primary | ICD-10-CM

## 2024-01-10 DIAGNOSIS — Z47.1 AFTERCARE FOLLOWING LEFT KNEE JOINT REPLACEMENT SURGERY: ICD-10-CM

## 2024-01-10 PROCEDURE — 97140 MANUAL THERAPY 1/> REGIONS: CPT | Performed by: PHYSICAL THERAPIST

## 2024-01-10 PROCEDURE — 97110 THERAPEUTIC EXERCISES: CPT | Performed by: PHYSICAL THERAPIST

## 2024-01-10 NOTE — PROGRESS NOTES
Daily Note     Today's date: 1/10/2024  Patient name: Aki iHlario  : 1955  MRN: [id number]  Referring provider: Hudson Smith MD  Dx:   Encounter Diagnosis     ICD-10-CM    1. History of total left knee replacement  Z96.652       2. Aftercare following left knee joint replacement surgery  Z47.1     Z96.652                      Subjective: Pt reports his knee is doing well, but still swells up a little each day and goes down at night.      Objective: See treatment diary below    Assessment:  Pt is challenged with progression of today's session.  He has improved knee flexion ROM and completes full revolutions on recumbent bike.  He does well w added weight for SLR.  He will benefit from progression of steps NV.  Patient demonstrated fatigue post treatment, exhibited good technique with therapeutic exercises, and would benefit from continued PT.      Plan: Continue per plan of care.  Progress treatment as tolerated.       Precautions:   Past Medical History:   Diagnosis Date    Arthritis     Bilateral carpal tunnel syndrome     Chronic pain disorder     low back    GERD (gastroesophageal reflux disease)     Hyperlipidemia     Low back pain     Lumbar disc disease     Lumbar spinal stenosis     Neck pain     had cervical fusion    Obesity     Psoriasis     right elbow    Tremor     left leg    Wears dentures     full dentures    Wears glasses        23 L TKA  Date 12/15 12/20 12/22 12/28 1/3 1/5 1/10     Visit # 1 2 3 4 5 6 7     FOTO done    NV done      Re-eval              Manuals 12/15 12/20 12/22 12/28 1/3 1/5 1/10      PROM of L Knee  SF SF SF SF DK SF      Patella mobes L  SF SF SF SF DK SF                                Neuro Re-Ed  12/20 12/22 12/28 1/3 1/5       EO/EC             SLS             Lateral steps  NV           Mini squats  10x 2x10 2x10 2x10 2x10 20x      Standing SLR 3 way  NV 2x10 ea           HR/TR                          Ther Ex  12/20 12/22 12/28 1/3 1/5 1/10      Bike  "when able for ROM  3' arcs  5' arcs 6' 3' half revs, 2' reversed full 6'      Quad sets 5''x20 10x10\" 10x10\"          TKE sets 5''x20            Heel slides 10''x10 20x10\" sb 20x5\" SB 20x5\" SB 20x5\" SB 20x5\" SB 30x5\" SB      Gastroc st 3x30'' 3x30\"           Quad str    3x30\"         HS st  3x30''      3x30\"      Bridges  15x5\" 15x5\" 2x10 2x10 2x10 20x      SLR  2x10 2x10 3x10 3x10 3x10 3x10 1#      LAQs  20x5\" 20x5\" 2x15 2#  2x15 2# SAQ 2x15 3# LAQ      S/l hip ABD  2x10 2x10 3x10 3x10 3x10 3x10 1#      Leg press  20x 95#  SL 20x 45# 20x 105# 3x10 115# 2x15 115# 2x15 125# BLE 2x15 145# BLE      SL leg press    4x10 55# 2x15 65# 2x15 75#  2x15 75#                    Ther Activity      1/5       Step ups, lat step downs  NV  2x10 0R  2x10 1R 2x10                     Gait Training                                       Modalities             CP 10'  10' 10'                                      "

## 2024-01-12 ENCOUNTER — APPOINTMENT (OUTPATIENT)
Dept: PHYSICAL THERAPY | Facility: CLINIC | Age: 69
End: 2024-01-12
Payer: MEDICARE

## 2024-01-14 DIAGNOSIS — M17.12 PRIMARY OSTEOARTHRITIS OF LEFT KNEE: ICD-10-CM

## 2024-01-15 RX ORDER — CELECOXIB 200 MG/1
CAPSULE ORAL
Qty: 60 CAPSULE | Refills: 0 | Status: SHIPPED | OUTPATIENT
Start: 2024-01-15

## 2024-01-15 RX ORDER — ASCORBIC ACID 500 MG
TABLET ORAL
Qty: 60 TABLET | Refills: 1 | Status: SHIPPED | OUTPATIENT
Start: 2024-01-15

## 2024-01-15 RX ORDER — ASPIRIN 81 MG/1
81 TABLET, COATED ORAL 2 TIMES DAILY
Qty: 60 TABLET | Refills: 0 | Status: SHIPPED | OUTPATIENT
Start: 2024-01-15

## 2024-01-17 ENCOUNTER — OFFICE VISIT (OUTPATIENT)
Dept: PHYSICAL THERAPY | Facility: CLINIC | Age: 69
End: 2024-01-17
Payer: MEDICARE

## 2024-01-17 DIAGNOSIS — Z47.1 AFTERCARE FOLLOWING LEFT KNEE JOINT REPLACEMENT SURGERY: ICD-10-CM

## 2024-01-17 DIAGNOSIS — Z96.652 HISTORY OF TOTAL LEFT KNEE REPLACEMENT: Primary | ICD-10-CM

## 2024-01-17 DIAGNOSIS — Z96.652 AFTERCARE FOLLOWING LEFT KNEE JOINT REPLACEMENT SURGERY: ICD-10-CM

## 2024-01-17 PROCEDURE — 97110 THERAPEUTIC EXERCISES: CPT | Performed by: PHYSICAL THERAPIST

## 2024-01-17 PROCEDURE — 97140 MANUAL THERAPY 1/> REGIONS: CPT | Performed by: PHYSICAL THERAPIST

## 2024-01-17 NOTE — PROGRESS NOTES
"Daily Note     Today's date: 2024  Patient name: Aki Hilario  : 1955  MRN: [id number]  Referring provider: Hudson Smith MD  Dx:   Encounter Diagnosis     ICD-10-CM    1. History of total left knee replacement  Z96.652       2. Aftercare following left knee joint replacement surgery  Z47.1     Z96.652                      Subjective: Pt reports lots of knee soreness after exercising, walking, and shoveling over the past 2 days.      Objective: See treatment diary below      Assessment: Pt has some stiffness initially on the recumbent bike, but loosens up and completes full revolutions after about 2 minutes.   He does well w trial of treadmill walking.  Pt is challenged w progression of leg press today, and has improved PROM into flexion w minimal pain.  Patient demonstrated fatigue post treatment, exhibited good technique with therapeutic exercises, and would benefit from continued PT.      Plan: Continue per plan of care.  Progress treatment as tolerated.       Precautions: 23 L TKA    Date 12/15 12/20 12/22 12/28 1/3 1/5 1/10 1/17    Visit # 1 2 3 4 5 6 7 8    FOTO done    NV done      Re-eval              Manuals 12/15 12/20 12/22 12/28 1/3 1/5 1/10 1/17     PROM of L Knee  SF SF SF SF DK SF SF     Patella mobes L  SF SF SF SF DK SF SF                               Neuro Re-Ed  12/20 12/22 12/28 1/3 1/5  1/17     EO/EC             SLS             Lateral steps  NV           Mini squats  10x 2x10 2x10 2x10 2x10 20x      Standing SLR 3 way  NV 2x10 ea           HR/TR                          Ther Ex  12/20 12/22 12/28 1/3 1/5 1/10 1/17     Bike when able for ROM  3' arcs  5' arcs 6' 3' half revs, 2' reversed full 6' 6'     Treadmill        3' 1.0 mph     TKE sets 5''x20            Heel slides 10''x10 20x10\" sb 20x5\" SB 20x5\" SB 20x5\" SB 20x5\" SB 30x5\" SB 20x5\" SB     Gastroc st 3x30'' 3x30\"           Quad str    3x30\"         HS st  3x30''      3x30\"      Bridges  15x5\" 15x5\" 2x10 2x10 2x10 " "20x 20x     SLR  2x10 2x10 3x10 3x10 3x10 3x10 1# 3x10 1#     LAQs  20x5\" 20x5\" 2x15 2#  2x15 2# SAQ 2x15 3# LAQ 2x15 3# LAQ     S/l hip ABD  2x10 2x10 3x10 3x10 3x10 3x10 1# 3x10 1#     Leg press  20x 95#  SL 20x 45# 20x 105# 3x10 115# 2x15 115# 2x15 125# BLE 2x15 145# BLE 2x15 145# BLE     SL leg press    4x10 55# 2x15 65# 2x15 75#  2x15 75#  2x15 85#                   Ther Activity      1/5       Step ups, lat step downs  NV  2x10 0R  2x10 1R 2x10                     Gait Training                                       Modalities             CP 10'  10' 10'                                        "

## 2024-01-24 ENCOUNTER — OFFICE VISIT (OUTPATIENT)
Dept: PHYSICAL THERAPY | Facility: CLINIC | Age: 69
End: 2024-01-24
Payer: MEDICARE

## 2024-01-24 DIAGNOSIS — Z96.652 HISTORY OF TOTAL LEFT KNEE REPLACEMENT: Primary | ICD-10-CM

## 2024-01-24 PROCEDURE — 97140 MANUAL THERAPY 1/> REGIONS: CPT

## 2024-01-24 PROCEDURE — 97110 THERAPEUTIC EXERCISES: CPT

## 2024-01-24 PROCEDURE — 97150 GROUP THERAPEUTIC PROCEDURES: CPT

## 2024-01-24 NOTE — PROGRESS NOTES
"Daily Note     Today's date: 2024  Patient name: Aki Hilario  : 1955  MRN: [id number]  Referring provider: Hudson Smith MD  Dx:   Encounter Diagnosis     ICD-10-CM    1. History of total left knee replacement  Z96.652           Start Time: 801  Stop Time: 0834  Total time in clinic (min): 33 minutes    Subjective: Reports that he is doing well. Notes that he is feeling pretty solid. Felt good after last time. \"Everyday feels like it is going to get better\".     Objective: See treatment diary below    [id number] group   [id number] 1on1 (23 mins)     Assessment: Tolerated treatment well. Aki continues to make good progress. Able to progress resistance (SL leg press) and repetitions (LAQ) that demonstrate improvements in quad control and strength. Continues to ambulate with SPC and reduced knee extension on affected side, crouched gait on L side that should improve with further terminal knee extension activities either with theraband or ashley. Primary complaint with knee flexion, showed patient forward lunge on step knee flexion stretch to do at home to continue encouraging knee flexion gains. Patient demonstrated fatigue post treatment, exhibited good technique with therapeutic exercises, and would benefit from continued PT      Plan: Continue per plan of care.  Progress treatment as tolerated.       Precautions: 23 L TKA    Date 12/15 12/20 12/22 12/28 1/3 1/5 1/10 1/17 1/24    Visit # 1 2 3 4 5 6 7 8 9    FOTO done    NV done       Re-eval               Manuals 12/15 12/20 12/22 12/28 1/3 1/5 1/10 1/17 1/24    PROM of L Knee  SF SF SF SF DK SF SF MH     Patella mobes L  SF SF SF SF DK SF SF MH                               Neuro Re-Ed  12/20 12/22 12/28 1/3 1/5  1/17 1/24    EO/EC             SLS             Lateral steps  NV           Mini squats  10x 2x10 2x10 2x10 2x10 20x      Standing SLR 3 way  NV 2x10 ea           HR/TR                          Ther Ex  12/20 12/22 12/28 1/3 1/5 1/10 " "1/17 1/24    Bike when able for ROM  3' arcs  5' arcs 6' 3' half revs, 2' reversed full 6' 6' 6'     Treadmill        3' 1.0 mph 3' 1.0 mph     TKE sets 5''x20            Heel slides 10''x10 20x10\" sb 20x5\" SB 20x5\" SB 20x5\" SB 20x5\" SB 30x5\" SB 20x5\" SB 20x5\" SB     Gastroc st 3x30'' 3x30\"           Quad str    3x30\"         HS st  3x30''      3x30\"      Bridges  15x5\" 15x5\" 2x10 2x10 2x10 20x 20x 20x5\"    SLR  2x10 2x10 3x10 3x10 3x10 3x10 1# 3x10 1# 3x10 1#     LAQs  20x5\" 20x5\" 2x15 2#  2x15 2# SAQ 2x15 3# LAQ 2x15 3# LAQ 2x20 3#     S/l hip ABD  2x10 2x10 3x10 3x10 3x10 3x10 1# 3x10 1# 3x10 1#     Leg press  20x 95#  SL 20x 45# 20x 105# 3x10 115# 2x15 115# 2x15 125# BLE 2x15 145# BLE 2x15 145# BLE 2x15 145# BLE     SL leg press    4x10 55# 2x15 65# 2x15 75#  2x15 75#  2x15 85#  2x15 85# 2x15     Forward lunge knee flex on step          10x                 Ther Activity      1/5       Step ups, lat step downs  NV  2x10 0R  2x10 1R 2x10                     Gait Training                                       Modalities             CP 10'  10' 10'                                          "

## 2024-01-26 ENCOUNTER — OFFICE VISIT (OUTPATIENT)
Dept: PHYSICAL THERAPY | Facility: CLINIC | Age: 69
End: 2024-01-26
Payer: MEDICARE

## 2024-01-26 DIAGNOSIS — Z96.652 HISTORY OF TOTAL LEFT KNEE REPLACEMENT: Primary | ICD-10-CM

## 2024-01-26 PROCEDURE — 97140 MANUAL THERAPY 1/> REGIONS: CPT

## 2024-01-26 PROCEDURE — 97110 THERAPEUTIC EXERCISES: CPT

## 2024-01-26 NOTE — PROGRESS NOTES
"Daily Note     Today's date: 2024  Patient name: Aki Hilario  : 1955  MRN: [id number]  Referring provider: Hudson Smith MD  Dx:   Encounter Diagnosis     ICD-10-CM    1. History of total left knee replacement  Z96.652                      Subjective: Pt presents to PT with no complaints.       Objective: See treatment diary below      Assessment: Pt demonstrates good tolerance to PT performing full rev on recumbent bike today.  Pt continues to increase strength while performing SLR in 2 directions with increased weight with no adverse effects noted.  Did note that pt experiences quad lag in L LE with fatigue. Pt continues to work toward PT goals.   Patient demonstrated fatigue post treatment, exhibited good technique with therapeutic exercises, and would benefit from continued PT to increase flexibility, strength and function.        Plan: Continue per plan of care.      Precautions: 23 L TKA    Date 12/15 12/20 12/22 12/28 1/3 1/5 1/10 1/17 1/24 1/26   Visit # 1 2 3 4 5 6 7 8 9 10   FOTO done    NV done       Re-eval               Manuals 12/15 12/20 12/22 12/28 1/3 1/5 1/10 1/17 1/24 1/26   PROM of L Knee  SF SF SF SF DK SF SF MH  PK   Patella mobes L  SF SF SF SF DK SF SF MH  PK                             Neuro Re-Ed  12/20 12/22 12/28 1/3 1/5  1/17 1/24 1/26   EO/EC             SLS             Lateral steps  NV           Mini squats  10x 2x10 2x10 2x10 2x10 20x      Standing SLR 3 way  NV 2x10 ea           HR/TR                          Ther Ex  12/20 12/22 12/28 1/3 1/5 1/10 1/17 1/24 1/26   Bike when able for ROM  3' arcs  5' arcs 6' 3' half revs, 2' reversed full 6' 6' 6'  8' L=1 full forward rev   Treadmill        3' 1.0 mph 3' 1.0 mph  nv   TKE sets 5''x20            Heel slides 10''x10 20x10\" sb 20x5\" SB 20x5\" SB 20x5\" SB 20x5\" SB 30x5\" SB 20x5\" SB 20x5\" SB  nv   Gastroc st 3x30'' 3x30\"           Quad str    3x30\"         HS st  3x30''      3x30\"   30\" x 3   Bridges  15x5\" 15x5\" " "2x10 2x10 2x10 20x 20x 20x5\" 20x5\"   SLR  2x10 2x10 3x10 3x10 3x10 3x10 1# 3x10 1# 3x10 1#  2x10 2#   LAQs  20x5\" 20x5\" 2x15 2#  2x15 2# SAQ 2x15 3# LAQ 2x15 3# LAQ 2x20 3#  4# 1 x 20   S/l hip ABD  2x10 2x10 3x10 3x10 3x10 3x10 1# 3x10 1# 3x10 1#  2x10 2#   Leg press  20x 95#  SL 20x 45# 20x 105# 3x10 115# 2x15 115# 2x15 125# BLE 2x15 145# BLE 2x15 145# BLE 2x15 145# BLE  2x15 145# BLE    SL leg press    4x10 55# 2x15 65# 2x15 75#  2x15 75#  2x15 85#  2x15 85# 2x15  85# 2x15    Forward lunge knee flex on step          10x 10x 10\"                Ther Activity      1/5 1/26   Step ups, lat step downs  NV  2x10 0R  2x10 1R 2x10                     Gait Training                                       Modalities             CP 10'  10' 10'                                            "

## 2024-01-31 ENCOUNTER — OFFICE VISIT (OUTPATIENT)
Dept: PHYSICAL THERAPY | Facility: CLINIC | Age: 69
End: 2024-01-31
Payer: MEDICARE

## 2024-01-31 DIAGNOSIS — Z96.652 AFTERCARE FOLLOWING LEFT KNEE JOINT REPLACEMENT SURGERY: ICD-10-CM

## 2024-01-31 DIAGNOSIS — Z96.652 HISTORY OF TOTAL LEFT KNEE REPLACEMENT: Primary | ICD-10-CM

## 2024-01-31 DIAGNOSIS — Z47.1 AFTERCARE FOLLOWING LEFT KNEE JOINT REPLACEMENT SURGERY: ICD-10-CM

## 2024-01-31 PROCEDURE — 97112 NEUROMUSCULAR REEDUCATION: CPT | Performed by: PHYSICAL MEDICINE & REHABILITATION

## 2024-01-31 PROCEDURE — 97110 THERAPEUTIC EXERCISES: CPT | Performed by: PHYSICAL THERAPIST

## 2024-01-31 PROCEDURE — 97140 MANUAL THERAPY 1/> REGIONS: CPT | Performed by: PHYSICAL THERAPIST

## 2024-01-31 NOTE — PROGRESS NOTES
"Daily Note     Today's date: 2024  Patient name: Aki Hilario  : 1955  MRN: [id number]  Referring provider: Hudson Smith MD  Dx:   Encounter Diagnosis     ICD-10-CM    1. History of total left knee replacement  Z96.652       2. Aftercare following left knee joint replacement surgery  Z47.1     Z96.652                      Subjective: Pt reports his knee is doing well, but he still gets some swelling and soreness if walking long periods.      Objective: See treatment diary below      Assessment:  Pt does well w today's session and progresses leg press without issue.  He does well w raised step ups during today's session. He completes SLR and s/l hip ABD w appropriate fatigue.  He continues to have some stiffness with knee flexion PROM.  Patient demonstrated fatigue post treatment, exhibited good technique with therapeutic exercises, and would benefit from continued PT.      Plan: Continue per plan of care.  Progress treatment as tolerated.       Precautions: 23 L TKA    Date 1/31   12/28 1/3 1/5 1/10 1/17 1/24 1/26   Visit # 11   4 5 6 7 8 9 10   FOTO xx    NV done       Re-eval               Manuals 1/31   12/28 1/3 1/5 1/10 1/17 1/24 1/26   PROM of L Knee SF   SF SF DK SF SF MH  PK   Patella mobes L SF   SF SF DK SF SF MH  PK                             Neuro Re-Ed 1/31   12/28 1/3 1/5  1/17 1/24 1/26   EO/EC             SLS             Lateral steps             Mini squats 20x   2x10 2x10 2x10 20x      Standing SLR 3 way              HR/TR HR 20x                         Ther Ex 1/31   12/28 1/3 1/5 1/10 1/17 1/24 1/26   Bike when able for ROM 8'   5' arcs 6' 3' half revs, 2' reversed full 6' 6' 6'  8' L=1 full forward rev   Treadmill 4' 1.2 mph       3' 1.0 mph 3' 1.0 mph  nv   TKE sets             Heel slides 20x5\"   20x5\" SB 20x5\" SB 20x5\" SB 30x5\" SB 20x5\" SB 20x5\" SB  nv   Gastroc st             Quad str    3x30\"         HS st        3x30\"   30\" x 3   Bridges 20x5\"   2x10 2x10 2x10 20x " "20x 20x5\" 20x5\"   SLR 2# 2x10   3x10 3x10 3x10 3x10 1# 3x10 1# 3x10 1#  2x10 2#   LAQs 4# 2x10   2x15 2#  2x15 2# SAQ 2x15 3# LAQ 2x15 3# LAQ 2x20 3#  4# 1 x 20   S/l hip ABD 2#   3x10 3x10 3x10 3x10 1# 3x10 1# 3x10 1#  2x10 2#   Leg press 3x15 165# BLE    3x10 115# 2x15 115# 2x15 125# BLE 2x15 145# BLE 2x15 145# BLE 2x15 145# BLE  2x15 145# BLE    SL leg press 95# 2x15   4x10 55# 2x15 65# 2x15 75#  2x15 75#  2x15 85#  2x15 85# 2x15  85# 2x15    Forward lunge knee flex on step          10x 10x 10\"                Ther Activity 1/31 1/5 1/26   Step ups, lat step downs  2R 2x10    2x10 1R 2x10                     Gait Training                                       Modalities             CP                                                 "

## 2024-02-02 ENCOUNTER — OFFICE VISIT (OUTPATIENT)
Dept: PHYSICAL THERAPY | Facility: CLINIC | Age: 69
End: 2024-02-02
Payer: MEDICARE

## 2024-02-02 DIAGNOSIS — Z96.652 HISTORY OF TOTAL LEFT KNEE REPLACEMENT: Primary | ICD-10-CM

## 2024-02-02 DIAGNOSIS — Z96.652 AFTERCARE FOLLOWING LEFT KNEE JOINT REPLACEMENT SURGERY: ICD-10-CM

## 2024-02-02 DIAGNOSIS — Z47.1 AFTERCARE FOLLOWING LEFT KNEE JOINT REPLACEMENT SURGERY: ICD-10-CM

## 2024-02-02 PROCEDURE — 97140 MANUAL THERAPY 1/> REGIONS: CPT | Performed by: PHYSICAL THERAPIST

## 2024-02-02 PROCEDURE — 97110 THERAPEUTIC EXERCISES: CPT | Performed by: PHYSICAL THERAPIST

## 2024-02-02 NOTE — PROGRESS NOTES
"Daily Note     Today's date: 2024  Patient name: Aki Hilario  : 1955  MRN: [id number]  Referring provider: Hudson Smith MD  Dx:   Encounter Diagnosis     ICD-10-CM    1. History of total left knee replacement  Z96.652       2. Aftercare following left knee joint replacement surgery  Z47.1     Z96.652                      Subjective: Pt reports his knee does well win the morning, but after walking around for a bit he gets some swelling in his knee that makes it hard to bend his knee.      Objective: See treatment diary below      Assessment:  Pt does well w progression of today's session and has less pain with PROM of knee flexion post session.  He progresses well with his walking speed on the treadmill and has normal gait mechanics.  He has some fatigue w emphasis on quad activation during LAQ's and SLR.  Patient demonstrated fatigue post treatment and would benefit from continued PT.      Plan: Continue per plan of care.  Progress treatment as tolerated.       Precautions: 23 L TKA    Date 1/31 2/2  12/28 1/3 1/5 1/10 1/17 1/24 1/26   Visit # 11 12  4 5 6 7 8 9 10   FOTO xx    NV done       Re-eval               Manuals 1/31 2/2  12/28 1/3 1/5 1/10 1/17 1/24 1/26   PROM of L Knee SF SF  SF SF DK SF SF   PK   Patella mobs L SF SF  SF SF DK SF SF   PK                             Neuro Re-Ed 1/31 2/2  12/28 1/3 1/5  1/17 1/24 1/26   EO/EC             SLS             Lateral steps             Mini squats 20x   2x10 2x10 2x10 20x      Standing SLR 3 way              HR/TR HR 20x                         Ther Ex 1/31 2/2  12/28 1/3 1/5 1/10 1/17 1/24 1/26   Bike when able for ROM 8' 7'  5' arcs 6' 3' half revs, 2' reversed full 6' 6' 6'  8' L=1 full forward rev   Treadmill 4' 1.2 mph 3' 1.3 mph      3' 1.0 mph 3' 1.0 mph  nv   TKE sets             Heel slides 20x5\"   20x5\" SB 20x5\" SB 20x5\" SB 30x5\" SB 20x5\" SB 20x5\" SB  nv   Gastroc st  3x30\" slantboard lv2           Quad str    3x30\"       " "  HS st        3x30\"   30\" x 3   Bridges 20x5\" 20x5\"  2x10 2x10 2x10 20x 20x 20x5\" 20x5\"   SLR 2# 2x10 2# 2x10  3x10 3x10 3x10 3x10 1# 3x10 1# 3x10 1#  2x10 2#   LAQs 4# 2x10 4# 2x15  2x15 2#  2x15 2# SAQ 2x15 3# LAQ 2x15 3# LAQ 2x20 3#  4# 1 x 20   S/l hip ABD 2# 2# 2x10  3x10 3x10 3x10 3x10 1# 3x10 1# 3x10 1#  2x10 2#   Leg press 3x15 165# BLE  3x15 165# BLE  3x10 115# 2x15 115# 2x15 125# BLE 2x15 145# BLE 2x15 145# BLE 2x15 145# BLE  2x15 145# BLE    SL leg press 95# 2x15 95# 3x15  4x10 55# 2x15 65# 2x15 75#  2x15 75#  2x15 85#  2x15 85# 2x15  85# 2x15    Forward lunge knee flex on step          10x 10x 10\"                Ther Activity 1/31 2/2 1/5 1/26   Step ups, lat step downs  2R 2x10 2R 2x10   2x10 1R 2x10                     Gait Training                                       Modalities             CP                                                   "

## 2024-02-07 ENCOUNTER — OFFICE VISIT (OUTPATIENT)
Dept: PHYSICAL THERAPY | Facility: CLINIC | Age: 69
End: 2024-02-07
Payer: MEDICARE

## 2024-02-07 DIAGNOSIS — Z96.652 HISTORY OF TOTAL LEFT KNEE REPLACEMENT: Primary | ICD-10-CM

## 2024-02-07 DIAGNOSIS — Z96.652 AFTERCARE FOLLOWING LEFT KNEE JOINT REPLACEMENT SURGERY: ICD-10-CM

## 2024-02-07 DIAGNOSIS — Z47.1 AFTERCARE FOLLOWING LEFT KNEE JOINT REPLACEMENT SURGERY: ICD-10-CM

## 2024-02-07 PROCEDURE — 97530 THERAPEUTIC ACTIVITIES: CPT | Performed by: PHYSICAL THERAPIST

## 2024-02-07 PROCEDURE — 97110 THERAPEUTIC EXERCISES: CPT | Performed by: PHYSICAL THERAPIST

## 2024-02-07 PROCEDURE — 97140 MANUAL THERAPY 1/> REGIONS: CPT | Performed by: PHYSICAL THERAPIST

## 2024-02-07 NOTE — PROGRESS NOTES
"Daily Note     Today's date: 2024  Patient name: Aki Hilario  : 1955  MRN: [id number]  Referring provider: Hudson Smith MD  Dx:   Encounter Diagnosis     ICD-10-CM    1. History of total left knee replacement  Z96.652       2. Aftercare following left knee joint replacement surgery  Z47.1     Z96.652                      Subjective: Pt reports his knee is feeling much better today and he did well after last visit.    Objective: See treatment diary below      Assessment:  Pt does well w progression of today's session with no pain or increase in sx.  He demonstrates good step up form and has good quad activation with SLR.  He will benefit from increase in weight on leg press NV.  He has less pain with knee PROM during today's session. Patient demonstrated fatigue post treatment, exhibited good technique with therapeutic exercises, and would benefit from continued PT.      Plan: Continue per plan of care.  Progress treatment as tolerated.       Precautions: 23 L TKA    Date 1/31 2/2 2/7   1/5 1/10 1/17 1/24 1/26   Visit # 11 12 13   6 7 8 9 10   FOTO xx     done       Re-eval               Manuals 1/31 2/2 2/7   1/5 1/10 1/17 1/24 1/26   PROM of L Knee SF SF SF   DK SF SF MH  PK   Patella mobs L SF SF SF   DK SF SF MH  PK                             Neuro Re-Ed    EO/EC             SLS   NV          Lateral steps             Mini squats 20x     2x10 20x      Standing SLR 3 way              HR/TR HR 20x  30x                       Ther Ex 1/31 2/2 2/7   1/5 1/10 1/17 1/24 1/26   Bike when able for ROM 8' 7' 6'   3' half revs, 2' reversed full 6' 6' 6'  8' L=1 full forward rev   Treadmill 4' 1.2 mph 3' 1.3 mph 4' 1.5 mph     3' 1.0 mph 3' 1.0 mph  nv   TKE sets             Heel slides 20x5\"     20x5\" SB 30x5\" SB 20x5\" SB 20x5\" SB  nv   Gastroc st  3x30\" slantboard lv2 3x30\" slantboard lv2          Quad str             HS st        3x30\"   30\" x 3   Bridges 20x5\" " "20x5\" 20x5\"   2x10 20x 20x 20x5\" 20x5\"   SLR 2# 2x10 2# 2x10 2x15 2#   3x10 3x10 1# 3x10 1# 3x10 1#  2x10 2#   LAQs 4# 2x10 4# 2x15 4# 2x15   2x15 2# SAQ 2x15 3# LAQ 2x15 3# LAQ 2x20 3#  4# 1 x 20   S/l hip ABD 2# 2# 2x10 2x15 2#   3x10 3x10 1# 3x10 1# 3x10 1#  2x10 2#   Leg press 3x15 165# BLE  3x15 165# BLE 3x15 165# BLE   2x15 125# BLE 2x15 145# BLE 2x15 145# BLE 2x15 145# BLE  2x15 145# BLE    SL leg press 95# 2x15 95# 3x15 95# 3x15   2x15 75#  2x15 75#  2x15 85#  2x15 85# 2x15  85# 2x15    Forward lunge knee flex on step          10x 10x 10\"                Ther Activity 1/31 2/2 2/7 1/5 1/26   Step ups, lat step downs  2R 2x10 2R 2x10 2R 2x10   2x10                     Gait Training                                       Modalities             CP                                                     "

## 2024-02-09 ENCOUNTER — OFFICE VISIT (OUTPATIENT)
Dept: PHYSICAL THERAPY | Facility: CLINIC | Age: 69
End: 2024-02-09
Payer: MEDICARE

## 2024-02-09 ENCOUNTER — APPOINTMENT (OUTPATIENT)
Dept: LAB | Facility: CLINIC | Age: 69
End: 2024-02-09
Payer: MEDICARE

## 2024-02-09 DIAGNOSIS — L40.0 PSORIASIS VULGARIS: ICD-10-CM

## 2024-02-09 DIAGNOSIS — Z01.89 RADIOLOGICAL EXAMINATION, NOT ELSEWHERE CLASSIFIED: ICD-10-CM

## 2024-02-09 DIAGNOSIS — Z47.1 AFTERCARE FOLLOWING LEFT KNEE JOINT REPLACEMENT SURGERY: ICD-10-CM

## 2024-02-09 DIAGNOSIS — Z96.652 HISTORY OF TOTAL LEFT KNEE REPLACEMENT: Primary | ICD-10-CM

## 2024-02-09 DIAGNOSIS — Z96.652 AFTERCARE FOLLOWING LEFT KNEE JOINT REPLACEMENT SURGERY: ICD-10-CM

## 2024-02-09 DIAGNOSIS — Z79.899 ENCOUNTER FOR LONG-TERM (CURRENT) USE OF OTHER MEDICATIONS: ICD-10-CM

## 2024-02-09 PROCEDURE — 97140 MANUAL THERAPY 1/> REGIONS: CPT

## 2024-02-09 PROCEDURE — 97110 THERAPEUTIC EXERCISES: CPT

## 2024-02-09 PROCEDURE — 36415 COLL VENOUS BLD VENIPUNCTURE: CPT

## 2024-02-09 PROCEDURE — 86480 TB TEST CELL IMMUN MEASURE: CPT

## 2024-02-09 PROCEDURE — 97112 NEUROMUSCULAR REEDUCATION: CPT

## 2024-02-09 NOTE — PROGRESS NOTES
"Daily Note     Today's date: 2024  Patient name: Aki Hilario  : 1955  MRN: [id number]  Referring provider: Hudson Smith MD  Dx:   Encounter Diagnosis     ICD-10-CM    1. History of total left knee replacement  Z96.652       2. Aftercare following left knee joint replacement surgery  Z47.1     Z96.652                      Subjective: Pt presents to PT reporting he feels better noting less pain and increased movement in L knee.  Pt denies increased pain post PT session.       Objective: See treatment diary below      Assessment: Tolerated treatment well with complaints or adverse effects noted.  Noted muscle restriction at posterior L knee with manual therapy however pt states improvement with L knee extension today.  Patient demonstrated fatigue post treatment, exhibited good technique with therapeutic exercises, and would benefit from continued PT to increase flexibility, strength and function.        Plan: Continue per plan of care.      Precautions: 23 L TKA    Date    Visit # 11 12 13 14     9 10   FOTO xx            Re-eval               Manuals    PROM of L Knee SF SF SF PK     MH  PK   Patella mobs L SF SF SF PK       PK                             Neuro Re-Ed    EO/EC             SLS   NV 20\" x 3         Lateral steps             Mini squats 20x            Standing SLR 3 way              HR/TR HR 20x  30x 30x ea                      Ther Ex    Bike when able for ROM 8' 7' 6' 7'     6'  8' L=1 full forward rev   Treadmill 4' 1.2 mph 3' 1.3 mph 4' 1.5 mph 5' 1.5 mph     3' 1.0 mph  nv   TKE sets             Heel slides 20x5\"        20x5\" SB  nv   Gastroc st  3x30\" slantboard lv2 3x30\" slantboard lv2 3x30\" slantboard lv2         Quad str             Standing hip ABD     BTB 2 x 10         HS st           30\" x 3   Bridges 20x5\" 20x5\" 20x5\" 20x5\"     20x5\" 20x5\"   SLR 2# " "2x10 2# 2x10 2x15 2# 2x15 2#     3x10 1#  2x10 2#   LAQs 4# 2x10 4# 2x15 4# 2x15      2x20 3#  4# 1 x 20   S/l hip ABD 2# 2# 2x10 2x15 2# 2x15 2#     3x10 1#  2x10 2#   Leg press 3x15 165# BLE  3x15 165# BLE 3x15 165# BLE 3x15 165# BLE     2x15 145# BLE  2x15 145# BLE    SL leg press 95# 2x15 95# 3x15 95# 3x15 95# 3x15     2x15 85# 2x15  85# 2x15    Forward lunge knee flex on step          10x 10x 10\"                Ther Activity 1/31 2/2 2/7 2/9 1/26   Step ups, lat step downs  2R 2x10 2R 2x10 2R 2x10 2R 2x10                      Gait Training                                       Modalities             CP                                                       "

## 2024-02-10 LAB
GAMMA INTERFERON BACKGROUND BLD IA-ACNC: 0.08 IU/ML
M TB IFN-G BLD-IMP: NEGATIVE
M TB IFN-G CD4+ BCKGRND COR BLD-ACNC: -0.01 IU/ML
M TB IFN-G CD4+ BCKGRND COR BLD-ACNC: 0.01 IU/ML
MITOGEN IGNF BCKGRD COR BLD-ACNC: 9.92 IU/ML

## 2024-02-14 ENCOUNTER — OFFICE VISIT (OUTPATIENT)
Dept: PHYSICAL THERAPY | Facility: CLINIC | Age: 69
End: 2024-02-14
Payer: MEDICARE

## 2024-02-14 DIAGNOSIS — Z96.652 HISTORY OF TOTAL LEFT KNEE REPLACEMENT: Primary | ICD-10-CM

## 2024-02-14 DIAGNOSIS — Z96.652 AFTERCARE FOLLOWING LEFT KNEE JOINT REPLACEMENT SURGERY: ICD-10-CM

## 2024-02-14 DIAGNOSIS — Z47.1 AFTERCARE FOLLOWING LEFT KNEE JOINT REPLACEMENT SURGERY: ICD-10-CM

## 2024-02-14 PROCEDURE — 97140 MANUAL THERAPY 1/> REGIONS: CPT

## 2024-02-14 PROCEDURE — 97110 THERAPEUTIC EXERCISES: CPT

## 2024-02-14 NOTE — PROGRESS NOTES
"Daily Note     Today's date: 2024  Patient name: Aki Hilario  : 1955  MRN: [id number]  Referring provider: Hudson Smith MD  Dx:   Encounter Diagnosis     ICD-10-CM    1. History of total left knee replacement  Z96.652       2. Aftercare following left knee joint replacement surgery  Z47.1     Z96.652                      Subjective: Pt presents to PT reporting swelling remains in L knee and ankle.  He states MD follow up next Tuesday.  Pt denies increased pain post PT session but report stiffness.        Objective: See treatment diary below      Assessment: Tolerated treatment well with no adverse effects noted.  Noted edema in L ankle and pt was educated about elevating L LE to decrease edema.  Patient demonstrated fatigue post treatment, exhibited good technique with therapeutic exercises, and would benefit from continued PT to increase flexibility, strength and function.      Plan: Continue per plan of care.      Precautions: 23 L TKA    Date    Visit # 11 12 13 14 15    9 10   FOTO xx            Re-eval               Manuals    PROM of L Knee SF SF SF PK PK    MH  PK   Patella mobs L SF SF SF PK PK    MH  PK                             Neuro Re-Ed    EO/EC             SLS   NV 20\" x 3         Lateral steps             Mini squats 20x            Standing SLR 3 way              HR/TR HR 20x  30x 30x ea 30x ea                     Ther Ex    Bike when able for ROM 8' 7' 6' 7' 8'    6'  8' L=1 full forward rev   Treadmill 4' 1.2 mph 3' 1.3 mph 4' 1.5 mph 5' 1.5 mph nv    3' 1.0 mph  nv   TKE sets             Heel slides 20x5\"        20x5\" SB  nv   Gastroc st  3x30\" slantboard lv2 3x30\" slantboard lv2 3x30\" slantboard lv2 3x30\" slantboard lv2        Quad str             Standing hip ABD     BTB 2 x 10 nv        HS st           30\" x 3   Bridges 20x5\" 20x5\" " "20x5\" 20x5\" 5\" x 20    20x5\" 20x5\"   SLR 2# 2x10 2# 2x10 2x15 2# 2x15 2# 15x 2.5#    3x10 1#  2x10 2#   LAQs 4# 2x10 4# 2x15 4# 2x15  5# 15x    2x20 3#  4# 1 x 20   S/l hip ABD 2# 2# 2x10 2x15 2# 2x15 2# 15x 2.5#    3x10 1#  2x10 2#   Leg press 3x15 165# BLE  3x15 165# BLE 3x15 165# BLE 3x15 165# BLE 3x15 165# BLE    2x15 145# BLE  2x15 145# BLE    SL leg press 95# 2x15 95# 3x15 95# 3x15 95# 3x15 95# 3x15    2x15 85# 2x15  85# 2x15    Forward lunge knee flex on step          10x 10x 10\"                Ther Activity 1/31 2/2 2/7 2/9 2/14 1/26   Step ups, lat step downs  2R 2x10 2R 2x10 2R 2x10 2R 2x10 nv                     Gait Training     2/14                                  Modalities             CP                                                         "

## 2024-02-16 ENCOUNTER — APPOINTMENT (OUTPATIENT)
Dept: PHYSICAL THERAPY | Facility: CLINIC | Age: 69
End: 2024-02-16
Payer: MEDICARE

## 2024-02-16 NOTE — PROGRESS NOTES
"Daily Note     Today's date: 2024  Patient name: Aki Hilario  : 1955  MRN: [id number]  Referring provider: Hudson Smith MD  Dx: No diagnosis found.               Subjective: ***      Objective: See treatment diary below      Assessment: Tolerated treatment well. Patient demonstrated fatigue post treatment, exhibited good technique with therapeutic exercises, and would benefit from continued PT to increase flexibility, strength and function.      Plan: Continue per plan of care.      Precautions: 23 L TKA    Date        Visit # 11 12 13 14 15        FOTO xx            Re-eval               Manuals        PROM of L Knee SF SF SF PK PK        Patella mobs L SF SF SF PK PK                                  Neuro Re-Ed        EO/EC             SLS   NV 20\" x 3         Lateral steps             Mini squats 20x            Standing SLR 3 way              HR/TR HR 20x  30x 30x ea 30x ea                     Ther Ex        Bike when able for ROM 8' 7' 6' 7' 8'        Treadmill 4' 1.2 mph 3' 1.3 mph 4' 1.5 mph 5' 1.5 mph nv        TKE sets             Heel slides 20x5\"            Gastroc st  3x30\" slantboard lv2 3x30\" slantboard lv2 3x30\" slantboard lv2 3x30\" slantboard lv2        Quad str             Standing hip ABD     BTB 2 x 10 nv        HS st              Bridges 20x5\" 20x5\" 20x5\" 20x5\" 5\" x 20        SLR 2# 2x10 2# 2x10 2x15 2# 2x15 2# 15x 2.5#        LAQs 4# 2x10 4# 2x15 4# 2x15  5# 15x        S/l hip ABD 2# 2# 2x10 2x15 2# 2x15 2# 15x 2.5#        Leg press 3x15 165# BLE  3x15 165# BLE 3x15 165# BLE 3x15 165# BLE 3x15 165# BLE        SL leg press 95# 2x15 95# 3x15 95# 3x15 95# 3x15 95# 3x15        Forward lunge knee flex on step                           Ther Activity  2 2 2 2 216       Step ups, lat step downs  2R 2x10 2R 2x10 2R 2x10 2R 2x10 nv                     Gait Training   "   2/14 2/16                                 Modalities             CP

## 2024-02-20 ENCOUNTER — OFFICE VISIT (OUTPATIENT)
Dept: OBGYN CLINIC | Facility: MEDICAL CENTER | Age: 69
End: 2024-02-20

## 2024-02-20 VITALS
SYSTOLIC BLOOD PRESSURE: 148 MMHG | WEIGHT: 224 LBS | BODY MASS INDEX: 33.18 KG/M2 | DIASTOLIC BLOOD PRESSURE: 76 MMHG | HEART RATE: 67 BPM | HEIGHT: 69 IN

## 2024-02-20 DIAGNOSIS — Z96.652 AFTERCARE FOLLOWING LEFT KNEE JOINT REPLACEMENT SURGERY: Primary | ICD-10-CM

## 2024-02-20 DIAGNOSIS — Z47.1 AFTERCARE FOLLOWING LEFT KNEE JOINT REPLACEMENT SURGERY: Primary | ICD-10-CM

## 2024-02-20 PROCEDURE — 99024 POSTOP FOLLOW-UP VISIT: CPT | Performed by: STUDENT IN AN ORGANIZED HEALTH CARE EDUCATION/TRAINING PROGRAM

## 2024-02-20 RX ORDER — SODIUM, POTASSIUM,MAG SULFATES 17.5-3.13G
SOLUTION, RECONSTITUTED, ORAL ORAL
COMMUNITY

## 2024-02-20 NOTE — PROGRESS NOTES
Subjective: 69 y.o. male presents to the office 3 months s/p left total knee arthroplasty performed on 11/24/2023. Patient seen and examined. Pain well controlled. Progressing well in physical therapy. He does note continued swelling with increased activities including physical therapy and ambulation. Incision without drainage. Denies fevers or chills    Physical Exam:  Incision: well healed  ROM: 0-110 without pain  5/5 IP/Q/HS/TA/GS, 2+ DP/PT, SILT DP/SP/S/S/TN    No new imaging obtained today    Assessment/Plan:  3 months s/p left total knee arthroplasty performed on 11/24/2023     - continue multi-modal pain control   - Weight bearing status: as tolerated  - DVT ppx: completed  - Continue PT/OT exercises  - Cleared for colonoscopy from ortho standpoint, note provided  - F/U in 9 months for annual check, new x-rays upon arrival    Scribe Attestation      I,:  Zaida Navarrete am acting as a scribe while in the presence of the attending physician.:       I,:  Valeriy Topete DO personally performed the services described in this documentation    as scribed in my presence.:

## 2024-02-20 NOTE — LETTER
February 20, 2024     Patient: Aki Hilario  YOB: 1955  Date of Visit: 2/20/2024      To Whom it May Concern:    Aki Hilario is under my professional care. Aki was seen in my office on 2/20/2024. Aki is cleared for colonoscopy from Orthopedic standpoint.    If you have any questions or concerns, please don't hesitate to call.         Sincerely,          Valeriy Topete, DO

## 2024-02-21 ENCOUNTER — OFFICE VISIT (OUTPATIENT)
Dept: PHYSICAL THERAPY | Facility: CLINIC | Age: 69
End: 2024-02-21
Payer: MEDICARE

## 2024-02-21 DIAGNOSIS — Z96.652 HISTORY OF TOTAL LEFT KNEE REPLACEMENT: Primary | ICD-10-CM

## 2024-02-21 DIAGNOSIS — Z96.652 AFTERCARE FOLLOWING LEFT KNEE JOINT REPLACEMENT SURGERY: ICD-10-CM

## 2024-02-21 DIAGNOSIS — Z47.1 AFTERCARE FOLLOWING LEFT KNEE JOINT REPLACEMENT SURGERY: ICD-10-CM

## 2024-02-21 PROCEDURE — 97110 THERAPEUTIC EXERCISES: CPT

## 2024-02-21 PROCEDURE — 97112 NEUROMUSCULAR REEDUCATION: CPT

## 2024-02-21 NOTE — PROGRESS NOTES
"Daily Note     Today's date: 2024  Patient name: Aki Hilario  : 1955  MRN: [id number]  Referring provider: Hudson Smith MD  Dx:   Encounter Diagnosis     ICD-10-CM    1. History of total left knee replacement  Z96.652       2. Aftercare following left knee joint replacement surgery  Z47.1     Z96.652                      Subjective: Pt reports follow up with MD and was told he doesn't need therapy anymore.  Pt is in agreement with stopping therapy and states he will come back if needed.  Pt reports stiffness in L knee today.      Objective: See treatment diary below      Assessment: Tolerated treatment well with no adverse effects noted.   Reviewed with pt on proper stretching and TE for HEP.  Pt advised about importance of continued stretching and strengthening; pt has progressed nicely through therapy and reports a verbal understanding.  Patient demonstrated fatigue post treatment, exhibited good technique with therapeutic exercises, and would benefit from continued PT to increase flexibility, strength and function.      Plan:  Pt Dc'd per MD instruction per patient.     Precautions: 23 L TKA    Date        Visit # 11 12 13 14 15 16       FOTO xx     xx       Re-eval               Manuals        PROM of L Knee SF SF SF PK PK        Patella mobs L SF SF SF PK PK                                  Neuro Re-Ed        EO/EC             SLS   NV 20\" x 3  20\" x 3       Lateral steps             Mini squats 20x            Standing SLR 3 way              HR/TR HR 20x  30x 30x ea 30x ea 30x ea                    Ther Ex        Bike when able for ROM 8' 7' 6' 7' 8' 6'       Treadmill 4' 1.2 mph 3' 1.3 mph 4' 1.5 mph 5' 1.5 mph nv --       TKE sets             Heel slides 20x5\"            Gastroc st  3x30\" slantboard lv2 3x30\" slantboard lv2 3x30\" slantboard lv2 3x30\" slantboard lv2 3x30\" slantboard " Discussed with Dr. Sandeep Monique and will schedule MRI brain with and without contrast for AM with anesthesia sedation. "lv2       Quad str             Standing hip ABD     BTB 2 x 10 nv BTB 2 x 10       HS st              Bridges 20x5\" 20x5\" 20x5\" 20x5\" 5\" x 20        SLR 2# 2x10 2# 2x10 2x15 2# 2x15 2# 15x 2.5#        LAQs 4# 2x10 4# 2x15 4# 2x15  5# 15x        S/l hip ABD 2# 2# 2x10 2x15 2# 2x15 2# 15x 2.5#        Leg press 3x15 165# BLE  3x15 165# BLE 3x15 165# BLE 3x15 165# BLE 3x15 165# BLE 3x15 165# BLE       SL leg press 95# 2x15 95# 3x15 95# 3x15 95# 3x15 95# 3x15 95# 3x15       Forward lunge knee flex on step                           Ther Activity 1/31 2/2 2/7 2/9 2/14 2/21       Step ups, lat step downs  2R 2x10 2R 2x10 2R 2x10 2R 2x10 nv 2R 2x10                    Gait Training     2/14 2/21                                 Modalities             CP                                                             "

## 2024-02-23 ENCOUNTER — APPOINTMENT (OUTPATIENT)
Dept: PHYSICAL THERAPY | Facility: CLINIC | Age: 69
End: 2024-02-23
Payer: MEDICARE

## 2024-02-28 ENCOUNTER — APPOINTMENT (OUTPATIENT)
Dept: PHYSICAL THERAPY | Facility: CLINIC | Age: 69
End: 2024-02-28
Payer: MEDICARE

## 2024-03-15 DIAGNOSIS — E78.2 MIXED HYPERLIPIDEMIA: ICD-10-CM

## 2024-03-18 RX ORDER — ATORVASTATIN CALCIUM 20 MG/1
TABLET, FILM COATED ORAL
Qty: 90 TABLET | Refills: 1 | Status: SHIPPED | OUTPATIENT
Start: 2024-03-18

## 2024-06-12 ENCOUNTER — OFFICE VISIT (OUTPATIENT)
Dept: FAMILY MEDICINE CLINIC | Facility: CLINIC | Age: 69
End: 2024-06-12
Payer: MEDICARE

## 2024-06-12 VITALS
BODY MASS INDEX: 33.5 KG/M2 | TEMPERATURE: 98.1 F | HEART RATE: 97 BPM | WEIGHT: 226.2 LBS | SYSTOLIC BLOOD PRESSURE: 128 MMHG | OXYGEN SATURATION: 98 % | DIASTOLIC BLOOD PRESSURE: 82 MMHG | HEIGHT: 69 IN

## 2024-06-12 DIAGNOSIS — I10 ESSENTIAL HYPERTENSION: ICD-10-CM

## 2024-06-12 DIAGNOSIS — Z87.891 HISTORY OF TOBACCO USE: ICD-10-CM

## 2024-06-12 DIAGNOSIS — R73.03 PRE-DIABETES: Primary | ICD-10-CM

## 2024-06-12 DIAGNOSIS — E55.9 VITAMIN D INSUFFICIENCY: ICD-10-CM

## 2024-06-12 DIAGNOSIS — R74.8 ELEVATED ALKALINE PHOSPHATASE LEVEL: ICD-10-CM

## 2024-06-12 DIAGNOSIS — Z12.5 SCREENING FOR PROSTATE CANCER: ICD-10-CM

## 2024-06-12 DIAGNOSIS — R79.89 LOW T4: ICD-10-CM

## 2024-06-12 DIAGNOSIS — R94.6 ABNORMAL RESULTS OF THYROID FUNCTION STUDIES: ICD-10-CM

## 2024-06-12 DIAGNOSIS — Z00.00 MEDICARE ANNUAL WELLNESS VISIT, SUBSEQUENT: ICD-10-CM

## 2024-06-12 DIAGNOSIS — E78.2 MIXED HYPERLIPIDEMIA: ICD-10-CM

## 2024-06-12 DIAGNOSIS — L40.9 PSORIASIS: ICD-10-CM

## 2024-06-12 PROCEDURE — G0439 PPPS, SUBSEQ VISIT: HCPCS | Performed by: FAMILY MEDICINE

## 2024-06-12 PROCEDURE — 99214 OFFICE O/P EST MOD 30 MIN: CPT | Performed by: FAMILY MEDICINE

## 2024-06-12 NOTE — PATIENT INSTRUCTIONS
Medicare Preventive Visit Patient Instructions  Thank you for completing your Welcome to Medicare Visit or Medicare Annual Wellness Visit today. Your next wellness visit will be due in one year (6/13/2025).  The screening/preventive services that you may require over the next 5-10 years are detailed below. Some tests may not apply to you based off risk factors and/or age. Screening tests ordered at today's visit but not completed yet may show as past due. Also, please note that scanned in results may not display below.  Preventive Screenings:  Service Recommendations Previous Testing/Comments   Colorectal Cancer Screening  Colonoscopy    Fecal Occult Blood Test (FOBT)/Fecal Immunochemical Test (FIT)  Fecal DNA/Cologuard Test  Flexible Sigmoidoscopy Age: 45-75 years old   Colonoscopy: every 10 years (May be performed more frequently if at higher risk)  OR  FOBT/FIT: every 1 year  OR  Cologuard: every 3 years  OR  Sigmoidoscopy: every 5 years  Screening may be recommended earlier than age 45 if at higher risk for colorectal cancer. Also, an individualized decision between you and your healthcare provider will decide whether screening between the ages of 76-85 would be appropriate. Colonoscopy: 03/21/2024  FOBT/FIT: Not on file  Cologuard: Not on file  Sigmoidoscopy: Not on file    Screening Current     Prostate Cancer Screening Individualized decision between patient and health care provider in men between ages of 55-69   Medicare will cover every 12 months beginning on the day after your 50th birthday PSA: 0.61 ng/mL           Hepatitis C Screening Once for adults born between 1945 and 1965  More frequently in patients at high risk for Hepatitis C Hep C Antibody: 01/25/2019    Screening Current   Diabetes Screening 1-2 times per year if you're at risk for diabetes or have pre-diabetes Fasting glucose: 92 mg/dL (12/22/2023)  A1C: 5.6 % (12/22/2023)  Screening Current   Cholesterol Screening Once every 5 years if you  don't have a lipid disorder. May order more often based on risk factors. Lipid panel: 12/22/2023  Screening Not Indicated  History Lipid Disorder      Other Preventive Screenings Covered by Medicare:  Abdominal Aortic Aneurysm (AAA) Screening: covered once if your at risk. You're considered to be at risk if you have a family history of AAA or a male between the age of 65-75 who smoking at least 100 cigarettes in your lifetime.  Lung Cancer Screening: covers low dose CT scan once per year if you meet all of the following conditions: (1) Age 55-77; (2) No signs or symptoms of lung cancer; (3) Current smoker or have quit smoking within the last 15 years; (4) You have a tobacco smoking history of at least 20 pack years (packs per day x number of years you smoked); (5) You get a written order from a healthcare provider.  Glaucoma Screening: covered annually if you're considered high risk: (1) You have diabetes OR (2) Family history of glaucoma OR (3)  aged 50 and older OR (4)  American aged 65 and older  Osteoporosis Screening: covered every 2 years if you meet one of the following conditions: (1) Have a vertebral abnormality; (2) On glucocorticoid therapy for more than 3 months; (3) Have primary hyperparathyroidism; (4) On osteoporosis medications and need to assess response to drug therapy.  HIV Screening: covered annually if you're between the age of 15-65. Also covered annually if you are younger than 15 and older than 65 with risk factors for HIV infection. For pregnant patients, it is covered up to 3 times per pregnancy.    Immunizations:  Immunization Recommendations   Influenza Vaccine Annual influenza vaccination during flu season is recommended for all persons aged >= 6 months who do not have contraindications   Pneumococcal Vaccine   * Pneumococcal conjugate vaccine = PCV13 (Prevnar 13), PCV15 (Vaxneuvance), PCV20 (Prevnar 20)  * Pneumococcal polysaccharide vaccine = PPSV23 (Pneumovax)  Adults 19-65 yo with certain risk factors or if 65+ yo  If never received any pneumonia vaccine: recommend Prevnar 20 (PCV20)  Give PCV20 if previously received 1 dose of PCV13 or PPSV23   Hepatitis B Vaccine 3 dose series if at intermediate or high risk (ex: diabetes, end stage renal disease, liver disease)   Respiratory syncytial virus (RSV) Vaccine - COVERED BY MEDICARE PART D  * RSVPreF3 (Arexvy) CDC recommends that adults 60 years of age and older may receive a single dose of RSV vaccine using shared clinical decision-making (SCDM)   Tetanus (Td) Vaccine - COST NOT COVERED BY MEDICARE PART B Following completion of primary series, a booster dose should be given every 10 years to maintain immunity against tetanus. Td may also be given as tetanus wound prophylaxis.   Tdap Vaccine - COST NOT COVERED BY MEDICARE PART B Recommended at least once for all adults. For pregnant patients, recommended with each pregnancy.   Shingles Vaccine (Shingrix) - COST NOT COVERED BY MEDICARE PART B  2 shot series recommended in those 19 years and older who have or will have weakened immune systems or those 50 years and older     Health Maintenance Due:      Topic Date Due   • Colorectal Cancer Screening  03/21/2029   • Hepatitis C Screening  Completed   • Lung Cancer Screening  Discontinued     Immunizations Due:      Topic Date Due   • Hepatitis A Vaccine (1 of 2 - Risk 2-dose series) Never done   • Hepatitis B Vaccine (1 of 3 - Risk 3-dose series) Never done   • Pneumococcal Vaccine: 65+ Years (3 of 3 - PPSV23 or PCV20) 08/12/2020   • COVID-19 Vaccine (5 - 2023-24 season) 09/01/2023   • Influenza Vaccine (Season Ended) 09/01/2024     Advance Directives   What are advance directives?  Advance directives are legal documents that state your wishes and plans for medical care. These plans are made ahead of time in case you lose your ability to make decisions for yourself. Advance directives can apply to any medical decision, such as  the treatments you want, and if you want to donate organs.   What are the types of advance directives?  There are many types of advance directives, and each state has rules about how to use them. You may choose a combination of any of the following:  Living will:  This is a written record of the treatment you want. You can also choose which treatments you do not want, which to limit, and which to stop at a certain time. This includes surgery, medicine, IV fluid, and tube feedings.   Durable power of  for healthcare (DPAHC):  This is a written record that states who you want to make healthcare choices for you when you are unable to make them for yourself. This person, called a proxy, is usually a family member or a friend. You may choose more than 1 proxy.  Do not resuscitate (DNR) order:  A DNR order is used in case your heart stops beating or you stop breathing. It is a request not to have certain forms of treatment, such as CPR. A DNR order may be included in other types of advance directives.  Medical directive:  This covers the care that you want if you are in a coma, near death, or unable to make decisions for yourself. You can list the treatments you want for each condition. Treatment may include pain medicine, surgery, blood transfusions, dialysis, IV or tube feedings, and a ventilator (breathing machine).  Values history:  This document has questions about your views, beliefs, and how you feel and think about life. This information can help others choose the care that you would choose.  Why are advance directives important?  An advance directive helps you control your care. Although spoken wishes may be used, it is better to have your wishes written down. Spoken wishes can be misunderstood, or not followed. Treatments may be given even if you do not want them. An advance directive may make it easier for your family to make difficult choices about your care.   Weight Management   Why it is important  to manage your weight:  Being overweight increases your risk of health conditions such as heart disease, high blood pressure, type 2 diabetes, and certain types of cancer. It can also increase your risk for osteoarthritis, sleep apnea, and other respiratory problems. Aim for a slow, steady weight loss. Even a small amount of weight loss can lower your risk of health problems.  How to lose weight safely:  A safe and healthy way to lose weight is to eat fewer calories and get regular exercise. You can lose up about 1 pound a week by decreasing the number of calories you eat by 500 calories each day.   Healthy meal plan for weight management:  A healthy meal plan includes a variety of foods, contains fewer calories, and helps you stay healthy. A healthy meal plan includes the following:  Eat whole-grain foods more often.  A healthy meal plan should contain fiber. Fiber is the part of grains, fruits, and vegetables that is not broken down by your body. Whole-grain foods are healthy and provide extra fiber in your diet. Some examples of whole-grain foods are whole-wheat breads and pastas, oatmeal, brown rice, and bulgur.  Eat a variety of vegetables every day.  Include dark, leafy greens such as spinach, kale, gadiel greens, and mustard greens. Eat yellow and orange vegetables such as carrots, sweet potatoes, and winter squash.   Eat a variety of fruits every day.  Choose fresh or canned fruit (canned in its own juice or light syrup) instead of juice. Fruit juice has very little or no fiber.  Eat low-fat dairy foods.  Drink fat-free (skim) milk or 1% milk. Eat fat-free yogurt and low-fat cottage cheese. Try low-fat cheeses such as mozzarella and other reduced-fat cheeses.  Choose meat and other protein foods that are low in fat.  Choose beans or other legumes such as split peas or lentils. Choose fish, skinless poultry (chicken or turkey), or lean cuts of red meat (beef or pork). Before you cook meat or poultry, cut off  any visible fat.   Use less fat and oil.  Try baking foods instead of frying them. Add less fat, such as margarine, sour cream, regular salad dressing and mayonnaise to foods. Eat fewer high-fat foods. Some examples of high-fat foods include french fries, doughnuts, ice cream, and cakes.  Eat fewer sweets.  Limit foods and drinks that are high in sugar. This includes candy, cookies, regular soda, and sweetened drinks.  Exercise:  Exercise at least 30 minutes per day on most days of the week. Some examples of exercise include walking, biking, dancing, and swimming. You can also fit in more physical activity by taking the stairs instead of the elevator or parking farther away from stores. Ask your healthcare provider about the best exercise plan for you.      © Copyright [employer] 2018 Information is for End User's use only and may not be sold, redistributed or otherwise used for commercial purposes. All illustrations and images included in CareNotes® are the copyrighted property of [employer]M., Inc. or [employer]

## 2024-06-12 NOTE — PROGRESS NOTES
Ambulatory Visit  Name: kAi Hilario      : 1955      MRN: [id number]  Encounter Provider: Jt Leach MD  Encounter Date: 2024   Encounter department: [facility]    Assessment & Plan     Medicare annual wellness completed today.  Patient last HbA1c was 5.6.  Will recheck as patient does have a history of prediabetes.  Continue to work on diet and exercise.  Blood pressure stable today 128/82, currently not on medications for blood pressure.  Continue atorvastatin for hyperlipidemia, recheck lipid panel.  He did have an elevated alkaline phosphatase, we will recheck this.  Recheck vitamin D and continue vitamin D supplements.  T4 slightly low on previous blood work.  Will recheck thyroid studies.  Check PSA.  Remote smoking history, quit in , agreeable to AAA screening. RTC in 6 months for follow up    1. Pre-diabetes  -     Comprehensive metabolic panel; Future  -     Hemoglobin A1C; Future  2. Essential hypertension  3. Mixed hyperlipidemia  -     Lipid panel; Future  4. Medicare annual wellness visit, subsequent  5. Elevated alkaline phosphatase level  -     Comprehensive metabolic panel; Future  6. Vitamin D insufficiency  -     Vitamin D 25 hydroxy; Future  7. Low T4  -     T4, free; Future  -     TSH, 3rd generation; Future  8. Screening for prostate cancer  -     PSA, Total Screen; Future  9. Psoriasis  10. Abnormal results of thyroid function studies  -     T4, free; Future  -     TSH, 3rd generation; Future  11. History of tobacco use  -     US abdominal aorta screening aaa; Future; Expected date: 2024     Preventive health issues were discussed with patient, and age appropriate screening tests were ordered as noted in patient's After Visit Summary. Personalized health advice and appropriate referrals for health education or preventive services given if needed, as noted in patient's After Visit Summary.    History of Present Illness     He presents  today for follow-up on prediabetes, hypertension, hyperlipidemia.  I did review his previous blood work that did show some elevations of his alkaline phosphatase as well as a slightly low vitamin D level.  He denies any new complaints today.  Takes his medications without issue.  Follows up with dermatology for psoriasis.  Also due for Medicare annual wellness       Patient Care Team:  Jt Leach MD as PCP - General (Family Medicine)  Jose Mckeon, NICOLE Villafuerte CRNP Dang Zhang, MD Jason Erickson, DO    Review of Systems   Constitutional:  Negative for activity change, appetite change, chills, fatigue and fever.   HENT:  Negative for congestion, rhinorrhea, sneezing and sore throat.    Eyes:  Negative for pain, discharge, redness and itching.   Respiratory:  Negative for cough, chest tightness, shortness of breath and wheezing.    Cardiovascular:  Negative for chest pain and palpitations.   Gastrointestinal:  Negative for abdominal pain, constipation, diarrhea, nausea and vomiting.   Musculoskeletal:  Negative for arthralgias, gait problem, myalgias and neck pain.   Skin:  Negative for rash.   Neurological:  Negative for dizziness, weakness, numbness and headaches.   Hematological:  Negative for adenopathy.   Psychiatric/Behavioral:  Negative for confusion and dysphoric mood. The patient is not nervous/anxious.    All other systems reviewed and are negative.    Medical History Reviewed by provider this encounter:  Tobacco  Allergies  Meds  Problems  Med Hx  Surg Hx  Fam Hx       Annual Wellness Visit Questionnaire   Aki is here for his Subsequent Wellness visit. Last Medicare Wellness visit information reviewed, patient interviewed and updates made to the record today.      Health Risk Assessment:   Patient rates overall health as good. Patient feels that their physical health rating is same. Patient is satisfied with their life. Eyesight was rated as same.  Hearing was rated as same. Patient feels that their emotional and mental health rating is same. Patients states they are never, rarely angry. Patient states they are never, rarely unusually tired/fatigued. Pain experienced in the last 7 days has been some. Patient's pain rating has been 3/10. Patient states that he has experienced no weight loss or gain in last 6 months.     Depression Screening:   PHQ-2 Score: 0      Fall Risk Screening:   In the past year, patient has experienced: no history of falling in past year      Home Safety:  Patient does not have trouble with stairs inside or outside of their home. Patient has working smoke alarms and has working carbon monoxide detector. Home safety hazards include: none.     Nutrition:   Current diet is Regular.     Medications:   Patient is currently taking over-the-counter supplements. OTC medications include: see medication list. Patient is able to manage medications.     Activities of Daily Living (ADLs)/Instrumental Activities of Daily Living (IADLs):   Walk and transfer into and out of bed and chair?: Yes  Dress and groom yourself?: Yes    Bathe or shower yourself?: Yes    Feed yourself? Yes  Do your laundry/housekeeping?: Yes  Manage your money, pay your bills and track your expenses?: Yes  Make your own meals?: Yes    Do your own shopping?: Yes    Previous Hospitalizations:   Any hospitalizations or ED visits within the last 12 months?: Yes    How many hospitalizations have you had in the last year?: 1-2    Advance Care Planning:   Living will: Yes    Durable POA for healthcare: Yes    Advanced directive: Yes      PREVENTIVE SCREENINGS      Cardiovascular Screening:    General: History Lipid Disorder and Risks and Benefits Discussed    Due for: Lipid Panel      Diabetes Screening:     General: Risks and Benefits Discussed    Due for: Blood Glucose      Colorectal Cancer Screening:     General: Screening Current      Prostate Cancer Screening:    General: Risks  and Benefits Discussed    Due for: PSA      Osteoporosis Screening:    General: Screening Not Indicated      Abdominal Aortic Aneurysm (AAA) Screening:    Risk factors include: age between 65-76 yo and tobacco use        Lung Cancer Screening:     General: Screening Not Indicated      Hepatitis C Screening:    General: Screening Current    Screening, Brief Intervention, and Referral to Treatment (SBIRT)    Screening  Typical number of drinks in a day: 0  Typical number of drinks in a week: 0  Interpretation: Low risk drinking behavior.    AUDIT-C Screenin) How often did you have a drink containing alcohol in the past year? never  2) How many drinks did you have on a typical day when you were drinking in the past year? 0  3) How often did you have 6 or more drinks on one occasion in the past year? never    AUDIT-C Score: 0  Interpretation: Score 0-3 (male): Negative screen for alcohol misuse    Single Item Drug Screening:  How often have you used an illegal drug (including marijuana) or a prescription medication for non-medical reasons in the past year? never    Single Item Drug Screen Score: 0  Interpretation: Negative screen for possible drug use disorder    Social Determinants of Health     Financial Resource Strain: Low Risk  (2023)    Overall Financial Resource Strain (CARDIA)    • Difficulty of Paying Living Expenses: Not hard at all   Food Insecurity: No Food Insecurity (2024)    Hunger Vital Sign    • Worried About Running Out of Food in the Last Year: Never true    • Ran Out of Food in the Last Year: Never true   Transportation Needs: No Transportation Needs (2024)    PRAPARE - Transportation    • Lack of Transportation (Medical): No    • Lack of Transportation (Non-Medical): No   Housing Stability: Unknown (2024)    Housing Stability Vital Sign    • Unable to Pay for Housing in the Last Year: No    • Homeless in the Last Year: No   Utilities: Not At Risk (2024)    [facility] Utilities     "• Threatened with loss of utilities: No     No results found.    Objective     /82   Pulse 97   Temp 98.1 °F (36.7 °C)   Ht 5' 9\" (1.753 m)   Wt 103 kg (226 lb 3.2 oz)   SpO2 98%   BMI 33.40 kg/m²     Physical Exam  Vitals and nursing note reviewed.   Constitutional:       General: He is not in acute distress.     Appearance: Normal appearance. He is well-developed and normal weight. He is not ill-appearing or toxic-appearing.   HENT:      Head: Normocephalic and atraumatic.      Right Ear: Tympanic membrane, ear canal and external ear normal. There is no impacted cerumen.      Left Ear: Tympanic membrane, ear canal and external ear normal. There is no impacted cerumen.      Nose: Nose normal. No congestion or rhinorrhea.      Mouth/Throat:      Mouth: Mucous membranes are moist.      Pharynx: Oropharynx is clear.   Eyes:      General: No scleral icterus.        Right eye: No discharge.         Left eye: No discharge.      Conjunctiva/sclera: Conjunctivae normal.      Pupils: Pupils are equal, round, and reactive to light.   Cardiovascular:      Rate and Rhythm: Normal rate and regular rhythm.      Pulses: Normal pulses.      Heart sounds: Normal heart sounds. No murmur heard.  Pulmonary:      Effort: Pulmonary effort is normal. No respiratory distress.      Breath sounds: Normal breath sounds. No wheezing.   Abdominal:      General: There is no distension.      Palpations: Abdomen is soft. There is no mass.      Tenderness: There is no abdominal tenderness.      Hernia: No hernia is present.   Musculoskeletal:         General: No swelling, tenderness, deformity or signs of injury. Normal range of motion.      Cervical back: Normal range of motion.   Skin:     General: Skin is warm and dry.      Capillary Refill: Capillary refill takes less than 2 seconds.      Findings: No lesion or rash.   Neurological:      General: No focal deficit present.      Mental Status: He is alert. Mental status is at " baseline.      Motor: No weakness.      Gait: Gait normal.   Psychiatric:         Mood and Affect: Mood normal.         Behavior: Behavior normal.       Administrative Statements

## 2024-06-28 ENCOUNTER — APPOINTMENT (OUTPATIENT)
Dept: LAB | Facility: CLINIC | Age: 69
End: 2024-06-28
Payer: MEDICARE

## 2024-06-28 ENCOUNTER — HOSPITAL ENCOUNTER (OUTPATIENT)
Dept: ULTRASOUND IMAGING | Facility: MEDICAL CENTER | Age: 69
Discharge: HOME | End: 2024-06-28
Payer: MEDICARE

## 2024-06-28 DIAGNOSIS — R74.8 ELEVATED ALKALINE PHOSPHATASE LEVEL: ICD-10-CM

## 2024-06-28 DIAGNOSIS — R73.03 PRE-DIABETES: ICD-10-CM

## 2024-06-28 DIAGNOSIS — Z87.891 HISTORY OF TOBACCO USE: ICD-10-CM

## 2024-06-28 DIAGNOSIS — E78.2 MIXED HYPERLIPIDEMIA: ICD-10-CM

## 2024-06-28 DIAGNOSIS — R94.6 ABNORMAL RESULTS OF THYROID FUNCTION STUDIES: ICD-10-CM

## 2024-06-28 DIAGNOSIS — Z12.5 SCREENING FOR PROSTATE CANCER: ICD-10-CM

## 2024-06-28 DIAGNOSIS — R79.89 LOW T4: ICD-10-CM

## 2024-06-28 DIAGNOSIS — E55.9 VITAMIN D INSUFFICIENCY: ICD-10-CM

## 2024-06-28 LAB
25(OH)D3 SERPL-MCNC: 57.5 NG/ML (ref 30–100)
ALBUMIN SERPL BCG-MCNC: 4 G/DL (ref 3.5–5)
ALP SERPL-CCNC: 113 U/L (ref 34–104)
ALT SERPL W P-5'-P-CCNC: 18 U/L (ref 7–52)
ANION GAP SERPL CALCULATED.3IONS-SCNC: 6 MMOL/L (ref 4–13)
AST SERPL W P-5'-P-CCNC: 21 U/L (ref 13–39)
BILIRUB SERPL-MCNC: 0.92 MG/DL (ref 0.2–1)
BUN SERPL-MCNC: 14 MG/DL (ref 5–25)
CALCIUM SERPL-MCNC: 9 MG/DL (ref 8.4–10.2)
CHLORIDE SERPL-SCNC: 106 MMOL/L (ref 96–108)
CHOLEST SERPL-MCNC: 112 MG/DL
CO2 SERPL-SCNC: 25 MMOL/L (ref 21–32)
CREAT SERPL-MCNC: 0.7 MG/DL (ref 0.6–1.3)
EST. AVERAGE GLUCOSE BLD GHB EST-MCNC: 120 MG/DL
GFR SERPL CREATININE-BSD FRML MDRD: 94 ML/MIN/1.73SQ M
GLUCOSE P FAST SERPL-MCNC: 94 MG/DL (ref 65–99)
HBA1C MFR BLD: 5.8 %
HDLC SERPL-MCNC: 37 MG/DL
LDLC SERPL CALC-MCNC: 61 MG/DL (ref 0–100)
NONHDLC SERPL-MCNC: 75 MG/DL
POTASSIUM SERPL-SCNC: 4 MMOL/L (ref 3.5–5.3)
PROT SERPL-MCNC: 6.3 G/DL (ref 6.4–8.4)
PSA SERPL-MCNC: 0.71 NG/ML (ref 0–4)
SODIUM SERPL-SCNC: 137 MMOL/L (ref 135–147)
T4 FREE SERPL-MCNC: 0.85 NG/DL (ref 0.61–1.12)
TRIGL SERPL-MCNC: 70 MG/DL
TSH SERPL DL<=0.05 MIU/L-ACNC: 1.12 UIU/ML (ref 0.45–4.5)

## 2024-06-28 PROCEDURE — 82306 VITAMIN D 25 HYDROXY: CPT

## 2024-06-28 PROCEDURE — 83036 HEMOGLOBIN GLYCOSYLATED A1C: CPT

## 2024-06-28 PROCEDURE — 84439 ASSAY OF FREE THYROXINE: CPT

## 2024-06-28 PROCEDURE — 36415 COLL VENOUS BLD VENIPUNCTURE: CPT

## 2024-06-28 PROCEDURE — 76706 US ABDL AORTA SCREEN AAA: CPT

## 2024-06-28 PROCEDURE — 80053 COMPREHEN METABOLIC PANEL: CPT

## 2024-06-28 PROCEDURE — G0103 PSA SCREENING: HCPCS

## 2024-06-28 PROCEDURE — 80061 LIPID PANEL: CPT

## 2024-06-28 PROCEDURE — 84443 ASSAY THYROID STIM HORMONE: CPT

## 2024-07-30 NOTE — TELEPHONE ENCOUNTER
To be called 7/6    Next SOVS on 8/4 to arrive at 8:45 [FreeTextEntry1] : cpe appears well left testicular pain not right now will send for sono ekg ok check labs due for colonsocpy  tdap and prevnar 20 given

## 2024-08-05 DIAGNOSIS — E78.2 MIXED HYPERLIPIDEMIA: ICD-10-CM

## 2024-08-06 RX ORDER — ATORVASTATIN CALCIUM 20 MG/1
TABLET, FILM COATED ORAL
Qty: 100 TABLET | Refills: 1 | Status: SHIPPED | OUTPATIENT
Start: 2024-08-06

## 2024-08-20 ENCOUNTER — OFFICE VISIT (OUTPATIENT)
Dept: PAIN MEDICINE | Facility: MEDICAL CENTER | Age: 69
End: 2024-08-20
Payer: MEDICARE

## 2024-08-20 VITALS
HEART RATE: 72 BPM | WEIGHT: 225 LBS | HEIGHT: 69 IN | SYSTOLIC BLOOD PRESSURE: 136 MMHG | BODY MASS INDEX: 33.33 KG/M2 | DIASTOLIC BLOOD PRESSURE: 70 MMHG | OXYGEN SATURATION: 98 %

## 2024-08-20 DIAGNOSIS — M54.50 CHRONIC BILATERAL LOW BACK PAIN WITHOUT SCIATICA: ICD-10-CM

## 2024-08-20 DIAGNOSIS — G89.29 CHRONIC BILATERAL LOW BACK PAIN WITHOUT SCIATICA: ICD-10-CM

## 2024-08-20 DIAGNOSIS — M47.816 LUMBAR SPONDYLOSIS: Primary | ICD-10-CM

## 2024-08-20 DIAGNOSIS — M54.16 LUMBAR RADICULOPATHY: ICD-10-CM

## 2024-08-20 PROCEDURE — 99214 OFFICE O/P EST MOD 30 MIN: CPT

## 2024-08-20 PROCEDURE — G2211 COMPLEX E/M VISIT ADD ON: HCPCS

## 2024-08-20 NOTE — PROGRESS NOTES
Assessment:  1. Lumbar spondylosis    2. Lumbar radiculopathy        Plan:  The patient has been experiencing return of moderate to severe axial lumbar spine pain that is now causing functional deficit. The pain has been present for at least 3 months and is not improving with conservative care. Currently the patient is not experiencing any radicular features nor neurogenic claudication.  Non-facet pathology has been ruled out on clinical evaluation.  We will schedule the patient for repeat right and left L3-L5 radiofrequency ablations.    Follow-up after RFA, or sooner if needed    This patient is being managed for a complex and serious condition that requires ongoing, intensive medical management. The nature of this condition demands constant vigilance and a nuanced approach to treatment. The condition necessitates an in-depth and focused approach to management, including regular monitoring and potential coordination with other healthcare professionals.     Detailed Description of Visit Complexity: This visit involved an intricate evaluation and management of the patient's condition. The complexity of the visit was due to the need for a detailed assessment of the current state, consideration of potential complications, and a careful balancing of treatment options to manage the condition effectively.     Patient's Health Status and History: This patient has a significant pain history which requires regular and detailed management. The condition's impact on their life and health is substantial, necessitating a comprehensive and tailored approach to chronic ongoing care.     My impressions and treatment recommendations were discussed in detail with the patient who verbalized understanding and had no further questions.  Discharge instructions were provided. I personally saw and examined the patient and I agree with the above discussed plan of care.    Orders Placed This Encounter   Procedures    FL spine and pain  procedure     Standing Status:   Future     Standing Expiration Date:   8/20/2028     Order Specific Question:   Reason for Exam:     Answer:   Right L3-L5 RFA     Order Specific Question:   Anticoagulant hold needed?     Answer:   No    FL spine and pain procedure     THIS SIDE FIRST     Standing Status:   Future     Standing Expiration Date:   8/20/2028     Order Specific Question:   Reason for Exam:     Answer:   Left L3-L5 RFA     Order Specific Question:   Anticoagulant hold needed?     Answer:   No     No orders of the defined types were placed in this encounter.      History of Present Illness:  Aki Hilario is a 69 y.o. male who presents for a follow up office visit in regards to low back pain localized to the lumbar region bilaterally.  This is constant and very severe in the morning.  He is currently rating it a 6/10 in intensity.  He describes the quality of his pain as sharp.  Patient reports pain is localized to the low back and does not radiate into the lower extremities.  He denies any radicular features at this time.  Patient states he has had this pain in the past and has responded very positively to bilateral L3-L5 radiofrequency ablations, which provided nearly 100% pain relief as well as meaningful functional improvement lasting for 11-12 months followed by gradual return of the same pain.  Patient states his pain began to return in early June and has been progressively worsening ever since that time.  He is currently using OTC acetaminophen for pain relief.    I have personally reviewed and/or updated the patient's past medical history, past surgical history, family history, social history, current medications, allergies, and vital signs today.     Review of Systems   Respiratory:  Negative for shortness of breath.    Cardiovascular:  Negative for chest pain.   Gastrointestinal:  Negative for constipation, diarrhea, nausea and vomiting.   Musculoskeletal:  Positive for back pain, gait problem  and myalgias. Negative for arthralgias and joint swelling.   Skin:  Negative for rash.   Neurological:  Negative for dizziness, seizures and weakness.   All other systems reviewed and are negative.      Patient Active Problem List   Diagnosis    Chronic bilateral low back pain without sciatica    Essential hypertension    Mixed hyperlipidemia    Polyarticular arthritis    Chronic pain syndrome    Psoriasis    Protrusion of lumbar intervertebral disc    Lumbar radiculopathy    Spinal stenosis of lumbar region with radiculopathy    Osteoarthritis of left knee    Gastroesophageal reflux disease without esophagitis    Numbness of left hand    Trigger finger of left thumb    Left carpal tunnel syndrome    Ulnar neuropathy of left upper extremity    Neuropathic pain    Myofascial pain syndrome    Lumbar spondylosis    DDD (degenerative disc disease), cervical    Neck pain    Impaired fasting glucose    Preoperative clearance    Pre-diabetes       Past Medical History:   Diagnosis Date    Arthritis 2013    Bilateral carpal tunnel syndrome     Chronic pain disorder     low back    GERD (gastroesophageal reflux disease)     Hyperlipidemia     Low back pain     Lumbar disc disease     Lumbar spinal stenosis     Neck pain     had cervical fusion    Obesity     Psoriasis     right elbow    Tremor     left leg    Wears dentures     full dentures    Wears glasses        Past Surgical History:   Procedure Laterality Date    CERVICAL LAMINECTOMY      COLONOSCOPY  07/2015    multiple polyps, repeat in 3 years    COLONOSCOPY  10/2018    Dr. Rosales.  Diverticulosis in sigmoid and descending colon, tubular adenoma polyp removed from descending colon.    JOINT REPLACEMENT      right TKR    KNEE SURGERY Right 2002    20 years ago-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    LAMINECTOMY      ORTHOPEDIC SURGERY      POPLITEAL SYNOVIAL CYST EXCISION      last assessed: 04/22/2015    [place] ARTHRP KNE CONDYLE&PLATU MEDIAL&LAT COMPARTMENTS Left 11/24/2023     Procedure: ARTHROPLASTY KNEE TOTAL;  Surgeon: Valeriy Topete DO;  Location: [facility];  Service: Orthopedics    NM NEUROPLASTY &/TRANSPOS MEDIAN NRV CARPAL TUNNE Left 09/25/2020    Procedure: RELEASE CARPAL TUNNEL;  Surgeon: Darrin Allen MD;  Location: [facility];  Service: Orthopedics    NM NEUROPLASTY &/TRANSPOS MEDIAN NRV CARPAL TUNNE Right 10/20/2020    Procedure: RELEASE CARPAL TUNNEL - Open;  Surgeon: Darrin Allen MD;  Location: [facility];  Service: Orthopedics    SPINE SURGERY      TONSILLECTOMY AND ADENOIDECTOMY      last assessed: 04/22/2015    TOTAL KNEE ARTHROPLASTY      last assessed: 05/12/2015       Family History   Problem Relation Age of Onset    No Known Problems Mother     Allergies Father         seasonal    Lung cancer Family        Social History     Occupational History    Occupation: [employer]   Tobacco Use    Smoking status: Former     Current packs/day: 0.00     Average packs/day: 1.5 packs/day for 10.0 years (15.0 ttl pk-yrs)     Types: Cigarettes     Start date: 9/24/2003     Quit date: 9/24/2013     Years since quitting: 10.9    Smokeless tobacco: Never   Vaping Use    Vaping status: Never Used   Substance and Sexual Activity    Alcohol use: Not Currently     Alcohol/week: 12.0 standard drinks of alcohol     Types: 12 Cans of beer per week    Drug use: Never    Sexual activity: Not Currently     Partners: Male       Current Outpatient Medications on File Prior to Visit   Medication Sig    adalimumab (HUMIRA) 40 mg/0.8 mL PSKT Inject 40 mg under the skin every 14 (fourteen) days Pt had last dose on 10/27/23- Will be on hold for surgery on 11/24/23    atorvastatin (LIPITOR) 20 mg tablet TAKE 1 TABLET BY MOUTH ONCE  DAILY    Multiple Vitamins-Minerals (One-A-Day Mens 50+) TABS Take by mouth    Na Sulfate-K Sulfate-Mg Sulf 17.5-3.13-1.6 GM/177ML SOLN     acetaminophen (TYLENOL) 500 mg tablet Take 2 tablets (1,000 mg total) by mouth every 8 (eight) hours (Patient not  "taking: Reported on 6/12/2024)    ascorbic acid (VITAMIN C) 500 MG tablet TAKE 1 TABLET(500 MG) BY MOUTH TWICE DAILY (Patient not taking: Reported on 6/12/2024)    Aspirin Low Dose 81 MG EC tablet TAKE 1 TABLET BY MOUTH TWICE DAILY (Patient not taking: Reported on 6/12/2024)    celecoxib (CeleBREX) 200 mg capsule TAKE 1 CAPSULE(200 MG) BY MOUTH TWICE DAILY (Patient not taking: Reported on 6/12/2024)    cholecalciferol (VITAMIN D3) 1,000 units tablet TAKE 2 TABLETS BY MOUTH DAILY. (Patient not taking: Reported on 6/12/2024)    folic acid (FOLVITE) 1 mg tablet TAKE 1 TABLET(1 MG) BY MOUTH DAILY (Patient not taking: Reported on 6/12/2024)    Multiple Vitamins-Minerals (multivitamin with minerals) tablet Take 1 tablet by mouth daily (Patient not taking: Reported on 6/12/2024)    ondansetron (ZOFRAN-ODT) 4 mg disintegrating tablet Take 1 tablet (4 mg total) by mouth every 6 (six) hours as needed for nausea or vomiting (Patient not taking: Reported on 12/6/2023)    senna-docusate sodium (SENOKOT S) 8.6-50 mg per tablet Take 1 tablet by mouth daily (Patient not taking: Reported on 6/12/2024)     No current facility-administered medications on file prior to visit.       No Known Allergies    Physical Exam:    /70   Pulse 72   Ht 5' 9\" (1.753 m)   Wt 102 kg (225 lb)   SpO2 98%   BMI 33.23 kg/m²     Constitutional:normal, well developed, well nourished, alert, in no distress and non-toxic and no overt pain behavior.  Eyes:anicteric  HEENT:grossly intact  Neck:supple, symmetric, trachea midline and no masses   Pulmonary:even and unlabored  Cardiovascular:No edema or pitting edema present  Skin:Normal without rashes or lesions and well hydrated  Psychiatric:Mood and affect appropriate  Neurologic:Cranial Nerves II-XII grossly intact  Musculoskeletal:normal gait.  Severe pain reproduced with lumbar spine extension and rotation to the left and right.  Tender to palpation over lower lumbar facet joints.  Lower extremity " strength is normal.

## 2024-09-11 ENCOUNTER — HOSPITAL ENCOUNTER (OUTPATIENT)
Dept: RADIOLOGY | Facility: MEDICAL CENTER | Age: 69
Discharge: HOME | End: 2024-09-11
Payer: MEDICARE

## 2024-09-11 ENCOUNTER — TELEPHONE (OUTPATIENT)
Dept: PAIN MEDICINE | Facility: MEDICAL CENTER | Age: 69
End: 2024-09-11

## 2024-09-11 VITALS
SYSTOLIC BLOOD PRESSURE: 141 MMHG | HEART RATE: 65 BPM | RESPIRATION RATE: 18 BRPM | DIASTOLIC BLOOD PRESSURE: 80 MMHG | TEMPERATURE: 97.9 F | OXYGEN SATURATION: 96 %

## 2024-09-11 DIAGNOSIS — M47.816 LUMBAR SPONDYLOSIS: ICD-10-CM

## 2024-09-11 PROCEDURE — 64635 DESTROY LUMB/SAC FACET JNT: CPT | Performed by: PHYSICAL MEDICINE & REHABILITATION

## 2024-09-11 PROCEDURE — 64636 DESTROY L/S FACET JNT ADDL: CPT | Performed by: PHYSICAL MEDICINE & REHABILITATION

## 2024-09-11 RX ADMIN — Medication 5 ML: at 09:56

## 2024-09-11 NOTE — DISCHARGE INSTRUCTIONS

## 2024-09-11 NOTE — H&P
History of Present Illness: The patient is a 69 y.o. male who presents with complaints of left low back pain    Past Medical History:   Diagnosis Date    Arthritis 2013    Bilateral carpal tunnel syndrome     Chronic pain disorder     low back    GERD (gastroesophageal reflux disease)     Hyperlipidemia     Low back pain     Lumbar disc disease     Lumbar spinal stenosis     Neck pain     had cervical fusion    Obesity     Psoriasis     right elbow    Tremor     left leg    Wears dentures     full dentures    Wears glasses        Past Surgical History:   Procedure Laterality Date    CERVICAL LAMINECTOMY      COLONOSCOPY  07/2015    multiple polyps, repeat in 3 years    COLONOSCOPY  10/2018    Dr. Rosales.  Diverticulosis in sigmoid and descending colon, tubular adenoma polyp removed from descending colon.    JOINT REPLACEMENT      right TKR    KNEE SURGERY Right 2002    20 years ago-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    LAMINECTOMY      ORTHOPEDIC SURGERY      POPLITEAL SYNOVIAL CYST EXCISION      last assessed: 04/22/2015    [place] ARTHRP KNE CONDYLE&PLATU MEDIAL&LAT COMPARTMENTS Left 11/24/2023    Procedure: ARTHROPLASTY KNEE TOTAL;  Surgeon: Valeriy Topete DO;  Location: [facility];  Service: Orthopedics    UT NEUROPLASTY &/TRANSPOS MEDIAN NRV CARPAL TUNNE Left 09/25/2020    Procedure: RELEASE CARPAL TUNNEL;  Surgeon: Darrin Allen MD;  [facility];  Service: Orthopedics    UT NEUROPLASTY &/TRANSPOS MEDIAN NRV CARPAL TUNNE Right 10/20/2020    Procedure: RELEASE CARPAL TUNNEL - Open;  Surgeon: Darrin Allen MD;  Location[facility];  Service: Orthopedics    SPINE SURGERY      TONSILLECTOMY AND ADENOIDECTOMY      last assessed: 04/22/2015    TOTAL KNEE ARTHROPLASTY      last assessed: 05/12/2015         Current Outpatient Medications:     adalimumab (HUMIRA) 40 mg/0.8 mL PSKT, Inject 40 mg under the skin every 14 (fourteen) days Pt had last dose on 10/27/23- Will be on hold for surgery on 11/24/23, Disp:  , Rfl:     Aspirin Low Dose 81 MG EC tablet, TAKE 1 TABLET BY MOUTH TWICE DAILY (Patient not taking: Reported on 6/12/2024), Disp: 60 tablet, Rfl: 0    atorvastatin (LIPITOR) 20 mg tablet, TAKE 1 TABLET BY MOUTH ONCE  DAILY, Disp: 100 tablet, Rfl: 1    celecoxib (CeleBREX) 200 mg capsule, TAKE 1 CAPSULE(200 MG) BY MOUTH TWICE DAILY (Patient not taking: Reported on 6/12/2024), Disp: 60 capsule, Rfl: 0    Multiple Vitamins-Minerals (One-A-Day Mens 50+) TABS, Take by mouth, Disp: , Rfl:     No Known Allergies    Physical Exam:   Vitals:    09/11/24 0924   BP: 141/80   Pulse: 73   Resp: 18   Temp: 97.9 °F (36.6 °C)   SpO2: 96%     General: Awake, Alert, Oriented x 3, Mood and affect appropriate  Respiratory: Respirations even and unlabored  Cardiovascular: Peripheral pulses intact; no edema  Musculoskeletal Exam: left low back pain    ASA Score: 3    Patient/Chart Verification  Patient ID Verified: Verbal  Consents Confirmed: Procedural, To be obtained in the Pre-Procedure area  H&P( within 30 days) Verified: To be obtained in the Procedural area  Allergies Reviewed: Yes  Anticoag/NSAID held?: NA  Currently on antibiotics?: No  Pregnancy denied?: NA  Does Patient Have a Prosthetic Device/Implant: No    Assessment:   1. Lumbar spondylosis        Plan: Left L3-L5 RFA

## 2024-09-12 NOTE — TELEPHONE ENCOUNTER
--FYI--    Pt did well over night no s/s of infection or sunburn sensation.  Aware it takes 2 weeks to notice pain relief and 4-6 weeks for full pain effect to be achieved.  Aware to medicate as previous for discomfort and may use ice or heat.  Current pain level? 3/10  Confirmed next appt.  Call if any questions or concerns prior to next appt.

## 2024-09-25 ENCOUNTER — HOSPITAL ENCOUNTER (OUTPATIENT)
Dept: RADIOLOGY | Facility: MEDICAL CENTER | Age: 69
Discharge: HOME | End: 2024-09-25
Payer: MEDICARE

## 2024-09-25 ENCOUNTER — TELEPHONE (OUTPATIENT)
Dept: PAIN MEDICINE | Facility: MEDICAL CENTER | Age: 69
End: 2024-09-25

## 2024-09-25 VITALS
RESPIRATION RATE: 18 BRPM | TEMPERATURE: 98.4 F | OXYGEN SATURATION: 97 % | DIASTOLIC BLOOD PRESSURE: 81 MMHG | HEART RATE: 73 BPM | SYSTOLIC BLOOD PRESSURE: 165 MMHG

## 2024-09-25 DIAGNOSIS — M47.816 LUMBAR SPONDYLOSIS: ICD-10-CM

## 2024-09-25 PROCEDURE — 64636 DESTROY L/S FACET JNT ADDL: CPT | Performed by: PHYSICAL MEDICINE & REHABILITATION

## 2024-09-25 PROCEDURE — 64635 DESTROY LUMB/SAC FACET JNT: CPT | Performed by: PHYSICAL MEDICINE & REHABILITATION

## 2024-09-25 RX ADMIN — Medication 5 ML: at 09:56

## 2024-09-25 NOTE — H&P
History of Present Illness: The patient is a 69 y.o. male who presents with complaints of right low back pain    Past Medical History:   Diagnosis Date    Arthritis 2013    Bilateral carpal tunnel syndrome     Chronic pain disorder     low back    GERD (gastroesophageal reflux disease)     Hyperlipidemia     Low back pain     Lumbar disc disease     Lumbar spinal stenosis     Neck pain     had cervical fusion    Obesity     Psoriasis     right elbow    Tremor     left leg    Wears dentures     full dentures    Wears glasses        Past Surgical History:   Procedure Laterality Date    CERVICAL LAMINECTOMY      COLONOSCOPY  07/2015    multiple polyps, repeat in 3 years    COLONOSCOPY  10/2018    Dr. Rosales.  Diverticulosis in sigmoid and descending colon, tubular adenoma polyp removed from descending colon.    JOINT REPLACEMENT      right TKR    KNEE SURGERY Right 2002    20 years ago-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    LAMINECTOMY      ORTHOPEDIC SURGERY      POPLITEAL SYNOVIAL CYST EXCISION      last assessed: 04/22/2015    [place] ARTHRP KNE CONDYLE&PLATU MEDIAL&LAT COMPARTMENTS Left 11/24/2023    Procedure: ARTHROPLASTY KNEE TOTAL;  Surgeon: Valeriy Topete DO;  Location: [facility];  Service: Orthopedics    [place] NEUROPLASTY &/TRANSPOS MEDIAN NRV CARPAL TUNNE Left 09/25/2020    Procedure: RELEASE CARPAL TUNNEL;  Surgeon: Darrin Allen MD;  [facility];  Service: Orthopedics    DC NEUROPLASTY &/TRANSPOS MEDIAN NRV CARPAL TUNNE Right 10/20/2020    Procedure: RELEASE CARPAL TUNNEL - Open;  Surgeon: Darrin Allen MD;  Location[facility];  Service: Orthopedics    SPINE SURGERY      TONSILLECTOMY AND ADENOIDECTOMY      last assessed: 04/22/2015    TOTAL KNEE ARTHROPLASTY      last assessed: 05/12/2015         Current Outpatient Medications:     adalimumab (HUMIRA) 40 mg/0.8 mL PSKT, Inject 40 mg under the skin every 14 (fourteen) days Pt had last dose on 10/27/23- Will be on hold for surgery on 11/24/23, Disp:  , Rfl:     atorvastatin (LIPITOR) 20 mg tablet, TAKE 1 TABLET BY MOUTH ONCE  DAILY, Disp: 100 tablet, Rfl: 1    Multiple Vitamins-Minerals (One-A-Day Mens 50+) TABS, Take by mouth, Disp: , Rfl:     No Known Allergies    Physical Exam:   Vitals:    09/25/24 0929   BP: 161/89   Pulse: 75   Resp: 20   Temp: 98.4 °F (36.9 °C)   SpO2: 99%     General: Awake, Alert, Oriented x 3, Mood and affect appropriate  Respiratory: Respirations even and unlabored  Cardiovascular: Peripheral pulses intact; no edema  Musculoskeletal Exam: right low back pain    ASA Score: 3    Patient/Chart Verification  Patient ID Verified: Verbal  ID Band Applied: No  Consents Confirmed: Procedural, To be obtained in the Pre-Procedure area  H&P( within 30 days) Verified: To be obtained in the Procedural area  Interval H&P(within 24 hr) Complete (required for Outpatients and Surgery Admit only): To be obtained in the Procedural area  Allergies Reviewed: Yes  Anticoag/NSAID held?: NA  Currently on antibiotics?: No  Does Patient Have a Prosthetic Device/Implant: No (Pt denies having a pacemaker or ICD)    Assessment:   1. Lumbar spondylosis        Plan: Right L3-L5 RFA

## 2024-09-25 NOTE — DISCHARGE INSTRUCTIONS

## 2024-11-06 ENCOUNTER — OFFICE VISIT (OUTPATIENT)
Dept: PAIN MEDICINE | Facility: MEDICAL CENTER | Age: 69
End: 2024-11-06
Payer: MEDICARE

## 2024-11-06 VITALS
HEIGHT: 69 IN | WEIGHT: 222 LBS | DIASTOLIC BLOOD PRESSURE: 73 MMHG | SYSTOLIC BLOOD PRESSURE: 155 MMHG | HEART RATE: 73 BPM | BODY MASS INDEX: 32.88 KG/M2

## 2024-11-06 DIAGNOSIS — M47.816 LUMBAR SPONDYLOSIS: ICD-10-CM

## 2024-11-06 DIAGNOSIS — M79.18 MYOFASCIAL PAIN SYNDROME: ICD-10-CM

## 2024-11-06 DIAGNOSIS — M48.061 SPINAL STENOSIS OF LUMBAR REGION WITH RADICULOPATHY: ICD-10-CM

## 2024-11-06 DIAGNOSIS — M54.16 LUMBAR RADICULOPATHY: Primary | ICD-10-CM

## 2024-11-06 DIAGNOSIS — M54.16 SPINAL STENOSIS OF LUMBAR REGION WITH RADICULOPATHY: ICD-10-CM

## 2024-11-06 PROCEDURE — 99214 OFFICE O/P EST MOD 30 MIN: CPT

## 2024-11-06 PROCEDURE — G2211 COMPLEX E/M VISIT ADD ON: HCPCS

## 2024-11-06 RX ORDER — GABAPENTIN 300 MG/1
300 CAPSULE ORAL 3 TIMES DAILY
Qty: 90 CAPSULE | Refills: 0 | Status: SHIPPED | OUTPATIENT
Start: 2024-11-06

## 2024-11-06 NOTE — PROGRESS NOTES
Assessment:  1. Lumbar radiculopathy    2. Spinal stenosis of lumbar region with radiculopathy    3. Lumbar spondylosis    4. Myofascial pain syndrome        Plan:  Update MRI lumbar spine to further evaluate low back and radiating right lower extremity pain.  Patient has been to physical therapy several times in the past for his low back pain and remains compliant with his home exercise program at this time.    Restart gabapentin 300mg three times daily.  Patient has tolerated this medication well in the past without side effects and states it did provide relief when he was taking it. He was instructed to start with 1 capsule nightly x 3 days, then increase to 2 capsules daily x 3 days, then increase to 3 capsules daily and remain on this dose. He will call the office if he experiences any adverse effects.   Follow-up in 8 weeks to review response to gabapentin and MRI results.    This patient is being managed for a complex and serious condition that requires ongoing, intensive medical management. The nature of this condition demands constant vigilance and a nuanced approach to treatment. The condition necessitates an in-depth and focused approach to management, including regular monitoring and potential coordination with other healthcare professionals.     Detailed Description of Visit Complexity: This visit involved an intricate evaluation and management of the patient's condition. The complexity of the visit was due to the need for a detailed assessment of the current state, consideration of potential complications, and a careful balancing of treatment options to manage the condition effectively.     Patient's Health Status and History: This patient has a significant pain history which requires regular and detailed management. The condition's impact on their life and health is substantial, necessitating a comprehensive and tailored approach to chronic ongoing care.     My impressions and treatment  recommendations were discussed in detail with the patient who verbalized understanding and had no further questions.  Discharge instructions were provided. I personally saw and examined the patient and I agree with the above discussed plan of care.    Orders Placed This Encounter   Procedures    MRI lumbar spine wo contrast     Standing Status:   Future     Standing Expiration Date:   11/6/2028     Scheduling Instructions:      There is no preparation for this test. Please leave your jewelry and valuables at home, wedding rings are the exception. All patients will be required to change into a hospital gown and pants.  Street clothes are not permitted in the MRI.  Magnetic nail polish must be removed prior to arrival for your test. Please bring your insurance cards, a form of photo ID and a list of your medications with you. Arrive 15 minutes prior to your appointment time in order to register. Please bring any prior CT or MRI studies of this area that were not performed at a [facility].            To schedule this appointment, please contact Central Scheduling at [phone].            Prior to your appointment, please make sure you complete the MRI Screening Form when you e-Check in for your appointment. This will be available starting 7 days before your appointment in [facility]. You may receive an e-mail with an activation code if you do not have a [facility] account. If you do not have access to a device, we will complete your screening at your appointment.     Order Specific Question:   Reason for Exam     Answer:   Lumbar radiculopathy, right     Order Specific Question:   What is the patient's sedation requirement?     Answer:   No Sedation     Order Specific Question:   Does the patient need medication for Claustrophobia? If yes, order medication at this point.     Answer:   No     Order Specific Question:   Does the patient wear a life vest, have an implanted cardiac device, a stimulation device, a  sleep apnea stimulator, or a breast tissue expansion device?     Answer:   No     Order Specific Question:   Release to patient through [employer]     Answer:   Immediate     New Medications Ordered This Visit   Medications    gabapentin (NEURONTIN) 300 mg capsule     Sig: Take 1 capsule (300 mg total) by mouth 3 (three) times a day     Dispense:  90 capsule     Refill:  0       History of Present Illness:  Aki Hilario is a 69 y.o. male who presents for a follow up office visit in regards to ongoing bilateral low back pain which is now radiating into the right thigh anteriorly down to the knee.  This pain is constant, and typically worse in the morning.  He is currently rating his symptoms a 4/10 in intensity but states that they increase severely when he walks even small distances.  He describes the quality of the pain as sharp.  He denies any new weakness or numbness associated with this pain complaint.  Patient recently underwent bilateral L3-L5 radiofrequency ablations.  He reports 0% reduction in pain at this time and is very disappointed with these results as previous ablations in this spinal segment have provided very meaningful improvement lasting for nearly 12 months.  Pain is causing significant dysfunction in his ADLs at this point.  Patient states he is unable to do housework without severe pain.  He states he can no longer walk around the mall without taking multiple breaks due to pain.    He has had similar pain in the past however this was typically localized to the left lower extremity.  He reported improvement in lower extremity pain following these procedures.  Patient also notes that he had improvement in his leg pain while on gabapentin in the past.  This was discontinued shortly after his last radiofrequency ablation due to his very low pain level at that time.  He is agreeable to restarting gabapentin at this time.    Patient notes he was recently evaluated by the surgeon who did his cervical  fusion in 2015 for his low back and lower extremity pain.  He states he was told there are no surgical options at this time. Cannot find record of this in chart.     I have personally reviewed and/or updated the patient's past medical history, past surgical history, family history, social history, current medications, allergies, and vital signs today.     Review of Systems   Respiratory:  Negative for shortness of breath.    Cardiovascular:  Negative for chest pain.   Gastrointestinal:  Negative for constipation, diarrhea, nausea and vomiting.   Musculoskeletal:  Positive for back pain and gait problem. Negative for arthralgias, joint swelling and myalgias.   Skin:  Negative for rash.   Neurological:  Negative for dizziness, seizures and weakness.   All other systems reviewed and are negative.      Patient Active Problem List   Diagnosis    Chronic bilateral low back pain without sciatica    Essential hypertension    Mixed hyperlipidemia    Polyarticular arthritis    Chronic pain syndrome    Psoriasis    Protrusion of lumbar intervertebral disc    Lumbar radiculopathy    Spinal stenosis of lumbar region with radiculopathy    Osteoarthritis of left knee    Gastroesophageal reflux disease without esophagitis    Numbness of left hand    Trigger finger of left thumb    Left carpal tunnel syndrome    Ulnar neuropathy of left upper extremity    Neuropathic pain    Myofascial pain syndrome    Lumbar spondylosis    DDD (degenerative disc disease), cervical    Neck pain    Impaired fasting glucose    Preoperative clearance    Pre-diabetes       Past Medical History:   Diagnosis Date    Arthritis 2013    Bilateral carpal tunnel syndrome     Chronic pain disorder     low back    GERD (gastroesophageal reflux disease)     Hyperlipidemia     Low back pain     Lumbar disc disease     Lumbar spinal stenosis     Neck pain     had cervical fusion    Obesity     Psoriasis     right elbow    Tremor     left leg    Wears dentures      full dentures    Wears glasses        Past Surgical History:   Procedure Laterality Date    CERVICAL LAMINECTOMY      COLONOSCOPY  2015    multiple polyps, repeat in 3 years    COLONOSCOPY  10/2018    Dr. Rosales.  Diverticulosis in sigmoid and descending colon, tubular adenoma polyp removed from descending colon.    JOINT REPLACEMENT      right TKR    KNEE SURGERY Right     20 years ago-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    LAMINECTOMY      ORTHOPEDIC SURGERY      POPLITEAL SYNOVIAL CYST EXCISION      last assessed: 2015    NC ARTHRP KNE CONDYLE&PLATU MEDIAL&LAT COMPARTMENTS Left 2023    Procedure: ARTHROPLASTY KNEE TOTAL;  Surgeon: Valeriy Topete DO;  [facility];  Service: Orthopedics    NC NEUROPLASTY &/TRANSPOS MEDIAN NRV CARPAL TUNNE Left 2020    Procedure: RELEASE CARPAL TUNNEL;  Surgeon: Darrin Allen MD;  [facility];  Service: Orthopedics    NC NEUROPLASTY &/TRANSPOS MEDIAN NRV CARPAL TUNNE Right 10/20/2020    Procedure: RELEASE CARPAL TUNNEL - Open;  Surgeon: Darrin Allen MD;  [facility];  Service: Orthopedics    SPINE SURGERY      TONSILLECTOMY AND ADENOIDECTOMY      last assessed: 2015    TOTAL KNEE ARTHROPLASTY      last assessed: 2015       Family History   Problem Relation Age of Onset    No Known Problems Mother     Allergies Father         seasonal    Lung cancer Family        Social History     Occupational History    Occupation: Retired [employer]   Tobacco Use    Smoking status: Former     Current packs/day: 0.00     Average packs/day: 1.5 packs/day for 10.0 years (15.0 ttl pk-yrs)     Types: Cigarettes     Start date: 2003     Quit date: 2013     Years since quittin.1    Smokeless tobacco: Never   Vaping Use    Vaping status: Never Used   Substance and Sexual Activity    Alcohol use: Not Currently     Alcohol/week: 12.0 standard drinks of alcohol     Types: 12 Cans of beer per week    Drug use: Never    Sexual  "activity: Not Currently     Partners: Male       Current Outpatient Medications on File Prior to Visit   Medication Sig    adalimumab (HUMIRA) 40 mg/0.8 mL PSKT Inject 40 mg under the skin every 14 (fourteen) days Pt had last dose on 10/27/23- Will be on hold for surgery on 11/24/23    atorvastatin (LIPITOR) 20 mg tablet TAKE 1 TABLET BY MOUTH ONCE  DAILY    Multiple Vitamins-Minerals (One-A-Day Mens 50+) TABS Take by mouth     No current facility-administered medications on file prior to visit.       No Known Allergies    Physical Exam:    /73   Pulse 73   Ht 5' 9\" (1.753 m)   Wt 101 kg (222 lb)   BMI 32.78 kg/m²     Constitutional:normal, well developed, well nourished, alert, in no distress and non-toxic and no overt pain behavior.  Eyes:anicteric  HEENT:grossly intact  Neck:supple, symmetric, trachea midline and no masses   Pulmonary:even and unlabored  Cardiovascular:No edema or pitting edema present  Skin:Normal without rashes or lesions and well hydrated  Psychiatric:Mood and affect appropriate  Neurologic:Cranial Nerves II-XII grossly intact  Musculoskeletal:normal lower extremity strength bilaterally with the exception of right hip flexion which is slightly diminished (this is chronic).  Equivocal straight leg raise on the right.  Negative on the left.  SI joint provocative maneuvers do not reproduce his pain complaint.  He is mildly tender to palpation over the lower lumbar spine.  Nontender to palpation over the bilateral sacroiliac joints and piriformis muscles.  Pain is worse with lumbar spine extension and rotation.  No pain with forward flexion.  Sensation to light touch is intact and equal in the lower extremities bilaterally.  Patellar reflex 2+ b/l.     "

## 2024-11-12 ENCOUNTER — OFFICE VISIT (OUTPATIENT)
Age: 69
End: 2024-11-12
Payer: MEDICARE

## 2024-11-12 ENCOUNTER — APPOINTMENT (OUTPATIENT)
Age: 69
End: 2024-11-12
Payer: MEDICARE

## 2024-11-12 VITALS — BODY MASS INDEX: 32.88 KG/M2 | WEIGHT: 222 LBS | HEIGHT: 69 IN

## 2024-11-12 DIAGNOSIS — Z47.1 AFTERCARE FOLLOWING LEFT KNEE JOINT REPLACEMENT SURGERY: Primary | ICD-10-CM

## 2024-11-12 DIAGNOSIS — Z47.1 AFTERCARE FOLLOWING LEFT KNEE JOINT REPLACEMENT SURGERY: ICD-10-CM

## 2024-11-12 DIAGNOSIS — Z96.652 AFTERCARE FOLLOWING LEFT KNEE JOINT REPLACEMENT SURGERY: Primary | ICD-10-CM

## 2024-11-12 DIAGNOSIS — Z96.652 AFTERCARE FOLLOWING LEFT KNEE JOINT REPLACEMENT SURGERY: ICD-10-CM

## 2024-11-12 PROCEDURE — 99213 OFFICE O/P EST LOW 20 MIN: CPT | Performed by: STUDENT IN AN ORGANIZED HEALTH CARE EDUCATION/TRAINING PROGRAM

## 2024-11-12 PROCEDURE — 73562 X-RAY EXAM OF KNEE 3: CPT

## 2024-11-12 NOTE — PROGRESS NOTES
Knee Post Op Office Note    Assessment:     1. Aftercare following left knee joint replacement surgery        Plan:     Problem List Items Addressed This Visit    None  Visit Diagnoses       Aftercare following left knee joint replacement surgery    -  Primary    Relevant Orders    XR knee 3 vw left non injury             Plan:  69 y.o. male  doing well 1 year s/p Left TKA from 11/24/23.  Xrays of the left knee reviewed today showing prosthesis in good position with no signs of loosening.  Range of motion and stability are excellent.  His left knee is overall doing very well and does not limit him in his activities. He may continue with activities as tolerated.  We will see him back in 5 years, sooner if he has any issues.      Subjective:     Patient ID: Aki Hilario is a 69 y.o. male.    Chief Complaint:  HPI:  Patient is a 69 y.o. male here today for annual post op evaluation of their left knee.  He is 1 year s/p left TKA, DOS: 11/24/23.  He states that his left knee is doing excellent overall.  His only complaint is that he feels stiffness in the left, however he feels that this may be due to his lumbar spine.  His left knee does not limit him from doing activities he enjoys and he does not require any medications for his knee.  He is pleased with his progress.    Allergy:  No Known Allergies  Medications:  all current active meds have been reviewed  Past Medical History:  Past Medical History:   Diagnosis Date    Arthritis 2013    Bilateral carpal tunnel syndrome     Chronic pain disorder     low back    GERD (gastroesophageal reflux disease)     Hyperlipidemia     Low back pain     Lumbar disc disease     Lumbar spinal stenosis     Neck pain     had cervical fusion    Obesity     Psoriasis     right elbow    Tremor     left leg    Wears dentures     full dentures    Wears glasses      Past Surgical History:  Past Surgical History:   Procedure Laterality Date    CERVICAL LAMINECTOMY      COLONOSCOPY  07/2015     multiple polyps, repeat in 3 years    COLONOSCOPY  10/2018    Dr. Rosales.  Diverticulosis in sigmoid and descending colon, tubular adenoma polyp removed from descending colon.    JOINT REPLACEMENT      right TKR    KNEE SURGERY Right     20 years ago-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    LAMINECTOMY      ORTHOPEDIC SURGERY      POPLITEAL SYNOVIAL CYST EXCISION      last assessed: 2015    ID ARTHRP KNE CONDYLE&PLATU MEDIAL&LAT COMPARTMENTS Left 2023    Procedure: ARTHROPLASTY KNEE TOTAL;  Surgeon: Valeriy Topete DO;  [facility];  Service: Orthopedics    ID NEUROPLASTY &/TRANSPOS MEDIAN NRV CARPAL TUNNE Left 2020    Procedure: RELEASE CARPAL TUNNEL;  Surgeon: Darrin Allen MD;  [facility];  Service: Orthopedics    ID NEUROPLASTY &/TRANSPOS MEDIAN NRV CARPAL TUNNE Right 10/20/2020    Procedure: RELEASE CARPAL TUNNEL - Open;  Surgeon: Darrin Allen MD;  [facility];  Service: Orthopedics    SPINE SURGERY      TONSILLECTOMY AND ADENOIDECTOMY      last assessed: 2015    TOTAL KNEE ARTHROPLASTY      last assessed: 2015     Family History:  Family History   Problem Relation Age of Onset    No Known Problems Mother     Allergies Father         seasonal    Lung cancer Family      Social History:  Social History     Substance and Sexual Activity   Alcohol Use Not Currently    Alcohol/week: 12.0 standard drinks of alcohol    Types: 12 Cans of beer per week     Social History     Substance and Sexual Activity   Drug Use Never     Social History     Tobacco Use   Smoking Status Former    Current packs/day: 0.00    Average packs/day: 1.5 packs/day for 10.0 years (15.0 ttl pk-yrs)    Types: Cigarettes    Start date: 2003    Quit date: 2013    Years since quittin.1   Smokeless Tobacco Never           ROS:  General: Per HPI  Skin: Negative, except if noted below  HEENT: Negative  Respiratory: Negative  Cardiovascular: Negative  Gastrointestinal:  "Negative  Urinary: Negative  Vascular: Negative  Musculoskeletal: Positive per HPI   Neurologic: Positive per HPI  Endocrine: Negative    Objective:  BP Readings from Last 1 Encounters:   11/06/24 155/73      Wt Readings from Last 1 Encounters:   11/12/24 101 kg (222 lb)        Respiratory:   non-labored respirations    Lymphatics:  no palpable lymph nodes    Gait:   normal    Neurologic:   Alert and oriented times 3  Patient with normal sensation except as noted below  Deep tendon reflexes 2+ except as noted in MSK exam    Bilateral Lower Extremity:  Left Knee:      Inspection:  skin intact, well healed incision    Overall limb alignment neutral    Effusion: none    ROM 0-120 wo pain    Extensor Lag: none    Palpation: no Joint line tenderness to palpation    AP Stability at 90 deg stable    M/L stability in full extension stable    M/L stability in midflexion stable    Motor: 5/5 Q/HS/TA/GS/P    Pulses: 2+ DP / 2+ PT    SILT DP/SP/S/S/TN      Imaging:  My interpretation XR AP knee/lateral/maria/sunrise left knee: s/p cemented CR attune TKA, components in good position. No fracture or dislocation.      BMI:   Estimated body mass index is 32.78 kg/m² as calculated from the following:    Height as of this encounter: 5' 9\" (1.753 m).    Weight as of this encounter: 101 kg (222 lb).  BSA:   Estimated body surface area is 2.16 meters squared as calculated from the following:    Height as of this encounter: 5' 9\" (1.753 m).    Weight as of this encounter: 101 kg (222 lb).           Scribe Attestation      I,:  Linda Dumont PA-C am acting as a scribe while in the presence of the attending physician.:       I,:  Valeriy Topete DO personally performed the services described in this documentation    as scribed in my presence.:               "

## 2024-11-13 ENCOUNTER — TELEPHONE (OUTPATIENT)
Age: 69
End: 2024-11-13

## 2024-11-13 DIAGNOSIS — Z96.652 AFTERCARE FOLLOWING LEFT KNEE JOINT REPLACEMENT SURGERY: Primary | ICD-10-CM

## 2024-11-13 DIAGNOSIS — Z47.1 AFTERCARE FOLLOWING LEFT KNEE JOINT REPLACEMENT SURGERY: Primary | ICD-10-CM

## 2024-11-13 NOTE — TELEPHONE ENCOUNTER
Caller: Self    Doctor: Efrem    Reason for call: States Dr Topete was going to order therapy but he did not get any referrals. Can someone let patient know when order is in?    Call back#: [phone]

## 2024-11-21 ENCOUNTER — HOSPITAL ENCOUNTER (OUTPATIENT)
Dept: MRI IMAGING | Facility: HOSPITAL | Age: 69
Discharge: HOME | End: 2024-11-21
Payer: MEDICARE

## 2024-11-21 DIAGNOSIS — M54.16 LUMBAR RADICULOPATHY: ICD-10-CM

## 2024-11-21 PROCEDURE — 72148 MRI LUMBAR SPINE W/O DYE: CPT

## 2024-11-22 ENCOUNTER — TELEPHONE (OUTPATIENT)
Dept: PAIN MEDICINE | Facility: CLINIC | Age: 69
End: 2024-11-22

## 2024-11-22 NOTE — TELEPHONE ENCOUNTER
November 22, 2024  Ciara Ybarra PA-C to [facility]       11/22/24  2:05 PM  Result Note  Please advise patient lumbar spine MRI appears stable in comparison to the previous study. If he is still experiencing right anterior thigh pain, can offer right L3-L4 TFESI

## 2024-11-22 NOTE — TELEPHONE ENCOUNTER
S/W pt.  Advised pt of the same.  He still has the thigh pain.  He wants to schedule the injection. As long as it is covered by insurance.  Pt denies taking blood thinners and denies DM. Advised will have the  call him to schedule.  Pt verbalized understanding.    Last SOVS on 11/6/24  Has pre-diabetes listed on problem list in chart

## 2024-11-25 NOTE — TELEPHONE ENCOUNTER
Procedure scheduled 12/18/24    Reviewed instructions: , NPO 1 hour prior, loose-fitting/comfortable pants, if ill/fever/infx/abx to call and reschedule.  No immunizations or vaccinations 2 days prior/after steroid injections. Patient stated verbal understanding.

## 2024-12-12 ENCOUNTER — OFFICE VISIT (OUTPATIENT)
Dept: FAMILY MEDICINE CLINIC | Facility: CLINIC | Age: 69
End: 2024-12-12
Payer: MEDICARE

## 2024-12-12 VITALS
WEIGHT: 228 LBS | SYSTOLIC BLOOD PRESSURE: 130 MMHG | OXYGEN SATURATION: 97 % | HEIGHT: 69 IN | TEMPERATURE: 98.1 F | HEART RATE: 65 BPM | BODY MASS INDEX: 33.77 KG/M2 | DIASTOLIC BLOOD PRESSURE: 80 MMHG

## 2024-12-12 DIAGNOSIS — I10 ESSENTIAL HYPERTENSION: Primary | ICD-10-CM

## 2024-12-12 DIAGNOSIS — R73.03 PRE-DIABETES: ICD-10-CM

## 2024-12-12 DIAGNOSIS — E78.2 MIXED HYPERLIPIDEMIA: ICD-10-CM

## 2024-12-12 PROCEDURE — G2211 COMPLEX E/M VISIT ADD ON: HCPCS | Performed by: FAMILY MEDICINE

## 2024-12-12 PROCEDURE — 99214 OFFICE O/P EST MOD 30 MIN: CPT | Performed by: FAMILY MEDICINE

## 2024-12-12 RX ORDER — ATORVASTATIN CALCIUM 20 MG/1
20 TABLET, FILM COATED ORAL DAILY
Qty: 100 TABLET | Refills: 1 | Status: SHIPPED | OUTPATIENT
Start: 2024-12-12

## 2024-12-12 NOTE — PROGRESS NOTES
"Name: Aki Hilario      : 1955      MRN: [id number]  Encounter Provider: Jt Leach MD  Encounter Date: 2024   Encounter department: [facility]  :  Assessment & Plan  Essential hypertension  Blood pressure well-controlled today 130/80, not on any meds       Mixed hyperlipidemia  Will be due for labs, order placed.  Refill Lipitor.  Work on diet and exercise  Orders:  •  Lipid panel; Future  •  atorvastatin (LIPITOR) 20 mg tablet; Take 1 tablet (20 mg total) by mouth daily    Pre-diabetes  Due for labs, orders placed.  Work on diet and exercise  Orders:  •  Comprehensive metabolic panel; Future  •  Hemoglobin A1C; Future           History of Present Illness     Presents today for follow-up on prediabetes, hyperlipidemia, hypertension.  States overall he is doing well.  Denies any new complaints today.      Review of Systems   Constitutional:  Negative for activity change, appetite change, chills, fatigue and fever.   HENT:  Negative for congestion, rhinorrhea, sneezing and sore throat.    Eyes:  Negative for pain, discharge, redness and itching.   Respiratory:  Negative for cough, chest tightness, shortness of breath and wheezing.    Cardiovascular:  Negative for chest pain and palpitations.   Gastrointestinal:  Negative for abdominal pain, constipation, diarrhea, nausea and vomiting.   Musculoskeletal:  Negative for arthralgias, gait problem, myalgias and neck pain.   Skin:  Negative for rash.   Neurological:  Negative for dizziness, weakness, numbness and headaches.   Hematological:  Negative for adenopathy.   Psychiatric/Behavioral:  Negative for confusion and dysphoric mood. The patient is not nervous/anxious.    All other systems reviewed and are negative.      Objective   /80 (BP Location: Left arm, Patient Position: Sitting, Cuff Size: Large)   Pulse 65   Temp 98.1 °F (36.7 °C) (Temporal)   Ht 5' 9\" (1.753 m)   Wt 103 kg (228 lb)   SpO2 97%   BMI " 33.67 kg/m²      Physical Exam  Vitals reviewed.   Constitutional:       General: He is not in acute distress.     Appearance: Normal appearance. He is well-developed.   HENT:      Head: Normocephalic and atraumatic.      Right Ear: External ear normal.      Left Ear: External ear normal.      Nose: Nose normal.      Mouth/Throat:      Mouth: Mucous membranes are moist.   Eyes:      General: No scleral icterus.        Right eye: No discharge.         Left eye: No discharge.      Conjunctiva/sclera: Conjunctivae normal.   Cardiovascular:      Rate and Rhythm: Normal rate and regular rhythm.      Pulses: Normal pulses.      Heart sounds: Normal heart sounds. No murmur heard.  Pulmonary:      Effort: Pulmonary effort is normal. No respiratory distress.      Breath sounds: Normal breath sounds. No wheezing.   Abdominal:      Palpations: Abdomen is soft. There is no mass.      Tenderness: There is no abdominal tenderness.      Hernia: No hernia is present.   Musculoskeletal:         General: No swelling, tenderness, deformity or signs of injury. Normal range of motion.      Cervical back: Normal range of motion.   Skin:     General: Skin is warm and dry.      Capillary Refill: Capillary refill takes less than 2 seconds.      Findings: No lesion or rash.   Neurological:      General: No focal deficit present.      Mental Status: He is alert. Mental status is at baseline.      Motor: No weakness.      Gait: Gait normal.   Psychiatric:         Mood and Affect: Mood normal.         Behavior: Behavior normal.

## 2024-12-12 NOTE — ASSESSMENT & PLAN NOTE
Due for labs, orders placed.  Work on diet and exercise  Orders:  •  Comprehensive metabolic panel; Future  •  Hemoglobin A1C; Future

## 2024-12-12 NOTE — ASSESSMENT & PLAN NOTE
Will be due for labs, order placed.  Refill Lipitor.  Work on diet and exercise  Orders:  •  Lipid panel; Future  •  atorvastatin (LIPITOR) 20 mg tablet; Take 1 tablet (20 mg total) by mouth daily

## 2024-12-18 ENCOUNTER — HOSPITAL ENCOUNTER (OUTPATIENT)
Dept: RADIOLOGY | Facility: MEDICAL CENTER | Age: 69
Discharge: HOME | End: 2024-12-18
Payer: MEDICARE

## 2024-12-18 VITALS
HEART RATE: 59 BPM | DIASTOLIC BLOOD PRESSURE: 85 MMHG | OXYGEN SATURATION: 99 % | RESPIRATION RATE: 18 BRPM | SYSTOLIC BLOOD PRESSURE: 164 MMHG | TEMPERATURE: 97.5 F

## 2024-12-18 DIAGNOSIS — M54.16 LUMBAR RADICULOPATHY: ICD-10-CM

## 2024-12-18 PROCEDURE — 64483 NJX AA&/STRD TFRM EPI L/S 1: CPT | Performed by: PHYSICAL MEDICINE & REHABILITATION

## 2024-12-18 RX ORDER — PAPAVERINE HCL 150 MG
10 CAPSULE, EXTENDED RELEASE ORAL ONCE
Status: COMPLETED | OUTPATIENT
Start: 2024-12-18 | End: 2024-12-18

## 2024-12-18 RX ADMIN — IOHEXOL 2 ML: 300 INJECTION, SOLUTION INTRAVENOUS at 10:57

## 2024-12-18 RX ADMIN — DEXAMETHASONE SODIUM PHOSPHATE 10 MG: 10 INJECTION INTRAMUSCULAR; INTRAVENOUS at 10:57

## 2024-12-18 NOTE — DISCHARGE INSTRUCTIONS
Epidural Steroid Injection   WHAT YOU NEED TO KNOW:   An epidural steroid injection (BLADE) is a procedure to inject steroid medicine into the epidural space. The epidural space is between your spinal cord and vertebrae. Steroids reduce inflammation and fluid buildup in your spine that may be causing pain. You may be given pain medicine along with the steroids.          ACTIVITY  Do not drive or operate machinery today.  No strenuous activity today - bending, lifting, etc.  You may resume normal activites starting tomorrow - start slowly and as tolerated.  You may shower today, but no tub baths or hot tubs.  You may have numbness for several hours from the local anesthetic. Please use caution and common sense, especially with weight-bearing activities.    CARE OF THE INJECTION SITE  If you have soreness or pain, apply ice to the area today (20 minutes on/20 minutes off).  Starting tomorrow, you may use warm, moist heat or ice if needed.  You may have an increase or change in your discomfort for 36-48 hours after your treatment.  Apply ice and continue with any pain medication you have been prescribed.  Notify the [facility] if you have any of the following: redness, drainage, swelling, headache, stiff neck or fever above 100°F.    SPECIAL INSTRUCTIONS  Our office will contact you in approximately 14 days for a progress report.    MEDICATIONS  Continue to take all routine medications.  Our office may have instructed you to hold some medications.    As no general anesthesia was used in today's procedure, you should not experience any side effects related to anesthesia.     If you are diabetic, the steroids used in today's injection may temporarily increase your blood sugar levels after the first few days after your injection. Please keep a close eye on your sugars and alert the doctor who manages your diabetes if your sugars are significantly high from your baseline or you are symptomatic.     If you have a  problem specifically related to your procedure, please call our office at [phone].  Problems not related to your procedure should be directed to your primary care physician.

## 2024-12-18 NOTE — H&P
History of Present Illness: The patient is a 69 y.o. male who presents with complaints of right low back and leg pain    Past Medical History:   Diagnosis Date    Arthritis 2013    Bilateral carpal tunnel syndrome     Chronic pain disorder     low back    GERD (gastroesophageal reflux disease)     Hyperlipidemia     Low back pain     Lumbar disc disease     Lumbar spinal stenosis     Neck pain     had cervical fusion    Obesity     Psoriasis     right elbow    Tremor     left leg    Wears dentures     full dentures    Wears glasses        Past Surgical History:   Procedure Laterality Date    CERVICAL LAMINECTOMY      COLONOSCOPY  07/2015    multiple polyps, repeat in 3 years    COLONOSCOPY  10/2018    Dr. Rosales.  Diverticulosis in sigmoid and descending colon, tubular adenoma polyp removed from descending colon.    JOINT REPLACEMENT      right TKR    KNEE SURGERY Right 2002    20 years ago-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    LAMINECTOMY      ORTHOPEDIC SURGERY      POPLITEAL SYNOVIAL CYST EXCISION      last assessed: 04/22/2015    [place] ARTHRP KNE CONDYLE&PLATU MEDIAL&LAT COMPARTMENTS Left 11/24/2023    Procedure: ARTHROPLASTY KNEE TOTAL;  Surgeon: Valeriy Topete DO;  [facility];  Service: Orthopedics    [place] NEUROPLASTY &/TRANSPOS MEDIAN NRV CARPAL TUNNE Left 09/25/2020    Procedure: RELEASE CARPAL TUNNEL;  Surgeon: Darrin Allen MD;  [facility];  Service: Orthopedics    MO NEUROPLASTY &/TRANSPOS MEDIAN NRV CARPAL TUNNE Right 10/20/2020    Procedure: RELEASE CARPAL TUNNEL - Open;  Surgeon: Darrin Allen MD;  [facility];  Service: Orthopedics    SPINE SURGERY      TONSILLECTOMY AND ADENOIDECTOMY      last assessed: 04/22/2015    TOTAL KNEE ARTHROPLASTY      last assessed: 05/12/2015         Current Outpatient Medications:     adalimumab (HUMIRA) 40 mg/0.8 mL PSKT, Inject 40 mg under the skin every 14 (fourteen) days Pt had last dose on 10/27/23- Will be on hold for surgery on  11/24/23, Disp: , Rfl:     atorvastatin (LIPITOR) 20 mg tablet, Take 1 tablet (20 mg total) by mouth daily, Disp: 100 tablet, Rfl: 1    gabapentin (NEURONTIN) 300 mg capsule, Take 1 capsule (300 mg total) by mouth 3 (three) times a day, Disp: 90 capsule, Rfl: 0    Multiple Vitamins-Minerals (One-A-Day Mens 50+) TABS, Take by mouth, Disp: , Rfl:     Current Facility-Administered Medications:     dexamethasone (PF) (DECADRON) injection 10 mg, 10 mg, Epidural, Once, Brandan Leija,     iohexol (OMNIPAQUE) 300 mg/mL injection 2 mL, 2 mL, Epidural, Once, Brandan Leija, DO    No Known Allergies    Physical Exam:   Vitals:    12/18/24 1046   BP: 144/72   Pulse: 63   Resp: 18   Temp: 97.5 °F (36.4 °C)   SpO2: 99%     General: Awake, Alert, Oriented x 3, Mood and affect appropriate  Respiratory: Respirations even and unlabored  Cardiovascular: Peripheral pulses intact; no edema  Musculoskeletal Exam: right low back and leg pain    ASA Score: 3    Patient/Chart Verification  Patient ID Verified: Verbal  Consents Confirmed: Procedural, To be obtained in the Pre-Procedure area  H&P( within 30 days) Verified: To be obtained in the Procedural area  Allergies Reviewed: Yes  Anticoag/NSAID held?: NA  Currently on antibiotics?: No  Pregnancy denied?: NA    Assessment:   1. Lumbar radiculopathy        Plan: right L3-L4 TFESI ([id number])

## 2024-12-20 ENCOUNTER — APPOINTMENT (OUTPATIENT)
Dept: LAB | Facility: CLINIC | Age: 69
End: 2024-12-20
Payer: MEDICARE

## 2024-12-20 DIAGNOSIS — R73.03 PRE-DIABETES: ICD-10-CM

## 2024-12-20 DIAGNOSIS — E78.2 MIXED HYPERLIPIDEMIA: ICD-10-CM

## 2024-12-20 LAB
ALBUMIN SERPL BCG-MCNC: 4.1 G/DL (ref 3.5–5)
ALP SERPL-CCNC: 104 U/L (ref 34–104)
ALT SERPL W P-5'-P-CCNC: 18 U/L (ref 7–52)
ANION GAP SERPL CALCULATED.3IONS-SCNC: 8 MMOL/L (ref 4–13)
AST SERPL W P-5'-P-CCNC: 22 U/L (ref 13–39)
BILIRUB SERPL-MCNC: 0.78 MG/DL (ref 0.2–1)
BUN SERPL-MCNC: 19 MG/DL (ref 5–25)
CALCIUM SERPL-MCNC: 9.4 MG/DL (ref 8.4–10.2)
CHLORIDE SERPL-SCNC: 104 MMOL/L (ref 96–108)
CHOLEST SERPL-MCNC: 117 MG/DL (ref ?–200)
CO2 SERPL-SCNC: 28 MMOL/L (ref 21–32)
CREAT SERPL-MCNC: 0.71 MG/DL (ref 0.6–1.3)
EST. AVERAGE GLUCOSE BLD GHB EST-MCNC: 120 MG/DL
GFR SERPL CREATININE-BSD FRML MDRD: 94 ML/MIN/1.73SQ M
GLUCOSE P FAST SERPL-MCNC: 89 MG/DL (ref 65–99)
HBA1C MFR BLD: 5.8 %
HDLC SERPL-MCNC: 40 MG/DL
LDLC SERPL CALC-MCNC: 54 MG/DL (ref 0–100)
NONHDLC SERPL-MCNC: 77 MG/DL
POTASSIUM SERPL-SCNC: 4.2 MMOL/L (ref 3.5–5.3)
PROT SERPL-MCNC: 6.3 G/DL (ref 6.4–8.4)
SODIUM SERPL-SCNC: 140 MMOL/L (ref 135–147)
TRIGL SERPL-MCNC: 113 MG/DL (ref ?–150)

## 2024-12-20 PROCEDURE — 80061 LIPID PANEL: CPT

## 2024-12-20 PROCEDURE — 80053 COMPREHEN METABOLIC PANEL: CPT

## 2024-12-20 PROCEDURE — 36415 COLL VENOUS BLD VENIPUNCTURE: CPT

## 2024-12-20 PROCEDURE — 83036 HEMOGLOBIN GLYCOSYLATED A1C: CPT

## 2024-12-24 ENCOUNTER — RESULTS FOLLOW-UP (OUTPATIENT)
Dept: FAMILY MEDICINE CLINIC | Facility: CLINIC | Age: 69
End: 2024-12-24

## 2024-12-31 ENCOUNTER — TELEPHONE (OUTPATIENT)
Dept: PAIN MEDICINE | Facility: CLINIC | Age: 69
End: 2024-12-31

## 2025-01-02 ENCOUNTER — OFFICE VISIT (OUTPATIENT)
Dept: PAIN MEDICINE | Facility: MEDICAL CENTER | Age: 70
End: 2025-01-02
Payer: MEDICARE

## 2025-01-02 VITALS — BODY MASS INDEX: 33.77 KG/M2 | HEIGHT: 69 IN | WEIGHT: 228 LBS

## 2025-01-02 DIAGNOSIS — M48.061 SPINAL STENOSIS OF LUMBAR REGION WITH RADICULOPATHY: ICD-10-CM

## 2025-01-02 DIAGNOSIS — M47.816 LUMBAR SPONDYLOSIS: ICD-10-CM

## 2025-01-02 DIAGNOSIS — M54.16 LUMBAR RADICULOPATHY: Primary | ICD-10-CM

## 2025-01-02 DIAGNOSIS — M54.16 SPINAL STENOSIS OF LUMBAR REGION WITH RADICULOPATHY: ICD-10-CM

## 2025-01-02 PROCEDURE — G2211 COMPLEX E/M VISIT ADD ON: HCPCS

## 2025-01-02 PROCEDURE — 99214 OFFICE O/P EST MOD 30 MIN: CPT

## 2025-01-02 RX ORDER — GABAPENTIN 300 MG/1
300 CAPSULE ORAL 3 TIMES DAILY
Qty: 270 CAPSULE | Refills: 0 | Status: SHIPPED | OUTPATIENT
Start: 2025-01-02

## 2025-01-02 NOTE — PROGRESS NOTES
Assessment:  1. Lumbar radiculopathy    2. Spinal stenosis of lumbar region with radiculopathy    3. Lumbar spondylosis        Plan:  Patient is reporting about 85% improvement following recent right L3-L4 TFESI.  He is aware this can be repeated on an as-needed basis.  Continue gabapentin 300 mg 3 times daily.  He is tolerating this well without side effects and reports that it does help to alleviate his chronic lower extremity pain.  Follow-up as needed at this time.    My impressions and treatment recommendations were discussed in detail with the patient who verbalized understanding and had no further questions.  Discharge instructions were provided. I personally saw and examined the patient and I agree with the above discussed plan of care.    No orders of the defined types were placed in this encounter.    New Medications Ordered This Visit   Medications    gabapentin (NEURONTIN) 300 mg capsule     Sig: Take 1 capsule (300 mg total) by mouth 3 (three) times a day     Dispense:  270 capsule     Refill:  0       History of Present Illness:  Aki Hilario is a 69 y.o. male who presents for a follow up office visit after undergoing right L3-L4 TFESI which he states has alleviated 85% of his low back pain and nearly 100% of his right lower extremity pain.  He is overall very happy with these results and states his pain is very manageable at this time.  Residual pain is localized to the lumbar region bilaterally and is rated a 2/10 in intensity.  This is intermittent and is no longer interfering with his ADLs which was a major concern for him prior to the procedure.    Currently taking gabapentin 300mg TID for pain relief and tolerating well without side effects.    I have personally reviewed and/or updated the patient's past medical history, past surgical history, family history, social history, current medications, allergies, and vital signs today.     Review of Systems   Constitutional:  Negative for fatigue.    HENT:  Negative for ear pain, hearing loss and sinus pressure.    Eyes:  Negative for pain.   Respiratory:  Negative for wheezing.    Cardiovascular:  Negative for palpitations.   Gastrointestinal:  Negative for abdominal pain.   Endocrine: Negative for polyuria.   Genitourinary:  Negative for frequency.   Musculoskeletal:  Positive for arthralgias and back pain. Negative for gait problem and myalgias.   Skin:  Negative for rash.   Neurological:  Negative for numbness and headaches.   Psychiatric/Behavioral:  Negative for dysphoric mood and sleep disturbance.        Patient Active Problem List   Diagnosis    Chronic bilateral low back pain without sciatica    Essential hypertension    Mixed hyperlipidemia    Polyarticular arthritis    Chronic pain syndrome    Psoriasis    Protrusion of lumbar intervertebral disc    Lumbar radiculopathy    Spinal stenosis of lumbar region with radiculopathy    Osteoarthritis of left knee    Gastroesophageal reflux disease without esophagitis    Numbness of left hand    Trigger finger of left thumb    Left carpal tunnel syndrome    Ulnar neuropathy of left upper extremity    Neuropathic pain    Myofascial pain syndrome    Lumbar spondylosis    DDD (degenerative disc disease), cervical    Neck pain    Impaired fasting glucose    Preoperative clearance    Pre-diabetes       Past Medical History:   Diagnosis Date    Arthritis 2013    Bilateral carpal tunnel syndrome     Chronic pain disorder     low back    GERD (gastroesophageal reflux disease)     Hyperlipidemia     Low back pain     Lumbar disc disease     Lumbar spinal stenosis     Neck pain     had cervical fusion    Obesity     Psoriasis     right elbow    Tremor     left leg    Wears dentures     full dentures    Wears glasses        Past Surgical History:   Procedure Laterality Date    CERVICAL LAMINECTOMY      COLONOSCOPY  07/2015    multiple polyps, repeat in 3 years    COLONOSCOPY  10/2018    Dr. Rosales.  Diverticulosis in  sigmoid and descending colon, tubular adenoma polyp removed from descending colon.    JOINT REPLACEMENT      right TKR    KNEE SURGERY Right     20 years ago-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    LAMINECTOMY      ORTHOPEDIC SURGERY      POPLITEAL SYNOVIAL CYST EXCISION      last assessed: 2015    [place] ARTHRP KNE CONDYLE&PLATU MEDIAL&LAT COMPARTMENTS Left 2023    Procedure: ARTHROPLASTY KNEE TOTAL;  Surgeon: Valeriy Topete DO;  [facility];  Service: Orthopedics    [place] NEUROPLASTY &/TRANSPOS MEDIAN NRV CARPAL TUNNE Left 2020    Procedure: RELEASE CARPAL TUNNEL;  Surgeon: Darrin Allen MD;  [facility];  Service: Orthopedics    [place] NEUROPLASTY &/TRANSPOS MEDIAN NRV CARPAL TUNNE Right 10/20/2020    Procedure: RELEASE CARPAL TUNNEL - Open;  Surgeon: Darrin Allen MD;  [facility];  Service: Orthopedics    SPINE SURGERY      TONSILLECTOMY AND ADENOIDECTOMY      last assessed: 2015    TOTAL KNEE ARTHROPLASTY      last assessed: 2015       Family History   Problem Relation Age of Onset    No Known Problems Mother     Allergies Father         seasonal    Lung cancer Family        Social History     Occupational History    Occupation: [employer]   Tobacco Use    Smoking status: Former     Current packs/day: 0.00     Average packs/day: 1.5 packs/day for 10.0 years (15.0 ttl pk-yrs)     Types: Cigarettes     Start date: 2003     Quit date: 2013     Years since quittin.2    Smokeless tobacco: Never   Vaping Use    Vaping status: Never Used   Substance and Sexual Activity    Alcohol use: Not Currently     Alcohol/week: 12.0 standard drinks of alcohol     Types: 12 Cans of beer per week    Drug use: Never    Sexual activity: Not Currently     Partners: Male       Current Outpatient Medications on File Prior to Visit   Medication Sig    adalimumab (HUMIRA) 40 mg/0.8 mL PSKT Inject 40 mg under the skin every 14 (fourteen) days Pt had last dose on  "10/27/23- Will be on hold for surgery on 11/24/23    atorvastatin (LIPITOR) 20 mg tablet Take 1 tablet (20 mg total) by mouth daily    Multiple Vitamins-Minerals (One-A-Day Mens 50+) TABS Take by mouth    [DISCONTINUED] gabapentin (NEURONTIN) 300 mg capsule Take 1 capsule (300 mg total) by mouth 3 (three) times a day     No current facility-administered medications on file prior to visit.       No Known Allergies    Physical Exam:    Ht 5' 9\" (1.753 m)   Wt 103 kg (228 lb)   BMI 33.67 kg/m²     Constitutional:normal, well developed, well nourished, alert, in no distress and non-toxic and no overt pain behavior.  Eyes:anicteric  HEENT:grossly intact  Neck:supple, symmetric, trachea midline and no masses   Pulmonary:even and unlabored  Cardiovascular:No edema or pitting edema present  Skin:Normal without rashes or lesions and well hydrated  Psychiatric:Mood and affect appropriate  Neurologic:Cranial Nerves II-XII grossly intact  Musculoskeletal:normal except for mild bilateral low back pain.    "

## 2025-01-30 ENCOUNTER — APPOINTMENT (OUTPATIENT)
Dept: LAB | Facility: CLINIC | Age: 70
End: 2025-01-30
Payer: MEDICARE

## 2025-01-30 DIAGNOSIS — L40.0 PSORIASIS VULGARIS: ICD-10-CM

## 2025-01-30 PROCEDURE — 86480 TB TEST CELL IMMUN MEASURE: CPT

## 2025-01-30 PROCEDURE — 36415 COLL VENOUS BLD VENIPUNCTURE: CPT

## 2025-01-31 LAB
GAMMA INTERFERON BACKGROUND BLD IA-ACNC: 0.05 IU/ML
M TB IFN-G BLD-IMP: NEGATIVE
M TB IFN-G CD4+ BCKGRND COR BLD-ACNC: 0.01 IU/ML
M TB IFN-G CD4+ BCKGRND COR BLD-ACNC: 0.01 IU/ML
MITOGEN IGNF BCKGRD COR BLD-ACNC: 9.95 IU/ML

## 2025-04-01 ENCOUNTER — OFFICE VISIT (OUTPATIENT)
Age: 70
End: 2025-04-01
Payer: MEDICARE

## 2025-04-01 VITALS — BODY MASS INDEX: 33.77 KG/M2 | WEIGHT: 228 LBS | HEIGHT: 69 IN

## 2025-04-01 DIAGNOSIS — M54.50 CHRONIC LOW BACK PAIN, UNSPECIFIED BACK PAIN LATERALITY, UNSPECIFIED WHETHER SCIATICA PRESENT: ICD-10-CM

## 2025-04-01 DIAGNOSIS — Z98.1 S/P CERVICAL SPINAL FUSION: Primary | ICD-10-CM

## 2025-04-01 DIAGNOSIS — G89.29 CHRONIC LOW BACK PAIN, UNSPECIFIED BACK PAIN LATERALITY, UNSPECIFIED WHETHER SCIATICA PRESENT: ICD-10-CM

## 2025-04-01 DIAGNOSIS — R26.89 MYELOPATHIC GAIT: ICD-10-CM

## 2025-04-01 PROCEDURE — 99214 OFFICE O/P EST MOD 30 MIN: CPT | Performed by: ORTHOPAEDIC SURGERY

## 2025-04-01 NOTE — PROGRESS NOTES
"Name: Aki Hilario      : 1955       MRN: [id number]   Encounter Provider: Dylon Turner MD   Encounter Date: 25  Encounter department: [facility] ORTHOPEDIC SPINE SURGERY      History of Present Illness    Aki Hilario is a 70 y.o. male who presents for initial evaluation of his lumbar spine. Patient reports that his low back pain has been present for multiple years. The patient reports that their pain is mostly located across the low back. He reports that his low back symptoms increase in severity after 5-10 minutes of walking. Denies recent land-based or aqua-based PT. Reports last course of PT was over 10 years ago. Reports history of spinal injections with pain management, performed by Dr. Leija. Reports prior history of spine surgery, as he underwent a posterior cervical fusion in .     Diabetic: No  Nicotine use: Former  BMI: Estimated body mass index is 33.67 kg/m² as calculated from the following:    Height as of this encounter: 5' 9\" (1.753 m).    Weight as of this encounter: 103 kg (228 lb).  Blood thinners: None      ALLERGIES: No Known Allergies    MEDICATIONS:    Current Outpatient Medications:     adalimumab (HUMIRA) 40 mg/0.8 mL PSKT, Inject 40 mg under the skin every 14 (fourteen) days Pt had last dose on 10/27/23- Will be on hold for surgery on 23, Disp: , Rfl:     atorvastatin (LIPITOR) 20 mg tablet, Take 1 tablet (20 mg total) by mouth daily, Disp: 100 tablet, Rfl: 1    gabapentin (NEURONTIN) 300 mg capsule, Take 1 capsule (300 mg total) by mouth 3 (three) times a day, Disp: 270 capsule, Rfl: 0    Multiple Vitamins-Minerals (One-A-Day Mens 50+) TABS, Take by mouth, Disp: , Rfl:      PAST MEDICAL HISTORY:   Past Medical History:   Diagnosis Date    Arthritis 2013    Bilateral carpal tunnel syndrome     Chronic pain disorder     low back    GERD (gastroesophageal reflux disease)     Hyperlipidemia     Low back pain     " Lumbar disc disease     Lumbar spinal stenosis     Neck pain     had cervical fusion    Obesity     Psoriasis     right elbow    Tremor     left leg    Wears dentures     full dentures    Wears glasses        PAST SURGICAL HISTORY:  Past Surgical History:   Procedure Laterality Date    CERVICAL LAMINECTOMY      COLONOSCOPY  2015    multiple polyps, repeat in 3 years    COLONOSCOPY  10/2018    Dr. Rosales.  Diverticulosis in sigmoid and descending colon, tubular adenoma polyp removed from descending colon.    JOINT REPLACEMENT      right TKR    KNEE SURGERY Right     20 years ago-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    LAMINECTOMY      ORTHOPEDIC SURGERY      POPLITEAL SYNOVIAL CYST EXCISION      last assessed: 2015    [place] ARTHRP KNE CONDYLE&PLATU MEDIAL&LAT COMPARTMENTS Left 2023    Procedure: ARTHROPLASTY KNEE TOTAL;  Surgeon: Valeriy Topete DO;  [facility];  Service: Orthopedics    [place] NEUROPLASTY &/TRANSPOS MEDIAN NRV CARPAL TUNNE Left 2020    Procedure: RELEASE CARPAL TUNNEL;  Surgeon: Darrin Allen MD;  [facility];  Service: Orthopedics    [place] NEUROPLASTY &/TRANSPOS MEDIAN NRV CARPAL TUNNE Right 10/20/2020    Procedure: RELEASE CARPAL TUNNEL - Open;  Surgeon: Darrin Allen MD;  [facility];  Service: Orthopedics    SPINE SURGERY      TONSILLECTOMY AND ADENOIDECTOMY      last assessed: 2015    TOTAL KNEE ARTHROPLASTY      last assessed: 2015       SOCIAL HISTORY:  Social History     Tobacco Use   Smoking Status Former    Current packs/day: 0.00    Average packs/day: 1.5 packs/day for 10.0 years (15.0 ttl pk-yrs)    Types: Cigarettes    Start date: 2003    Quit date: 2013    Years since quittin.5   Smokeless Tobacco Never        Physical Exam    70 y.o. male sitting comfortably on exam chair in no apparent distress.   Ambulates with myelopathic exam, leaning forward  Loss of lumbar lordosis  ROM: flexion to 40 cm about the ground,  extension to 25%  Patient is unable to go up on toes and on heels   Patient is  unable to balance on 1 leg   Non-TTP over spinous processes and paraspinal muscles  Strength RLE: hip flexion 5/5, knee extension 5/5, knee flexion 5/5 , dorsiflexion 5/5, plantar flexion 5/5  Strength LLE: hip flexion 5/5, knee extension 5/5, knee flexion 5/5 , dorsiflexion 5/5, plantar flexion 5/5  Strength RUE:  finger abduction 5/5,  strength 5/5, wrist flexion 5/5, wrist extension 5/5 , elbow flexion 5/5, elbow extension 5/5, shoulder external rotation 5/5  Strength LUE:  finger abduction 5/5,  strength 5/5, wrist flexion 5/5, wrist extension 5/5 , elbow flexion 5/5, elbow extension 5/5, shoulder external rotation 5/5  Sensation is intact and equal bilaterally in all extremities   Reflexes:    R knee 3+, R achilles 2+   L knee 3+, L achilles 2+   R triceps 3+, R biceps 3+, R brachioradialis 3+   L triceps 3+, L biceps 3+, L brachioradialis 3+      RADIOGRAPHIC STUDIES:  MRI, lumbar spine, 11/21/2024: Severe degenerative changes throughout the lower thoracic spine and the entirety of the lumbar spine.  There is evidence of bone-on-bone deformity and loss of lumbar lordosis.  There is evidence of severe lateral recess stenosis at L2-3, L3-4 and L4-5 and moderate lateral recess stenosis at L5-S1.  X-rays, lumbar spine, 6/29/2017: Severe degenerative disease throughout the lumbar spine with bone-on-bone deformity and multilevel vacuum disc formation.  Loss of lumbar lordosis.    SPINE AND PAIN PROCEDURES:   Last injection with Dr. Leija on 12/18/2024 - Fluoroscopically-guided right L3-4 transforaminal epidural steroid injection under fluoroscopy       Assessment & Plan  S/P cervical spinal fusion    Orders:    Ambulatory Referral to Physical Therapy; Future    MRI cervical spine wo contrast; Future    MRI thoracic spine wo contrast; Future    Myelopathic gait    Orders:    Ambulatory Referral to Physical Therapy; Future     MRI cervical spine wo contrast; Future    MRI thoracic spine wo contrast; Future    Chronic low back pain, unspecified back pain laterality, unspecified whether sciatica present    Orders:    Ambulatory Referral to Physical Therapy; Future    MRI cervical spine wo contrast; Future    MRI thoracic spine wo contrast; Future           PLAN:  70 y.o. male with chronic low back pain, lumbar spinal stenosis, and myelopathic gait, who is s/p posterior cervical fusion in 2013.  Did review his MRI scan from 2013 where there was evidence of spinal cord compression single level myelomalacia.  It appears that he underwent a cervical posterior decompression and fusion.  But there is no x-rays postoperatively on the neck and there is no indication is had an updated MRI study.  On physical examination his gait is highly affected.  He also appears to be myelopathic in all 4 extremities.  At this time I think important to obtain MRI studies of the cervical and thoracic spine to fully assess the cervical myelopathy and confirm that it is a chronic issue and not progressive in nature.    Was referred to me was for evaluation of his amatory dysfunction and back pain.  He does have significant degenerative disc disease throughout the lumbar spine sagittal imbalance and multilevel vacuum disclamation.  The MRI scan shows evidence severe stenosis.  I did review the x-rays from 2017 and the MRI study from November of last year.    Natural history of spinal stenosis and treatment options were discussed with him.  He has had normal physical therapy in the past.  He has had numerous injections by Dr. Leija.  The only modality that he has not tried is pool therapy.  The role of pool therapy was discussed with the patient.  I am going to refer him to pool therapy for a couple months to see if he has any improvement in his symptoms.  If there is improvement in pain and function improvement, continuation of pool therapy may be reasonable.  If  the pool therapy fails, the only available option would be surgery.     He has an appointment to see me in 6 to 8 weeks.  By that time he should have undergone the MRI study of the cervical and thoracic spine in addition to 5-6 sessions of pool therapy.        Scribe Attestation      I,:  Jessica Ortiz PA-C am acting as a scribe while in the presence of the attending physician.:       I,:  Dylon Turner MD personally performed the services described in this documentation    as scribed in my presence.:

## 2025-04-14 ENCOUNTER — HOSPITAL ENCOUNTER (OUTPATIENT)
Dept: MRI IMAGING | Facility: HOSPITAL | Age: 70
Discharge: HOME | End: 2025-04-14
Payer: MEDICARE

## 2025-04-14 DIAGNOSIS — R26.89 MYELOPATHIC GAIT: ICD-10-CM

## 2025-04-14 DIAGNOSIS — Z98.1 S/P CERVICAL SPINAL FUSION: ICD-10-CM

## 2025-04-14 DIAGNOSIS — M54.50 CHRONIC LOW BACK PAIN, UNSPECIFIED BACK PAIN LATERALITY, UNSPECIFIED WHETHER SCIATICA PRESENT: ICD-10-CM

## 2025-04-14 DIAGNOSIS — G89.29 CHRONIC LOW BACK PAIN, UNSPECIFIED BACK PAIN LATERALITY, UNSPECIFIED WHETHER SCIATICA PRESENT: ICD-10-CM

## 2025-04-14 PROCEDURE — 72146 MRI CHEST SPINE W/O DYE: CPT

## 2025-04-14 PROCEDURE — 72141 MRI NECK SPINE W/O DYE: CPT

## 2025-05-19 ENCOUNTER — OFFICE VISIT (OUTPATIENT)
Age: 70
End: 2025-05-19
Payer: MEDICARE

## 2025-05-19 VITALS — HEIGHT: 69 IN | WEIGHT: 228 LBS | BODY MASS INDEX: 33.77 KG/M2

## 2025-05-19 DIAGNOSIS — M54.50 CHRONIC LOW BACK PAIN, UNSPECIFIED BACK PAIN LATERALITY, UNSPECIFIED WHETHER SCIATICA PRESENT: ICD-10-CM

## 2025-05-19 DIAGNOSIS — G89.29 CHRONIC LOW BACK PAIN, UNSPECIFIED BACK PAIN LATERALITY, UNSPECIFIED WHETHER SCIATICA PRESENT: ICD-10-CM

## 2025-05-19 DIAGNOSIS — Z98.1 S/P CERVICAL SPINAL FUSION: Primary | ICD-10-CM

## 2025-05-19 DIAGNOSIS — R26.89 MYELOPATHIC GAIT: ICD-10-CM

## 2025-05-19 PROCEDURE — 99214 OFFICE O/P EST MOD 30 MIN: CPT | Performed by: ORTHOPAEDIC SURGERY

## 2025-05-19 NOTE — PROGRESS NOTES
"Name: Aki Hilario      : 1955       MRN: [id number]   Encounter Provider: Dylon Turner MD   Encounter Date: 25  Encounter department: [facility] ORTHOPEDIC SPINE SURGERY      History of Present Illness    Aki Hilario is a 70 y.o. male who presents for follow-up evaluation. He has a prior history of spinal injections with Dr. Leija, with the last injection being in 2024, and posterior cervical fusion in . Patient was last seen in the office on 2025, where he was referred to aquatic therapy for his lumbar spine and MRIs of the cervical and thoracic spine were ordered to evaluate for spinal cord compression. At this time, patient reports that he is doing well overall. He reports that aquatic therapy has significant improved his low back symptoms. He notes his walking has, also, significantly improved since beginning aquatic therapy. Denies any radicular symptoms. Denies any neck or upper back pain.     Diabetic: No  Nicotine use: Former  BMI: Estimated body mass index is 33.67 kg/m² as calculated from the following:    Height as of this encounter: 5' 9\" (1.753 m).    Weight as of this encounter: 103 kg (228 lb).  Blood thinners: None      ALLERGIES: No Known Allergies    MEDICATIONS:    Current Outpatient Medications:     adalimumab (HUMIRA) 40 mg/0.8 mL PSKT, Inject 40 mg under the skin every 14 (fourteen) days Pt had last dose on 10/27/23- Will be on hold for surgery on 23, Disp: , Rfl:     atorvastatin (LIPITOR) 20 mg tablet, Take 1 tablet (20 mg total) by mouth daily, Disp: 100 tablet, Rfl: 1    gabapentin (NEURONTIN) 300 mg capsule, Take 1 capsule (300 mg total) by mouth 3 (three) times a day, Disp: 270 capsule, Rfl: 0    Multiple Vitamins-Minerals (One-A-Day Mens 50+) TABS, Take by mouth, Disp: , Rfl:      PAST MEDICAL HISTORY:   Past Medical History:   Diagnosis Date    Arthritis 2013    Bilateral carpal tunnel " syndrome     Chronic pain disorder     low back    GERD (gastroesophageal reflux disease)     Hyperlipidemia     Low back pain     Lumbar disc disease     Lumbar spinal stenosis     Neck pain     had cervical fusion    Obesity     Psoriasis     right elbow    Tremor     left leg    Wears dentures     full dentures    Wears glasses        PAST SURGICAL HISTORY:  Past Surgical History:   Procedure Laterality Date    CERVICAL LAMINECTOMY      COLONOSCOPY  2015    multiple polyps, repeat in 3 years    COLONOSCOPY  10/2018    Dr. Rosales.  Diverticulosis in sigmoid and descending colon, tubular adenoma polyp removed from descending colon.    JOINT REPLACEMENT      right TKR    KNEE SURGERY Right     20 years ago-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    LAMINECTOMY      ORTHOPEDIC SURGERY      POPLITEAL SYNOVIAL CYST EXCISION      last assessed: 2015    MS ARTHRP KNE CONDYLE&PLATU MEDIAL&LAT COMPARTMENTS Left 2023    Procedure: ARTHROPLASTY KNEE TOTAL;  Surgeon: Valeriy Topete DO;  Location: [facility];  Service: Orthopedics    MS NEUROPLASTY &/TRANSPOS MEDIAN NRV CARPAL TUNNE Left 2020    Procedure: RELEASE CARPAL TUNNEL;  Surgeon: Darrin Allen MD;  Location: [facility];  Service: Orthopedics    MS NEUROPLASTY &/TRANSPOS MEDIAN NRV CARPAL TUNNE Right 10/20/2020    Procedure: RELEASE CARPAL TUNNEL - Open;  Surgeon: Darrin Allen MD;  Location: [facility];  Service: Orthopedics    SPINE SURGERY      TONSILLECTOMY AND ADENOIDECTOMY      last assessed: 2015    TOTAL KNEE ARTHROPLASTY      last assessed: 2015       SOCIAL HISTORY:  Social History     Tobacco Use   Smoking Status Former    Current packs/day: 0.00    Average packs/day: 1.5 packs/day for 10.0 years (15.0 ttl pk-yrs)    Types: Cigarettes    Start date: 2003    Quit date: 2013    Years since quittin.6   Smokeless Tobacco Never        Physical Exam    70 y.o. male sitting comfortably on exam chair in no apparent  distress.   Ambulates with myelopathic exam, leaning forward  Loss of lumbar lordosis  ROM: flexion to 40 cm about the ground, extension to 25%  Patient is able to go up on toes and on heels with mild difficulty   Non-TTP over spinous processes and paraspinal muscles    Strength RLE: hip flexion 5/5, knee extension 5/5, knee flexion 5/5 , dorsiflexion 5/5, plantar flexion 5/5  Strength LLE: hip flexion 5/5, knee extension 5/5, knee flexion 5/5 , dorsiflexion 5/5, plantar flexion 5/5  Strength RUE:  finger abduction 5/5,  strength 5/5, wrist flexion 5/5, wrist extension 5/5 , elbow flexion 5/5, elbow extension 5/5, shoulder external rotation 5/5  Strength LUE:  finger abduction 5/5,  strength 5/5, wrist flexion 5/5, wrist extension 5/5 , elbow flexion 5/5, elbow extension 5/5, shoulder external rotation 5/5  Sensation is intact and equal bilaterally in all extremities     Reflexes:    R knee 3+, R achilles 2+   L knee 3+, L achilles 2+   R triceps 3+, R biceps 3+, R brachioradialis 3+   L triceps 3+, L biceps 3+, L brachioradialis 3+      RADIOGRAPHIC STUDIES:  MRI, lumbar spine, 11/21/2024: Severe degenerative changes throughout the lower thoracic spine and the entirety of the lumbar spine.  There is evidence of bone-on-bone deformity and loss of lumbar lordosis.  There is evidence of severe lateral recess stenosis at L2-3, L3-4 and L4-5 and moderate lateral recess stenosis at L5-S1.  X-rays, lumbar spine, 6/29/2017: Severe degenerative disease throughout the lumbar spine with bone-on-bone deformity and multilevel vacuum disc formation.  Loss of lumbar lordosis.  MRI, cervical spine, 4/14/2025: Severe multilevel cervical degenerative disc disease.  There is evidence of fusion at C3-4 and C5-6 and possibly at C2-3.  Grade 1 spondylolisthesis present at C7-T1.  The canal appears to be open.  There is evidence of prior laminectomy from C2-3 through C5-6.  There is no spinal cord flattening or  myelomalacia.  MRI, thoracic spine, 4/14/2025: Moderate to severe multilevel thoracic degenerative disc disease.  The canal is wide open.  There is no evidence of spinal cord compression    SPINE AND PAIN PROCEDURES:   Last injection with Dr. Leija on 12/18/2024 - Fluoroscopically-guided right L3-4 transforaminal epidural steroid injection under fluoroscopy       Assessment & Plan  S/P cervical spinal fusion         Myelopathic gait         Chronic low back pain, unspecified back pain laterality, unspecified whether sciatica present                PLAN:  70 y.o. male with chronic low back pain, lumbar spinal stenosis, and myelopathic gait, who is s/p posterior cervical fusion in 2013.  He has had a prior laminectomy and does have myelopathic signs.  The MRI studies were ordered to rule out progressive myelopathy.  He returns today for evaluation of the MRI studies.  Is also been undergoing therapy had significant improvement in his pain.    MRI images of the cervical and thoracic spine, performed on 4/14/2025, were reviewed and discussed with the patient during today's visit. Discussed that the recent MRI studies show no evidence of spinal cord compression or progressive myelomalacia. At this time, there is no indications for treatment of his cervical or thoracic spine.  There is no indication of spinal cord flattening or myelomalacia.  Prior laminectomy site is well visualized in the cervical spine.  There is some atrophy of the spinal cord at the laminectomy site but no evidence of myelomalacia.    In regards to the patient's lumbar spine, he was encouraged to continue participating in aquatic therapy, since he has had significant improvement in his symptoms and function. Patient agreeable to this plan. I did discuss that he can join a local swimming pool and begin to perform the aquatic therapy exercises on his own. Mentioned that he is to continue with these exercises weekly for the foreseeable future.      Patient is to return in 6 months for re-evaluation.       Scribe Attestation      I,:  Jessica Ortiz PA-C am acting as a scribe while in the presence of the attending physician.:       I,:  Dylon Turner MD personally performed the services described in this documentation    as scribed in my presence.:

## 2025-06-16 ENCOUNTER — OFFICE VISIT (OUTPATIENT)
Dept: FAMILY MEDICINE CLINIC | Facility: CLINIC | Age: 70
End: 2025-06-16
Payer: MEDICARE

## 2025-06-16 VITALS
RESPIRATION RATE: 20 BRPM | SYSTOLIC BLOOD PRESSURE: 132 MMHG | OXYGEN SATURATION: 99 % | WEIGHT: 214 LBS | HEIGHT: 69 IN | DIASTOLIC BLOOD PRESSURE: 70 MMHG | BODY MASS INDEX: 31.7 KG/M2 | TEMPERATURE: 97.8 F | HEART RATE: 68 BPM

## 2025-06-16 DIAGNOSIS — R73.03 PRE-DIABETES: ICD-10-CM

## 2025-06-16 DIAGNOSIS — Z00.00 MEDICARE ANNUAL WELLNESS VISIT, SUBSEQUENT: Primary | ICD-10-CM

## 2025-06-16 DIAGNOSIS — L40.9 PSORIASIS: ICD-10-CM

## 2025-06-16 DIAGNOSIS — K21.9 GASTROESOPHAGEAL REFLUX DISEASE WITHOUT ESOPHAGITIS: ICD-10-CM

## 2025-06-16 DIAGNOSIS — M47.816 LUMBAR SPONDYLOSIS: ICD-10-CM

## 2025-06-16 DIAGNOSIS — I10 ESSENTIAL HYPERTENSION: ICD-10-CM

## 2025-06-16 DIAGNOSIS — E78.2 MIXED HYPERLIPIDEMIA: ICD-10-CM

## 2025-06-16 DIAGNOSIS — Z12.5 SCREENING FOR PROSTATE CANCER: ICD-10-CM

## 2025-06-16 PROBLEM — Z01.818 PREOPERATIVE CLEARANCE: Status: RESOLVED | Noted: 2023-11-06 | Resolved: 2025-06-16

## 2025-06-16 PROBLEM — R73.01 IMPAIRED FASTING GLUCOSE: Status: RESOLVED | Noted: 2023-11-06 | Resolved: 2025-06-16

## 2025-06-16 PROBLEM — G89.4 CHRONIC PAIN SYNDROME: Status: RESOLVED | Noted: 2017-10-04 | Resolved: 2025-06-16

## 2025-06-16 PROBLEM — M79.18 MYOFASCIAL PAIN SYNDROME: Status: RESOLVED | Noted: 2022-07-13 | Resolved: 2025-06-16

## 2025-06-16 PROBLEM — M54.16 LUMBAR RADICULOPATHY: Status: RESOLVED | Noted: 2018-04-23 | Resolved: 2025-06-16

## 2025-06-16 PROCEDURE — G2211 COMPLEX E/M VISIT ADD ON: HCPCS | Performed by: NURSE PRACTITIONER

## 2025-06-16 PROCEDURE — G0439 PPPS, SUBSEQ VISIT: HCPCS | Performed by: NURSE PRACTITIONER

## 2025-06-16 PROCEDURE — 99214 OFFICE O/P EST MOD 30 MIN: CPT | Performed by: NURSE PRACTITIONER

## 2025-06-16 RX ORDER — ATORVASTATIN CALCIUM 20 MG/1
20 TABLET, FILM COATED ORAL DAILY
Qty: 100 TABLET | Refills: 1 | Status: SHIPPED | OUTPATIENT
Start: 2025-06-16

## 2025-06-16 NOTE — ASSESSMENT & PLAN NOTE
Lab Results   Component Value Date    HGBA1C 5.8 (H) 12/20/2024     Has been stable. Recheck every 6 mo  Orders:  •  Hemoglobin A1C; Future  •  Comprehensive metabolic panel; Future

## 2025-06-16 NOTE — ASSESSMENT & PLAN NOTE
Lab Results   Component Value Date    LDLCALC 54 12/20/2024     Continue with lipitor 20  Update labs   Orders:  •  atorvastatin (LIPITOR) 20 mg tablet; Take 1 tablet (20 mg total) by mouth daily  •  Lipid panel; Future

## 2025-06-16 NOTE — ASSESSMENT & PLAN NOTE
Stable off of medication- doing water therapy which is helpful.   Gabapentin wasn't helpful- was seeing specialist

## 2025-06-16 NOTE — ASSESSMENT & PLAN NOTE
Follows with dermatology and receives Humira injection.    Continue outpatient follow-up with dermatology

## 2025-06-16 NOTE — PROGRESS NOTES
Name: Aki Hilario      : 1955      MRN: [id number]  Encounter Provider: MEY Flores  Encounter Date: 2025   Encounter department: [facility]  :  Assessment & Plan  Medicare annual wellness visit, subsequent      Orders:  •  TSH, 3rd generation with Free T4 reflex; Future  •  CBC and differential; Future    Mixed hyperlipidemia  Lab Results   Component Value Date    LDLCALC 54 2024     Continue with lipitor 20  Update labs   Orders:  •  atorvastatin (LIPITOR) 20 mg tablet; Take 1 tablet (20 mg total) by mouth daily  •  Lipid panel; Future    Psoriasis  Follows with dermatology and receives Humira injection.    Continue outpatient follow-up with dermatology       Pre-diabetes  Lab Results   Component Value Date    HGBA1C 5.8 (H) 2024     Has been stable. Recheck every 6 mo  Orders:  •  Hemoglobin A1C; Future  •  Comprehensive metabolic panel; Future    Screening for prostate cancer  Would like to continue with PSA   Orders:  •  PSA, Total Screen; Future    Lumbar spondylosis  Stable off of medication- doing water therapy which is helpful.   Gabapentin wasn't helpful- was seeing specialist         Gastroesophageal reflux disease without esophagitis  Resolved/ no longer present per patient        Essential hypertension  Not on medication/ never was. BP stable/ asymptomatic           Preventive health issues were discussed with patient, and age appropriate screening tests were ordered as noted in patient's After Visit Summary. Personalized health advice and appropriate referrals for health education or preventive services given if needed, as noted in patient's After Visit Summary.    History of Present Illness     Here for 6 month follow up and medicare wellness visit.        Patient Care Team:  Jt Leach MD as PCP - General (Family Medicine)  DO Susannah Stephen PA-C Daryl Gordon, CRNP Dang Zhang, MD Jason Erickson,  DO    Review of Systems   Constitutional:  Negative for chills and fever.   Eyes:  Negative for discharge.   Respiratory:  Negative for shortness of breath.    Cardiovascular:  Negative for chest pain.   Gastrointestinal:  Negative for constipation and diarrhea.   Genitourinary:  Negative for difficulty urinating.   Musculoskeletal:  Negative for joint swelling.   Skin:  Negative for rash.   Neurological:  Negative for headaches.   Hematological:  Negative for adenopathy.   Psychiatric/Behavioral:  The patient is not nervous/anxious.      Medical History Reviewed by provider this encounter:  Tobacco  Allergies  Meds  Problems  Med Hx  Surg Hx  Fam Hx       Annual Wellness Visit Questionnaire   Aki is here for his Subsequent Wellness visit. Last Medicare Wellness visit information reviewed, patient interviewed, no change since last AWV.     Health Risk Assessment:   Patient rates overall health as good. Patient feels that their physical health rating is slightly better. Patient is satisfied with their life. Eyesight was rated as same. Hearing was rated as same. Patient feels that their emotional and mental health rating is same. Patients states they are never, rarely angry. Patient states they are never, rarely unusually tired/fatigued. Pain experienced in the last 7 days has been some. Patient's pain rating has been 3/10. Patient states that he has experienced no weight loss or gain in last 6 months.     Depression Screening:   PHQ-2 Score: 0      Fall Risk Screening:   In the past year, patient has experienced: no history of falling in past year      Home Safety:  Patient does not have trouble with stairs inside or outside of their home. Patient has working smoke alarms and has working carbon monoxide detector. Home safety hazards include: none.     Nutrition:   Current diet is Regular.     Medications:   Patient is currently taking over-the-counter supplements. OTC medications include: see medication  list. Patient is able to manage medications.     Activities of Daily Living (ADLs)/Instrumental Activities of Daily Living (IADLs):   Walk and transfer into and out of bed and chair?: Yes  Dress and groom yourself?: Yes    Bathe or shower yourself?: Yes    Feed yourself? Yes  Do your laundry/housekeeping?: Yes  Manage your money, pay your bills and track your expenses?: Yes  Make your own meals?: Yes    Do your own shopping?: Yes    Previous Hospitalizations:   Any hospitalizations or ED visits within the last 12 months?: No      Advance Care Planning:   Living will: Yes    Durable POA for healthcare: Yes    Advanced directive: Yes      Preventive Screenings      Cardiovascular Screening:    General: Screening Not Indicated, History Lipid Disorder and Screening Current      Diabetes Screening:     General: Screening Current      Colorectal Cancer Screening:     General: Screening Current      Prostate Cancer Screening:    General: Screening Current      Osteoporosis Screening:    General: Screening Not Indicated      Abdominal Aortic Aneurysm (AAA) Screening:    Risk factors include: age between 65-74 yo and tobacco use        General: Screening Current      Lung Cancer Screening:     General: Screening Not Indicated      Hepatitis C Screening:    General: Screening Current    Immunizations:  - Immunizations due: Prevnar 20, Hepatitis A and Hepatitis B    Screening, Brief Intervention, and Referral to Treatment (SBIRT)     Screening  Typical number of drinks in a day: 0  Typical number of drinks in a week: 0  Interpretation: Low risk drinking behavior.    AUDIT-C Screenin) How often did you have a drink containing alcohol in the past year? never  2) How many drinks did you have on a typical day when you were drinking in the past year? 0  3) How often did you have 6 or more drinks on one occasion in the past year? never    AUDIT-C Score: 0  Interpretation: Score 0-3 (male): Negative screen for alcohol  "misuse    Single Item Drug Screening:  How often have you used an illegal drug (including marijuana) or a prescription medication for non-medical reasons in the past year? never    Single Item Drug Screen Score: 0  Interpretation: Negative screen for possible drug use disorder    Social Drivers of Health     Financial Resource Strain: Low Risk  (5/31/2023)    Overall Financial Resource Strain (CARDIA)    • Difficulty of Paying Living Expenses: Not hard at all   Food Insecurity: No Food Insecurity (6/16/2025)    Nursing - Inadequate Food Risk Classification    • Worried About Running Out of Food in the Last Year: Never true    • Ran Out of Food in the Last Year: Never true   Transportation Needs: No Transportation Needs (6/16/2025)    PRAPARE - Transportation    • Lack of Transportation (Medical): No    • Lack of Transportation (Non-Medical): No   Housing Stability: Unknown (6/16/2025)    Housing Stability Vital Sign    • Unable to Pay for Housing in the Last Year: No    • Homeless in the Last Year: No   Utilities: Not At Risk (6/16/2025)    [facility] Utilities    • Threatened with loss of utilities: No     No results found.    Objective   /70 (BP Location: Right arm, Patient Position: Sitting, Cuff Size: Adult)   Pulse 68   Temp 97.8 °F (36.6 °C) (Temporal)   Resp 20   Ht 5' 9\" (1.753 m)   Wt 97.1 kg (214 lb)   SpO2 99%   BMI 31.60 kg/m²     Physical Exam  Vitals and nursing note reviewed.   Constitutional:       General: He is not in acute distress.     Appearance: Normal appearance. He is obese. He is not ill-appearing, toxic-appearing or diaphoretic.   HENT:      Head: Normocephalic and atraumatic.      Right Ear: External ear normal.      Left Ear: External ear normal.      Nose: Nose normal.      Mouth/Throat:      Mouth: Mucous membranes are moist.      Pharynx: Oropharynx is clear. No oropharyngeal exudate or posterior oropharyngeal erythema.     Eyes:      General: Lids are normal. No scleral icterus. "        Right eye: No discharge.         Left eye: No discharge.      Extraocular Movements: Extraocular movements intact.      Conjunctiva/sclera: Conjunctivae normal.      Pupils: Pupils are equal, round, and reactive to light.       Cardiovascular:      Rate and Rhythm: Normal rate and regular rhythm. No extrasystoles are present.     Heart sounds: S1 normal and S2 normal. No murmur heard.  Pulmonary:      Effort: Pulmonary effort is normal. No respiratory distress.      Breath sounds: Normal breath sounds. No stridor or decreased air movement. No wheezing or rhonchi.   Abdominal:      General: Bowel sounds are normal.      Palpations: Abdomen is soft.     Musculoskeletal:         General: Normal range of motion.      Cervical back: Normal range of motion and neck supple.     Skin:     General: Skin is warm and dry.     Neurological:      General: No focal deficit present.      Mental Status: He is alert and oriented to person, place, and time. Mental status is at baseline.      Cranial Nerves: No cranial nerve deficit.      Sensory: No sensory deficit.      Motor: No weakness.      Coordination: Coordination normal.      Gait: Gait normal.     Psychiatric:         Attention and Perception: Attention and perception normal.         Mood and Affect: Mood and affect normal.         Speech: Speech normal.         Behavior: Behavior is cooperative.         Cognition and Memory: Cognition normal.         Judgment: Judgment normal.

## 2025-06-30 ENCOUNTER — APPOINTMENT (OUTPATIENT)
Dept: LAB | Facility: CLINIC | Age: 70
End: 2025-06-30
Payer: MEDICARE

## 2025-06-30 DIAGNOSIS — E78.2 MIXED HYPERLIPIDEMIA: ICD-10-CM

## 2025-06-30 DIAGNOSIS — Z12.5 SCREENING FOR PROSTATE CANCER: ICD-10-CM

## 2025-06-30 DIAGNOSIS — R73.03 PRE-DIABETES: ICD-10-CM

## 2025-06-30 DIAGNOSIS — Z00.00 MEDICARE ANNUAL WELLNESS VISIT, SUBSEQUENT: ICD-10-CM

## 2025-06-30 LAB
ALBUMIN SERPL BCG-MCNC: 4.2 G/DL (ref 3.5–5)
ALP SERPL-CCNC: 120 U/L (ref 34–104)
ALT SERPL W P-5'-P-CCNC: 17 U/L (ref 7–52)
ANION GAP SERPL CALCULATED.3IONS-SCNC: 11 MMOL/L (ref 4–13)
AST SERPL W P-5'-P-CCNC: 21 U/L (ref 13–39)
BASOPHILS # BLD AUTO: 0.06 THOUSANDS/ÂΜL (ref 0–0.1)
BASOPHILS NFR BLD AUTO: 1 % (ref 0–1)
BILIRUB SERPL-MCNC: 0.59 MG/DL (ref 0.2–1)
BUN SERPL-MCNC: 12 MG/DL (ref 5–25)
CALCIUM SERPL-MCNC: 9.2 MG/DL (ref 8.4–10.2)
CHLORIDE SERPL-SCNC: 104 MMOL/L (ref 96–108)
CHOLEST SERPL-MCNC: 130 MG/DL (ref ?–200)
CO2 SERPL-SCNC: 24 MMOL/L (ref 21–32)
CREAT SERPL-MCNC: 0.69 MG/DL (ref 0.6–1.3)
EOSINOPHIL # BLD AUTO: 0.13 THOUSAND/ÂΜL (ref 0–0.61)
EOSINOPHIL NFR BLD AUTO: 2 % (ref 0–6)
ERYTHROCYTE [DISTWIDTH] IN BLOOD BY AUTOMATED COUNT: 13.5 % (ref 11.6–15.1)
EST. AVERAGE GLUCOSE BLD GHB EST-MCNC: 114 MG/DL
GFR SERPL CREATININE-BSD FRML MDRD: 94 ML/MIN/1.73SQ M
GLUCOSE P FAST SERPL-MCNC: 102 MG/DL (ref 65–99)
HBA1C MFR BLD: 5.6 %
HCT VFR BLD AUTO: 43.9 % (ref 36.5–49.3)
HDLC SERPL-MCNC: 38 MG/DL
HGB BLD-MCNC: 14.5 G/DL (ref 12–17)
IMM GRANULOCYTES # BLD AUTO: 0.02 THOUSAND/UL (ref 0–0.2)
IMM GRANULOCYTES NFR BLD AUTO: 0 % (ref 0–2)
LDLC SERPL CALC-MCNC: 78 MG/DL (ref 0–100)
LYMPHOCYTES # BLD AUTO: 2.27 THOUSANDS/ÂΜL (ref 0.6–4.47)
LYMPHOCYTES NFR BLD AUTO: 37 % (ref 14–44)
MCH RBC QN AUTO: 32.8 PG (ref 26.8–34.3)
MCHC RBC AUTO-ENTMCNC: 33 G/DL (ref 31.4–37.4)
MCV RBC AUTO: 99 FL (ref 82–98)
MONOCYTES # BLD AUTO: 0.42 THOUSAND/ÂΜL (ref 0.17–1.22)
MONOCYTES NFR BLD AUTO: 7 % (ref 4–12)
NEUTROPHILS # BLD AUTO: 3.26 THOUSANDS/ÂΜL (ref 1.85–7.62)
NEUTS SEG NFR BLD AUTO: 53 % (ref 43–75)
NONHDLC SERPL-MCNC: 92 MG/DL
NRBC BLD AUTO-RTO: 0 /100 WBCS
PLATELET # BLD AUTO: 230 THOUSANDS/UL (ref 149–390)
PMV BLD AUTO: 9.7 FL (ref 8.9–12.7)
POTASSIUM SERPL-SCNC: 4.8 MMOL/L (ref 3.5–5.3)
PROT SERPL-MCNC: 6.7 G/DL (ref 6.4–8.4)
PSA SERPL-MCNC: 0.83 NG/ML (ref 0–4)
RBC # BLD AUTO: 4.42 MILLION/UL (ref 3.88–5.62)
SODIUM SERPL-SCNC: 139 MMOL/L (ref 135–147)
TRIGL SERPL-MCNC: 70 MG/DL (ref ?–150)
TSH SERPL DL<=0.05 MIU/L-ACNC: 1.28 UIU/ML (ref 0.45–4.5)
WBC # BLD AUTO: 6.16 THOUSAND/UL (ref 4.31–10.16)

## 2025-06-30 PROCEDURE — 80053 COMPREHEN METABOLIC PANEL: CPT

## 2025-06-30 PROCEDURE — 83036 HEMOGLOBIN GLYCOSYLATED A1C: CPT

## 2025-06-30 PROCEDURE — G0103 PSA SCREENING: HCPCS

## 2025-06-30 PROCEDURE — 36415 COLL VENOUS BLD VENIPUNCTURE: CPT

## 2025-06-30 PROCEDURE — 80061 LIPID PANEL: CPT

## 2025-06-30 PROCEDURE — 85025 COMPLETE CBC W/AUTO DIFF WBC: CPT

## 2025-06-30 PROCEDURE — 84443 ASSAY THYROID STIM HORMONE: CPT

## (undated) DEVICE — SCD SEQUENTIAL COMPRESSION COMFORT SLEEVE MEDIUM KNEE LENGTH: Brand: KENDALL SCD

## (undated) DEVICE — LIGHT GLOVE GREEN

## (undated) DEVICE — IMPERVIOUS STOCKINETTE: Brand: DEROYAL

## (undated) DEVICE — ACE WRAP 6 IN UNSTERILE

## (undated) DEVICE — WEBRIL 6 IN UNSTERILE

## (undated) DEVICE — CURITY NON-ADHERENT STRIPS: Brand: CURITY

## (undated) DEVICE — ASTOUND STANDARD SURGICAL GOWN, XL: Brand: CONVERTORS

## (undated) DEVICE — CHLORAPREP HI-LITE 26ML ORANGE

## (undated) DEVICE — NEEDLE 18 G X 1 1/2

## (undated) DEVICE — NEEDLE 25G X 1 1/2

## (undated) DEVICE — 3M™ IOBAN™ 2 ANTIMICROBIAL INCISE DRAPE 6650EZ: Brand: IOBAN™ 2

## (undated) DEVICE — SUT VICRYL 2-0 CT-1 36 IN J945H

## (undated) DEVICE — NEEDLE COUNTER LG W/RULER

## (undated) DEVICE — X-RAY DETECTABLE SPONGES,16 PLY: Brand: VISTEC

## (undated) DEVICE — ACE WRAP 4 IN UNSTERILE

## (undated) DEVICE — SPECIMEN CONTAINER STERILE PEEL PACK

## (undated) DEVICE — GLOVE SRG BIOGEL 8.5

## (undated) DEVICE — CAPIT KNEE ATTUNE FB W/DOM

## (undated) DEVICE — GLOVE SRG BIOGEL 8

## (undated) DEVICE — SYRINGE BULB 2 OZ

## (undated) DEVICE — SUT VICRYL 1 CT-1 27 IN J261H

## (undated) DEVICE — HEAVY DUTY TABLE COVER: Brand: CONVERTORS

## (undated) DEVICE — CUFF TOURNIQUET 34 X 4 IN QUICK CONNECT DISP 1BLA

## (undated) DEVICE — GLOVE INDICATOR PI UNDERGLOVE SZ 6.5 BLUE

## (undated) DEVICE — GLOVE INDICATOR PI UNDERGLOVE SZ 8.5 BLUE

## (undated) DEVICE — CEMENT MIXING CARTRIDGE PRISM II

## (undated) DEVICE — COBAN 6 IN STERILE

## (undated) DEVICE — SKIN MARKER DUAL TIP WITH RULER CAP, FLEXIBLE RULER AND LABELS: Brand: DEVON

## (undated) DEVICE — GLOVE INDICATOR PI UNDERGLOVE SZ 8 BLUE

## (undated) DEVICE — SAW BLADE RECIPROCATING SINGLE SIDED 258 ORTHO

## (undated) DEVICE — TIBURON HAND DRAPE: Brand: CONVERTORS

## (undated) DEVICE — SUT STRATAFIX SPIRAL PDS PLUS 1 CTX 18 IN SXPP1A400

## (undated) DEVICE — SPONGE LAP 18 X 18 IN STRL RFD

## (undated) DEVICE — GLOVE SRG BIOGEL 6.5

## (undated) DEVICE — SURGICAL GOWN, XL SMARTSLEEVE: Brand: CONVERTORS

## (undated) DEVICE — ACE WRAP 6 IN STERILE

## (undated) DEVICE — HANDPIECE SET WITH RETRACTABLE COAXIAL FAN SPRAY TIP AND SUCTION TUBE: Brand: INTERPULSE

## (undated) DEVICE — INTENDED FOR TISSUE SEPARATION, AND OTHER PROCEDURES THAT REQUIRE A SHARP SURGICAL BLADE TO PUNCTURE OR CUT.: Brand: BARD-PARKER ® CARBON RIB-BACK BLADES

## (undated) DEVICE — SPONGE 4 X 4 XRAY 16 PLY STRL LF RFD

## (undated) DEVICE — SUT ETHILON 4-0 PS-2 18 IN 1667H

## (undated) DEVICE — ADHESIVE SKIN HIGH VISCOSITY EXOFIN 1ML

## (undated) DEVICE — GAUZE SPONGES,16 PLY: Brand: CURITY

## (undated) DEVICE — HOOD: Brand: FLYTE, SURGICOOL

## (undated) DEVICE — PADDING CAST 4 IN  COTTON STRL

## (undated) DEVICE — DRAPE SHEET THREE QUARTER

## (undated) DEVICE — BETHLEHEM UNIV TOTAL KNEE, KIT: Brand: CARDINAL HEALTH

## (undated) DEVICE — BOWL: 16OZ PEELPOUCH 75/CS 16/PLT: Brand: MEDEGEN MEDICAL PRODUCTS, LLC

## (undated) DEVICE — SYRINGE 10ML LL

## (undated) DEVICE — ACE WRAP 3 IN UNSTERILE

## (undated) DEVICE — TOWEL SET X-RAY

## (undated) DEVICE — DRESSING MEPILEX AG BORDER POST-OP 4 X 12 IN

## (undated) DEVICE — 450 ML BOTTLE OF 0.05% CHLORHEXIDINE GLUCONATE IN 99.95% STERILE WATER FOR IRRIGATION, USP AND APPLICATOR.: Brand: IRRISEPT ANTIMICROBIAL WOUND LAVAGE

## (undated) DEVICE — PLUMEPEN PRO 10FT

## (undated) DEVICE — SAW BLADE OSCILLATING BRAZOL 167

## (undated) DEVICE — GLOVE INDICATOR PI UNDERGLOVE SZ 7 BLUE

## (undated) DEVICE — GLOVE SRG BIOGEL ORTHOPEDIC 8.5

## (undated) DEVICE — SYRINGE 50ML LL

## (undated) DEVICE — 3M™ STERI-DRAPE™ U-DRAPE 1015: Brand: STERI-DRAPE™

## (undated) DEVICE — SUT STRATAFIX SPIRAL PLUS 3-0 PS-2 30 X 30 CM SXMP2B408

## (undated) DEVICE — GLOVE SRG BIOGEL 7

## (undated) DEVICE — 4-PORT MANIFOLD: Brand: NEPTUNE 2